# Patient Record
Sex: MALE | Race: BLACK OR AFRICAN AMERICAN | NOT HISPANIC OR LATINO | Employment: OTHER | URBAN - METROPOLITAN AREA
[De-identification: names, ages, dates, MRNs, and addresses within clinical notes are randomized per-mention and may not be internally consistent; named-entity substitution may affect disease eponyms.]

---

## 2019-10-29 ENCOUNTER — OFFICE VISIT (OUTPATIENT)
Dept: WOUND CARE | Facility: HOSPITAL | Age: 64
End: 2019-10-29

## 2019-10-29 PROCEDURE — G0463 HOSPITAL OUTPT CLINIC VISIT: HCPCS

## 2019-11-05 ENCOUNTER — TRANSCRIBE ORDERS (OUTPATIENT)
Dept: ADMINISTRATIVE | Facility: HOSPITAL | Age: 64
End: 2019-11-05

## 2019-11-05 ENCOUNTER — OFFICE VISIT (OUTPATIENT)
Dept: WOUND CARE | Facility: HOSPITAL | Age: 64
End: 2019-11-05

## 2019-11-05 DIAGNOSIS — I70.203 BILATERAL FEMORAL ARTERY STENOSIS (HCC): Primary | ICD-10-CM

## 2019-11-05 PROCEDURE — G0463 HOSPITAL OUTPT CLINIC VISIT: HCPCS

## 2019-11-07 ENCOUNTER — APPOINTMENT (OUTPATIENT)
Dept: CARDIOLOGY | Facility: HOSPITAL | Age: 64
End: 2019-11-07

## 2019-11-11 ENCOUNTER — APPOINTMENT (OUTPATIENT)
Dept: CARDIOLOGY | Facility: HOSPITAL | Age: 64
End: 2019-11-11

## 2019-11-19 ENCOUNTER — OFFICE VISIT (OUTPATIENT)
Dept: WOUND CARE | Facility: HOSPITAL | Age: 64
End: 2019-11-19

## 2019-11-19 ENCOUNTER — LAB REQUISITION (OUTPATIENT)
Dept: LAB | Facility: HOSPITAL | Age: 64
End: 2019-11-19

## 2019-11-19 DIAGNOSIS — E11.621 TYPE 2 DIABETES MELLITUS WITH FOOT ULCER (CODE) (HCC): ICD-10-CM

## 2019-11-19 PROCEDURE — 87070 CULTURE OTHR SPECIMN AEROBIC: CPT | Performed by: SURGERY

## 2019-11-19 PROCEDURE — G0463 HOSPITAL OUTPT CLINIC VISIT: HCPCS

## 2019-11-19 PROCEDURE — 87205 SMEAR GRAM STAIN: CPT | Performed by: SURGERY

## 2019-11-19 PROCEDURE — 87075 CULTR BACTERIA EXCEPT BLOOD: CPT | Performed by: SURGERY

## 2019-11-19 PROCEDURE — 87077 CULTURE AEROBIC IDENTIFY: CPT | Performed by: SURGERY

## 2019-11-19 PROCEDURE — 87186 SC STD MICRODIL/AGAR DIL: CPT | Performed by: SURGERY

## 2019-11-22 LAB
BACTERIA SPEC AEROBE CULT: ABNORMAL
BACTERIA SPEC AEROBE CULT: ABNORMAL
GRAM STN SPEC: ABNORMAL
GRAM STN SPEC: ABNORMAL

## 2019-11-24 LAB — BACTERIA SPEC ANAEROBE CULT: NORMAL

## 2019-11-26 ENCOUNTER — HOSPITAL ENCOUNTER (OUTPATIENT)
Dept: CARDIOLOGY | Facility: HOSPITAL | Age: 64
Discharge: HOME | End: 2019-11-26
Admitting: SURGERY

## 2019-11-26 DIAGNOSIS — I70.203 BILATERAL FEMORAL ARTERY STENOSIS (HCC): ICD-10-CM

## 2019-11-26 DIAGNOSIS — I73.9 PAD (PERIPHERAL ARTERY DISEASE) (HCC): ICD-10-CM

## 2019-11-26 LAB
BH CV LOWER ARTERIAL LEFT ABI RATIO: 1.2
BH CV LOWER ARTERIAL LEFT DORSALIS PEDIS SYS MAX: 170 MMHG
BH CV LOWER ARTERIAL LEFT GREAT TOE SYS MAX: 138 MMHG
BH CV LOWER ARTERIAL LEFT POST TIBIAL SYS MAX: 173 MMHG
BH CV LOWER ARTERIAL LEFT TBI RATIO: 1.2
BH CV LOWER ARTERIAL RIGHT ABI RATIO: 1.1
BH CV LOWER ARTERIAL RIGHT DORSALIS PEDIS SYS MAX: 167 MMHG
BH CV LOWER ARTERIAL RIGHT GREAT TOE SYS MAX: 132 MMHG
BH CV LOWER ARTERIAL RIGHT POST TIBIAL SYS MAX: 166 MMHG
BH CV LOWER ARTERIAL RIGHT TBI RATIO: 1.1
UPPER ARTERIAL LEFT ARM BRACHIAL SYS MAX: 145 MMHG
UPPER ARTERIAL RIGHT ARM BRACHIAL SYS MAX: 140 MMHG

## 2019-11-26 PROCEDURE — 93922 UPR/L XTREMITY ART 2 LEVELS: CPT

## 2019-12-03 ENCOUNTER — OFFICE VISIT (OUTPATIENT)
Dept: WOUND CARE | Facility: HOSPITAL | Age: 64
End: 2019-12-03

## 2019-12-03 PROCEDURE — G0463 HOSPITAL OUTPT CLINIC VISIT: HCPCS

## 2019-12-10 ENCOUNTER — TELEPHONE (OUTPATIENT)
Dept: INFECTIOUS DISEASES | Facility: CLINIC | Age: 64
End: 2019-12-10

## 2019-12-10 NOTE — TELEPHONE ENCOUNTER
Julia swenson/wound care called to inquire if the patient had been scheduled for new patient appt at this time.  Informed her that referral had been sent to Dr. Hurst for review.

## 2019-12-17 ENCOUNTER — LAB (OUTPATIENT)
Dept: LAB | Facility: HOSPITAL | Age: 64
End: 2019-12-17

## 2019-12-17 ENCOUNTER — OFFICE VISIT (OUTPATIENT)
Dept: WOUND CARE | Facility: HOSPITAL | Age: 64
End: 2019-12-17

## 2019-12-17 ENCOUNTER — TRANSCRIBE ORDERS (OUTPATIENT)
Dept: ADMINISTRATIVE | Facility: HOSPITAL | Age: 64
End: 2019-12-17

## 2019-12-17 DIAGNOSIS — L97.509 DIABETIC FOOT ULCER WITH OSTEOMYELITIS (HCC): Primary | ICD-10-CM

## 2019-12-17 DIAGNOSIS — M86.9 DIABETIC FOOT ULCER WITH OSTEOMYELITIS (HCC): Primary | ICD-10-CM

## 2019-12-17 DIAGNOSIS — M86.9 DIABETIC FOOT ULCER WITH OSTEOMYELITIS (HCC): ICD-10-CM

## 2019-12-17 DIAGNOSIS — E11.621 DIABETIC FOOT ULCER WITH OSTEOMYELITIS (HCC): ICD-10-CM

## 2019-12-17 DIAGNOSIS — L97.509 DIABETIC FOOT ULCER WITH OSTEOMYELITIS (HCC): ICD-10-CM

## 2019-12-17 DIAGNOSIS — E11.69 DIABETIC FOOT ULCER WITH OSTEOMYELITIS (HCC): Primary | ICD-10-CM

## 2019-12-17 DIAGNOSIS — E11.69 DIABETIC FOOT ULCER WITH OSTEOMYELITIS (HCC): ICD-10-CM

## 2019-12-17 DIAGNOSIS — E11.621 DIABETIC FOOT ULCER WITH OSTEOMYELITIS (HCC): Primary | ICD-10-CM

## 2019-12-17 DIAGNOSIS — L02.611 ABSCESS OF RIGHT FOOT: ICD-10-CM

## 2019-12-17 DIAGNOSIS — L97.422 ULCER OF LEFT HEEL, WITH FAT LAYER EXPOSED (HCC): ICD-10-CM

## 2019-12-17 LAB
CRP SERPL-MCNC: 0.17 MG/DL (ref 0–0.5)
ERYTHROCYTE [SEDIMENTATION RATE] IN BLOOD: 33 MM/HR (ref 0–20)
HBA1C MFR BLD: 8.6 % (ref 4.8–5.6)

## 2019-12-17 PROCEDURE — 86140 C-REACTIVE PROTEIN: CPT

## 2019-12-17 PROCEDURE — G0463 HOSPITAL OUTPT CLINIC VISIT: HCPCS

## 2019-12-17 PROCEDURE — 83036 HEMOGLOBIN GLYCOSYLATED A1C: CPT

## 2019-12-17 PROCEDURE — 85652 RBC SED RATE AUTOMATED: CPT

## 2019-12-17 PROCEDURE — 36415 COLL VENOUS BLD VENIPUNCTURE: CPT

## 2019-12-23 ENCOUNTER — OFFICE VISIT (OUTPATIENT)
Dept: INFECTIOUS DISEASES | Facility: CLINIC | Age: 64
End: 2019-12-23

## 2019-12-23 ENCOUNTER — APPOINTMENT (OUTPATIENT)
Dept: LAB | Facility: HOSPITAL | Age: 64
End: 2019-12-23

## 2019-12-23 VITALS
TEMPERATURE: 98 F | SYSTOLIC BLOOD PRESSURE: 180 MMHG | HEART RATE: 76 BPM | HEIGHT: 68 IN | WEIGHT: 196.4 LBS | BODY MASS INDEX: 29.77 KG/M2 | DIASTOLIC BLOOD PRESSURE: 100 MMHG

## 2019-12-23 DIAGNOSIS — T07.XXXA WOUNDS, MULTIPLE: Primary | ICD-10-CM

## 2019-12-23 DIAGNOSIS — Z79.2 LONG TERM CURRENT USE OF ANTIBIOTICS: ICD-10-CM

## 2019-12-23 LAB
ALBUMIN SERPL-MCNC: 3.8 G/DL (ref 3.5–5.2)
ALBUMIN/GLOB SERPL: 1 G/DL
ALP SERPL-CCNC: 111 U/L (ref 39–117)
ALT SERPL W P-5'-P-CCNC: 23 U/L (ref 1–41)
ANION GAP SERPL CALCULATED.3IONS-SCNC: 12.7 MMOL/L (ref 5–15)
AST SERPL-CCNC: 19 U/L (ref 1–40)
BASOPHILS # BLD AUTO: 0.02 10*3/MM3 (ref 0–0.2)
BASOPHILS NFR BLD AUTO: 0.3 % (ref 0–1.5)
BILIRUB SERPL-MCNC: 0.4 MG/DL (ref 0.2–1.2)
BUN BLD-MCNC: 15 MG/DL (ref 8–23)
BUN/CREAT SERPL: 11.7 (ref 7–25)
CALCIUM SPEC-SCNC: 8.8 MG/DL (ref 8.6–10.5)
CHLORIDE SERPL-SCNC: 104 MMOL/L (ref 98–107)
CO2 SERPL-SCNC: 24.3 MMOL/L (ref 22–29)
CREAT BLD-MCNC: 1.28 MG/DL (ref 0.76–1.27)
DEPRECATED RDW RBC AUTO: 39.1 FL (ref 37–54)
EOSINOPHIL # BLD AUTO: 0.57 10*3/MM3 (ref 0–0.4)
EOSINOPHIL NFR BLD AUTO: 8 % (ref 0.3–6.2)
ERYTHROCYTE [DISTWIDTH] IN BLOOD BY AUTOMATED COUNT: 13.2 % (ref 12.3–15.4)
GFR SERPL CREATININE-BSD FRML MDRD: 68 ML/MIN/1.73
GLOBULIN UR ELPH-MCNC: 4 GM/DL
GLUCOSE BLD-MCNC: 169 MG/DL (ref 65–99)
HCT VFR BLD AUTO: 34.7 % (ref 37.5–51)
HGB BLD-MCNC: 11.9 G/DL (ref 13–17.7)
IMM GRANULOCYTES # BLD AUTO: 0.04 10*3/MM3 (ref 0–0.05)
IMM GRANULOCYTES NFR BLD AUTO: 0.6 % (ref 0–0.5)
LYMPHOCYTES # BLD AUTO: 1.67 10*3/MM3 (ref 0.7–3.1)
LYMPHOCYTES NFR BLD AUTO: 23.3 % (ref 19.6–45.3)
MCH RBC QN AUTO: 28.3 PG (ref 26.6–33)
MCHC RBC AUTO-ENTMCNC: 34.3 G/DL (ref 31.5–35.7)
MCV RBC AUTO: 82.6 FL (ref 79–97)
MONOCYTES # BLD AUTO: 0.77 10*3/MM3 (ref 0.1–0.9)
MONOCYTES NFR BLD AUTO: 10.8 % (ref 5–12)
NEUTROPHILS # BLD AUTO: 4.09 10*3/MM3 (ref 1.7–7)
NEUTROPHILS NFR BLD AUTO: 57 % (ref 42.7–76)
NRBC BLD AUTO-RTO: 0 /100 WBC (ref 0–0.2)
PLATELET # BLD AUTO: 161 10*3/MM3 (ref 140–450)
PMV BLD AUTO: 10.6 FL (ref 6–12)
POTASSIUM BLD-SCNC: 3.6 MMOL/L (ref 3.5–5.2)
PROT SERPL-MCNC: 7.8 G/DL (ref 6–8.5)
RBC # BLD AUTO: 4.2 10*6/MM3 (ref 4.14–5.8)
SODIUM BLD-SCNC: 141 MMOL/L (ref 136–145)
WBC NRBC COR # BLD: 7.16 10*3/MM3 (ref 3.4–10.8)

## 2019-12-23 PROCEDURE — 99203 OFFICE O/P NEW LOW 30 MIN: CPT | Performed by: INTERNAL MEDICINE

## 2019-12-23 PROCEDURE — 80053 COMPREHEN METABOLIC PANEL: CPT | Performed by: INTERNAL MEDICINE

## 2019-12-23 PROCEDURE — 85025 COMPLETE CBC W/AUTO DIFF WBC: CPT | Performed by: INTERNAL MEDICINE

## 2019-12-23 PROCEDURE — 36415 COLL VENOUS BLD VENIPUNCTURE: CPT | Performed by: INTERNAL MEDICINE

## 2019-12-23 RX ORDER — POTASSIUM CHLORIDE 750 MG/1
10 TABLET, FILM COATED, EXTENDED RELEASE ORAL 2 TIMES DAILY
COMMUNITY
End: 2020-11-02

## 2019-12-23 RX ORDER — TAMSULOSIN HYDROCHLORIDE 0.4 MG/1
0.4 CAPSULE ORAL DAILY
COMMUNITY
End: 2021-02-26 | Stop reason: SDUPTHER

## 2019-12-23 RX ORDER — HYDROCODONE BITARTRATE AND ACETAMINOPHEN 7.5; 325 MG/1; MG/1
.5-1 TABLET ORAL
COMMUNITY
Start: 2016-03-25 | End: 2022-07-25

## 2019-12-23 RX ORDER — GABAPENTIN 100 MG/1
100 CAPSULE ORAL 3 TIMES DAILY
COMMUNITY
End: 2020-11-02

## 2019-12-23 RX ORDER — PIOGLITAZONEHYDROCHLORIDE 15 MG/1
15 TABLET ORAL DAILY
COMMUNITY
End: 2021-03-04 | Stop reason: SDUPTHER

## 2019-12-23 RX ORDER — HYDROCHLOROTHIAZIDE 25 MG/1
12.5 TABLET ORAL DAILY
COMMUNITY
End: 2020-11-02

## 2019-12-23 RX ORDER — DOXYCYCLINE HYCLATE 100 MG/1
100 CAPSULE ORAL 2 TIMES DAILY
COMMUNITY
End: 2020-11-02

## 2019-12-23 RX ORDER — SPIRONOLACTONE 25 MG/1
25 TABLET ORAL DAILY
COMMUNITY
End: 2020-12-03

## 2019-12-23 RX ORDER — ATORVASTATIN CALCIUM 20 MG/1
20 TABLET, FILM COATED ORAL DAILY
COMMUNITY
End: 2021-03-04 | Stop reason: SDUPTHER

## 2019-12-23 RX ORDER — AMLODIPINE BESYLATE 10 MG/1
10 TABLET ORAL DAILY
COMMUNITY
End: 2021-03-04 | Stop reason: SDUPTHER

## 2019-12-23 RX ORDER — LOSARTAN POTASSIUM 100 MG/1
100 TABLET ORAL DAILY
COMMUNITY
End: 2021-03-04 | Stop reason: SDUPTHER

## 2019-12-23 NOTE — PROGRESS NOTES
Referring Provider: Kam Vasquez Sr., MD  [address] [place] [address]  Reason for clinic visits: Follow-up infectious disease clinic visit regarding bilateral foot wounds.    HPI: Arden Carrasquillo is a 64 y.o. male who presents to the infectious disease clinic today for evaluation of bilateral foot wounds.  Patient states that he developed blisters on his left lower extremity over his shin closer to his ankle about 2 to 3 months ago.  These were pruritic.  He scratched them creating wounds.  He states he was treated by his primary care provider with an antibiotic -Keflex?  Those wounds healed but he developed more blisters on the dorsal aspect of his left foot followed by blisters developing on his right foot 2 weeks later.  Again these were pruritic and the patient scratched them creating wounds.  He has been seen at the wound care clinic since November.  Bacterial cultures were obtained on November 19 and grew Citrobacter.  The patient has been on doxycycline 100 mg p.o. twice daily for the last 3 weeks or so.  He states overall he feels like he is doing better although continues to have blisters with wounds on his feet bilaterally.  No other skin involvement.  He denies any fevers chills or night sweats.  He states these lesions are painful.  Otherwise he is doing well and denies any abdominal pain nausea vomiting or diarrhea.  No other rashes or skin lesions    Past Medical History:   Diagnosis Date   • Diabetes mellitus (CMS/[facility])    Hypertension  History of kidney failure  Arthritis  Status post right shoulder surgery    No past surgical history on file.    Social History   reports that he has never smoked. He does not have any smokeless tobacco history on file. Alcohol use questions deferred to the physician. He reports that he has current or past drug history. Drug: Hydrocodone.    Family History  family history is not on file.    No Known Allergies    The medication list has been reviewed and  updated.     Review of Systems  Pertinent items are noted in HPI, all other systems reviewed and negative    Vital Signs   Temp:  [98 °F (36.7 °C)] 98 °F (36.7 °C)  Heart Rate:  [76] 76  BP: (180)/(100) 180/100    Physical Exam:   General: In no acute distress  HEENT: Normocephalic, atraumatic, PERRL,  no scleral icterus. Oropharynx is clear and moist  Neck: Supple, trachea is midline  Cardiovascular: Normal rate, regular rhythm, herberth S1 and S2, no murmurs, rubs, or gallops    Respiratory: Lungs are clear to ascultation bilaterally, no wheezing   GI: Abdomen is soft, non-tender, non-distended, positive bowel sounds bilaterally,   Musculoskeletal:  no edema, tenderness or deformity  Skin: No rashes, blisters on dorsum of both feet bilaterally with hyperpigmentation, old blisters scabbed over without any purulence or erythema minimal swelling  LE: no E/C/C  Neurological: Alert and oriented, being all 4 extremities  Psychiatric: Normal mood and affect     Lab Results   Component Value Date    WBC 7.16 12/23/2019    HGB 11.9 (L) 12/23/2019    HCT 34.7 (L) 12/23/2019    MCV 82.6 12/23/2019     12/23/2019       Lab Results   Component Value Date    GLUCOSE 169 (H) 12/23/2019    BUN 15 12/23/2019    CREATININE 1.28 (H) 12/23/2019    EGFRIFAFRI 68 12/23/2019    BCR 11.7 12/23/2019    CO2 24.3 12/23/2019    CALCIUM 8.8 12/23/2019    ALBUMIN 3.80 12/23/2019    AST 19 12/23/2019    ALT 23 12/23/2019       Lab Results   Component Value Date    SEDRATE 33 (H) 12/17/2019       Lab Results   Component Value Date    CRP 0.17 12/17/2019 11/19 Left foot wound culture: Citrobacter    Assessment:  This is a 64 y.o. male who presents to clinic today for valuation of blistering wounds on his feet bilaterally.  These wounds definitely do not have the typical appearance of a diabetic foot wound.  First of all the location of them is unusual and the fact that they are symmetric makes it even more unlikely to be simply a result  of his diabetes.  His wound started off as blisters and I am wondering if there is some underlying skin condition.  At this time I would recommend referral to dermatology for evaluation.  Currently there are no signs or symptoms of bacterial infection and he seems to be responding well to doxycycline.  I believe the Citrobacter in his wound represents a bacterial superinfection and not the primary cause of these lesions.  I would recommend him to continue his doxycycline and complete the course in the next few days.  We will await dermatology referral.  In the meantime I asked him to call the infectious disease clinic with any worsening symptoms.    Plan:   1.  Complete a course of doxycycline as originally planned  2.  Ambulatory referral to dermatology  3.  Wound care per wound care clinic recommendations    Return to Infectious Disease clinic in 1 month     Time: More than 50% of time spent in counseling and coordination of care:  Total face-to-face/floor time 45 min.  Time spent in counseling 45 min. Counseling included the following topics: Diabetic foot wounds, bacterial superinfection, blistering skin condition and the need to refer to dermatology

## 2019-12-26 ENCOUNTER — TELEPHONE (OUTPATIENT)
Dept: INFECTIOUS DISEASES | Facility: CLINIC | Age: 64
End: 2019-12-26

## 2019-12-26 NOTE — TELEPHONE ENCOUNTER
LVM for pt requesting that he contact the office regarding appt for Derm;    pts scheduled with Dr. Roxi Bates   01/02/20 @ 3:30  [address] [facility], [facility]  [phone]

## 2020-01-21 ENCOUNTER — OFFICE VISIT (OUTPATIENT)
Dept: WOUND CARE | Facility: HOSPITAL | Age: 65
End: 2020-01-21

## 2020-01-21 PROCEDURE — G0463 HOSPITAL OUTPT CLINIC VISIT: HCPCS

## 2020-02-25 ENCOUNTER — APPOINTMENT (OUTPATIENT)
Dept: WOUND CARE | Facility: HOSPITAL | Age: 65
End: 2020-02-25

## 2020-11-02 ENCOUNTER — OFFICE VISIT (OUTPATIENT)
Dept: INTERNAL MEDICINE | Facility: CLINIC | Age: 65
End: 2020-11-02

## 2020-11-02 ENCOUNTER — RESULTS ENCOUNTER (OUTPATIENT)
Dept: INTERNAL MEDICINE | Facility: CLINIC | Age: 65
End: 2020-11-02

## 2020-11-02 VITALS
WEIGHT: 197.8 LBS | SYSTOLIC BLOOD PRESSURE: 144 MMHG | OXYGEN SATURATION: 99 % | DIASTOLIC BLOOD PRESSURE: 78 MMHG | BODY MASS INDEX: 29.98 KG/M2 | HEART RATE: 80 BPM | HEIGHT: 68 IN

## 2020-11-02 DIAGNOSIS — N18.31 STAGE 3A CHRONIC KIDNEY DISEASE (HCC): ICD-10-CM

## 2020-11-02 DIAGNOSIS — N13.8 BPH WITH OBSTRUCTION/LOWER URINARY TRACT SYMPTOMS: ICD-10-CM

## 2020-11-02 DIAGNOSIS — E11.42 TYPE 2 DIABETES MELLITUS WITH DIABETIC POLYNEUROPATHY, WITH LONG-TERM CURRENT USE OF INSULIN (HCC): ICD-10-CM

## 2020-11-02 DIAGNOSIS — Z79.4 TYPE 2 DIABETES MELLITUS WITH DIABETIC POLYNEUROPATHY, WITH LONG-TERM CURRENT USE OF INSULIN (HCC): ICD-10-CM

## 2020-11-02 DIAGNOSIS — Z12.11 ENCOUNTER FOR SCREENING FOR COLORECTAL MALIGNANT NEOPLASM: ICD-10-CM

## 2020-11-02 DIAGNOSIS — M43.10 DEGENERATIVE SPONDYLOLISTHESIS: ICD-10-CM

## 2020-11-02 DIAGNOSIS — Z12.12 ENCOUNTER FOR SCREENING FOR COLORECTAL MALIGNANT NEOPLASM: ICD-10-CM

## 2020-11-02 DIAGNOSIS — N40.1 BPH WITH OBSTRUCTION/LOWER URINARY TRACT SYMPTOMS: ICD-10-CM

## 2020-11-02 DIAGNOSIS — I10 ESSENTIAL HYPERTENSION: ICD-10-CM

## 2020-11-02 DIAGNOSIS — F41.1 GENERALIZED ANXIETY DISORDER: Primary | ICD-10-CM

## 2020-11-02 PROBLEM — E78.5 HYPERLIPIDEMIA: Status: ACTIVE | Noted: 2020-11-02

## 2020-11-02 PROCEDURE — 99204 OFFICE O/P NEW MOD 45 MIN: CPT | Performed by: FAMILY MEDICINE

## 2020-11-02 RX ORDER — MIRTAZAPINE 15 MG/1
TABLET, FILM COATED ORAL
Qty: 60 TABLET | Refills: 1 | Status: SHIPPED | OUTPATIENT
Start: 2020-11-02 | End: 2021-03-01

## 2020-11-02 RX ORDER — PREGABALIN 50 MG/1
50 CAPSULE ORAL 2 TIMES DAILY PRN
Qty: 60 CAPSULE | Refills: 0 | Status: SHIPPED | OUTPATIENT
Start: 2020-11-02 | End: 2020-11-30

## 2020-11-02 RX ORDER — ALPRAZOLAM 0.25 MG/1
0.25 TABLET ORAL DAILY
COMMUNITY
End: 2020-12-03

## 2020-11-02 NOTE — PROGRESS NOTES
Venipuncture performed in LAC by KD with good hemostasis. Patient tolerated well. 11/2/2020 Shae SPAIN LPN.

## 2020-11-02 NOTE — PROGRESS NOTES
Date of Encounter: 2020  Patient: Arden Carrasquillo,  1955    Subjective   History of Presenting Illness  Chief complaint: Neuropathy, anxiety    Type 2 diabetes: Follows with endocrinology.  Last point-of-care A1c 7.7% 2020.  No hypoglycemic episodes.  Foot exam 2020, eye exam pending in 1 month.  No previous history of diabetic retinopathy.    Diabetic neuropathy: On gabapentin 100 mg 3 times daily, feels his neuropathy is very poorly controlled.  Keeps him awake at night.    Anxiety: On alprazolam which she takes 2-3 times per week for anxiety.  Feels his mood is fairly stressed.  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  Working part-time as a .  Has never been on other long-term medications for mood.    BPH with LUTS, does not follow with urology.    Degenerative spondylolisthesis on chronic opioids followed by pain management.    Stage III CKD previously referred to nephrology but has not seen them.    Single.  Never smoker.  Does not drink.  Diabetic diet.  Does not exercise but tries to take the stairs at work.  Works part-time as a .  Does not have a living will but is working on the paperwork.  Has not had colon cancer screening within the past 10 years.  Would like PPSV23 and influenza vaccines would like to wait until next appointment.    Review of Systems:  Negative for fever, congestion, chest pain upon exertion, shortness of breath, vision changes, vomiting, dysuria, lymphadenopathy, muscle weakness,  rashes.    Positive for anxiety and depression, numbness    The following portions of the patient's history were reviewed and updated as appropriate: allergies, current medications, past family history, past medical history, past social history, past surgical history and problem list.    Patient Active Problem List   Diagnosis   • Type 2 diabetes mellitus with diabetic autonomic neuropathy, with long-term current use of insulin (CMS/[facility])   • Degenerative spondylolisthesis   •  BPH with obstruction/lower urinary tract symptoms   • Hypertension   • Generalized anxiety disorder   • Stage 3a chronic kidney disease     Past Medical History:   Diagnosis Date   • Diabetes mellitus (CMS/HCC)      No past surgical history on file.  No family history on file.    Current Outpatient Medications:   •  ALPRAZolam (XANAX) 0.25 MG tablet, Take 0.25 mg by mouth Daily., Disp: , Rfl:   •  amLODIPine (NORVASC) 10 MG tablet, Take 10 mg by mouth Daily., Disp: , Rfl:   •  atorvastatin (LIPITOR) 20 MG tablet, Take 20 mg by mouth Daily., Disp: , Rfl:   •  Cholecalciferol (VITAMIN D3 GUMMIES PO), Take  by mouth., Disp: , Rfl:   •  HYDROcodone-acetaminophen (NORCO) 5-325 MG per tablet, Take 0.5-1 tablets by mouth., Disp: , Rfl:   •  Insulin Glargine, 2 Unit Dial, (TOUJEO MAX SOLOSTAR) 300 UNIT/ML solution pen-injector injection, Inject  under the skin into the appropriate area as directed., Disp: , Rfl:   •  insulin lispro (humaLOG) 100 UNIT/ML injection, Inject 15 Units under the skin into the appropriate area as directed 2 (Two) Times a Day., Disp: , Rfl:   •  losartan (COZAAR) 100 MG tablet, Take 100 mg by mouth Daily., Disp: , Rfl:   •  Menthol-Methyl Salicylate (SALONPAS JET SPRAY EX), Apply  topically., Disp: , Rfl:   •  Multiple Vitamins-Minerals (MENS MULTIVITAMIN PLUS PO), Take  by mouth., Disp: , Rfl:   •  pioglitazone (ACTOS) 15 MG tablet, Take 15 mg by mouth Daily., Disp: , Rfl:   •  spironolactone (ALDACTONE) 25 MG tablet, Take 25 mg by mouth Daily., Disp: , Rfl:   •  tamsulosin (FLOMAX) 0.4 MG capsule 24 hr capsule, Take 0.4 mg by mouth Daily., Disp: , Rfl:   •  Terbinafine HCl (LAMISIL AT SPRAY EX), Apply  topically., Disp: , Rfl:   •  mirtazapine (REMERON) 15 MG tablet, Take 1 tab PO nightly, Disp: 60 tablet, Rfl: 1  •  pregabalin (LYRICA) 50 MG capsule, Take 1 capsule by mouth 2 (Two) Times a Day As Needed (neuropathy)., Disp: 60 capsule, Rfl: 0  No Known Allergies  Social History     Tobacco Use  "  • Smoking status: Never Smoker   Substance Use Topics   • Alcohol use: Defer   • Drug use: Yes     Types: Hydrocodone          Objective   Physical Exam  Vitals:    11/02/20 1119   BP: 144/78   Pulse: 80   SpO2: 99%   Weight: 89.7 kg (197 lb 12.8 oz)   Height: 172.7 cm (68\")     Body mass index is 30.08 kg/m².    Constitutional: NAD.  Eyes: EOMI. PERRLA. Normal conjunctiva.  Ear, nose, mouth, throat: No tonsillar exudates or erythema.   Normal nasal mucosa. Normal external ear canals and TMs bilaterally.  Cardiovascular: RRR. No murmurs. No LE edema b/l. Radial pulses 2+ bilaterally.  Pulmonary: CTA b/l. Good effort.  Integumentary: No rashes or wounds on face or upper extremities.  Lymphatic: No anterior cervical lymphadenopathy.  Endocrine: No thyromegaly or palpable thyroid nodules.  Psychiatric: Normal affect. Normal thought content.  Extremities: Onychomycosis but no evidence of callus or ulceration of the left foot.  Wears compression stockings.  DP 2+.       Assessment/Plan   Assessment and Plan  Very pleasant 65-year-old male with type 2 diabetes with polyneuropathy, hypertension, hyperlipidemia, BPH, chronic lower back pain on opioid therapy, CKD 3, anxiety and depression, who presents with the following:    Diagnoses and all orders for this visit:    1. Generalized anxiety disorder (Primary): Discussed risks and benefits of mirtazapine.  If effective we will encourage continued wean down on alprazolam.  -     Thyroid Panel With TSH  -     mirtazapine (REMERON) 15 MG tablet; Take 1 tab PO nightly  Dispense: 60 tablet; Refill: 1    2. Type 2 diabetes mellitus with diabetic polyneuropathy, with long-term current use of insulin (CMS/[facility]): Continue to follow with endocrinology.  Discussed the risks and benefits of pregabalin for neuropathy as gabapentin has not been effective, particularly at lower dose.  If we decide to make this a long-term medication we will have to do a contract, drug screen.  -     " Hemoglobin A1c  -     Lipid Panel  -     pregabalin (LYRICA) 50 MG capsule; Take 1 capsule by mouth 2 (Two) Times a Day As Needed (neuropathy).  Dispense: 60 capsule; Refill: 0    3. Essential hypertension: Controlled per J[facility] 8    4. BPH with obstruction/lower urinary tract symptoms  -     PSA DIAGNOSTIC    5. Degenerative spondylolisthesis: Continue to follow with pain management    6. Stage 3a chronic kidney disease  -     Comprehensive Metabolic Panel  -     CBC & Differential  -     Urinalysis With Microscopic - Urine, Clean Catch    7. Encounter for screening for colorectal malignant neoplasm  -     Cologuard - Stool, Per Rectum; Future    Follow-up in 1 month for annual Medicare wellness    Pollo Ward MD  Family Medicine  O: [phone]  C: [phone]    Disclaimer: Parts of this note were dictated by speech recognition. Minor errors in transcription may be present. Please call if questions.

## 2020-11-03 DIAGNOSIS — Z79.4 TYPE 2 DIABETES MELLITUS WITH DIABETIC AUTONOMIC NEUROPATHY, WITH LONG-TERM CURRENT USE OF INSULIN (HCC): ICD-10-CM

## 2020-11-03 DIAGNOSIS — N18.31 STAGE 3A CHRONIC KIDNEY DISEASE (HCC): Primary | ICD-10-CM

## 2020-11-03 DIAGNOSIS — I10 ESSENTIAL HYPERTENSION: ICD-10-CM

## 2020-11-03 DIAGNOSIS — E11.43 TYPE 2 DIABETES MELLITUS WITH DIABETIC AUTONOMIC NEUROPATHY, WITH LONG-TERM CURRENT USE OF INSULIN (HCC): ICD-10-CM

## 2020-11-03 PROBLEM — R97.20 ELEVATED PSA, LESS THAN 10 NG/ML: Status: ACTIVE | Noted: 2020-11-03

## 2020-11-03 PROBLEM — D64.9 ANEMIA: Status: ACTIVE | Noted: 2020-11-03

## 2020-11-03 LAB
ALBUMIN SERPL-MCNC: 4.2 G/DL (ref 3.8–4.8)
ALBUMIN/GLOB SERPL: 1.1 {RATIO} (ref 1.2–2.2)
ALP SERPL-CCNC: 119 IU/L (ref 39–117)
ALT SERPL-CCNC: 17 IU/L (ref 0–44)
APPEARANCE UR: CLEAR
AST SERPL-CCNC: 15 IU/L (ref 0–40)
BACTERIA #/AREA URNS HPF: NORMAL /[HPF]
BASOPHILS # BLD AUTO: 0 X10E3/UL (ref 0–0.2)
BASOPHILS NFR BLD AUTO: 0 %
BILIRUB SERPL-MCNC: 0.2 MG/DL (ref 0–1.2)
BILIRUB UR QL STRIP: NEGATIVE
BUN SERPL-MCNC: 33 MG/DL (ref 8–27)
BUN/CREAT SERPL: 16 (ref 10–24)
CALCIUM SERPL-MCNC: 9.1 MG/DL (ref 8.6–10.2)
CHLORIDE SERPL-SCNC: 107 MMOL/L (ref 96–106)
CHOLEST SERPL-MCNC: 122 MG/DL (ref 100–199)
CO2 SERPL-SCNC: 22 MMOL/L (ref 20–29)
COLOR UR: YELLOW
CREAT SERPL-MCNC: 2.02 MG/DL (ref 0.76–1.27)
EOSINOPHIL # BLD AUTO: 0.4 X10E3/UL (ref 0–0.4)
EOSINOPHIL NFR BLD AUTO: 6 %
EPI CELLS #/AREA URNS HPF: NORMAL /HPF (ref 0–10)
ERYTHROCYTE [DISTWIDTH] IN BLOOD BY AUTOMATED COUNT: 13 % (ref 11.6–15.4)
FT4I SERPL CALC-MCNC: 1.5 (ref 1.2–4.9)
GLOBULIN SER CALC-MCNC: 3.7 G/DL (ref 1.5–4.5)
GLUCOSE SERPL-MCNC: 156 MG/DL (ref 65–99)
GLUCOSE UR QL: NEGATIVE
HBA1C MFR BLD: 7.7 % (ref 4.8–5.6)
HCT VFR BLD AUTO: 31.9 % (ref 37.5–51)
HDLC SERPL-MCNC: 33 MG/DL
HGB BLD-MCNC: 10.1 G/DL (ref 13–17.7)
HGB UR QL STRIP: NEGATIVE
IMM GRANULOCYTES # BLD AUTO: 0 X10E3/UL (ref 0–0.1)
IMM GRANULOCYTES NFR BLD AUTO: 0 %
KETONES UR QL STRIP: NEGATIVE
LDLC SERPL CALC-MCNC: 56 MG/DL (ref 0–99)
LEUKOCYTE ESTERASE UR QL STRIP: NEGATIVE
LYMPHOCYTES # BLD AUTO: 1.6 X10E3/UL (ref 0.7–3.1)
LYMPHOCYTES NFR BLD AUTO: 23 %
MCH RBC QN AUTO: 26.7 PG (ref 26.6–33)
MCHC RBC AUTO-ENTMCNC: 31.7 G/DL (ref 31.5–35.7)
MCV RBC AUTO: 84 FL (ref 79–97)
MICRO URNS: NORMAL
MICRO URNS: NORMAL
MONOCYTES # BLD AUTO: 0.7 X10E3/UL (ref 0.1–0.9)
MONOCYTES NFR BLD AUTO: 10 %
MUCOUS THREADS URNS QL MICRO: PRESENT
NEUTROPHILS # BLD AUTO: 4.3 X10E3/UL (ref 1.4–7)
NEUTROPHILS NFR BLD AUTO: 61 %
NITRITE UR QL STRIP: NEGATIVE
PH UR STRIP: 5.5 [PH] (ref 5–7.5)
PLATELET # BLD AUTO: 164 X10E3/UL (ref 150–450)
POTASSIUM SERPL-SCNC: 4.8 MMOL/L (ref 3.5–5.2)
PROT SERPL-MCNC: 7.9 G/DL (ref 6–8.5)
PROT UR QL STRIP: NORMAL
PSA SERPL-MCNC: 6.2 NG/ML (ref 0–4)
RBC # BLD AUTO: 3.78 X10E6/UL (ref 4.14–5.8)
RBC #/AREA URNS HPF: NORMAL /HPF (ref 0–2)
SODIUM SERPL-SCNC: 141 MMOL/L (ref 134–144)
SP GR UR: 1.01 (ref 1–1.03)
T3RU NFR SERPL: 28 % (ref 24–39)
T4 SERPL-MCNC: 5.3 UG/DL (ref 4.5–12)
TRIGL SERPL-MCNC: 199 MG/DL (ref 0–149)
TSH SERPL DL<=0.005 MIU/L-ACNC: 2.95 UIU/ML (ref 0.45–4.5)
UROBILINOGEN UR STRIP-MCNC: 0.2 MG/DL (ref 0.2–1)
VLDLC SERPL CALC-MCNC: 33 MG/DL (ref 5–40)
WBC # BLD AUTO: 7.1 X10E3/UL (ref 3.4–10.8)
WBC #/AREA URNS HPF: NORMAL /HPF (ref 0–5)

## 2020-11-03 NOTE — PROGRESS NOTES
Discussed the results over the phone with the patient as previously agreed.    Worsening CKD  Elevated PSA, no prior values to compare to  Mild anemia    We will have him back in 1 month Medicare wellness for recheck on these labs  We will also send him to nephrology for CKD

## 2020-11-13 DIAGNOSIS — D64.9 ANEMIA, UNSPECIFIED TYPE: ICD-10-CM

## 2020-11-13 DIAGNOSIS — R19.5 POSITIVE COLORECTAL CANCER SCREENING USING COLOGUARD TEST: Primary | ICD-10-CM

## 2020-11-13 NOTE — PROGRESS NOTES
Discussed the results over the phone with the patient as previously agreed.    Positive Cologuard, also anemia unspecified but may be related to CKD, will send for colonoscopy ASAP

## 2020-11-29 DIAGNOSIS — E11.42 TYPE 2 DIABETES MELLITUS WITH DIABETIC POLYNEUROPATHY, WITH LONG-TERM CURRENT USE OF INSULIN (HCC): ICD-10-CM

## 2020-11-29 DIAGNOSIS — Z79.4 TYPE 2 DIABETES MELLITUS WITH DIABETIC POLYNEUROPATHY, WITH LONG-TERM CURRENT USE OF INSULIN (HCC): ICD-10-CM

## 2020-11-30 RX ORDER — PREGABALIN 50 MG/1
CAPSULE ORAL
Qty: 60 CAPSULE | Refills: 2 | Status: SHIPPED | OUTPATIENT
Start: 2020-11-30 | End: 2021-03-01

## 2020-12-03 ENCOUNTER — OFFICE VISIT (OUTPATIENT)
Dept: INTERNAL MEDICINE | Facility: CLINIC | Age: 65
End: 2020-12-03

## 2020-12-03 VITALS
WEIGHT: 197.8 LBS | HEIGHT: 68 IN | TEMPERATURE: 98 F | DIASTOLIC BLOOD PRESSURE: 82 MMHG | SYSTOLIC BLOOD PRESSURE: 126 MMHG | BODY MASS INDEX: 29.98 KG/M2 | HEART RATE: 81 BPM | OXYGEN SATURATION: 99 %

## 2020-12-03 DIAGNOSIS — R10.13 DYSPEPSIA: ICD-10-CM

## 2020-12-03 DIAGNOSIS — I10 ESSENTIAL HYPERTENSION: ICD-10-CM

## 2020-12-03 DIAGNOSIS — F41.1 GENERALIZED ANXIETY DISORDER: ICD-10-CM

## 2020-12-03 DIAGNOSIS — E11.43 TYPE 2 DIABETES MELLITUS WITH DIABETIC AUTONOMIC NEUROPATHY, WITH LONG-TERM CURRENT USE OF INSULIN (HCC): ICD-10-CM

## 2020-12-03 DIAGNOSIS — N62 GYNECOMASTIA: ICD-10-CM

## 2020-12-03 DIAGNOSIS — R97.20 ELEVATED PSA, LESS THAN 10 NG/ML: ICD-10-CM

## 2020-12-03 DIAGNOSIS — Z00.00 ENCOUNTER FOR SUBSEQUENT ANNUAL WELLNESS VISIT (AWV) IN MEDICARE PATIENT: Primary | ICD-10-CM

## 2020-12-03 DIAGNOSIS — Z23 NEED FOR 23-POLYVALENT PNEUMOCOCCAL POLYSACCHARIDE VACCINE: ICD-10-CM

## 2020-12-03 DIAGNOSIS — N18.31 STAGE 3A CHRONIC KIDNEY DISEASE (HCC): ICD-10-CM

## 2020-12-03 DIAGNOSIS — E78.5 HYPERLIPIDEMIA, UNSPECIFIED HYPERLIPIDEMIA TYPE: ICD-10-CM

## 2020-12-03 DIAGNOSIS — Z79.4 TYPE 2 DIABETES MELLITUS WITH DIABETIC AUTONOMIC NEUROPATHY, WITH LONG-TERM CURRENT USE OF INSULIN (HCC): ICD-10-CM

## 2020-12-03 DIAGNOSIS — R19.5 POSITIVE COLORECTAL CANCER SCREENING USING COLOGUARD TEST: ICD-10-CM

## 2020-12-03 DIAGNOSIS — Z23 INFLUENZA VACCINE NEEDED: ICD-10-CM

## 2020-12-03 PROCEDURE — 90732 PPSV23 VACC 2 YRS+ SUBQ/IM: CPT | Performed by: FAMILY MEDICINE

## 2020-12-03 PROCEDURE — 90653 IIV ADJUVANT VACCINE IM: CPT | Performed by: FAMILY MEDICINE

## 2020-12-03 PROCEDURE — G0008 ADMIN INFLUENZA VIRUS VAC: HCPCS | Performed by: FAMILY MEDICINE

## 2020-12-03 PROCEDURE — G0009 ADMIN PNEUMOCOCCAL VACCINE: HCPCS | Performed by: FAMILY MEDICINE

## 2020-12-03 PROCEDURE — G0439 PPPS, SUBSEQ VISIT: HCPCS | Performed by: FAMILY MEDICINE

## 2020-12-03 RX ORDER — PANTOPRAZOLE SODIUM 40 MG/1
40 TABLET, DELAYED RELEASE ORAL DAILY
Qty: 60 TABLET | Refills: 1 | Status: SHIPPED | OUTPATIENT
Start: 2020-12-03 | End: 2021-01-28 | Stop reason: SDUPTHER

## 2020-12-03 RX ORDER — ONDANSETRON 4 MG/1
4 TABLET, FILM COATED ORAL EVERY 8 HOURS PRN
Qty: 30 TABLET | Refills: 0 | Status: SHIPPED | OUTPATIENT
Start: 2020-12-03 | End: 2021-01-20

## 2020-12-03 NOTE — PROGRESS NOTES
Date of Encounter: 2020  Patient: Arden Carrasquillo,  1955    SUBSEQUENT MEDICARE WELLNESS VISIT  Subjective   History of Presenting Illness  Chief complaint: Medicare wellness    Patient complains of tenderness of his left breast for 2 weeks.  No nipple discharge.  No inciting event.    Type 2 diabetes: No hypoglycemic episodes.  Follows with endocrinology.  FBS 120s-180s, occasionally a low 200.  Up-to-date on eye and foot exam.    Generalized anxiety sorter: Improved with mirtazapine daily.    Elevated PSA: On tamsulosin for LUTS.  Has not seen a urologist.    Abnormal Cologuard test: Scheduled to see gastroenterology later this month.    Nausea, improved with eating, also associated with some heartburn.  Taking Tums which helps.    Single.  Never smoker.  Does not drink.  Diabetic diet.  Does not exercise but tries to take the stairs at work.  Works part-time as a .  Does not have a living will but is working on the paperwork.    Abnormal Cologuard.   PPSV23 and influenza vaccines today.    Review of Systems:  Negative for fever, congestion, cough, shortness of breath    Health Risk Assessment:    Recent Hospitalizations:  No hospitalization(s) within the last year.    Current Medical Providers:  Patient Care Team:  Pollo Ward MD as PCP - General (Family Medicine)    Smoking Status:  Social History     Tobacco Use   Smoking Status Never Smoker   Smokeless Tobacco Never Used       Alcohol Consumption:  Social History     Substance and Sexual Activity   Alcohol Use Defer       Depression Screen:   PHQ-2/PHQ-9 Depression Screening 2020   Little interest or pleasure in doing things 0   Feeling down, depressed, or hopeless 0   Total Score 0     I spent more than 16 minutes asking patient questions, counseling and documenting in the chart    Fall Risk Screen:  SYDNIE Fall Risk Assessment was completed, and patient is at LOW risk for falls.Assessment completed on:2020    Health Habits  and Functional and Cognitive Screening:  Functional & Cognitive Status 12/3/2020   Do you have difficulty preparing food and eating? No   Do you have difficulty bathing yourself, getting dressed or grooming yourself? No   Do you have difficulty using the toilet? No   Do you have difficulty moving around from place to place? No   Do you have trouble with steps or getting out of a bed or a chair? No   Do you need help using the phone?  No   Are you deaf or do you have serious difficulty hearing?  No   Do you need help with transportation? No   Do you need help shopping? No   Do you need help preparing meals?  No   Do you need help with housework?  No   Do you need help with laundry? No   Do you need help taking your medications? No   Do you need help managing money? No   Do you ever drive or ride in a car without wearing a seat belt? No   Do you have difficulty concentrating, remembering or making decisions? No       Does the patient have evidence of cognitive impairment? No    Aspirin use counseling:Discussed aspirin, will start after cscope    Recent Lab Results:  Lab Results   Component Value Date     (H) 11/02/2020    CHLPL 122 11/02/2020    TRIG 199 (H) 11/02/2020    HDL 33 (L) 11/02/2020    LDL 56 11/02/2020    VLDL 33 11/02/2020    HGBA1C 7.7 (H) 11/02/2020        Age-appropriate Screening Schedule:  Refer to the list below for future screening recommendations based on patient's age, sex and/or medical conditions. Orders for these recommended tests are listed in the plan section. The patient has been provided with a written plan.    Health Maintenance   Topic Date Due   • TDAP/TD VACCINES (1 - Tdap) 10/03/1972   • ZOSTER VACCINE (1 of 2) 10/03/2003   • INFLUENZA VACCINE  11/02/2021 (Originally 8/1/2020)   • HEMOGLOBIN A1C  05/02/2021   • DIABETIC FOOT EXAM  07/01/2021   • LIPID PANEL  11/02/2021   • DIABETIC EYE EXAM  12/03/2021   • URINE MICROALBUMIN  Discontinued       Compared to one year ago, the  patient feels his physical health is better.  Compared to one year ago, the patient feels his mental health is better.    Reviewed chart for potential of high risk medication in the elderly: yes  Reviewed chart for potential of harmful drug interactions in the elderly:yes    Advanced Care Planning:  Advance Care Planning   ACP discussion was held with the patient during this visit. Patient does not have an advance directive, information provided.    The following portions of the patient's history were reviewed and updated as appropriate: allergies, current medications, past family history, past medical history, past social history, past surgical history and problem list.    Patient Active Problem List   Diagnosis   • Type 2 diabetes mellitus with diabetic autonomic neuropathy, with long-term current use of insulin (CMS/[facility])   • Degenerative spondylolisthesis   • BPH with obstruction/lower urinary tract symptoms   • Hypertension   • Generalized anxiety disorder   • Stage 3a chronic kidney disease   • Hyperlipidemia   • Elevated PSA, less than 10 ng/ml   • Anemia     Past Medical History:   Diagnosis Date   • Diabetes mellitus (CMS/[facility])      No past surgical history on file.  No family history on file.    Current Outpatient Medications:   •  amLODIPine (NORVASC) 10 MG tablet, Take 10 mg by mouth Daily., Disp: , Rfl:   •  atorvastatin (LIPITOR) 20 MG tablet, Take 20 mg by mouth Daily., Disp: , Rfl:   •  Cholecalciferol (VITAMIN D3 GUMMIES PO), Take  by mouth., Disp: , Rfl:   •  HYDROcodone-acetaminophen (NORCO) 5-325 MG per tablet, Take 0.5-1 tablets by mouth., Disp: , Rfl:   •  Insulin Glargine, 2 Unit Dial, (TOUJEO MAX SOLOSTAR) 300 UNIT/ML solution pen-injector injection, Inject  under the skin into the appropriate area as directed., Disp: , Rfl:   •  insulin lispro (humaLOG) 100 UNIT/ML injection, Inject 15 Units under the skin into the appropriate area as directed 2 (Two) Times a Day., Disp: , Rfl:   •  losartan  "(COZAAR) 100 MG tablet, Take 100 mg by mouth Daily., Disp: , Rfl:   •  Menthol-Methyl Salicylate (SALONPAS JET SPRAY EX), Apply  topically., Disp: , Rfl:   •  mirtazapine (REMERON) 15 MG tablet, Take 1 tab PO nightly, Disp: 60 tablet, Rfl: 1  •  Multiple Vitamins-Minerals (MENS MULTIVITAMIN PLUS PO), Take  by mouth., Disp: , Rfl:   •  pioglitazone (ACTOS) 15 MG tablet, Take 15 mg by mouth Daily., Disp: , Rfl:   •  pregabalin (LYRICA) 50 MG capsule, TAKE ONE CAPSULE BY MOUTH TWICE A DAY AS NEEDED FOR NEUROPATHY, Disp: 60 capsule, Rfl: 2  •  tamsulosin (FLOMAX) 0.4 MG capsule 24 hr capsule, Take 0.4 mg by mouth Daily., Disp: , Rfl:   •  Terbinafine HCl (LAMISIL AT SPRAY EX), Apply  topically., Disp: , Rfl:   No Known Allergies  Social History     Tobacco Use   • Smoking status: Never Smoker   • Smokeless tobacco: Never Used   Substance Use Topics   • Alcohol use: Defer   • Drug use: Yes     Types: Hydrocodone          Objective   Physical Exam  Vitals:    12/03/20 1251   BP: 126/82   Pulse: 81   Temp: 98 °F (36.7 °C)   TempSrc: Temporal   SpO2: 99%   Weight: 89.7 kg (197 lb 12.8 oz)   Height: 172.7 cm (68\")     Body mass index is 30.08 kg/m².  Discussed the patient's BMI with him. The BMI is above average; BMI management plan is completed.    Constitutional: NAD.  Eyes: EOMI. PERRLA. Normal conjunctiva.  Cardiovascular: RRR. No murmurs. No LE edema b/l. Radial pulses 2+ bilaterally.  Pulmonary: CTA b/l. Good effort.  Integumentary: No lacerations or wounds on face or upper extremities.  Lymphatic: No anterior cervical lymphadenopathy.  Endocrine: No thyromegaly or palpable thyroid nodules.  Psychiatric: Normal affect. Normal thought content.  Musculoskeletal: Mild tenderness to palpation of the subareolar tissue, mild appreciable gynecomastia.  No nipple discharge or overlying skin changes     Assessment/Plan   Assessment and Plan  Very pleasant 65-year-old male with type 2 diabetes with polyneuropathy, hypertension, " hyperlipidemia, BPH, chronic lower back pain on opioid therapy, CKD 3, anxiety and depression, elevated PSA, abnormal Cologuard test, who presents with the following:    Advance Directive Discussion  Colon Cancer Screening  Depression/Dysphoria  Immunizations Discussed/Encouraged (specific immunizations; Influenza and Pneumococcal 23 )  Prostate Cancer Screening     The above risks/problems have been discussed with the patient.  Pertinent information has been shared with the patient in the After Visit Summary.  Other plans as below:    Diagnoses and all orders for this visit:    1. Encounter for subsequent annual wellness visit (AWV) in Medicare patient (Primary):    2. Type 2 diabetes mellitus with diabetic autonomic neuropathy, with long-term current use of insulin (CMS/[facility]): Stable, continue to follow with endocrinology.  Pregabalin has improved his neuropathy significantly.  Okay to continue this current dose.    3. Generalized anxiety disorder: Improved with mirtazapine    4. Elevated PSA, less than 10 ng/ml: With LUTS.  We need to follow this up further but in context of abnormal Cologuard would want him to proceed with colonoscopy before evaluating for prostate abnormalities.  Plan to repeat early next year.    5. Essential hypertension: Controlled    6. Hyperlipidemia, unspecified hyperlipidemia type: Continue statin    7. Gynecomastia    8. Stage 3a chronic kidney disease    9. Influenza vaccine needed  -     Fluad Tri 65yr+    10. Need for 23-polyvalent pneumococcal polysaccharide vaccine  -     Pneumococcal Polysaccharide Vaccine 23-Valent Greater Than or Equal To 1yo Subcutaneous / IM    11. Dyspepsia: Empiric PPI  -     pantoprazole (PROTONIX) 40 MG EC tablet; Take 1 tablet by mouth Daily.  Dispense: 60 tablet; Refill: 1  -     ondansetron (Zofran) 4 MG tablet; Take 1 tablet by mouth Every 8 (Eight) Hours As Needed for Nausea or Vomiting.  Dispense: 30 tablet; Refill: 0    12. Positive colorectal cancer  screening using Cologuard test: Patient has gastroenterology follow-up later this month    Follow-up in 3 months for chronic medical problems.  Abnormal Cologuard test is our most pressing priority with PSA to be followed afterwards.    An After Visit Summary and PPPS were given to the patient.      Pollo Ward MD  Family Medicine  O: [phone]  C: [phone]    Disclaimer: Parts of this note were dictated by speech recognition. Minor errors in transcription may be present. Please call if questions.

## 2020-12-26 NOTE — PROGRESS NOTES
Consult (positive cologuard)      HPI  Patient for consultation regarding new anemia and a positive Cologuard test.  His hemoglobin 1 year ago was 11.9 at most recent check it was 10.1.    He denies rectal bleeding, change in bowel habits or dark stools. His bowel habits are regular without pain or cramping.     Reflux is controlled with daily pantoprazole. Denies pain with swallowing.     No weight loss.     Denies nausea or vomiting.     Review of Systems   Constitutional: Negative for appetite change, chills, diaphoresis, fatigue, fever and unexpected weight change.   HENT: Negative for dental problem, ear pain, mouth sores, rhinorrhea, sore throat and voice change.    Eyes: Negative for pain, redness and visual disturbance.   Respiratory: Negative for cough, chest tightness and wheezing.    Cardiovascular: Negative for chest pain, palpitations and leg swelling.   Gastrointestinal: Negative.    Endocrine: Negative for cold intolerance, heat intolerance, polydipsia, polyphagia and polyuria.   Genitourinary: Negative for dysuria, frequency, hematuria and urgency.   Musculoskeletal: Negative for arthralgias, back pain, joint swelling, myalgias and neck pain.   Skin: Negative for rash.   Allergic/Immunologic: Negative for environmental allergies, food allergies and immunocompromised state.   Neurological: Negative for dizziness, seizures, weakness, numbness and headaches.   Hematological: Does not bruise/bleed easily.   Psychiatric/Behavioral: Negative for sleep disturbance. The patient is not nervous/anxious.         I have reviewed and confirmed the accuracy of the HPI and ROS as documented by the APRN JAIRO Pickering     Problem List:    Patient Active Problem List   Diagnosis   • Type 2 diabetes mellitus with diabetic autonomic neuropathy, with long-term current use of insulin (CMS/[facility])   • Degenerative spondylolisthesis   • BPH with obstruction/lower urinary tract symptoms   • Hypertension   •  Generalized anxiety disorder   • Stage 3a chronic kidney disease   • Hyperlipidemia   • Elevated PSA, less than 10 ng/ml   • Anemia   • Gynecomastia   • Positive colorectal cancer screening using Cologuard test       Medical History:    Past Medical History:   Diagnosis Date   • Diabetes mellitus (CMS/HCC)         Social History:    Social History     Socioeconomic History   • Marital status: Single     Spouse name: Not on file   • Number of children: Not on file   • Years of education: Not on file   • Highest education level: Not on file   Tobacco Use   • Smoking status: Never Smoker   • Smokeless tobacco: Never Used   Substance and Sexual Activity   • Alcohol use: Defer   • Drug use: Yes     Types: Hydrocodone   • Sexual activity: Defer       Family History:   Family History   Problem Relation Age of Onset   • Colon cancer Neg Hx    • Colon polyps Neg Hx        Surgical History:   Past Surgical History:   Procedure Laterality Date   • UPPER GASTROINTESTINAL ENDOSCOPY           Current Outpatient Medications:   •  amLODIPine (NORVASC) 10 MG tablet, Take 10 mg by mouth Daily., Disp: , Rfl:   •  atorvastatin (LIPITOR) 20 MG tablet, Take 20 mg by mouth Daily., Disp: , Rfl:   •  Cholecalciferol (VITAMIN D3 GUMMIES PO), Take  by mouth., Disp: , Rfl:   •  HYDROcodone-acetaminophen (NORCO) 5-325 MG per tablet, Take 0.5-1 tablets by mouth., Disp: , Rfl:   •  Insulin Glargine, 2 Unit Dial, (TOUJEALFREDO CARRILLO SOLOSTAR) 300 UNIT/ML solution pen-injector injection, Inject  under the skin into the appropriate area as directed., Disp: , Rfl:   •  insulin lispro (humaLOG) 100 UNIT/ML injection, Inject 15 Units under the skin into the appropriate area as directed 2 (Two) Times a Day., Disp: , Rfl:   •  losartan (COZAAR) 100 MG tablet, Take 100 mg by mouth Daily., Disp: , Rfl:   •  Menthol-Methyl Salicylate (SALONPAS JET SPRAY EX), Apply  topically., Disp: , Rfl:   •  mirtazapine (REMERON) 15 MG tablet, Take 1 tab PO nightly, Disp: 60  tablet, Rfl: 1  •  Multiple Vitamins-Minerals (MENS MULTIVITAMIN PLUS PO), Take  by mouth., Disp: , Rfl:   •  ondansetron (Zofran) 4 MG tablet, Take 1 tablet by mouth Every 8 (Eight) Hours As Needed for Nausea or Vomiting., Disp: 30 tablet, Rfl: 0  •  pantoprazole (PROTONIX) 40 MG EC tablet, Take 1 tablet by mouth Daily., Disp: 60 tablet, Rfl: 1  •  pioglitazone (ACTOS) 15 MG tablet, Take 15 mg by mouth Daily., Disp: , Rfl:   •  pregabalin (LYRICA) 50 MG capsule, TAKE ONE CAPSULE BY MOUTH TWICE A DAY AS NEEDED FOR NEUROPATHY, Disp: 60 capsule, Rfl: 2  •  tamsulosin (FLOMAX) 0.4 MG capsule 24 hr capsule, Take 0.4 mg by mouth Daily., Disp: , Rfl:   •  Terbinafine HCl (LAMISIL AT SPRAY EX), Apply  topically., Disp: , Rfl:     Allergies: No Known Allergies     The following portions of the patient's history were reviewed and updated as appropriate: allergies, current medications, past family history, past medical history, past social history, past surgical history and problem list.    Vitals:    12/28/20 1435   BP: 126/80   Pulse: 90   Temp: 95 °F (35 °C)   SpO2: 99%         12/28/20  1435   Weight: 89.2 kg (196 lb 11.2 oz)     Body mass index is 30.81 kg/m².      PHYSICAL EXAM:  Physical Exam  Vitals signs reviewed.   Constitutional:       Appearance: Normal appearance. He is well-developed.   HENT:      Nose: Nose normal. No nasal deformity.   Eyes:      General: No scleral icterus.  Neck:      Trachea: No tracheal deviation.   Pulmonary:      Effort: Pulmonary effort is normal. No respiratory distress.      Breath sounds: Normal breath sounds.   Abdominal:      General: Bowel sounds are normal. There is no distension.      Palpations: Abdomen is soft. Abdomen is not rigid. There is no shifting dullness.      Tenderness: There is no abdominal tenderness. There is no guarding or rebound.      Hernia: No hernia is present.   Lymphadenopathy:      Comments: No periumbilical lymphadenopathy   Skin:     General: Skin is  warm.   Neurological:      Mental Status: He is alert.   Psychiatric:         Behavior: Behavior normal.             Assessment/ Plan  Diagnoses and all orders for this visit:    1. Positive colorectal cancer screening using Cologuard test (Primary)  -     External Facility Surgical / Procedural Request; Future    2. Anemia, unspecified type  -     External Facility Surgical / Procedural Request; Future    Other orders  -     Follow Anesthesia Guidelines / Standing Orders; Future  -     Obtain Informed Consent; Future  -     Implement Anesthesia orders day of procedure.; Standing  -     Obtain informed consent; Standing  -     Verify bowel prep was successful; Standing  -     Give tap water enema if bowel prep was insufficient; Standing         No follow-ups on file.    Patient Instructions   Schedule EGD and colonoscopy, orders placed.    Additional recommendations will be made based on results of EGD and colonoscopy findings.    Follow-up visit after procedures to discuss results and make any additional recommendations.    Discussion:  Patient for consultation regarding worsening anemia and a new positive Cologuard test.  We will proceed with an EGD and colonoscopy for further evaluation.    Documentation was completed by Nabila GILL acting as a scribe in the following sections (HPI, Assessment, Plan) on behalf of the billing provider. Angely

## 2020-12-28 ENCOUNTER — OFFICE VISIT (OUTPATIENT)
Dept: GASTROENTEROLOGY | Facility: CLINIC | Age: 65
End: 2020-12-28

## 2020-12-28 VITALS
WEIGHT: 196.7 LBS | BODY MASS INDEX: 30.87 KG/M2 | TEMPERATURE: 95 F | HEIGHT: 67 IN | HEART RATE: 90 BPM | DIASTOLIC BLOOD PRESSURE: 80 MMHG | OXYGEN SATURATION: 99 % | SYSTOLIC BLOOD PRESSURE: 126 MMHG

## 2020-12-28 DIAGNOSIS — R19.5 POSITIVE COLORECTAL CANCER SCREENING USING COLOGUARD TEST: Primary | ICD-10-CM

## 2020-12-28 DIAGNOSIS — D64.9 ANEMIA, UNSPECIFIED TYPE: ICD-10-CM

## 2020-12-28 PROCEDURE — 99204 OFFICE O/P NEW MOD 45 MIN: CPT | Performed by: INTERNAL MEDICINE

## 2020-12-28 NOTE — PATIENT INSTRUCTIONS
Schedule EGD and colonoscopy, orders placed.    Additional recommendations will be made based on results of EGD and colonoscopy findings.    Follow-up visit after procedures to discuss results and make any additional recommendations.

## 2021-01-14 ENCOUNTER — LAB REQUISITION (OUTPATIENT)
Dept: LAB | Facility: HOSPITAL | Age: 66
End: 2021-01-14

## 2021-01-14 DIAGNOSIS — Z00.00 ENCOUNTER FOR GENERAL ADULT MEDICAL EXAMINATION WITHOUT ABNORMAL FINDINGS: ICD-10-CM

## 2021-01-16 PROCEDURE — U0004 COV-19 TEST NON-CDC HGH THRU: HCPCS | Performed by: INTERNAL MEDICINE

## 2021-01-18 LAB — SARS-COV-2 RNA RESP QL NAA+PROBE: NOT DETECTED

## 2021-01-19 ENCOUNTER — OUTSIDE FACILITY SERVICE (OUTPATIENT)
Dept: GASTROENTEROLOGY | Facility: CLINIC | Age: 66
End: 2021-01-19

## 2021-01-19 ENCOUNTER — LAB REQUISITION (OUTPATIENT)
Dept: LAB | Facility: HOSPITAL | Age: 66
End: 2021-01-19

## 2021-01-19 DIAGNOSIS — R19.5 OTHER FECAL ABNORMALITIES: ICD-10-CM

## 2021-01-19 PROCEDURE — 45385 COLONOSCOPY W/LESION REMOVAL: CPT | Performed by: INTERNAL MEDICINE

## 2021-01-19 PROCEDURE — 43239 EGD BIOPSY SINGLE/MULTIPLE: CPT | Performed by: INTERNAL MEDICINE

## 2021-01-19 PROCEDURE — 88305 TISSUE EXAM BY PATHOLOGIST: CPT | Performed by: INTERNAL MEDICINE

## 2021-01-20 DIAGNOSIS — R10.13 DYSPEPSIA: ICD-10-CM

## 2021-01-20 PROBLEM — D01.0 CARCINOMA IN SITU OF COLON: Status: ACTIVE | Noted: 2021-01-20

## 2021-01-20 LAB
LAB AP CASE REPORT: NORMAL
LAB AP CLINICAL INFORMATION: NORMAL
LAB AP DIAGNOSIS COMMENT: NORMAL
PATH REPORT.FINAL DX SPEC: NORMAL
PATH REPORT.GROSS SPEC: NORMAL

## 2021-01-20 RX ORDER — ONDANSETRON 4 MG/1
TABLET, FILM COATED ORAL
Qty: 30 TABLET | Refills: 0 | Status: SHIPPED | OUTPATIENT
Start: 2021-01-20 | End: 2021-01-28 | Stop reason: SDUPTHER

## 2021-01-25 ENCOUNTER — TRANSCRIBE ORDERS (OUTPATIENT)
Dept: ADMINISTRATIVE | Facility: HOSPITAL | Age: 66
End: 2021-01-25

## 2021-01-25 DIAGNOSIS — N18.30 CKD (CHRONIC KIDNEY DISEASE), SYMPTOM MANAGEMENT ONLY, STAGE 3 (MODERATE) (HCC): Primary | ICD-10-CM

## 2021-01-28 DIAGNOSIS — R10.13 DYSPEPSIA: ICD-10-CM

## 2021-01-28 RX ORDER — PANTOPRAZOLE SODIUM 40 MG/1
40 TABLET, DELAYED RELEASE ORAL DAILY
Qty: 60 TABLET | Refills: 1 | Status: SHIPPED | OUTPATIENT
Start: 2021-01-28 | End: 2021-03-04 | Stop reason: SDUPTHER

## 2021-01-28 RX ORDER — ONDANSETRON 4 MG/1
4 TABLET, FILM COATED ORAL EVERY 8 HOURS PRN
Qty: 30 TABLET | Refills: 0 | Status: SHIPPED | OUTPATIENT
Start: 2021-01-28 | End: 2021-05-04 | Stop reason: SDUPTHER

## 2021-02-01 ENCOUNTER — TELEPHONE (OUTPATIENT)
Dept: GASTROENTEROLOGY | Facility: CLINIC | Age: 66
End: 2021-02-01

## 2021-02-01 ENCOUNTER — HOSPITAL ENCOUNTER (OUTPATIENT)
Dept: ULTRASOUND IMAGING | Facility: HOSPITAL | Age: 66
Discharge: HOME | End: 2021-02-01
Admitting: INTERNAL MEDICINE

## 2021-02-01 DIAGNOSIS — N18.30 CKD (CHRONIC KIDNEY DISEASE), SYMPTOM MANAGEMENT ONLY, STAGE 3 (MODERATE) (HCC): ICD-10-CM

## 2021-02-01 PROCEDURE — 76775 US EXAM ABDO BACK WALL LIM: CPT

## 2021-02-01 NOTE — TELEPHONE ENCOUNTER
Patient called requesting results of biopsy. I informed him of the advanced polyp in the sigmoid colon and repeat colonoscopy in 1 year. Patient understood. Recall noted.

## 2021-02-26 DIAGNOSIS — N40.1 BPH WITH OBSTRUCTION/LOWER URINARY TRACT SYMPTOMS: Primary | ICD-10-CM

## 2021-02-26 DIAGNOSIS — N13.8 BPH WITH OBSTRUCTION/LOWER URINARY TRACT SYMPTOMS: Primary | ICD-10-CM

## 2021-02-26 RX ORDER — TAMSULOSIN HYDROCHLORIDE 0.4 MG/1
0.4 CAPSULE ORAL DAILY
Qty: 90 CAPSULE | Refills: 1 | Status: SHIPPED | OUTPATIENT
Start: 2021-02-26 | End: 2021-03-04 | Stop reason: SDUPTHER

## 2021-02-27 DIAGNOSIS — F41.1 GENERALIZED ANXIETY DISORDER: ICD-10-CM

## 2021-03-01 DIAGNOSIS — Z79.4 TYPE 2 DIABETES MELLITUS WITH DIABETIC POLYNEUROPATHY, WITH LONG-TERM CURRENT USE OF INSULIN (HCC): ICD-10-CM

## 2021-03-01 DIAGNOSIS — E11.42 TYPE 2 DIABETES MELLITUS WITH DIABETIC POLYNEUROPATHY, WITH LONG-TERM CURRENT USE OF INSULIN (HCC): ICD-10-CM

## 2021-03-01 RX ORDER — PREGABALIN 50 MG/1
CAPSULE ORAL
Qty: 60 CAPSULE | Refills: 1 | Status: SHIPPED | OUTPATIENT
Start: 2021-03-01 | End: 2021-04-26

## 2021-03-01 RX ORDER — PREGABALIN 50 MG/1
CAPSULE ORAL
Qty: 60 CAPSULE | Refills: 1 | OUTPATIENT
Start: 2021-03-01

## 2021-03-01 RX ORDER — MIRTAZAPINE 15 MG/1
TABLET, FILM COATED ORAL
Qty: 90 TABLET | Refills: 3 | Status: SHIPPED | OUTPATIENT
Start: 2021-03-01 | End: 2021-03-04 | Stop reason: SDUPTHER

## 2021-03-04 ENCOUNTER — OFFICE VISIT (OUTPATIENT)
Dept: INTERNAL MEDICINE | Facility: CLINIC | Age: 66
End: 2021-03-04

## 2021-03-04 VITALS
HEIGHT: 68 IN | BODY MASS INDEX: 29.95 KG/M2 | OXYGEN SATURATION: 98 % | HEART RATE: 81 BPM | SYSTOLIC BLOOD PRESSURE: 132 MMHG | DIASTOLIC BLOOD PRESSURE: 74 MMHG | WEIGHT: 197.6 LBS | TEMPERATURE: 98.2 F

## 2021-03-04 DIAGNOSIS — D64.9 ANEMIA, UNSPECIFIED TYPE: ICD-10-CM

## 2021-03-04 DIAGNOSIS — D01.0 CARCINOMA IN SITU OF COLON: ICD-10-CM

## 2021-03-04 DIAGNOSIS — N18.31 STAGE 3A CHRONIC KIDNEY DISEASE (HCC): ICD-10-CM

## 2021-03-04 DIAGNOSIS — E11.43 TYPE 2 DIABETES MELLITUS WITH DIABETIC AUTONOMIC NEUROPATHY, WITH LONG-TERM CURRENT USE OF INSULIN (HCC): Primary | ICD-10-CM

## 2021-03-04 DIAGNOSIS — B35.4 TINEA CORPORIS: ICD-10-CM

## 2021-03-04 DIAGNOSIS — N40.1 BPH WITH OBSTRUCTION/LOWER URINARY TRACT SYMPTOMS: ICD-10-CM

## 2021-03-04 DIAGNOSIS — Z79.4 TYPE 2 DIABETES MELLITUS WITH DIABETIC AUTONOMIC NEUROPATHY, WITH LONG-TERM CURRENT USE OF INSULIN (HCC): Primary | ICD-10-CM

## 2021-03-04 DIAGNOSIS — N13.8 BPH WITH OBSTRUCTION/LOWER URINARY TRACT SYMPTOMS: ICD-10-CM

## 2021-03-04 DIAGNOSIS — R10.13 DYSPEPSIA: ICD-10-CM

## 2021-03-04 DIAGNOSIS — R97.20 ELEVATED PSA, LESS THAN 10 NG/ML: ICD-10-CM

## 2021-03-04 DIAGNOSIS — M43.10 DEGENERATIVE SPONDYLOLISTHESIS: ICD-10-CM

## 2021-03-04 DIAGNOSIS — I10 ESSENTIAL HYPERTENSION: ICD-10-CM

## 2021-03-04 DIAGNOSIS — F41.1 GENERALIZED ANXIETY DISORDER: ICD-10-CM

## 2021-03-04 PROBLEM — R19.5 POSITIVE COLORECTAL CANCER SCREENING USING COLOGUARD TEST: Status: RESOLVED | Noted: 2020-12-03 | Resolved: 2021-03-04

## 2021-03-04 PROCEDURE — 99214 OFFICE O/P EST MOD 30 MIN: CPT | Performed by: FAMILY MEDICINE

## 2021-03-04 RX ORDER — AMLODIPINE BESYLATE 10 MG/1
10 TABLET ORAL DAILY
Qty: 90 TABLET | Refills: 3 | Status: SHIPPED | OUTPATIENT
Start: 2021-03-04 | End: 2021-05-04 | Stop reason: SDUPTHER

## 2021-03-04 RX ORDER — INSULIN GLARGINE 300 U/ML
50 INJECTION, SOLUTION SUBCUTANEOUS DAILY
Status: SHIPPED | COMMUNITY
Start: 2021-03-04 | End: 2021-03-04 | Stop reason: SDUPTHER

## 2021-03-04 RX ORDER — BLOOD SUGAR DIAGNOSTIC
STRIP MISCELLANEOUS
Qty: 200 EACH | Refills: 12 | Status: SHIPPED | OUTPATIENT
Start: 2021-03-04 | End: 2022-07-11

## 2021-03-04 RX ORDER — ATORVASTATIN CALCIUM 20 MG/1
20 TABLET, FILM COATED ORAL DAILY
Qty: 90 TABLET | Refills: 3 | Status: SHIPPED | OUTPATIENT
Start: 2021-03-04 | End: 2022-05-06

## 2021-03-04 RX ORDER — AMLODIPINE BESYLATE AND BENAZEPRIL HYDROCHLORIDE 10; 20 MG/1; MG/1
1 CAPSULE ORAL DAILY
COMMUNITY
End: 2021-03-04

## 2021-03-04 RX ORDER — PIOGLITAZONEHYDROCHLORIDE 15 MG/1
15 TABLET ORAL DAILY
Qty: 90 TABLET | Refills: 3 | Status: SHIPPED | OUTPATIENT
Start: 2021-03-04 | End: 2021-07-07

## 2021-03-04 RX ORDER — INSULIN GLARGINE 300 U/ML
50 INJECTION, SOLUTION SUBCUTANEOUS DAILY
Qty: 5 PEN | Refills: 3 | Status: SHIPPED | OUTPATIENT
Start: 2021-03-04 | End: 2021-03-08

## 2021-03-04 RX ORDER — MIRTAZAPINE 15 MG/1
TABLET, FILM COATED ORAL
Qty: 90 TABLET | Refills: 3 | Status: SHIPPED | OUTPATIENT
Start: 2021-03-04

## 2021-03-04 RX ORDER — FLURBIPROFEN SODIUM 0.3 MG/ML
SOLUTION/ DROPS OPHTHALMIC
Qty: 100 EACH | Refills: 3 | Status: SHIPPED | OUTPATIENT
Start: 2021-03-04

## 2021-03-04 RX ORDER — TAMSULOSIN HYDROCHLORIDE 0.4 MG/1
0.4 CAPSULE ORAL DAILY
Qty: 90 CAPSULE | Refills: 3 | Status: SHIPPED | OUTPATIENT
Start: 2021-03-04 | End: 2022-03-29

## 2021-03-04 RX ORDER — PANTOPRAZOLE SODIUM 40 MG/1
40 TABLET, DELAYED RELEASE ORAL DAILY
Qty: 90 TABLET | Refills: 3 | Status: SHIPPED | OUTPATIENT
Start: 2021-03-04 | End: 2021-06-28 | Stop reason: SDUPTHER

## 2021-03-04 RX ORDER — LOSARTAN POTASSIUM 100 MG/1
100 TABLET ORAL DAILY
Qty: 90 TABLET | Refills: 3 | Status: SHIPPED | OUTPATIENT
Start: 2021-03-04 | End: 2022-05-17

## 2021-03-04 RX ORDER — INSULIN LISPRO 100 [IU]/ML
15 INJECTION, SOLUTION INTRAVENOUS; SUBCUTANEOUS 2 TIMES DAILY
Qty: 9 PEN | Refills: 3 | Status: SHIPPED | OUTPATIENT
Start: 2021-03-04 | End: 2021-06-03

## 2021-03-04 NOTE — PROGRESS NOTES
Date of Encounter: 2021  Patient: Arden Carrasquillo,  1955    Subjective   History of Presenting Illness  Chief complaint: Follow-up CKD, type 2 diabetes    Type 2 DM: Wants to see me instead of endocrinologist. On glargine 50 units QD, lispro 15 BID, poglitazone.  FBS 130s-170s, at bedtime 170s-200s.  No hypoglycemic episodes.  Does not check pre or postprandials.  Tries to keep to a diabetic diet.  Last A1c 7.7%.  Up-to-date on eye and foot exam.    Recent colonoscopy showing a polyp with carcinoma in situ, 1 year recall.    Patient saw nephrologist last month they have not faxed us any records but we do have the renal ultrasound which showed benign cysts.    Rash on left lower leg not improving with hydrocortisone    Review of Systems:  Negative for fever, cough, shortness of breath    The following portions of the patient's history were reviewed and updated as appropriate: allergies, current medications, past family history, past medical history, past social history, past surgical history and problem list.    Patient Active Problem List   Diagnosis   • Type 2 diabetes mellitus with diabetic autonomic neuropathy, with long-term current use of insulin (CMS/HCC)   • Degenerative spondylolisthesis   • BPH with obstruction/lower urinary tract symptoms   • Hypertension   • Generalized anxiety disorder   • Stage 3a chronic kidney disease (CMS/HCC)   • Hyperlipidemia   • Elevated PSA, less than 10 ng/ml   • Anemia   • Gynecomastia   • Carcinoma in situ of colon     Past Medical History:   Diagnosis Date   • Diabetes mellitus (CMS/HCC)    • Positive colorectal cancer screening using Cologuard test 12/3/2020     Past Surgical History:   Procedure Laterality Date   • UPPER GASTROINTESTINAL ENDOSCOPY       Family History   Problem Relation Age of Onset   • Colon cancer Neg Hx    • Colon polyps Neg Hx        Current Outpatient Medications:   •  amLODIPine (NORVASC) 10 MG tablet, Take 1 tablet by mouth Daily.,  Disp: 90 tablet, Rfl: 3  •  atorvastatin (LIPITOR) 20 MG tablet, Take 1 tablet by mouth Daily., Disp: 90 tablet, Rfl: 3  •  Cholecalciferol (VITAMIN D3 GUMMIES PO), Take  by mouth., Disp: , Rfl:   •  HYDROcodone-acetaminophen (NORCO) 5-325 MG per tablet, Take 0.5-1 tablets by mouth., Disp: , Rfl:   •  Insulin Glargine, 2 Unit Dial, (Toujeo Max SoloStar) 300 UNIT/ML solution pen-injector injection, Inject 50 Units under the skin into the appropriate area as directed Daily., Disp: 5 pen, Rfl: 3  •  losartan (COZAAR) 100 MG tablet, Take 1 tablet by mouth Daily., Disp: 90 tablet, Rfl: 3  •  mirtazapine (REMERON) 15 MG tablet, TAKE ONE TABLET BY MOUTH ONCE NIGHTLY, Disp: 90 tablet, Rfl: 3  •  Multiple Vitamins-Minerals (MENS MULTIVITAMIN PLUS PO), Take  by mouth., Disp: , Rfl:   •  ondansetron (ZOFRAN) 4 MG tablet, Take 1 tablet by mouth Every 8 (Eight) Hours As Needed for Nausea or Vomiting., Disp: 30 tablet, Rfl: 0  •  pantoprazole (PROTONIX) 40 MG EC tablet, Take 1 tablet by mouth Daily., Disp: 90 tablet, Rfl: 3  •  pioglitazone (ACTOS) 15 MG tablet, Take 1 tablet by mouth Daily., Disp: 90 tablet, Rfl: 3  •  pregabalin (LYRICA) 50 MG capsule, TAKE ONE CAPSULE BY MOUTH TWICE A DAY AS NEEDED FOR NEUROPATHY, Disp: 60 capsule, Rfl: 1  •  tamsulosin (FLOMAX) 0.4 MG capsule 24 hr capsule, Take 1 capsule by mouth Daily., Disp: 90 capsule, Rfl: 3  •  glucose blood (Accu-Chek Mary Plus) test strip, Use to check glucose four times daily, Disp: 200 each, Rfl: 12  •  Insulin Lispro, 1 Unit Dial, (HUMALOG) 100 UNIT/ML solution pen-injector, Inject 15 Units under the skin into the appropriate area as directed 2 (two) times a day., Disp: 9 pen, Rfl: 3  •  Insulin Pen Needle (B-D UF III MINI PEN NEEDLES) 31G X 5 MM misc, Use with insulin pens, Disp: 100 each, Rfl: 3  •  Menthol-Methyl Salicylate (SALONPAS JET SPRAY EX), Apply  topically., Disp: , Rfl:   •  Terbinafine HCl (LAMISIL AT SPRAY EX), Apply  topically., Disp: , Rfl:   No  "Known Allergies  Social History     Tobacco Use   • Smoking status: Never Smoker   • Smokeless tobacco: Never Used   Substance Use Topics   • Alcohol use: Defer   • Drug use: Yes     Types: Hydrocodone          Objective   Physical Exam  Vitals:    03/04/21 1226   BP: 132/74   Pulse: 81   Temp: 98.2 °F (36.8 °C)   TempSrc: Temporal   SpO2: 98%   Weight: 89.6 kg (197 lb 9.6 oz)   Height: 172.7 cm (68\")     Body mass index is 30.04 kg/m².    Constitutional: NAD.  Eyes: EOMI. PERRLA. Normal conjunctiva.  Ear, nose, mouth, throat: No tonsillar exudates or erythema.   Normal nasal mucosa. Normal external ear canals and TMs bilaterally.  Cardiovascular: RRR. No murmurs. No LE edema b/l. Radial pulses 2+ bilaterally.  Pulmonary: CTA b/l. Good effort.  Integumentary: Thickening, scaling patch without erythema of the left ankle with raised borders.  Lymphatic: No anterior cervical lymphadenopathy.  Endocrine: No thyromegaly or palpable thyroid nodules.  Psychiatric: Normal affect. Normal thought content.     Assessment/Plan   Assessment and Plan  Very pleasant 65-year-old male with type 2 diabetes with polyneuropathy, hypertension, hyperlipidemia, BPH, chronic lower back pain on opioid therapy, CKD 3, anxiety and depression, elevated PSA, abnormal Cologuard test, who presents with the following:    Diagnoses and all orders for this visit:    1. Type 2 diabetes mellitus with diabetic autonomic neuropathy, with long-term current use of insulin (CMS/[facility]) (Primary): We will see what his A1c is but I suspect needs to increase his basal insulin based on a recent values  -     atorvastatin (LIPITOR) 20 MG tablet; Take 1 tablet by mouth Daily.  Dispense: 90 tablet; Refill: 3  -     Insulin Glargine, 2 Unit Dial, (Toujeo Max SoloStar) 300 UNIT/ML solution pen-injector injection; Inject 50 Units under the skin into the appropriate area as directed Daily.  Dispense: 5 pen; Refill: 3  -     Insulin Lispro, 1 Unit Dial, (HUMALOG) 100 " UNIT/ML solution pen-injector; Inject 15 Units under the skin into the appropriate area as directed 2 (two) times a day.  Dispense: 9 pen; Refill: 3  -     pioglitazone (ACTOS) 15 MG tablet; Take 1 tablet by mouth Daily.  Dispense: 90 tablet; Refill: 3  -     glucose blood (Accu-Chek Mary Plus) test strip; Use to check glucose four times daily  Dispense: 200 each; Refill: 12  -     Insulin Pen Needle (B-D UF III MINI PEN NEEDLES) 31G X 5 MM misc; Use with insulin pens  Dispense: 100 each; Refill: 3  -     Hemoglobin A1c    2. Essential hypertension: Controlled  -     amLODIPine (NORVASC) 10 MG tablet; Take 1 tablet by mouth Daily.  Dispense: 90 tablet; Refill: 3  -     losartan (COZAAR) 100 MG tablet; Take 1 tablet by mouth Daily.  Dispense: 90 tablet; Refill: 3    3. Stage 3a chronic kidney disease (CMS/HCC): Continue to follow with nephrology    4. Carcinoma in situ of colon: Follow-up C-scope 1 year    5. Generalized anxiety disorder: Controlled  -     mirtazapine (REMERON) 15 MG tablet; TAKE ONE TABLET BY MOUTH ONCE NIGHTLY  Dispense: 90 tablet; Refill: 3    6. Degenerative spondylolisthesis: Follows with pain management    7. Anemia, unspecified type  -     CBC & Differential  -     Iron Profile  -     Vitamin B12  -     Ferritin    8. Elevated PSA, less than 10 ng/ml  -     PSA DIAGNOSTIC  -     Comprehensive Metabolic Panel    9. BPH with obstruction/lower urinary tract symptoms: Symptoms controlled  -     tamsulosin (FLOMAX) 0.4 MG capsule 24 hr capsule; Take 1 capsule by mouth Daily.  Dispense: 90 capsule; Refill: 3    10. Dyspepsia: Symptoms controlled  -     pantoprazole (PROTONIX) 40 MG EC tablet; Take 1 tablet by mouth Daily.  Dispense: 90 tablet; Refill: 3    11. Tinea corporis: DDx includes tinea versus psoriasis.  Not improving with hydrocortisone so probably tinea.  He has topical terbinafine, will try that first    F/u 3 mo for t2dm    Pollo Ward MD  Family Medicine  O: [phone]  C:  [phone]    Disclaimer: Parts of this note were dictated by speech recognition. Minor errors in transcription may be present. Please call if questions.

## 2021-03-05 LAB
ALBUMIN SERPL-MCNC: 4 G/DL (ref 3.8–4.8)
ALBUMIN/GLOB SERPL: 1.1 {RATIO} (ref 1.2–2.2)
ALP SERPL-CCNC: 140 IU/L (ref 39–117)
ALT SERPL-CCNC: 14 IU/L (ref 0–44)
AST SERPL-CCNC: 14 IU/L (ref 0–40)
BASOPHILS # BLD AUTO: 0 X10E3/UL (ref 0–0.2)
BASOPHILS NFR BLD AUTO: 0 %
BILIRUB SERPL-MCNC: 0.2 MG/DL (ref 0–1.2)
BUN SERPL-MCNC: 22 MG/DL (ref 8–27)
BUN/CREAT SERPL: 12 (ref 10–24)
CALCIUM SERPL-MCNC: 8.6 MG/DL (ref 8.6–10.2)
CHLORIDE SERPL-SCNC: 104 MMOL/L (ref 96–106)
CO2 SERPL-SCNC: 21 MMOL/L (ref 20–29)
CREAT SERPL-MCNC: 1.84 MG/DL (ref 0.76–1.27)
EOSINOPHIL # BLD AUTO: 0.3 X10E3/UL (ref 0–0.4)
EOSINOPHIL NFR BLD AUTO: 4 %
ERYTHROCYTE [DISTWIDTH] IN BLOOD BY AUTOMATED COUNT: 12.8 % (ref 11.6–15.4)
FERRITIN SERPL-MCNC: 162 NG/ML (ref 30–400)
GLOBULIN SER CALC-MCNC: 3.7 G/DL (ref 1.5–4.5)
GLUCOSE SERPL-MCNC: 185 MG/DL (ref 65–99)
HBA1C MFR BLD: 7.8 % (ref 4.8–5.6)
HCT VFR BLD AUTO: 32.4 % (ref 37.5–51)
HGB BLD-MCNC: 10.4 G/DL (ref 13–17.7)
IMM GRANULOCYTES # BLD AUTO: 0 X10E3/UL (ref 0–0.1)
IMM GRANULOCYTES NFR BLD AUTO: 0 %
IRON SATN MFR SERPL: 20 % (ref 15–55)
IRON SERPL-MCNC: 50 UG/DL (ref 38–169)
LYMPHOCYTES # BLD AUTO: 1.4 X10E3/UL (ref 0.7–3.1)
LYMPHOCYTES NFR BLD AUTO: 18 %
MCH RBC QN AUTO: 26.9 PG (ref 26.6–33)
MCHC RBC AUTO-ENTMCNC: 32.1 G/DL (ref 31.5–35.7)
MCV RBC AUTO: 84 FL (ref 79–97)
MONOCYTES # BLD AUTO: 0.6 X10E3/UL (ref 0.1–0.9)
MONOCYTES NFR BLD AUTO: 8 %
NEUTROPHILS # BLD AUTO: 5.4 X10E3/UL (ref 1.4–7)
NEUTROPHILS NFR BLD AUTO: 70 %
PLATELET # BLD AUTO: 187 X10E3/UL (ref 150–450)
POTASSIUM SERPL-SCNC: 4.8 MMOL/L (ref 3.5–5.2)
PROT SERPL-MCNC: 7.7 G/DL (ref 6–8.5)
PSA SERPL-MCNC: 6.2 NG/ML (ref 0–4)
RBC # BLD AUTO: 3.86 X10E6/UL (ref 4.14–5.8)
SODIUM SERPL-SCNC: 138 MMOL/L (ref 134–144)
TIBC SERPL-MCNC: 244 UG/DL (ref 250–450)
UIBC SERPL-MCNC: 194 UG/DL (ref 111–343)
VIT B12 SERPL-MCNC: 261 PG/ML (ref 232–1245)
WBC # BLD AUTO: 7.7 X10E3/UL (ref 3.4–10.8)

## 2021-03-08 DIAGNOSIS — N40.1 BPH WITH OBSTRUCTION/LOWER URINARY TRACT SYMPTOMS: ICD-10-CM

## 2021-03-08 DIAGNOSIS — R97.20 ELEVATED PSA, LESS THAN 10 NG/ML: Primary | ICD-10-CM

## 2021-03-08 DIAGNOSIS — E11.43 TYPE 2 DIABETES MELLITUS WITH DIABETIC AUTONOMIC NEUROPATHY, WITH LONG-TERM CURRENT USE OF INSULIN (HCC): ICD-10-CM

## 2021-03-08 DIAGNOSIS — Z79.4 TYPE 2 DIABETES MELLITUS WITH DIABETIC AUTONOMIC NEUROPATHY, WITH LONG-TERM CURRENT USE OF INSULIN (HCC): ICD-10-CM

## 2021-03-08 DIAGNOSIS — N13.8 BPH WITH OBSTRUCTION/LOWER URINARY TRACT SYMPTOMS: ICD-10-CM

## 2021-03-08 PROBLEM — N18.32 ANEMIA IN STAGE 3B CHRONIC KIDNEY DISEASE: Status: ACTIVE | Noted: 2020-11-03

## 2021-03-08 PROBLEM — D63.1 ANEMIA IN STAGE 3B CHRONIC KIDNEY DISEASE: Status: ACTIVE | Noted: 2020-11-03

## 2021-03-08 RX ORDER — INSULIN GLARGINE 300 U/ML
60 INJECTION, SOLUTION SUBCUTANEOUS DAILY
Qty: 5 PEN | Refills: 3 | Status: SHIPPED | OUTPATIENT
Start: 2021-03-08 | End: 2021-06-03

## 2021-03-08 NOTE — PROGRESS NOTES
After several attempts to call patient without success, I left a voice mail discussing the results as previously agreed.    Recommend increase glargine to 60 units based on recent glucose measurements and A1c    We will refer to urology due to persistent increase in PSA with history of TURP

## 2021-03-16 ENCOUNTER — BULK ORDERING (OUTPATIENT)
Dept: CASE MANAGEMENT | Facility: OTHER | Age: 66
End: 2021-03-16

## 2021-03-16 DIAGNOSIS — Z23 IMMUNIZATION DUE: ICD-10-CM

## 2021-04-26 DIAGNOSIS — E11.42 TYPE 2 DIABETES MELLITUS WITH DIABETIC POLYNEUROPATHY, WITH LONG-TERM CURRENT USE OF INSULIN (HCC): ICD-10-CM

## 2021-04-26 DIAGNOSIS — Z79.4 TYPE 2 DIABETES MELLITUS WITH DIABETIC POLYNEUROPATHY, WITH LONG-TERM CURRENT USE OF INSULIN (HCC): ICD-10-CM

## 2021-04-26 RX ORDER — PREGABALIN 50 MG/1
CAPSULE ORAL
Qty: 60 CAPSULE | Refills: 2 | Status: SHIPPED | OUTPATIENT
Start: 2021-04-26 | End: 2021-07-06 | Stop reason: SDUPTHER

## 2021-05-04 DIAGNOSIS — I10 ESSENTIAL HYPERTENSION: ICD-10-CM

## 2021-05-04 DIAGNOSIS — R10.13 DYSPEPSIA: ICD-10-CM

## 2021-05-04 RX ORDER — AMLODIPINE BESYLATE 10 MG/1
10 TABLET ORAL DAILY
Qty: 90 TABLET | Refills: 3 | Status: SHIPPED | OUTPATIENT
Start: 2021-05-04 | End: 2021-06-28 | Stop reason: SDUPTHER

## 2021-05-04 RX ORDER — ONDANSETRON 4 MG/1
4 TABLET, FILM COATED ORAL EVERY 8 HOURS PRN
Qty: 30 TABLET | Refills: 0 | Status: SHIPPED | OUTPATIENT
Start: 2021-05-04 | End: 2021-06-28 | Stop reason: SDUPTHER

## 2021-06-03 ENCOUNTER — OFFICE VISIT (OUTPATIENT)
Dept: INTERNAL MEDICINE | Facility: CLINIC | Age: 66
End: 2021-06-03

## 2021-06-03 VITALS
DIASTOLIC BLOOD PRESSURE: 90 MMHG | HEART RATE: 87 BPM | WEIGHT: 202.8 LBS | BODY MASS INDEX: 30.84 KG/M2 | OXYGEN SATURATION: 98 % | SYSTOLIC BLOOD PRESSURE: 140 MMHG

## 2021-06-03 DIAGNOSIS — Z79.4 TYPE 2 DIABETES MELLITUS WITH DIABETIC AUTONOMIC NEUROPATHY, WITH LONG-TERM CURRENT USE OF INSULIN (HCC): Primary | ICD-10-CM

## 2021-06-03 DIAGNOSIS — C61 PROSTATE CANCER (HCC): ICD-10-CM

## 2021-06-03 DIAGNOSIS — I10 ESSENTIAL HYPERTENSION: ICD-10-CM

## 2021-06-03 DIAGNOSIS — E11.43 TYPE 2 DIABETES MELLITUS WITH DIABETIC AUTONOMIC NEUROPATHY, WITH LONG-TERM CURRENT USE OF INSULIN (HCC): Primary | ICD-10-CM

## 2021-06-03 PROCEDURE — 99213 OFFICE O/P EST LOW 20 MIN: CPT | Performed by: FAMILY MEDICINE

## 2021-06-03 RX ORDER — INSULIN GLARGINE 300 U/ML
64 INJECTION, SOLUTION SUBCUTANEOUS DAILY
Qty: 5 PEN | Refills: 3 | Status: SHIPPED | OUTPATIENT
Start: 2021-06-03 | End: 2021-07-07

## 2021-06-03 RX ORDER — INSULIN LISPRO 100 [IU]/ML
8 INJECTION, SOLUTION INTRAVENOUS; SUBCUTANEOUS 2 TIMES DAILY
Qty: 3 PEN | Refills: 3 | Status: SHIPPED | OUTPATIENT
Start: 2021-06-03 | End: 2021-06-28 | Stop reason: SDUPTHER

## 2021-06-03 NOTE — PATIENT INSTRUCTIONS
Start checking your blood sugar before meals and 2 hours after meals (on the meals where you give yourself the insulin lispro).  You do not have to do this every day but a few of these measurements would be helpful in letting us know whether your short acting insulin is at the right dose.

## 2021-06-03 NOTE — PROGRESS NOTES
Date of Encounter: 2021  Patient: Arden Carrasquillo,  1955    Subjective   History of Presenting Illness  Chief complaint: Follow-up type 2 diabetes    Type 2 diabetes: FBS 120s-170s, occasionally 200, at bedtime 140s-170s, 1 hypoglycemic event to 70s in the evening with no obvious trigger.  Takes lispro 8 units with breakfast and lunch, not checking pre or postprandial.    Patient underwent prostate biopsy, [place] 3+3, current strategy is watchful waiting.    Continues to follow with Dr. Rod for pain management, recent UDS through there was appropriate.    Review of Systems:  Negative for fever, cough, shortness of breath    Positive for intermittent fatigue    The following portions of the patient's history were reviewed and updated as appropriate: allergies, current medications, past family history, past medical history, past social history, past surgical history and problem list.    Patient Active Problem List   Diagnosis   • Type 2 diabetes mellitus with diabetic autonomic neuropathy, with long-term current use of insulin (CMS/HCC)   • Degenerative spondylolisthesis   • Hypertension   • Generalized anxiety disorder   • Stage 3a chronic kidney disease (CMS/HCC)   • Hyperlipidemia   • Prostate cancer (CMS/HCC) [place] 3+3   • Anemia in stage 3b chronic kidney disease (CMS/HCC)   • Gynecomastia   • Carcinoma in situ of colon     Past Medical History:   Diagnosis Date   • Diabetes mellitus (CMS/HCC)    • Positive colorectal cancer screening using Cologuard test 12/3/2020     Past Surgical History:   Procedure Laterality Date   • CYSTOSCOPY TRANSURETHRAL RESECTION OF PROSTATE     • UPPER GASTROINTESTINAL ENDOSCOPY       Family History   Problem Relation Age of Onset   • Colon cancer Neg Hx    • Colon polyps Neg Hx        Current Outpatient Medications:   •  amLODIPine (NORVASC) 10 MG tablet, Take 1 tablet by mouth Daily., Disp: 90 tablet, Rfl: 3  •  atorvastatin (LIPITOR) 20 MG tablet, Take 1  tablet by mouth Daily., Disp: 90 tablet, Rfl: 3  •  Cholecalciferol (VITAMIN D3 GUMMIES PO), Take  by mouth., Disp: , Rfl:   •  glucose blood (Accu-Chek Mary Plus) test strip, Use to check glucose four times daily, Disp: 200 each, Rfl: 12  •  HYDROcodone-acetaminophen (NORCO) 5-325 MG per tablet, Take 0.5-1 tablets by mouth., Disp: , Rfl:   •  Insulin Glargine, 2 Unit Dial, (Toujeo Max SoloStar) 300 UNIT/ML solution pen-injector injection, Inject 64 Units under the skin into the appropriate area as directed Daily., Disp: 5 pen, Rfl: 3  •  Insulin Lispro, 1 Unit Dial, (HUMALOG) 100 UNIT/ML solution pen-injector, Inject 8 Units under the skin into the appropriate area as directed 2 (two) times a day., Disp: 3 pen, Rfl: 3  •  Insulin Pen Needle (B-D UF III MINI PEN NEEDLES) 31G X 5 MM misc, Use with insulin pens, Disp: 100 each, Rfl: 3  •  losartan (COZAAR) 100 MG tablet, Take 1 tablet by mouth Daily. (Patient taking differently: Take 50 mg by mouth 2 (Two) Times a Day.), Disp: 90 tablet, Rfl: 3  •  mirtazapine (REMERON) 15 MG tablet, TAKE ONE TABLET BY MOUTH ONCE NIGHTLY, Disp: 90 tablet, Rfl: 3  •  Multiple Vitamins-Minerals (MENS MULTIVITAMIN PLUS PO), Take  by mouth., Disp: , Rfl:   •  ondansetron (ZOFRAN) 4 MG tablet, Take 1 tablet by mouth Every 8 (Eight) Hours As Needed for Nausea or Vomiting., Disp: 30 tablet, Rfl: 0  •  pantoprazole (PROTONIX) 40 MG EC tablet, Take 1 tablet by mouth Daily., Disp: 90 tablet, Rfl: 3  •  pregabalin (LYRICA) 50 MG capsule, TAKE ONE CAPSULE BY MOUTH TWICE A DAY AS NEEDED FOR NEUROPATHY, Disp: 60 capsule, Rfl: 2  •  tamsulosin (FLOMAX) 0.4 MG capsule 24 hr capsule, Take 1 capsule by mouth Daily., Disp: 90 capsule, Rfl: 3  •  Menthol-Methyl Salicylate (SALONPAS JET SPRAY EX), Apply  topically., Disp: , Rfl:   •  pioglitazone (ACTOS) 15 MG tablet, Take 1 tablet by mouth Daily., Disp: 90 tablet, Rfl: 3  •  Terbinafine HCl (LAMISIL AT SPRAY EX), Apply  topically., Disp: , Rfl:   No  Known Allergies  Social History     Tobacco Use   • Smoking status: Never Smoker   • Smokeless tobacco: Never Used   Substance Use Topics   • Alcohol use: Defer   • Drug use: Yes     Types: Hydrocodone          Objective   Physical Exam  Vitals:    06/03/21 1308   BP: 140/90   Pulse: 87   SpO2: 98%   Weight: 92 kg (202 lb 12.8 oz)     Body mass index is 30.84 kg/m².    Constitutional: NAD.  Eyes: EOMI. PERRLA. Normal conjunctiva.  Cardiovascular: RRR. No murmurs. No LE edema b/l. Radial pulses 2+ bilaterally.  Pulmonary: CTA b/l. Good effort.  Integumentary: No rashes or wounds on face or upper extremities.  Lymphatic: No anterior cervical lymphadenopathy.  Endocrine: No thyromegaly or palpable thyroid nodules.  Psychiatric: Normal affect. Normal thought content.     Assessment/Plan   Assessment and Plan  Very pleasant 65-year-old male with type 2 diabetes with polyneuropathy, hypertension, hyperlipidemia, prostate cancer, carcinoma in situ of colon, chronic lower back pain on opioid therapy, CKD 3, anxiety and depression, who presents with the following:    Diagnoses and all orders for this visit:    1. Type 2 diabetes mellitus with diabetic autonomic neuropathy, with long-term current use of insulin (CMS/[facility]) (Primary): Increase glargine to 64 units daily, patient to start checking pre and postprandial so we can better titrate rapid acting insulin.  Point-of-care A1c 7.5%.  Goal is less than 8% in context of age and comorbidities  -     Insulin Lispro, 1 Unit Dial, (HUMALOG) 100 UNIT/ML solution pen-injector; Inject 8 Units under the skin into the appropriate area as directed 2 (two) times a day.  Dispense: 3 pen; Refill: 3  -     Insulin Glargine, 2 Unit Dial, (Toujeo Max SoloStar) 300 UNIT/ML solution pen-injector injection; Inject 64 Units under the skin into the appropriate area as directed Daily.  Dispense: 5 pen; Refill: 3    2. Essential hypertension: Controlled although higher than previously    3.  Prostate cancer (CMS/HCC) [place] 3+3: Continue to follow with urology, agree with watchful waiting    Follow-up in 6 months for annual physical    Pollo Ward MD  Family Medicine  O: [phone]  C: [phone]    Disclaimer: Parts of this note were dictated by speech recognition. Minor errors in transcription may be present. Please call if questions.

## 2021-06-28 DIAGNOSIS — E11.43 TYPE 2 DIABETES MELLITUS WITH DIABETIC AUTONOMIC NEUROPATHY, WITH LONG-TERM CURRENT USE OF INSULIN (HCC): ICD-10-CM

## 2021-06-28 DIAGNOSIS — Z79.4 TYPE 2 DIABETES MELLITUS WITH DIABETIC AUTONOMIC NEUROPATHY, WITH LONG-TERM CURRENT USE OF INSULIN (HCC): ICD-10-CM

## 2021-06-28 DIAGNOSIS — R10.13 DYSPEPSIA: ICD-10-CM

## 2021-06-28 DIAGNOSIS — I10 ESSENTIAL HYPERTENSION: ICD-10-CM

## 2021-06-28 RX ORDER — AMLODIPINE BESYLATE 10 MG/1
10 TABLET ORAL DAILY
Qty: 90 TABLET | Refills: 3 | Status: SHIPPED | OUTPATIENT
Start: 2021-06-28 | End: 2022-06-20 | Stop reason: SDUPTHER

## 2021-06-28 RX ORDER — PANTOPRAZOLE SODIUM 40 MG/1
40 TABLET, DELAYED RELEASE ORAL DAILY
Qty: 90 TABLET | Refills: 3 | Status: SHIPPED | OUTPATIENT
Start: 2021-06-28 | End: 2022-07-12

## 2021-06-28 RX ORDER — INSULIN LISPRO 100 [IU]/ML
8 INJECTION, SOLUTION INTRAVENOUS; SUBCUTANEOUS 2 TIMES DAILY
Qty: 3 PEN | Refills: 3 | Status: SHIPPED | OUTPATIENT
Start: 2021-06-28 | End: 2021-07-07

## 2021-06-28 RX ORDER — ONDANSETRON 4 MG/1
4 TABLET, FILM COATED ORAL EVERY 8 HOURS PRN
Qty: 30 TABLET | Refills: 0 | Status: SHIPPED | OUTPATIENT
Start: 2021-06-28 | End: 2022-07-12

## 2021-07-06 ENCOUNTER — OFFICE VISIT (OUTPATIENT)
Dept: INTERNAL MEDICINE | Facility: CLINIC | Age: 66
End: 2021-07-06

## 2021-07-06 VITALS
SYSTOLIC BLOOD PRESSURE: 142 MMHG | HEIGHT: 68 IN | DIASTOLIC BLOOD PRESSURE: 86 MMHG | BODY MASS INDEX: 30.98 KG/M2 | HEART RATE: 88 BPM | WEIGHT: 204.4 LBS | OXYGEN SATURATION: 97 %

## 2021-07-06 DIAGNOSIS — Z79.4 TYPE 2 DIABETES MELLITUS WITH DIABETIC POLYNEUROPATHY, WITH LONG-TERM CURRENT USE OF INSULIN (HCC): ICD-10-CM

## 2021-07-06 DIAGNOSIS — N18.31 STAGE 3A CHRONIC KIDNEY DISEASE (HCC): ICD-10-CM

## 2021-07-06 DIAGNOSIS — G62.9 PERIPHERAL POLYNEUROPATHY: Primary | ICD-10-CM

## 2021-07-06 DIAGNOSIS — E11.42 TYPE 2 DIABETES MELLITUS WITH DIABETIC POLYNEUROPATHY, WITH LONG-TERM CURRENT USE OF INSULIN (HCC): ICD-10-CM

## 2021-07-06 DIAGNOSIS — R60.0 BILATERAL LOWER EXTREMITY EDEMA: ICD-10-CM

## 2021-07-06 DIAGNOSIS — R06.01 ORTHOPNEA: ICD-10-CM

## 2021-07-06 PROCEDURE — 99214 OFFICE O/P EST MOD 30 MIN: CPT | Performed by: FAMILY MEDICINE

## 2021-07-06 RX ORDER — LANOLIN ALCOHOL/MO/W.PET/CERES
1000 CREAM (GRAM) TOPICAL DAILY
Qty: 90 TABLET | Refills: 3 | Status: SHIPPED | OUTPATIENT
Start: 2021-07-06 | End: 2022-06-13 | Stop reason: HOSPADM

## 2021-07-06 RX ORDER — PREGABALIN 75 MG/1
75 CAPSULE ORAL 2 TIMES DAILY
Qty: 60 CAPSULE | Refills: 2 | Status: SHIPPED | OUTPATIENT
Start: 2021-07-06 | End: 2021-10-20

## 2021-07-06 RX ORDER — LANOLIN ALCOHOL/MO/W.PET/CERES
1000 CREAM (GRAM) TOPICAL DAILY
COMMUNITY
End: 2021-07-06 | Stop reason: SDUPTHER

## 2021-07-06 NOTE — PROGRESS NOTES
"Date of Encounter: 2021  Patient: Arden Carrasquillo,  1955    Subjective   History of Presenting Illness  Chief complaint: Leg pain    Sensation of \"legs moving\" worse in the lower legs but also present somewhat in the upper legs.  Present for several months.  Unsure if related to previously diagnosed peripheral polyneuropathy, current dose of pregabalin does not control symptoms.  He does have some lower extremity edema, not doing anything for this. -150s, at bedtime low 200s, no hypoglycemic episodes. Has not seen nephrologist since our last appointment.  Making good urine.    Review of Systems:  Negative for fever, cough, shortness of breath, back pain    The following portions of the patient's history were reviewed and updated as appropriate: allergies, current medications, past family history, past medical history, past social history, past surgical history and problem list.    Patient Active Problem List   Diagnosis   • Type 2 diabetes mellitus with diabetic autonomic neuropathy, with long-term current use of insulin (CMS/HCC)   • Degenerative spondylolisthesis   • Hypertension   • Generalized anxiety disorder   • Stage 3a chronic kidney disease (CMS/HCC)   • Hyperlipidemia   • Prostate cancer (CMS/HCC) [place] 3+3   • Anemia in stage 3b chronic kidney disease (CMS/HCC)   • Gynecomastia   • Carcinoma in situ of colon   • Bilateral lower extremity edema     Past Medical History:   Diagnosis Date   • Diabetes mellitus (CMS/HCC)    • Positive colorectal cancer screening using Cologuard test 12/3/2020     Past Surgical History:   Procedure Laterality Date   • CYSTOSCOPY TRANSURETHRAL RESECTION OF PROSTATE     • UPPER GASTROINTESTINAL ENDOSCOPY       Family History   Problem Relation Age of Onset   • Colon cancer Neg Hx    • Colon polyps Neg Hx        Current Outpatient Medications:   •  amLODIPine (NORVASC) 10 MG tablet, Take 1 tablet by mouth Daily., Disp: 90 tablet, Rfl: 3  •  atorvastatin " (LIPITOR) 20 MG tablet, Take 1 tablet by mouth Daily., Disp: 90 tablet, Rfl: 3  •  Cholecalciferol (VITAMIN D3 GUMMIES PO), Take  by mouth., Disp: , Rfl:   •  glucose blood (Accu-Chek Mary Plus) test strip, Use to check glucose four times daily, Disp: 200 each, Rfl: 12  •  HYDROcodone-acetaminophen (NORCO) 7.5-325 MG per tablet, Take 0.5-1 tablets by mouth., Disp: , Rfl:   •  Insulin Glargine, 2 Unit Dial, (Toujeo Max SoloStar) 300 UNIT/ML solution pen-injector injection, Inject 64 Units under the skin into the appropriate area as directed Daily., Disp: 5 pen, Rfl: 3  •  Insulin Lispro, 1 Unit Dial, (HUMALOG) 100 UNIT/ML solution pen-injector, Inject 8 Units under the skin into the appropriate area as directed 2 (two) times a day., Disp: 3 pen, Rfl: 3  •  Insulin Pen Needle (B-D UF III MINI PEN NEEDLES) 31G X 5 MM misc, Use with insulin pens, Disp: 100 each, Rfl: 3  •  losartan (COZAAR) 100 MG tablet, Take 1 tablet by mouth Daily. (Patient taking differently: Take 50 mg by mouth 2 (Two) Times a Day.), Disp: 90 tablet, Rfl: 3  •  Menthol-Methyl Salicylate (SALONPAS JET SPRAY EX), Apply  topically., Disp: , Rfl:   •  mirtazapine (REMERON) 15 MG tablet, TAKE ONE TABLET BY MOUTH ONCE NIGHTLY, Disp: 90 tablet, Rfl: 3  •  Multiple Vitamins-Minerals (MENS MULTIVITAMIN PLUS PO), Take  by mouth., Disp: , Rfl:   •  ondansetron (ZOFRAN) 4 MG tablet, Take 1 tablet by mouth Every 8 (Eight) Hours As Needed for Nausea or Vomiting., Disp: 30 tablet, Rfl: 0  •  pantoprazole (PROTONIX) 40 MG EC tablet, Take 1 tablet by mouth Daily., Disp: 90 tablet, Rfl: 3  •  pioglitazone (ACTOS) 15 MG tablet, Take 1 tablet by mouth Daily., Disp: 90 tablet, Rfl: 3  •  pregabalin (LYRICA) 75 MG capsule, Take 1 capsule by mouth 2 (Two) Times a Day., Disp: 60 capsule, Rfl: 2  •  tamsulosin (FLOMAX) 0.4 MG capsule 24 hr capsule, Take 1 capsule by mouth Daily., Disp: 90 capsule, Rfl: 3  •  Terbinafine HCl (LAMISIL AT SPRAY EX), Apply  topically., Disp:  ", Rfl:   •  vitamin B-12 (CYANOCOBALAMIN) 1000 MCG tablet, Take 1 tablet by mouth Daily., Disp: 90 tablet, Rfl: 3  No Known Allergies  Social History     Tobacco Use   • Smoking status: Never Smoker   • Smokeless tobacco: Never Used   Substance Use Topics   • Alcohol use: Defer   • Drug use: Yes     Types: Hydrocodone          Objective   Physical Exam  Vitals:    07/06/21 1439   BP: 142/86   Pulse: 88   SpO2: 97%   Weight: 92.7 kg (204 lb 6.4 oz)   Height: 172.7 cm (68\")     Body mass index is 31.08 kg/m².    Constitutional: NAD.  Cardiovascular: RRR. No murmurs.  Moderate pitting edema of the lower legs, greatest at the ankles. Radial pulses 2+ bilaterally.  Pulmonary: CTA b/l. Good effort.  No bibasilar crackles.  Integumentary: No rashes or wounds on face or upper extremities.  Lymphatic: No anterior cervical lymphadenopathy.  Endocrine: No thyromegaly or palpable thyroid nodules.  Psychiatric: Normal affect. Normal thought content.  Neurologic: Monofilament sensation intact in the lower extremities excluding the feet.     Assessment/Plan   Assessment and Plan  Very pleasant 65-year-old male with type 2 diabetes with polyneuropathy, hypertension, hyperlipidemia, prostate cancer, chronic lower back pain on opioid therapy, CKD 3, anxiety and depression, who presents with the following:    Diagnoses and all orders for this visit:    1. Peripheral polyneuropathy (Primary): Symptoms may be related to type 2 diabetes but another contributing causes most certainly his lower extremity edema which was not present on previous exam.  He also has vitamin B12 deficiency.  For symptomatic relief, increase pregabalin to 75 mg twice daily, and recommend adherence to previously recommended vitamin B-12 therapy.  -     pregabalin (LYRICA) 75 MG capsule; Take 1 capsule by mouth 2 (Two) Times a Day.  Dispense: 60 capsule; Refill: 2  -     vitamin B-12 (CYANOCOBALAMIN) 1000 MCG tablet; Take 1 tablet by mouth Daily.  Dispense: 90 " tablet; Refill: 3  -     Vitamin B12    2. Type 2 diabetes mellitus with diabetic polyneuropathy, with long-term current use of insulin (CMS/[facility])  -     Hemoglobin A1c    3. Bilateral lower extremity edema: Recommend compression stockings, regular leg elevation.  Differential includes dependent edema, pioglitazone induced edema, worsening CKD with fluid retention, systolic CHF. Not a great candidate for furosemide due to kidney function but will update labs. He has not had an echocardiogram before, has no heart complaints or other findings consistent with systolic failure, will screen BNP for further evaluation  4. Orthopnea   -     BNP ([facility] Only)    5. Stage 3a chronic kidney disease (CMS/[facility]): Has follow-up with nephrology  -     Comprehensive Metabolic Panel  -     CBC & Differential  -     Thyroid Panel With TSH    Follow-up in 6 months or earlier as needed    Pollo Ward MD  Family Medicine  O: [phone]  C: [phone]    Disclaimer: Parts of this note were dictated by speech recognition. Minor errors in transcription may be present. Please call if questions.

## 2021-07-06 NOTE — PROGRESS NOTES
Venipuncture performed in [facility] by KD with good hemostasis. Patient tolerated well. 7/6/2021 Shae SPAIN LPN.

## 2021-07-07 DIAGNOSIS — E11.43 TYPE 2 DIABETES MELLITUS WITH DIABETIC AUTONOMIC NEUROPATHY, WITH LONG-TERM CURRENT USE OF INSULIN (HCC): ICD-10-CM

## 2021-07-07 DIAGNOSIS — Z79.4 TYPE 2 DIABETES MELLITUS WITH DIABETIC AUTONOMIC NEUROPATHY, WITH LONG-TERM CURRENT USE OF INSULIN (HCC): ICD-10-CM

## 2021-07-07 LAB
ALBUMIN SERPL-MCNC: 4.3 G/DL (ref 3.8–4.8)
ALBUMIN/GLOB SERPL: 1.2 {RATIO} (ref 1.2–2.2)
ALP SERPL-CCNC: 150 IU/L (ref 48–121)
ALT SERPL-CCNC: 14 IU/L (ref 0–44)
AST SERPL-CCNC: 18 IU/L (ref 0–40)
BASOPHILS # BLD AUTO: 0 X10E3/UL (ref 0–0.2)
BASOPHILS NFR BLD AUTO: 1 %
BILIRUB SERPL-MCNC: 0.3 MG/DL (ref 0–1.2)
BNP SERPL-MCNC: 12.6 PG/ML (ref 0–100)
BUN SERPL-MCNC: 17 MG/DL (ref 8–27)
BUN/CREAT SERPL: 11 (ref 10–24)
CALCIUM SERPL-MCNC: 9.4 MG/DL (ref 8.6–10.2)
CHLORIDE SERPL-SCNC: 105 MMOL/L (ref 96–106)
CO2 SERPL-SCNC: 23 MMOL/L (ref 20–29)
CREAT SERPL-MCNC: 1.54 MG/DL (ref 0.76–1.27)
EOSINOPHIL # BLD AUTO: 0.3 X10E3/UL (ref 0–0.4)
EOSINOPHIL NFR BLD AUTO: 5 %
ERYTHROCYTE [DISTWIDTH] IN BLOOD BY AUTOMATED COUNT: 13.9 % (ref 11.6–15.4)
FT4I SERPL CALC-MCNC: 1.4 (ref 1.2–4.9)
GLOBULIN SER CALC-MCNC: 3.6 G/DL (ref 1.5–4.5)
GLUCOSE SERPL-MCNC: 186 MG/DL (ref 65–99)
HBA1C MFR BLD: 7.8 % (ref 4.8–5.6)
HCT VFR BLD AUTO: 33.5 % (ref 37.5–51)
HGB BLD-MCNC: 10.9 G/DL (ref 13–17.7)
IMM GRANULOCYTES # BLD AUTO: 0 X10E3/UL (ref 0–0.1)
IMM GRANULOCYTES NFR BLD AUTO: 1 %
LYMPHOCYTES # BLD AUTO: 1.7 X10E3/UL (ref 0.7–3.1)
LYMPHOCYTES NFR BLD AUTO: 28 %
MCH RBC QN AUTO: 26.7 PG (ref 26.6–33)
MCHC RBC AUTO-ENTMCNC: 32.5 G/DL (ref 31.5–35.7)
MCV RBC AUTO: 82 FL (ref 79–97)
MONOCYTES # BLD AUTO: 0.6 X10E3/UL (ref 0.1–0.9)
MONOCYTES NFR BLD AUTO: 10 %
NEUTROPHILS # BLD AUTO: 3.6 X10E3/UL (ref 1.4–7)
NEUTROPHILS NFR BLD AUTO: 55 %
PLATELET # BLD AUTO: 185 X10E3/UL (ref 150–450)
POTASSIUM SERPL-SCNC: 4.7 MMOL/L (ref 3.5–5.2)
PROT SERPL-MCNC: 7.9 G/DL (ref 6–8.5)
RBC # BLD AUTO: 4.08 X10E6/UL (ref 4.14–5.8)
SODIUM SERPL-SCNC: 140 MMOL/L (ref 134–144)
T3RU NFR SERPL: 30 % (ref 24–39)
T4 SERPL-MCNC: 4.8 UG/DL (ref 4.5–12)
TSH SERPL DL<=0.005 MIU/L-ACNC: 2.63 UIU/ML (ref 0.45–4.5)
VIT B12 SERPL-MCNC: 265 PG/ML (ref 232–1245)
WBC # BLD AUTO: 6.3 X10E3/UL (ref 3.4–10.8)

## 2021-07-07 RX ORDER — INSULIN GLARGINE 300 U/ML
70 INJECTION, SOLUTION SUBCUTANEOUS DAILY
Qty: 5 PEN | Refills: 3 | Status: SHIPPED | OUTPATIENT
Start: 2021-07-07

## 2021-07-07 RX ORDER — INSULIN LISPRO 100 [IU]/ML
12 INJECTION, SOLUTION INTRAVENOUS; SUBCUTANEOUS 2 TIMES DAILY
Qty: 3 PEN | Refills: 3 | Status: SHIPPED | OUTPATIENT
Start: 2021-07-07

## 2021-08-06 ENCOUNTER — OFFICE VISIT (OUTPATIENT)
Dept: INTERNAL MEDICINE | Facility: CLINIC | Age: 66
End: 2021-08-06

## 2021-08-06 VITALS
TEMPERATURE: 99.5 F | OXYGEN SATURATION: 96 % | WEIGHT: 200.6 LBS | HEART RATE: 86 BPM | DIASTOLIC BLOOD PRESSURE: 78 MMHG | HEIGHT: 68 IN | SYSTOLIC BLOOD PRESSURE: 120 MMHG | BODY MASS INDEX: 30.4 KG/M2

## 2021-08-06 DIAGNOSIS — H66.93 BILATERAL OTITIS MEDIA, UNSPECIFIED OTITIS MEDIA TYPE: ICD-10-CM

## 2021-08-06 DIAGNOSIS — M43.10 DEGENERATIVE SPONDYLOLISTHESIS: Primary | ICD-10-CM

## 2021-08-06 PROCEDURE — 99213 OFFICE O/P EST LOW 20 MIN: CPT | Performed by: FAMILY MEDICINE

## 2021-08-06 RX ORDER — AMOXICILLIN 500 MG/1
1000 CAPSULE ORAL 2 TIMES DAILY
Qty: 20 CAPSULE | Refills: 0 | Status: SHIPPED | OUTPATIENT
Start: 2021-08-06 | End: 2022-06-13

## 2021-08-06 NOTE — PROGRESS NOTES
Subjective   Arden Carrasquillo is a 65 y.o. male.   CC: left leg pain    History of Present Illness   Arden is a 65 year old male who comes in today with the complaint of pain that extends from his left buttocks down the side of his left leg.  Sometimes it stops at the knee and other times it goes to the ankle.  Has a hard time getting comfortable.  Difficult to sit due to the pain.  Denies any known injury. Thinks it has been going on for about 3 months. Has a history of degenerative spondylolisthesis and does see pain management.  Has an appointment to see Dr. Camacho his pain management specialist on August 23.  He denies any swelling of the leg.  It hurts to walk.  He is on Hydrocodone and Lyrica.  Also has peripheral neuropathy.    Brings a record of his blood sugar readings---they are up and down.  Has some trouble following the diabetic diet.    Also complains of intermittent headache associated with dizziness which he describes as vertigo.  Just lasts for a short time when it comes on.  Does have some congestion.        The following portions of the patient's history were reviewed and updated as appropriate: allergies, current medications, past medical history, past social history, past surgical history and problem list.    Review of Systems   Constitutional: Positive for activity change (because of the pain). Negative for chills, diaphoresis, fatigue and fever.   HENT: Positive for ear pain. Negative for congestion.    Respiratory: Negative for cough and shortness of breath.    Cardiovascular: Negative for chest pain, palpitations and leg swelling.   Gastrointestinal: Negative for nausea.   Genitourinary: Negative for difficulty urinating.   Musculoskeletal: Positive for back pain.        Pain radiating from left buttocks down the LLE   Skin: Negative for rash.   Neurological: Positive for dizziness (with the headache) and headaches (intermittent, not daily). Negative for tremors, seizures, speech  "difficulty, weakness, light-headedness and numbness.       Objective   Physical Exam  Constitutional:       Appearance: Normal appearance.   HENT:      Right Ear: Ear canal and external ear normal. Tympanic membrane is erythematous.      Left Ear: Ear canal and external ear normal. Tympanic membrane is erythematous.      Nose: Nose normal.      Mouth/Throat:      Mouth: Mucous membranes are moist.      Pharynx: Oropharynx is clear.      Comments: Postnasal drainage  Eyes:      Pupils: Pupils are equal, round, and reactive to light.   Cardiovascular:      Rate and Rhythm: Normal rate and regular rhythm.      Heart sounds: Normal heart sounds.   Pulmonary:      Effort: Pulmonary effort is normal.      Breath sounds: Normal breath sounds.   Musculoskeletal:      Cervical back: Normal range of motion and neck supple.      Lumbar back: Tenderness present. No swelling, edema, deformity or signs of trauma. Decreased range of motion. Negative right straight leg raise test and negative left straight leg raise test.      Comments: Tender over the muscles of the left buttocks.   Skin:     General: Skin is warm and dry.      Findings: No rash.   Neurological:      General: No focal deficit present.      Mental Status: He is alert and oriented to person, place, and time.   Psychiatric:         Mood and Affect: Mood normal.         Behavior: Behavior normal.       Vitals:    08/06/21 1247   BP: 120/78   BP Location: Left arm   Patient Position: Sitting   Cuff Size: Large Adult   Pulse: 86   Temp: 99.5 °F (37.5 °C)   TempSrc: Oral   SpO2: 96%   Weight: 91 kg (200 lb 9.6 oz)   Height: 172.7 cm (68\")     Assessment/Plan   Diagnoses and all orders for this visit:    1. Degenerative spondylolisthesis (Primary)    2. Bilateral otitis media, unspecified otitis media type  -     amoxicillin (AMOXIL) 500 MG capsule; Take 2 capsules by mouth 2 (Two) Times a Day.  Dispense: 20 capsule; Refill: 0      He is to keep his appointment with pain " management.  There is nothing I can add to his regimen at this time.  He cannot take NSAIDs due to kidney disease.

## 2021-10-20 DIAGNOSIS — G62.9 PERIPHERAL POLYNEUROPATHY: ICD-10-CM

## 2021-10-20 RX ORDER — PREGABALIN 75 MG/1
CAPSULE ORAL
Qty: 60 CAPSULE | Refills: 0 | Status: SHIPPED | OUTPATIENT
Start: 2021-10-20 | End: 2021-11-17

## 2021-11-17 DIAGNOSIS — G62.9 PERIPHERAL POLYNEUROPATHY: ICD-10-CM

## 2021-11-17 RX ORDER — PREGABALIN 75 MG/1
CAPSULE ORAL
Qty: 180 CAPSULE | Refills: 2 | Status: SHIPPED | OUTPATIENT
Start: 2021-11-17 | End: 2022-05-24

## 2021-11-17 NOTE — TELEPHONE ENCOUNTER
Rx Refill Note  Requested Prescriptions     Pending Prescriptions Disp Refills   • pregabalin (LYRICA) 75 MG capsule [Pharmacy Med Name: PREGABALIN 75 MG CAPSULE] 60 capsule      Sig: TAKE ONE CAPSULE BY MOUTH TWICE A DAY      Last office visit with prescribing clinician: 7/6/2021      Next office visit with prescribing clinician: 12/3/2021            Mariana Ascencio LPN  11/17/21, 13:44 EST

## 2022-03-28 DIAGNOSIS — E11.43 TYPE 2 DIABETES MELLITUS WITH DIABETIC AUTONOMIC NEUROPATHY, WITH LONG-TERM CURRENT USE OF INSULIN: ICD-10-CM

## 2022-03-28 DIAGNOSIS — N40.1 BPH WITH OBSTRUCTION/LOWER URINARY TRACT SYMPTOMS: ICD-10-CM

## 2022-03-28 DIAGNOSIS — Z79.4 TYPE 2 DIABETES MELLITUS WITH DIABETIC AUTONOMIC NEUROPATHY, WITH LONG-TERM CURRENT USE OF INSULIN: ICD-10-CM

## 2022-03-28 DIAGNOSIS — N13.8 BPH WITH OBSTRUCTION/LOWER URINARY TRACT SYMPTOMS: ICD-10-CM

## 2022-03-29 RX ORDER — TAMSULOSIN HYDROCHLORIDE 0.4 MG/1
CAPSULE ORAL
Qty: 90 CAPSULE | Refills: 3 | Status: SHIPPED | OUTPATIENT
Start: 2022-03-29

## 2022-03-29 RX ORDER — PIOGLITAZONEHYDROCHLORIDE 15 MG/1
TABLET ORAL
Qty: 90 TABLET | Refills: 3 | OUTPATIENT
Start: 2022-03-29

## 2022-05-06 DIAGNOSIS — Z79.4 TYPE 2 DIABETES MELLITUS WITH DIABETIC AUTONOMIC NEUROPATHY, WITH LONG-TERM CURRENT USE OF INSULIN: ICD-10-CM

## 2022-05-06 DIAGNOSIS — E11.43 TYPE 2 DIABETES MELLITUS WITH DIABETIC AUTONOMIC NEUROPATHY, WITH LONG-TERM CURRENT USE OF INSULIN: ICD-10-CM

## 2022-05-06 RX ORDER — ATORVASTATIN CALCIUM 20 MG/1
TABLET, FILM COATED ORAL
Qty: 90 TABLET | Refills: 3 | Status: SHIPPED | OUTPATIENT
Start: 2022-05-06

## 2022-05-17 DIAGNOSIS — Z79.4 TYPE 2 DIABETES MELLITUS WITH DIABETIC AUTONOMIC NEUROPATHY, WITH LONG-TERM CURRENT USE OF INSULIN: ICD-10-CM

## 2022-05-17 DIAGNOSIS — E11.43 TYPE 2 DIABETES MELLITUS WITH DIABETIC AUTONOMIC NEUROPATHY, WITH LONG-TERM CURRENT USE OF INSULIN: ICD-10-CM

## 2022-05-17 DIAGNOSIS — I10 ESSENTIAL HYPERTENSION: ICD-10-CM

## 2022-05-17 RX ORDER — PIOGLITAZONEHYDROCHLORIDE 15 MG/1
TABLET ORAL
Qty: 90 TABLET | Refills: 3 | OUTPATIENT
Start: 2022-05-17

## 2022-05-17 RX ORDER — LOSARTAN POTASSIUM 100 MG/1
TABLET ORAL
Qty: 90 TABLET | Refills: 3 | Status: SHIPPED | OUTPATIENT
Start: 2022-05-17

## 2022-05-23 DIAGNOSIS — G62.9 PERIPHERAL POLYNEUROPATHY: ICD-10-CM

## 2022-05-24 RX ORDER — PREGABALIN 75 MG/1
CAPSULE ORAL
Qty: 180 CAPSULE | Refills: 1 | Status: SHIPPED | OUTPATIENT
Start: 2022-05-24 | End: 2022-11-06

## 2022-05-24 NOTE — TELEPHONE ENCOUNTER
Rx Refill Note  Requested Prescriptions     Pending Prescriptions Disp Refills   • pregabalin (LYRICA) 75 MG capsule [Pharmacy Med Name: PREGABALIN 75 MG CAPSULE] 180 capsule      Sig: TAKE ONE CAPSULE BY MOUTH TWICE A DAY      Last office visit with prescribing clinician: 7/6/2021      Next office visit with prescribing clinician: Visit date not found            Mariana Ascencio LPN  05/24/22, 09:10 EDT

## 2022-06-13 ENCOUNTER — OFFICE VISIT (OUTPATIENT)
Dept: INTERNAL MEDICINE | Facility: CLINIC | Age: 67
End: 2022-06-13

## 2022-06-13 VITALS
HEART RATE: 79 BPM | SYSTOLIC BLOOD PRESSURE: 140 MMHG | TEMPERATURE: 97.5 F | HEIGHT: 66 IN | BODY MASS INDEX: 28.64 KG/M2 | WEIGHT: 178.2 LBS | OXYGEN SATURATION: 97 % | DIASTOLIC BLOOD PRESSURE: 100 MMHG

## 2022-06-13 DIAGNOSIS — E11.43 TYPE 2 DIABETES MELLITUS WITH DIABETIC AUTONOMIC NEUROPATHY, WITH LONG-TERM CURRENT USE OF INSULIN: ICD-10-CM

## 2022-06-13 DIAGNOSIS — Z79.4 TYPE 2 DIABETES MELLITUS WITH DIABETIC AUTONOMIC NEUROPATHY, WITH LONG-TERM CURRENT USE OF INSULIN: ICD-10-CM
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
## 2022-06-13 DIAGNOSIS — C61 PROSTATE CANCER: ICD-10-CM

## 2022-06-13 DIAGNOSIS — E78.5 HYPERLIPIDEMIA, UNSPECIFIED HYPERLIPIDEMIA TYPE: ICD-10-CM

## 2022-06-13 DIAGNOSIS — I10 PRIMARY HYPERTENSION: ICD-10-CM

## 2022-06-13 PROCEDURE — 99214 OFFICE O/P EST MOD 30 MIN: CPT | Performed by: NURSE PRACTITIONER

## 2022-06-13 NOTE — PROGRESS NOTES
Date of Encounter: 2022  Patient: Arden Carrasquillo,  1955    Subjective     Chief complaint: Fall    HPI   Patient here today to discuss a few items.  Patient states its been over a year since he is seen with his PCP.  Patient recently had 2 falls.  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  Patient states that he never hit his head with either of the falls.  Last fall occurred about a week ago in his apartment.  Patient fell on the right side of his body.  Patient complains of right elbow pain as well as right hip pain.  Patient is using a walker to assist him in the office today.    Patient continues to follow with pain management for his back pain.  Patient states that he recently received an epidural has helped with his pain some.    Patient lives in an apartment with his sister currently.  Patient no longer working his part-time job as he states that it was too much to keep up with.  Patient states the falls occur due to him feeling weak in the legs.  Patient was not using his walker at the time of either of his falls.    Patient states he has not eaten anything yet today.  Would like to have his blood work updated.  Patient has been keeping track of his blood sugars at home and states that they are in pretty good range.    Patient continues to follow with specialist for his prostate cancer.  Patient brought copies of his last lab work from specialist office.  Still plans for watchful waiting at this time.    Review of Systems:  Negative for fever, congestion, chest pain upon exertion, shortness of breath, vision changes, vomiting, dysuria, lymphadenopathy, muscle weakness, numbness, mood changes, rashes.  Positive for pain on right elbow, weakness in bilateral legs.    The following portions of the patient's history were reviewed and updated as appropriate: allergies, current medications, past family history, past medical history, past social history, past  surgical history and problem list.    Patient Active Problem List   Diagnosis   • Type 2 diabetes mellitus with diabetic autonomic neuropathy, with long-term current use of insulin (HCC)   • Degenerative spondylolisthesis   • Hypertension   • Generalized anxiety disorder   • Stage 3a chronic kidney disease (HCC)   • Hyperlipidemia   • Prostate cancer (CMS/HCC) [place] 3+3   • Anemia in stage 3b chronic kidney disease (HCC)   • Gynecomastia   • Carcinoma in situ of colon   • Bilateral lower extremity edema     Past Medical History:   Diagnosis Date   • Diabetes mellitus (HCC)    • Positive colorectal cancer screening using Cologuard test 12/3/2020     Past Surgical History:   Procedure Laterality Date   • CYSTOSCOPY TRANSURETHRAL RESECTION OF PROSTATE     • UPPER GASTROINTESTINAL ENDOSCOPY       Family History   Problem Relation Age of Onset   • Colon cancer Neg Hx    • Colon polyps Neg Hx        Current Outpatient Medications:   •  amLODIPine (NORVASC) 10 MG tablet, Take 1 tablet by mouth Daily., Disp: 90 tablet, Rfl: 3  •  atorvastatin (LIPITOR) 20 MG tablet, TAKE ONE TABLET BY MOUTH DAILY, Disp: 90 tablet, Rfl: 3  •  glucose blood (Accu-Chek Mary Plus) test strip, Use to check glucose four times daily, Disp: 200 each, Rfl: 12  •  HYDROcodone-acetaminophen (NORCO) 7.5-325 MG per tablet, Take 0.5-1 tablets by mouth., Disp: , Rfl:   •  Insulin Glargine, 2 Unit Dial, (Toujeo Max SoloStar) 300 UNIT/ML solution pen-injector injection, Inject 70 Units under the skin into the appropriate area as directed Daily., Disp: 5 pen, Rfl: 3  •  Insulin Lispro, 1 Unit Dial, (HUMALOG) 100 UNIT/ML solution pen-injector, Inject 12 Units under the skin into the appropriate area as directed 2 (two) times a day., Disp: 3 pen, Rfl: 3  •  Insulin Pen Needle (B-D UF III MINI PEN NEEDLES) 31G X 5 MM misc, Use with insulin pens, Disp: 100 each, Rfl: 3  •  losartan (COZAAR) 100 MG tablet, TAKE ONE TABLET BY MOUTH DAILY, Disp: 90 tablet, Rfl:  "3  •  mirtazapine (REMERON) 15 MG tablet, TAKE ONE TABLET BY MOUTH ONCE NIGHTLY, Disp: 90 tablet, Rfl: 3  •  ondansetron (ZOFRAN) 4 MG tablet, Take 1 tablet by mouth Every 8 (Eight) Hours As Needed for Nausea or Vomiting., Disp: 30 tablet, Rfl: 0  •  pantoprazole (PROTONIX) 40 MG EC tablet, Take 1 tablet by mouth Daily., Disp: 90 tablet, Rfl: 3  •  pregabalin (LYRICA) 75 MG capsule, TAKE ONE CAPSULE BY MOUTH TWICE A DAY, Disp: 180 capsule, Rfl: 1  •  Cholecalciferol (VITAMIN D3 GUMMIES PO), Take  by mouth., Disp: , Rfl:   •  Multiple Vitamins-Minerals (MENS MULTIVITAMIN PLUS PO), Take  by mouth., Disp: , Rfl:   •  tamsulosin (FLOMAX) 0.4 MG capsule 24 hr capsule, TAKE ONE CAPSULE BY MOUTH DAILY, Disp: 90 capsule, Rfl: 3  •  Terbinafine HCl (LAMISIL AT SPRAY EX), Apply  topically., Disp: , Rfl:   No Known Allergies  Social History     Tobacco Use   • Smoking status: Never Smoker   • Smokeless tobacco: Never Used   Substance Use Topics   • Alcohol use: Defer   • Drug use: Yes     Types: Hydrocodone          Objective   Physical Exam   Vitals:    06/13/22 1400   BP: 140/100   BP Location: Left arm   Patient Position: Sitting   Cuff Size: Adult   Pulse: 79   Temp: 97.5 °F (36.4 °C)   TempSrc: Temporal   SpO2: 97%   Weight: 80.8 kg (178 lb 3.2 oz)   Height: 167.6 cm (66\")     Body mass index is 28.76 kg/m².    Constitutional: NAD.  Cardiovascular: RRR.  Integumentary: Patient has bruising and scabbing to right elbow.  No signs or symptoms of infection at today's visit.  Psychiatric: Normal affect. Normal thought content.           Assessment & Plan   Assessment and Plan  Pleasant 66-year-old male with generalized anxiety disorder, degenerative spondylolisthesis, bilateral lower extremity edema, hypertension, type 2 diabetes and others here today to discuss the following:    Diagnoses and all orders for this visit:    1. Fall, initial encounter (Primary)  -     Ambulatory Referral to Physical Therapy Evaluate and " treat    2. Primary hypertension  Assessment & Plan:  Blood pressure elevated in office today.  Patient is in some pain due to recent fall.  Will recheck blood pressure at follow-up visit to determine if any changes are needed to current medications.    Orders:  -     CBC & Differential    3. Type 2 diabetes mellitus with diabetic autonomic neuropathy, with long-term current use of insulin ([facility])  Assessment & Plan:  Had a long discussion with patient regarding his diabetes management.  Discussed about the importance of eating regular meals throughout the day.    Orders:  -     Hemoglobin A1c  -     Comprehensive Metabolic Panel    4. Hyperlipidemia, unspecified hyperlipidemia type  -     Comprehensive Metabolic Panel  -     Lipid Panel    5. Prostate cancer (CMS/[facility]) [place] 3+3  Overview:  Watchful waiting    Assessment & Plan:  Reviewed patient notes at bedside.  Patient to continue following with specialist on a regular basis.         Discussed with patient that importance of using his walker when around the house as well as outside of the home.  Discussed about eating regular meals so that his blood sugar does not drop too low.  Answered some questions from the patient.    Patient verbalized understanding of and agreed to plan of care.    Return in about 1 week (around 6/20/2022) for Medicare Wellness.       A total of 30 minutes of time was spent on this encounter today.  This includes reviewing the patient's records, face-to-face time, documentation.    Jen Rodas DNP, FNP-C  Family Medicine  O: [phone]      Please note that portions of this note were completed with a voice recognition program. Please call if questions.

## 2022-06-13 NOTE — ASSESSMENT & PLAN NOTE
Had a long discussion with patient regarding his diabetes management.  Discussed about the importance of eating regular meals throughout the day.

## 2022-06-13 NOTE — ASSESSMENT & PLAN NOTE
Blood pressure elevated in office today.  Patient is in some pain due to recent fall.  Will recheck blood pressure at follow-up visit to determine if any changes are needed to current medications.

## 2022-06-13 NOTE — ASSESSMENT & PLAN NOTE
Reviewed patient notes at bedside.  Patient to continue following with specialist on a regular basis.

## 2022-06-14 LAB
ALBUMIN SERPL-MCNC: 4 G/DL (ref 3.8–4.8)
ALBUMIN/GLOB SERPL: 1.2 {RATIO} (ref 1.2–2.2)
ALP SERPL-CCNC: 116 IU/L (ref 44–121)
ALT SERPL-CCNC: 28 IU/L (ref 0–44)
AST SERPL-CCNC: 16 IU/L (ref 0–40)
BASOPHILS # BLD AUTO: 0 X10E3/UL (ref 0–0.2)
BASOPHILS NFR BLD AUTO: 0 %
BILIRUB SERPL-MCNC: 0.3 MG/DL (ref 0–1.2)
BUN SERPL-MCNC: 20 MG/DL (ref 8–27)
BUN/CREAT SERPL: 15 (ref 10–24)
CALCIUM SERPL-MCNC: 8.6 MG/DL (ref 8.6–10.2)
CHLORIDE SERPL-SCNC: 102 MMOL/L (ref 96–106)
CHOLEST SERPL-MCNC: 141 MG/DL (ref 100–199)
CO2 SERPL-SCNC: 22 MMOL/L (ref 20–29)
CREAT SERPL-MCNC: 1.36 MG/DL (ref 0.76–1.27)
EGFRCR SERPLBLD CKD-EPI 2021: 57 ML/MIN/1.73
EOSINOPHIL # BLD AUTO: 0.2 X10E3/UL (ref 0–0.4)
EOSINOPHIL NFR BLD AUTO: 3 %
ERYTHROCYTE [DISTWIDTH] IN BLOOD BY AUTOMATED COUNT: 13 % (ref 11.6–15.4)
GLOBULIN SER CALC-MCNC: 3.4 G/DL (ref 1.5–4.5)
GLUCOSE SERPL-MCNC: 124 MG/DL (ref 65–99)
HBA1C MFR BLD: 7.5 % (ref 4.8–5.6)
HCT VFR BLD AUTO: 35.7 % (ref 37.5–51)
HDLC SERPL-MCNC: 33 MG/DL
HGB BLD-MCNC: 11.3 G/DL (ref 13–17.7)
IMM GRANULOCYTES # BLD AUTO: 0.1 X10E3/UL (ref 0–0.1)
IMM GRANULOCYTES NFR BLD AUTO: 1 %
LDLC SERPL CALC-MCNC: 78 MG/DL (ref 0–99)
LYMPHOCYTES # BLD AUTO: 1.9 X10E3/UL (ref 0.7–3.1)
LYMPHOCYTES NFR BLD AUTO: 27 %
MCH RBC QN AUTO: 26.4 PG (ref 26.6–33)
MCHC RBC AUTO-ENTMCNC: 31.7 G/DL (ref 31.5–35.7)
MCV RBC AUTO: 83 FL (ref 79–97)
MONOCYTES # BLD AUTO: 0.7 X10E3/UL (ref 0.1–0.9)
MONOCYTES NFR BLD AUTO: 10 %
NEUTROPHILS # BLD AUTO: 4.3 X10E3/UL (ref 1.4–7)
NEUTROPHILS NFR BLD AUTO: 59 %
PLATELET # BLD AUTO: 245 X10E3/UL (ref 150–450)
POTASSIUM SERPL-SCNC: 4.1 MMOL/L (ref 3.5–5.2)
PROT SERPL-MCNC: 7.4 G/DL (ref 6–8.5)
RBC # BLD AUTO: 4.28 X10E6/UL (ref 4.14–5.8)
SODIUM SERPL-SCNC: 138 MMOL/L (ref 134–144)
TRIGL SERPL-MCNC: 177 MG/DL (ref 0–149)
VLDLC SERPL CALC-MCNC: 30 MG/DL (ref 5–40)
WBC # BLD AUTO: 7.2 X10E3/UL (ref 3.4–10.8)

## 2022-06-20 ENCOUNTER — OFFICE VISIT (OUTPATIENT)
Dept: INTERNAL MEDICINE | Facility: CLINIC | Age: 67
End: 2022-06-20

## 2022-06-20 ENCOUNTER — HOME HEALTH ADMISSION (OUTPATIENT)
Dept: HOME HEALTH SERVICES | Facility: HOME HEALTHCARE | Age: 67
End: 2022-06-20

## 2022-06-20 VITALS
SYSTOLIC BLOOD PRESSURE: 148 MMHG | OXYGEN SATURATION: 98 % | HEIGHT: 66 IN | TEMPERATURE: 99.5 F | HEART RATE: 92 BPM | DIASTOLIC BLOOD PRESSURE: 88 MMHG | WEIGHT: 188.8 LBS | BODY MASS INDEX: 30.34 KG/M2

## 2022-06-20 DIAGNOSIS — N18.31 STAGE 3A CHRONIC KIDNEY DISEASE: ICD-10-CM

## 2022-06-20 DIAGNOSIS — C61 PROSTATE CANCER: ICD-10-CM

## 2022-06-20 DIAGNOSIS — I10 PRIMARY HYPERTENSION: ICD-10-CM

## 2022-06-20 DIAGNOSIS — N18.32 ANEMIA IN STAGE 3B CHRONIC KIDNEY DISEASE: ICD-10-CM

## 2022-06-20 DIAGNOSIS — R29.898 WEAKNESS OF BOTH LOWER EXTREMITIES: ICD-10-CM

## 2022-06-20 DIAGNOSIS — Z79.4 TYPE 2 DIABETES MELLITUS WITH DIABETIC AUTONOMIC NEUROPATHY, WITH LONG-TERM CURRENT USE OF INSULIN: ICD-10-CM

## 2022-06-20 DIAGNOSIS — E11.43 TYPE 2 DIABETES MELLITUS WITH DIABETIC AUTONOMIC NEUROPATHY, WITH LONG-TERM CURRENT USE OF INSULIN: ICD-10-CM

## 2022-06-20 DIAGNOSIS — Z00.00 ENCOUNTER FOR SUBSEQUENT ANNUAL WELLNESS VISIT (AWV) IN MEDICARE PATIENT: Primary | ICD-10-CM

## 2022-06-20 DIAGNOSIS — D63.1 ANEMIA IN STAGE 3B CHRONIC KIDNEY DISEASE: ICD-10-CM

## 2022-06-20 DIAGNOSIS — I10 ESSENTIAL HYPERTENSION: ICD-10-CM

## 2022-06-20 DIAGNOSIS — F41.1 GENERALIZED ANXIETY DISORDER: ICD-10-CM

## 2022-06-20 DIAGNOSIS — M43.10 DEGENERATIVE SPONDYLOLISTHESIS: ICD-10-CM

## 2022-06-20 DIAGNOSIS — E78.5 HYPERLIPIDEMIA, UNSPECIFIED HYPERLIPIDEMIA TYPE: ICD-10-CM

## 2022-06-20 DIAGNOSIS — D01.0 CARCINOMA IN SITU OF COLON: ICD-10-CM

## 2022-06-20 DIAGNOSIS — R60.0 BILATERAL LOWER EXTREMITY EDEMA: ICD-10-CM

## 2022-06-20 PROCEDURE — 1170F FXNL STATUS ASSESSED: CPT | Performed by: FAMILY MEDICINE

## 2022-06-20 PROCEDURE — 1159F MED LIST DOCD IN RCRD: CPT | Performed by: FAMILY MEDICINE

## 2022-06-20 PROCEDURE — 1125F AMNT PAIN NOTED PAIN PRSNT: CPT | Performed by: FAMILY MEDICINE

## 2022-06-20 PROCEDURE — G0439 PPPS, SUBSEQ VISIT: HCPCS | Performed by: FAMILY MEDICINE

## 2022-06-20 RX ORDER — DIAZEPAM 2 MG/1
TABLET ORAL
Qty: 3 TABLET | Refills: 0 | Status: SHIPPED | OUTPATIENT
Start: 2022-06-20 | End: 2022-12-16

## 2022-06-20 RX ORDER — AMLODIPINE BESYLATE 10 MG/1
10 TABLET ORAL DAILY
Qty: 90 TABLET | Refills: 3 | Status: SHIPPED | OUTPATIENT
Start: 2022-06-20

## 2022-06-20 NOTE — PROGRESS NOTES
Date of Encounter: 2022  Patient: Arden Carrasquillo,  1955    SUBSEQUENT MEDICARE WELLNESS VISIT  Subjective   History of Presenting Illness  Chief complaint: Medical wellness exam    Progressive lower extremity weakness so bad that he had to quit his job because of frequent falls.  Recent fall seen by Mrs. Rodas 2022.  Melissa has been controlling his pain along with LUCITA and hydrocodone prescribed by his pain management.  He did have follow-up imaging ordered by them but missed this appointment.    Hypertension, controlled per JNC 8 guidelines today.    Type 2 diabetes recent A1c 7.8%.  This is monitored by endocrinology.  FBS 130s-170s, 2-hour postprandial at 3 PM is over 250, at bedtime less than 200.  No hypoglycemic episodes.  He takes lispro 12 units twice daily, glargine 70 units daily.    Stage IIIa CKD not currently following with nephrology, recent GFR is improved.  He avoids NSAIDs.    Prostate cancer [place] 3+3 on Flomax for urinary symptoms, currently follows with urology annually with a less than 10% risk of metastases at 10 years.      General anxiety disorder stable on mirtazapine.  His chronically ill son is currently in the ICU and they are weighing taking him off of life support.     Carcinoma in situ of the colon due for colonoscopy.     Single, sexually active with monogamous partner. Lives with sister. Never smoker. Does not drink.  Diabetic diet. Colonoscopy  overdue for recall due to advanced polyp.  Had to quit his job due to frequent falls and lower extremity weakness.  Does not have a living will but is working on the paperwork, decision maker would be sister or daughter.   Up-to-date on pneumococcal, COVID-19 #1-3, declines further vaccination today.     Pain  In the past 7 days, how much pain have you felt? 3/10 lower back    Review of Systems:  Negative for fever, congestion, chest pain upon exertion, shortness of breath, vision changes, vomiting, dysuria,  lymphadenopathy, muscle weakness, numbness, mood changes, rashes.    Health Risk Assessment:    Recent Hospitalizations:  No hospitalization(s) within the last year.    Current Medical Providers:  Patient Care Team:  Pollo Ward MD as PCP - General (Family Medicine)  Senthil Gao MD as Consulting Physician (Gastroenterology)    Smoking Status:  Social History     Tobacco Use   Smoking Status Never Smoker   Smokeless Tobacco Never Used       Alcohol Consumption:  Social History     Substance and Sexual Activity   Alcohol Use Defer       Depression Screen:   PHQ-2/PHQ-9 Depression Screening 6/13/2022   Retired PHQ-9 Total Score -   Retired Total Score -   Little Interest or Pleasure in Doing Things 3-->nearly every day   Feeling Down, Depressed or Hopeless 1-->several days   Trouble Falling or Staying Asleep, or Sleeping Too Much 1-->several days   Feeling Tired or Having Little Energy 3-->nearly every day   Poor Appetite or Overeating 0-->not at all   Feeling Bad about Yourself - or that You are a Failure or Have Let Yourself or Your Family Down 0-->not at all   Trouble Concentrating on Things, Such as Reading the Newspaper or Watching Television 0-->not at all   Moving or Speaking So Slowly that Other People Could Have Noticed? Or the Opposite - Being So Fidgety 0-->not at all   Thoughts that You Would be Better Off Dead or of Hurting Yourself in Some Way 0-->not at all   PHQ-9: Brief Depression Severity Measure Score 8   If You Checked Off Any Problems, How Difficult Have These Problems Made It For You to Do Your Work, Take Care of Things at Home, or Get Along with Other People? somewhat difficult     I spent more than 16 minutes asking patient questions, counseling and documenting in the chart    Fall Risk Screen:  STEADI Fall Risk Assessment was completed, and patient is at HIGH risk for falls. Assessment completed on:6/13/2022    Health Habits and Functional and Cognitive Screening:  Functional &  Cognitive Status 6/20/2022   Do you have difficulty preparing food and eating? No   Do you have difficulty bathing yourself, getting dressed or grooming yourself? No   Do you have difficulty using the toilet? No   Do you have difficulty moving around from place to place? Yes   Do you have trouble with steps or getting out of a bed or a chair? Yes   Do you need help using the phone?  No   Are you deaf or do you have serious difficulty hearing?  No   Do you need help with transportation? No   Do you need help shopping? Yes   Do you need help preparing meals?  No   Do you need help with housework?  Yes   Do you need help with laundry? Yes   Do you need help taking your medications? No   Do you need help managing money? No   Do you ever drive or ride in a car without wearing a seat belt? No   Do you have difficulty concentrating, remembering or making decisions? No       Does the patient have evidence of cognitive impairment? No    Aspirin use counseling:Does not need ASA (and currently is not on it)    Recent Lab Results:  Lab Results   Component Value Date    CHLPL 141 06/13/2022    TRIG 177 (H) 06/13/2022    HDL 33 (L) 06/13/2022    LDL 78 06/13/2022    VLDL 30 06/13/2022    HGBA1C 7.5 (H) 06/13/2022        Age-appropriate Screening Schedule:  Refer to the list below for future screening recommendations based on patient's age, sex and/or medical conditions. Orders for these recommended tests are listed in the plan section. The patient has been provided with a written plan.    Health Maintenance   Topic Date Due   • TDAP/TD VACCINES (1 - Tdap) Never done   • ZOSTER VACCINE (1 of 2) Never done   • DIABETIC EYE EXAM  12/03/2021   • INFLUENZA VACCINE  10/01/2022   • HEMOGLOBIN A1C  12/13/2022   • DIABETIC FOOT EXAM  03/01/2023   • LIPID PANEL  06/13/2023   • URINE MICROALBUMIN  Discontinued       Compared to one year ago, the patient feels his physical health is worse.  Compared to one year ago, the patient feels his  mental health is the same.    Reviewed chart for potential of high risk medication in the elderly: yes  Reviewed chart for potential of harmful drug interactions in the elderly:yes    Advanced Care Planning:  Advance Care Planning   ACP discussion was held with the patient during this visit. Patient does not have an advance directive, information provided.    The following portions of the patient's history were reviewed and updated as appropriate: allergies, current medications, past family history, past medical history, past social history, past surgical history and problem list.    Patient Active Problem List   Diagnosis   • Type 2 diabetes mellitus with diabetic autonomic neuropathy, with long-term current use of insulin (HCC)   • Degenerative spondylolisthesis   • Hypertension   • Generalized anxiety disorder   • Stage 3a chronic kidney disease (HCC)   • Hyperlipidemia   • Prostate cancer (CMS/HCC) Greg 3+3   • Anemia in stage 3b chronic kidney disease (HCC)   • Gynecomastia   • Carcinoma in situ of colon   • Bilateral lower extremity edema   • Weakness of both lower extremities     Past Medical History:   Diagnosis Date   • Diabetes mellitus (HCC)    • Positive colorectal cancer screening using Cologuard test 12/3/2020     Past Surgical History:   Procedure Laterality Date   • CYSTOSCOPY TRANSURETHRAL RESECTION OF PROSTATE     • UPPER GASTROINTESTINAL ENDOSCOPY       Family History   Problem Relation Age of Onset   • Colon cancer Neg Hx    • Colon polyps Neg Hx        Current Outpatient Medications:   •  amLODIPine (NORVASC) 10 MG tablet, Take 1 tablet by mouth Daily., Disp: 90 tablet, Rfl: 3  •  atorvastatin (LIPITOR) 20 MG tablet, TAKE ONE TABLET BY MOUTH DAILY, Disp: 90 tablet, Rfl: 3  •  Cholecalciferol (VITAMIN D3 GUMMIES PO), Take  by mouth., Disp: , Rfl:   •  glucose blood (Accu-Chek Mary Plus) test strip, Use to check glucose four times daily, Disp: 200 each, Rfl: 12  •  HYDROcodone-acetaminophen  (NORCO) 7.5-325 MG per tablet, Take 0.5-1 tablets by mouth., Disp: , Rfl:   •  Insulin Glargine, 2 Unit Dial, (Toujeo Max SoloStar) 300 UNIT/ML solution pen-injector injection, Inject 70 Units under the skin into the appropriate area as directed Daily., Disp: 5 pen, Rfl: 3  •  Insulin Lispro, 1 Unit Dial, (HUMALOG) 100 UNIT/ML solution pen-injector, Inject 12 Units under the skin into the appropriate area as directed 2 (two) times a day., Disp: 3 pen, Rfl: 3  •  Insulin Pen Needle (B-D UF III MINI PEN NEEDLES) 31G X 5 MM misc, Use with insulin pens, Disp: 100 each, Rfl: 3  •  losartan (COZAAR) 100 MG tablet, TAKE ONE TABLET BY MOUTH DAILY, Disp: 90 tablet, Rfl: 3  •  mirtazapine (REMERON) 15 MG tablet, TAKE ONE TABLET BY MOUTH ONCE NIGHTLY, Disp: 90 tablet, Rfl: 3  •  Multiple Vitamins-Minerals (MENS MULTIVITAMIN PLUS PO), Take  by mouth., Disp: , Rfl:   •  ondansetron (ZOFRAN) 4 MG tablet, Take 1 tablet by mouth Every 8 (Eight) Hours As Needed for Nausea or Vomiting., Disp: 30 tablet, Rfl: 0  •  pantoprazole (PROTONIX) 40 MG EC tablet, Take 1 tablet by mouth Daily., Disp: 90 tablet, Rfl: 3  •  pregabalin (LYRICA) 75 MG capsule, TAKE ONE CAPSULE BY MOUTH TWICE A DAY, Disp: 180 capsule, Rfl: 1  •  tamsulosin (FLOMAX) 0.4 MG capsule 24 hr capsule, TAKE ONE CAPSULE BY MOUTH DAILY, Disp: 90 capsule, Rfl: 3  •  Terbinafine HCl (LAMISIL AT SPRAY EX), Apply  topically., Disp: , Rfl:   •  diazePAM (Valium) 2 MG tablet, Take 1/2 to 1 tablet 1 hour before MRI. May repeat a second tab if not effective., Disp: 3 tablet, Rfl: 0  No Known Allergies  Social History     Tobacco Use   • Smoking status: Never Smoker   • Smokeless tobacco: Never Used   Substance Use Topics   • Alcohol use: Defer   • Drug use: Yes     Types: Hydrocodone          Objective   Physical Exam  Vitals:    06/20/22 0956   BP: 148/88   BP Location: Right arm   Patient Position: Sitting   Cuff Size: Adult   Pulse: 92   Temp: 99.5 °F (37.5 °C)   TempSrc:  "Infrared   SpO2: 98%   Weight: 85.6 kg (188 lb 12.8 oz)   Height: 167.6 cm (66\")     Body mass index is 30.47 kg/m².  Discussed the patient's BMI with him. The BMI Is above average, continue dietary intervention as he cannot tolerate exercise due to weakness.    Constitutional: NAD.  Psychiatric: Blunted affect.  Congruent thought content.  Normal thought content.  Neurologic: 3-4/5 strength in the right lower extremity, 4/5 strength in the left lower extremity.  There is muscle atrophy in the lower extremities.  Brisk patellar reflexes bilaterally, 3 beats of clonus on ankle jerk bilaterally.     Assessment & Plan   Assessment and Plan  Very pleasant 66-year-old male with type 2 diabetes with polyneuropathy, hypertension, hyperlipidemia, prostate cancer, chronic lower back pain on opioid therapy, CKD 3, anxiety and depression, who presents with the following:    Advance Directive Discussion  Diabetic Lab Screening   Fall Risk    The above risks/problems have been discussed with the patient.  Pertinent information has been shared with the patient in the After Visit Summary.  Other plans as below:    Diagnoses and all orders for this visit:    1. Encounter for subsequent annual wellness visit (AWV) in Medicare patient (Primary): We discussed preventative care including age and patient-appropriate immunizations and cancer screening. We also discussed the importance of exercise and a healthy diet. This is their annual preventative exam.    2. Primary hypertension: Controlled per JNC 8    3. Hyperlipidemia, unspecified hyperlipidemia type: LDL approximately 70, continue current dose    4. Type 2 diabetes mellitus with diabetic autonomic neuropathy, with long-term current use of insulin (HCC): A1c less than 8%, continue to follow with endocrinology.  I recommend he consider adding a midday prandial dose of lispro 7-8 units.    5. Stage 3a chronic kidney disease (HCC): Improving, likely medical renal disease, not " currently following with nephrology but we will keep them in mind if worsening again    6. Anemia in stage 3b chronic kidney disease (HCC): Resolved    7. Prostate cancer (CMS/HCC) Greg 3+3: Continue to follow with urology annually    8. Carcinoma in situ of colon: Overdue for relook  -     Ambulatory Referral For Screening Colonoscopy    9. Generalized anxiety disorder: As needed for MRI  -     diazePAM (Valium) 2 MG tablet; Take 1/2 to 1 tablet 1 hour before MRI. May repeat a second tab if not effective.  Dispense: 3 tablet; Refill: 0    10. Degenerative spondylolisthesis: Concern for spinal stenosis based upon his symptoms and examination.  I will arrange an MRI for further evaluation, he will also call his pain management specialist.  -     MRI Lumbar Spine Without Contrast  -     Ambulatory Referral to Home Health  11. Weakness of both lower extremities  -     MRI Lumbar Spine Without Contrast  -     Ambulatory Referral to Home Health    12. Bilateral lower extremity edema: Stable to improving    Follow Up:  6 months    An After Visit Summary and PPPS were given to the patient.      Pollo Ward MD  Family Medicine  O: [phone]  C: [phone]    Disclaimer: Parts of this note were dictated by speech recognition. Minor errors in transcription may be present. Please call if questions.

## 2022-06-30 ENCOUNTER — TELEPHONE (OUTPATIENT)
Dept: INTERNAL MEDICINE | Facility: CLINIC | Age: 67
End: 2022-06-30

## 2022-07-11 DIAGNOSIS — Z79.4 TYPE 2 DIABETES MELLITUS WITH DIABETIC AUTONOMIC NEUROPATHY, WITH LONG-TERM CURRENT USE OF INSULIN: ICD-10-CM

## 2022-07-11 DIAGNOSIS — E11.43 TYPE 2 DIABETES MELLITUS WITH DIABETIC AUTONOMIC NEUROPATHY, WITH LONG-TERM CURRENT USE OF INSULIN: ICD-10-CM

## 2022-07-11 RX ORDER — PIOGLITAZONEHYDROCHLORIDE 15 MG/1
TABLET ORAL
Qty: 90 TABLET | Refills: 3 | Status: SHIPPED | OUTPATIENT
Start: 2022-07-11

## 2022-07-12 ENCOUNTER — TELEPHONE (OUTPATIENT)
Dept: INTERNAL MEDICINE | Facility: CLINIC | Age: 67
End: 2022-07-12

## 2022-07-12 DIAGNOSIS — R10.13 DYSPEPSIA: ICD-10-CM

## 2022-07-12 RX ORDER — PANTOPRAZOLE SODIUM 40 MG/1
TABLET, DELAYED RELEASE ORAL
Qty: 90 TABLET | Refills: 3 | Status: SHIPPED | OUTPATIENT
Start: 2022-07-12

## 2022-07-12 RX ORDER — ONDANSETRON 4 MG/1
TABLET, FILM COATED ORAL
Qty: 30 TABLET | Refills: 0 | Status: SHIPPED | OUTPATIENT
Start: 2022-07-12 | End: 2022-11-14

## 2022-07-14 ENCOUNTER — TELEPHONE (OUTPATIENT)
Dept: INTERNAL MEDICINE | Facility: CLINIC | Age: 67
End: 2022-07-14

## 2022-07-14 NOTE — TELEPHONE ENCOUNTER
Caller: TAO    Relationship: [facility]     Best call back number: [phone]    What orders are you requesting (i.e. lab or imaging): VERBAL ORDERS FOR OCCUPATIONAL THERAPY 2 TIMES A WEEK FOR 4 WEEKS    In what timeframe would the patient need to come in: TO START NEXT WEEK 7/18    Where will you receive your lab/imaging services: IN HOME    Additional notes: [facility]O COMPLETED PATIENT'S INITIAL EVALUATION FOR OCCUPATIONAL THERAPY TODAY 7/14/22.

## 2022-07-18 ENCOUNTER — TELEPHONE (OUTPATIENT)
Dept: INTERNAL MEDICINE | Facility: CLINIC | Age: 67
End: 2022-07-18

## 2022-07-18 NOTE — TELEPHONE ENCOUNTER
Caller: Arden Carrasquillo     Relationship: [unfilled]     Best call back number: [phone]    What is your medical concern? PATIENT IS WONDERING IF DR APARICIO WOULD RECOMMEND HIM RECEIVING A SHINGLES SHOT. HE HAS NOT RECEIVED ONE YET

## 2022-07-20 NOTE — TELEPHONE ENCOUNTER
"S/w pt re: shingles vaccine. Pt stated he would discuss with Dr. Ward at [facility], he \"wasn't sure that is what he needed.\"  "

## 2022-07-25 ENCOUNTER — OFFICE VISIT (OUTPATIENT)
Dept: INTERNAL MEDICINE | Facility: CLINIC | Age: 67
End: 2022-07-25

## 2022-07-25 VITALS
TEMPERATURE: 96.9 F | SYSTOLIC BLOOD PRESSURE: 124 MMHG | WEIGHT: 188.2 LBS | BODY MASS INDEX: 30.25 KG/M2 | DIASTOLIC BLOOD PRESSURE: 66 MMHG | HEIGHT: 66 IN | OXYGEN SATURATION: 98 % | HEART RATE: 87 BPM

## 2022-07-25 DIAGNOSIS — L30.4 INTERTRIGO: Primary | ICD-10-CM

## 2022-07-25 DIAGNOSIS — F51.04 PSYCHOPHYSIOLOGICAL INSOMNIA: ICD-10-CM

## 2022-07-25 DIAGNOSIS — Z23 NEED FOR COVID-19 VACCINE: ICD-10-CM

## 2022-07-25 PROCEDURE — 0054A COVID-19 (PFIZER) 12+ YRS: CPT | Performed by: FAMILY MEDICINE

## 2022-07-25 PROCEDURE — 99214 OFFICE O/P EST MOD 30 MIN: CPT | Performed by: FAMILY MEDICINE

## 2022-07-25 PROCEDURE — 91305 COVID-19 (PFIZER) 12+ YRS: CPT | Performed by: FAMILY MEDICINE

## 2022-07-25 RX ORDER — ZOLPIDEM TARTRATE 5 MG/1
TABLET ORAL
Qty: 30 TABLET | Refills: 0 | Status: SHIPPED | OUTPATIENT
Start: 2022-07-25 | End: 2022-09-11

## 2022-07-25 RX ORDER — OXYCODONE HYDROCHLORIDE AND ACETAMINOPHEN 5; 325 MG/1; MG/1
1 TABLET ORAL EVERY 6 HOURS PRN
COMMUNITY
End: 2022-12-16

## 2022-07-25 NOTE — PROGRESS NOTES
Date of Encounter: 2022  Patient: Arden Carrasquillo,  1955    Subjective   History of Presenting Illness  Chief complaint: Sleep problems    About 3 days each week he has difficulty falling asleep despite usual sleep habits.  He tries to go to sleep at the same time every night.  He uses blackout curtains and a noise machine.  He does occasionally watch TV in the bedroom.  Once he is asleep he usually stays asleep, problems falling asleep.  He has not noticed any help with the mirtazapine or melatonin. He does not use any alcohol at bedtime. He is not taking oxycodone for his lower back regularly.  Recent stressors include the death of his son following a long journey of chronic illness.    Rash under his left breast and left axilla, not painful just itchy.  Has been improving with topical Lamisil.    Review of Systems:  Negative for fever, cough, shortness of breath    The following portions of the patient's history were reviewed and updated as appropriate: allergies, current medications, past family history, past medical history, past social history, past surgical history and problem list.    Patient Active Problem List   Diagnosis   • Type 2 diabetes mellitus with diabetic autonomic neuropathy, with long-term current use of insulin (HCC)   • Degenerative spondylolisthesis   • Hypertension   • Generalized anxiety disorder   • Stage 3a chronic kidney disease (HCC)   • Hyperlipidemia   • Prostate cancer (CMS/HCC) [place] 3+3   • Anemia in stage 3b chronic kidney disease (HCC)   • Gynecomastia   • Carcinoma in situ of colon   • Bilateral lower extremity edema   • Weakness of both lower extremities   • Psychophysiological insomnia     Past Medical History:   Diagnosis Date   • Diabetes mellitus (HCC)    • Positive colorectal cancer screening using Cologuard test 12/3/2020     Past Surgical History:   Procedure Laterality Date   • CYSTOSCOPY TRANSURETHRAL RESECTION OF PROSTATE     • UPPER GASTROINTESTINAL  ENDOSCOPY       Family History   Problem Relation Age of Onset   • Colon cancer Neg Hx    • Colon polyps Neg Hx        Current Outpatient Medications:   •  amLODIPine (NORVASC) 10 MG tablet, Take 1 tablet by mouth Daily., Disp: 90 tablet, Rfl: 3  •  atorvastatin (LIPITOR) 20 MG tablet, TAKE ONE TABLET BY MOUTH DAILY, Disp: 90 tablet, Rfl: 3  •  Cholecalciferol (VITAMIN D3 GUMMIES PO), Take  by mouth., Disp: , Rfl:   •  diazePAM (Valium) 2 MG tablet, Take 1/2 to 1 tablet 1 hour before MRI. May repeat a second tab if not effective., Disp: 3 tablet, Rfl: 0  •  glucose blood (Accu-Chek Mary Plus) test strip, USE TO TEST SUGAR FOUR TIMES A DAY, Disp: 200 each, Rfl: prn  •  Insulin Glargine, 2 Unit Dial, (Toujeo Max SoloStar) 300 UNIT/ML solution pen-injector injection, Inject 70 Units under the skin into the appropriate area as directed Daily., Disp: 5 pen, Rfl: 3  •  Insulin Lispro, 1 Unit Dial, (HUMALOG) 100 UNIT/ML solution pen-injector, Inject 12 Units under the skin into the appropriate area as directed 2 (two) times a day., Disp: 3 pen, Rfl: 3  •  Insulin Pen Needle (B-D UF III MINI PEN NEEDLES) 31G X 5 MM misc, Use with insulin pens, Disp: 100 each, Rfl: 3  •  losartan (COZAAR) 100 MG tablet, TAKE ONE TABLET BY MOUTH DAILY, Disp: 90 tablet, Rfl: 3  •  mirtazapine (REMERON) 15 MG tablet, TAKE ONE TABLET BY MOUTH ONCE NIGHTLY, Disp: 90 tablet, Rfl: 3  •  Multiple Vitamins-Minerals (MENS MULTIVITAMIN PLUS PO), Take  by mouth., Disp: , Rfl:   •  ondansetron (ZOFRAN) 4 MG tablet, TAKE ONE TABLET BY MOUTH EVERY 8 HOURS AS NEEDED FOR  NAUSEA AND VOMITING, Disp: 30 tablet, Rfl: 0  •  oxyCODONE-acetaminophen (PERCOCET) 5-325 MG per tablet, Take 1 tablet by mouth Every 6 (Six) Hours As Needed., Disp: , Rfl:   •  pantoprazole (PROTONIX) 40 MG EC tablet, TAKE ONE TABLET BY MOUTH DAILY, Disp: 90 tablet, Rfl: 3  •  pioglitazone (ACTOS) 15 MG tablet, TAKE ONE TABLET BY MOUTH DAILY, Disp: 90 tablet, Rfl: 3  •  pregabalin  "(LYRICA) 75 MG capsule, TAKE ONE CAPSULE BY MOUTH TWICE A DAY, Disp: 180 capsule, Rfl: 1  •  tamsulosin (FLOMAX) 0.4 MG capsule 24 hr capsule, TAKE ONE CAPSULE BY MOUTH DAILY, Disp: 90 capsule, Rfl: 3  •  Terbinafine HCl (LAMISIL AT SPRAY EX), Apply  topically., Disp: , Rfl:   •  zolpidem (Ambien) 5 MG tablet, Take 1/2 to 1 tablet as needed for sleep, Disp: 30 tablet, Rfl: 0  Allergies   Allergen Reactions   • Hydrocodone Itching     Social History     Tobacco Use   • Smoking status: Never Smoker   • Smokeless tobacco: Never Used   Substance Use Topics   • Alcohol use: Defer   • Drug use: Yes     Types: Hydrocodone          Objective   Physical Exam  Vitals:    07/25/22 1053   BP: 124/66   Pulse: 87   Temp: 96.9 °F (36.1 °C)   TempSrc: Infrared   SpO2: 98%   Weight: 85.4 kg (188 lb 3.2 oz)   Height: 167.6 cm (66\")     Body mass index is 30.38 kg/m².    Constitutional: NAD.  Cardiovascular: RRR. No murmurs. No LE edema b/l. Radial pulses 2+ bilaterally.  Pulmonary: CTA b/l. Good effort.  Integumentary: Erythema in a intertrigo configuration under the left breast and left axilla.  There are no vesicles or papules.  These 2 rashes when taken together do not follow a dermatomal configuration.  Psychiatric: Normal affect. Normal thought content.       Assessment & Plan   Assessment and Plan  Very pleasant 66-year-old male with type 2 diabetes with polyneuropathy, hypertension, hyperlipidemia, prostate cancer, chronic lower back pain on opioid therapy, CKD 3, anxiety and depression, who presents with the following:    Diagnoses and all orders for this visit:    1. Intertrigo (Primary): Since improving with terbinafine recommend terbinafine cream instead of the spray due to alcohol base used for the latter.  On examination this is not consistent with shingles.    2. Psychophysiological insomnia: Suspect this is adjustment insomnia due to recent death of his son.  I do think intermittent, low-dose zolpidem is a good option " for him for a short period of time, I did discuss the risks and benefits of this medication especially in context of his oxycodone, however he does not use this regularly.  If not improving need to consider safer long-term medication.  -     zolpidem (Ambien) 5 MG tablet; Take 1/2 to 1 tablet as needed for sleep  Dispense: 30 tablet; Refill: 0    3. Need for COVID-19 vaccine  -     COVID-19 Vaccine (Pfizer) Kevin Ward MD  Family Medicine  O: [phone]  C: [phone]    Disclaimer: Parts of this note were dictated by speech recognition. Minor errors in transcription may be present. Please call if questions.

## 2022-07-26 ENCOUNTER — TELEPHONE (OUTPATIENT)
Dept: INTERNAL MEDICINE | Facility: CLINIC | Age: 67
End: 2022-07-26

## 2022-07-26 NOTE — TELEPHONE ENCOUNTER
Caller: [facility] - [place], [place] - [address] - [phone]  - [phone] FX    Relationship: Pharmacy     Who are you requesting to speak with (clinical staff, provider,  specific staff member): DR. APARICIO    What was the call regarding: [facility] STATES THEY ARE NEEDING TO KNOW THE FREQUENCY OR MAXIMUM DOSE ON THE PATIENTS zolpidem (Ambien) 5 MG tablet PRESCRIPTION.     PLEASE CALL PHARMACY BACK.

## 2022-08-16 ENCOUNTER — PREP FOR SURGERY (OUTPATIENT)
Dept: SURGERY | Facility: SURGERY CENTER | Age: 67
End: 2022-08-16

## 2022-08-16 DIAGNOSIS — Z86.010 HISTORY OF COLON POLYPS: Primary | ICD-10-CM

## 2022-08-16 DIAGNOSIS — Z12.11 ENCOUNTER FOR SCREENING FOR MALIGNANT NEOPLASM OF COLON: ICD-10-CM

## 2022-08-17 RX ORDER — SODIUM CHLORIDE 0.9 % (FLUSH) 0.9 %
10 SYRINGE (ML) INJECTION AS NEEDED
OUTPATIENT
Start: 2022-08-17

## 2022-08-17 RX ORDER — SODIUM CHLORIDE, SODIUM LACTATE, POTASSIUM CHLORIDE, CALCIUM CHLORIDE 600; 310; 30; 20 MG/100ML; MG/100ML; MG/100ML; MG/100ML
30 INJECTION, SOLUTION INTRAVENOUS CONTINUOUS PRN
OUTPATIENT
Start: 2022-08-17

## 2022-08-17 RX ORDER — SODIUM CHLORIDE 0.9 % (FLUSH) 0.9 %
3 SYRINGE (ML) INJECTION EVERY 12 HOURS SCHEDULED
OUTPATIENT
Start: 2022-08-17

## 2022-08-29 ENCOUNTER — OFFICE VISIT (OUTPATIENT)
Dept: INTERNAL MEDICINE | Facility: CLINIC | Age: 67
End: 2022-08-29

## 2022-08-29 VITALS
WEIGHT: 202 LBS | HEART RATE: 95 BPM | HEIGHT: 66 IN | TEMPERATURE: 98.9 F | OXYGEN SATURATION: 97 % | SYSTOLIC BLOOD PRESSURE: 115 MMHG | BODY MASS INDEX: 32.47 KG/M2 | DIASTOLIC BLOOD PRESSURE: 70 MMHG

## 2022-08-29 DIAGNOSIS — R31.9 URINARY TRACT INFECTION WITH HEMATURIA, SITE UNSPECIFIED: ICD-10-CM

## 2022-08-29 DIAGNOSIS — R80.9 PROTEINURIA, UNSPECIFIED TYPE: ICD-10-CM

## 2022-08-29 DIAGNOSIS — N39.0 URINARY TRACT INFECTION WITH HEMATURIA, SITE UNSPECIFIED: ICD-10-CM

## 2022-08-29 DIAGNOSIS — R35.0 FREQUENT URINATION: Primary | ICD-10-CM

## 2022-08-29 LAB
BILIRUB BLD-MCNC: NEGATIVE MG/DL
CLARITY, POC: ABNORMAL
COLOR UR: ABNORMAL
EXPIRATION DATE: ABNORMAL
GLUCOSE UR STRIP-MCNC: NEGATIVE MG/DL
KETONES UR QL: NEGATIVE
LEUKOCYTE EST, POC: ABNORMAL
Lab: ABNORMAL
NITRITE UR-MCNC: NEGATIVE MG/ML
PH UR: 6 [PH] (ref 5–8)
PROT UR STRIP-MCNC: ABNORMAL MG/DL
RBC # UR STRIP: ABNORMAL /UL
SP GR UR: 1.01 (ref 1–1.03)
UROBILINOGEN UR QL: NORMAL

## 2022-08-29 PROCEDURE — 81003 URINALYSIS AUTO W/O SCOPE: CPT | Performed by: NURSE PRACTITIONER

## 2022-08-29 PROCEDURE — 99213 OFFICE O/P EST LOW 20 MIN: CPT | Performed by: NURSE PRACTITIONER

## 2022-08-29 RX ORDER — NITROFURANTOIN 25; 75 MG/1; MG/1
100 CAPSULE ORAL 2 TIMES DAILY
Qty: 10 CAPSULE | Refills: 0 | Status: SHIPPED | OUTPATIENT
Start: 2022-08-29 | End: 2022-09-03

## 2022-08-29 RX ORDER — HYDROMORPHONE HYDROCHLORIDE 2 MG/1
TABLET ORAL
COMMUNITY
Start: 2022-08-02 | End: 2022-12-16

## 2022-08-29 RX ORDER — HYDROCODONE BITARTRATE AND ACETAMINOPHEN 7.5; 325 MG/1; MG/1
.5-1 TABLET ORAL
COMMUNITY
Start: 2022-08-30 | End: 2022-09-29

## 2022-08-29 NOTE — PROGRESS NOTES
Date of Encounter: 2022  Patient: Arden Carrasquillo,  1955    Subjective     Chief complaint: Urinary frequency and nausea    HPI   Patient here today for same-day visit.  Patient states that on Friday he started feeling bad.  Patient has had some abdominal discomfort and nausea.  Patient denies any vomiting or fever.  Patient states that he has had to use the restroom much more frequently.    Patient states that his sugars have been well controlled at home.  Denies any new medications besides pain medication which is managed by his pain management doctor.    Patient states he had some constipation and may have been mildly dehydrated last week.  Denies any recent history of urinary tract infection.    Review of Systems:  Negative for fever, congestion, chest pain upon exertion, shortness of breath, vision changes, vomiting, dysuria, lymphadenopathy, muscle weakness, numbness, mood changes, rashes.    The following portions of the patient's history were reviewed and updated as appropriate: allergies, current medications, past family history, past medical history, past social history, past surgical history and problem list.    Patient Active Problem List   Diagnosis   • Type 2 diabetes mellitus with diabetic autonomic neuropathy, with long-term current use of insulin (HCC)   • Degenerative spondylolisthesis   • Hypertension   • Generalized anxiety disorder   • Stage 3a chronic kidney disease (HCC)   • Hyperlipidemia   • Prostate cancer (CMS/HCC) Greg 3+3   • Anemia in stage 3b chronic kidney disease (HCC)   • Gynecomastia   • Carcinoma in situ of colon   • Bilateral lower extremity edema   • Weakness of both lower extremities   • Psychophysiological insomnia     Past Medical History:   Diagnosis Date   • Diabetes mellitus (HCC)    • Positive colorectal cancer screening using Cologuard test 12/3/2020     Past Surgical History:   Procedure Laterality Date   • CYSTOSCOPY TRANSURETHRAL RESECTION OF  PROSTATE     • UPPER GASTROINTESTINAL ENDOSCOPY       Family History   Problem Relation Age of Onset   • Colon cancer Neg Hx    • Colon polyps Neg Hx        Current Outpatient Medications:   •  amLODIPine (NORVASC) 10 MG tablet, Take 1 tablet by mouth Daily., Disp: 90 tablet, Rfl: 3  •  atorvastatin (LIPITOR) 20 MG tablet, TAKE ONE TABLET BY MOUTH DAILY, Disp: 90 tablet, Rfl: 3  •  Cholecalciferol (VITAMIN D3 GUMMIES PO), Take  by mouth., Disp: , Rfl:   •  diazePAM (Valium) 2 MG tablet, Take 1/2 to 1 tablet 1 hour before MRI. May repeat a second tab if not effective., Disp: 3 tablet, Rfl: 0  •  glucose blood (Accu-Chek Mary Plus) test strip, USE TO TEST SUGAR FOUR TIMES A DAY, Disp: 200 each, Rfl: prn  •  HYDROmorphone (DILAUDID) 2 MG tablet, , Disp: , Rfl:   •  Insulin Glargine, 2 Unit Dial, (Toujeo Max SoloStar) 300 UNIT/ML solution pen-injector injection, Inject 70 Units under the skin into the appropriate area as directed Daily., Disp: 5 pen, Rfl: 3  •  Insulin Lispro, 1 Unit Dial, (HUMALOG) 100 UNIT/ML solution pen-injector, Inject 12 Units under the skin into the appropriate area as directed 2 (two) times a day., Disp: 3 pen, Rfl: 3  •  Insulin Pen Needle (B-D UF III MINI PEN NEEDLES) 31G X 5 MM misc, Use with insulin pens, Disp: 100 each, Rfl: 3  •  losartan (COZAAR) 100 MG tablet, TAKE ONE TABLET BY MOUTH DAILY, Disp: 90 tablet, Rfl: 3  •  mirtazapine (REMERON) 15 MG tablet, TAKE ONE TABLET BY MOUTH ONCE NIGHTLY, Disp: 90 tablet, Rfl: 3  •  Multiple Vitamins-Minerals (MENS MULTIVITAMIN PLUS PO), Take  by mouth., Disp: , Rfl:   •  ondansetron (ZOFRAN) 4 MG tablet, TAKE ONE TABLET BY MOUTH EVERY 8 HOURS AS NEEDED FOR  NAUSEA AND VOMITING, Disp: 30 tablet, Rfl: 0  •  oxyCODONE-acetaminophen (PERCOCET) 5-325 MG per tablet, Take 1 tablet by mouth Every 6 (Six) Hours As Needed., Disp: , Rfl:   •  pantoprazole (PROTONIX) 40 MG EC tablet, TAKE ONE TABLET BY MOUTH DAILY, Disp: 90 tablet, Rfl: 3  •  pioglitazone  "(ACTOS) 15 MG tablet, TAKE ONE TABLET BY MOUTH DAILY, Disp: 90 tablet, Rfl: 3  •  pregabalin (LYRICA) 75 MG capsule, TAKE ONE CAPSULE BY MOUTH TWICE A DAY, Disp: 180 capsule, Rfl: 1  •  tamsulosin (FLOMAX) 0.4 MG capsule 24 hr capsule, TAKE ONE CAPSULE BY MOUTH DAILY, Disp: 90 capsule, Rfl: 3  •  Terbinafine HCl (LAMISIL AT SPRAY EX), Apply  topically., Disp: , Rfl:   •  zolpidem (Ambien) 5 MG tablet, Take 1/2 to 1 tablet as needed for sleep, Disp: 30 tablet, Rfl: 0  •  [START ON 8/30/2022] HYDROcodone-acetaminophen (NORCO) 7.5-325 MG per tablet, Take 0.5-1 tablets by mouth., Disp: , Rfl:   •  nitrofurantoin, macrocrystal-monohydrate, (Macrobid) 100 MG capsule, Take 1 capsule by mouth 2 (Two) Times a Day for 5 days., Disp: 10 capsule, Rfl: 0  Allergies   Allergen Reactions   • Hydrocodone Itching     Social History     Tobacco Use   • Smoking status: Never Smoker   • Smokeless tobacco: Never Used   Substance Use Topics   • Alcohol use: Defer   • Drug use: Yes     Types: Hydrocodone          Objective   Physical Exam   Vitals:    08/29/22 1359   BP: 115/70   Pulse: 95   Temp: 98.9 °F (37.2 °C)   TempSrc: Temporal   SpO2: 97%   Weight: 91.6 kg (202 lb)   Height: 167.6 cm (66\")     Body mass index is 32.6 kg/m².    Constitutional: NAD.  Cardiovascular: RRR.   Integumentary: No rashes or wounds on face or upper extremities.  : Negative CVA tenderness.         Assessment & Plan   Assessment and Plan  Pleasant 66-year-old male with hypertension, hyperlipidemia, type 2 diabetes and others here today for same-day visit.  The following was discussed:    Diagnoses and all orders for this visit:    1. Frequent urination (Primary)  -     POCT urinalysis dipstick, automated  -     Urine Culture - Urine, Urine, Clean Catch    2. Urinary tract infection with hematuria, site unspecified  -     nitrofurantoin, macrocrystal-monohydrate, (Macrobid) 100 MG capsule; Take 1 capsule by mouth 2 (Two) Times a Day for 5 days.  Dispense: 10 " capsule; Refill: 0  -     Urine Culture - Urine, Urine, Clean Catch    3. Proteinuria, unspecified type      Will monitor with follow-up UA in 1 month.    Discussed with patient about medications.  Discussed about the importance of staying well-hydrated.  Answered some questions from the patient.  Patient verbalized understanding of and agreed to plan of care.    Return in about 1 month (around 9/29/2022) for Recheck urine .     Jen Rodas DNP, FNP-C  [facility]  O: [phone]      Please note that portions of this note were completed with a voice recognition program. Please call if questions.

## 2022-09-01 LAB
BACTERIA UR CULT: ABNORMAL
BACTERIA UR CULT: ABNORMAL
OTHER ANTIBIOTIC SUSC ISLT: ABNORMAL

## 2022-09-09 DIAGNOSIS — F51.04 PSYCHOPHYSIOLOGICAL INSOMNIA: ICD-10-CM

## 2022-09-09 NOTE — TELEPHONE ENCOUNTER
Rx Refill Note  Requested Prescriptions     Pending Prescriptions Disp Refills   • zolpidem (AMBIEN) 5 MG tablet [Pharmacy Med Name: ZOLPIDEM TARTRATE 5 MG TABLET] 30 tablet      Sig: TAKE 1/2 TO 1 TABLET BY MOUTH EVERY NIGHT AT BEDTIME AS NEEDED FOR SLEEP      Last office visit with prescribing clinician: 7/25/2022      Next office visit with prescribing clinician: 12/20/2022            CAROLE WASHINGTON MA  09/09/22, 14:24 EDT

## 2022-09-09 NOTE — TELEPHONE ENCOUNTER
Rx Refill Note  Requested Prescriptions     Pending Prescriptions Disp Refills   • zolpidem (AMBIEN) 5 MG tablet [Pharmacy Med Name: ZOLPIDEM TARTRATE 5 MG TABLET] 30 tablet      Sig: TAKE 1/2 TO 1 TABLET BY MOUTH EVERY NIGHT AT BEDTIME AS NEEDED FOR SLEEP      Last office visit with prescribing clinician: 7/25/2022      Next office visit with prescribing clinician: 12/20/2022   {TIP  Encounters:23}    {TIP  Please add Last Relevant Lab Date if appropriate:23  } Quant Urine Drug Conf - (08/02/2022 15:25)    {TIP  Is Refill Pharmacy correct?:23}  CAROLE WASHINGTON MA  09/09/22, 14:22 EDT

## 2022-09-11 RX ORDER — ZOLPIDEM TARTRATE 5 MG/1
TABLET ORAL
Qty: 30 TABLET | Refills: 0 | Status: SHIPPED | OUTPATIENT
Start: 2022-09-11 | End: 2022-11-02

## 2022-09-15 DIAGNOSIS — N39.0 URINARY TRACT INFECTION WITH HEMATURIA, SITE UNSPECIFIED: ICD-10-CM

## 2022-09-15 DIAGNOSIS — R31.9 URINARY TRACT INFECTION WITH HEMATURIA, SITE UNSPECIFIED: ICD-10-CM

## 2022-09-16 RX ORDER — NITROFURANTOIN 25; 75 MG/1; MG/1
CAPSULE ORAL
Qty: 10 CAPSULE | Refills: 0 | OUTPATIENT
Start: 2022-09-16

## 2022-09-23 ENCOUNTER — TELEPHONE (OUTPATIENT)
Dept: INTERNAL MEDICINE | Facility: CLINIC | Age: 67
End: 2022-09-23

## 2022-09-23 NOTE — TELEPHONE ENCOUNTER
Caller: Arden Carrasquillo    Relationship: Self    Best call back number: [phone]    Who are you requesting to speak with (clinical staff, provider,  specific staff member):      What was the call regarding: PATIENT STATES HES HAVING SEVERE FATIGUE AND WANTS TO SPEAK TO DR. APARICIO ABOUT IT     Do you require a callback:YES

## 2022-10-06 ENCOUNTER — OFFICE VISIT (OUTPATIENT)
Dept: INTERNAL MEDICINE | Facility: CLINIC | Age: 67
End: 2022-10-06

## 2022-10-06 DIAGNOSIS — Z79.4 TYPE 2 DIABETES MELLITUS WITH DIABETIC AUTONOMIC NEUROPATHY, WITH LONG-TERM CURRENT USE OF INSULIN: ICD-10-CM

## 2022-10-06 DIAGNOSIS — E11.43 TYPE 2 DIABETES MELLITUS WITH DIABETIC AUTONOMIC NEUROPATHY, WITH LONG-TERM CURRENT USE OF INSULIN: ICD-10-CM

## 2022-10-06 DIAGNOSIS — R30.0 DYSURIA: Primary | ICD-10-CM

## 2022-10-06 DIAGNOSIS — Z23 NEED FOR INFLUENZA VACCINATION: ICD-10-CM

## 2022-10-06 DIAGNOSIS — Z23 NEED FOR COVID-19 VACCINE: ICD-10-CM

## 2022-10-06 LAB
EXPIRATION DATE: ABNORMAL
HBA1C MFR BLD: 7.7 % (ref 4.8–0.56)
Lab: ABNORMAL

## 2022-10-06 PROCEDURE — 99213 OFFICE O/P EST LOW 20 MIN: CPT | Performed by: FAMILY MEDICINE

## 2022-10-06 PROCEDURE — 91312 COVID-19 (PFIZER) BIVALENT BOOSTER 12+YRS: CPT | Performed by: FAMILY MEDICINE

## 2022-10-06 PROCEDURE — 0124A COVID-19 (PFIZER) BIVALENT BOOSTER 12+YRS: CPT | Performed by: FAMILY MEDICINE

## 2022-10-06 PROCEDURE — G0008 ADMIN INFLUENZA VIRUS VAC: HCPCS | Performed by: FAMILY MEDICINE

## 2022-10-06 PROCEDURE — 3051F HG A1C>EQUAL 7.0%<8.0%: CPT | Performed by: FAMILY MEDICINE

## 2022-10-06 PROCEDURE — 83036 HEMOGLOBIN GLYCOSYLATED A1C: CPT | Performed by: FAMILY MEDICINE

## 2022-10-06 PROCEDURE — 90662 IIV NO PRSV INCREASED AG IM: CPT | Performed by: FAMILY MEDICINE

## 2022-10-06 NOTE — PROGRESS NOTES
Your A1c is 7.7%.  This is stable compared to your previous values but is still a little higher than we would like.  If you are able to send me some sugar logs I can make adjustments to your medications.

## 2022-10-06 NOTE — PROGRESS NOTES
Date of Encounter: 10/06/2022  Patient: Arden Carrasquillo,  1955    Subjective   History of Presenting Illness  Chief complaint: Urine problems    Patient concerned that he may be developing a UTI.  He is having occasional burning with urination and fatigue which has been symptoms in the past, but he is not sure and he does not feel as badly as he did about a month ago when he was diagnosed with an Enterococcus faecalis UTI.  He denies fever and chills.  He does not feel that he wants to start an antibiotic yet as he is unsure of his intermittent symptoms.  He has not followed up with urology in over 1 year for prostate cancer.    Type 2 diabetes, amenable to A1c as well.  Has not received influenza or COVID-19 booster.    Review of Systems:  Negative for fever, cough, shortness of breath, chills, abdominal pain, nausea, foul-smelling urine, pelvic pain    The following portions of the patient's history were reviewed and updated as appropriate: allergies, current medications, past family history, past medical history, past social history, past surgical history and problem list.    Patient Active Problem List   Diagnosis   • Type 2 diabetes mellitus with diabetic autonomic neuropathy, with long-term current use of insulin ([facility])   • Degenerative spondylolisthesis   • Hypertension   • Generalized anxiety disorder   • Stage 3a chronic kidney disease (HCC)   • Hyperlipidemia   • Prostate cancer (CMS/HCC) Greg 3+3   • Anemia in stage 3b chronic kidney disease (HCC)   • Gynecomastia   • Carcinoma in situ of colon   • Bilateral lower extremity edema   • Weakness of both lower extremities   • Psychophysiological insomnia     Past Medical History:   Diagnosis Date   • Diabetes mellitus (HCC)    • Positive colorectal cancer screening using Cologuard test 12/3/2020     Past Surgical History:   Procedure Laterality Date   • CYSTOSCOPY TRANSURETHRAL RESECTION OF PROSTATE     • UPPER GASTROINTESTINAL ENDOSCOPY        Family History   Problem Relation Age of Onset   • Colon cancer Neg Hx    • Colon polyps Neg Hx        Current Outpatient Medications:   •  amLODIPine (NORVASC) 10 MG tablet, Take 1 tablet by mouth Daily., Disp: 90 tablet, Rfl: 3  •  atorvastatin (LIPITOR) 20 MG tablet, TAKE ONE TABLET BY MOUTH DAILY, Disp: 90 tablet, Rfl: 3  •  Cholecalciferol (VITAMIN D3 GUMMIES PO), Take  by mouth., Disp: , Rfl:   •  diazePAM (Valium) 2 MG tablet, Take 1/2 to 1 tablet 1 hour before MRI. May repeat a second tab if not effective., Disp: 3 tablet, Rfl: 0  •  glucose blood (Accu-Chek Mary Plus) test strip, USE TO TEST SUGAR FOUR TIMES A DAY, Disp: 200 each, Rfl: prn  •  HYDROmorphone (DILAUDID) 2 MG tablet, , Disp: , Rfl:   •  Insulin Glargine, 2 Unit Dial, (Toujeo Max SoloStar) 300 UNIT/ML solution pen-injector injection, Inject 70 Units under the skin into the appropriate area as directed Daily., Disp: 5 pen, Rfl: 3  •  Insulin Lispro, 1 Unit Dial, (HUMALOG) 100 UNIT/ML solution pen-injector, Inject 12 Units under the skin into the appropriate area as directed 2 (two) times a day., Disp: 3 pen, Rfl: 3  •  Insulin Pen Needle (B-D UF III MINI PEN NEEDLES) 31G X 5 MM misc, Use with insulin pens, Disp: 100 each, Rfl: 3  •  losartan (COZAAR) 100 MG tablet, TAKE ONE TABLET BY MOUTH DAILY, Disp: 90 tablet, Rfl: 3  •  mirtazapine (REMERON) 15 MG tablet, TAKE ONE TABLET BY MOUTH ONCE NIGHTLY, Disp: 90 tablet, Rfl: 3  •  Multiple Vitamins-Minerals (MENS MULTIVITAMIN PLUS PO), Take  by mouth., Disp: , Rfl:   •  ondansetron (ZOFRAN) 4 MG tablet, TAKE ONE TABLET BY MOUTH EVERY 8 HOURS AS NEEDED FOR  NAUSEA AND VOMITING, Disp: 30 tablet, Rfl: 0  •  oxyCODONE-acetaminophen (PERCOCET) 5-325 MG per tablet, Take 1 tablet by mouth Every 6 (Six) Hours As Needed., Disp: , Rfl:   •  pantoprazole (PROTONIX) 40 MG EC tablet, TAKE ONE TABLET BY MOUTH DAILY, Disp: 90 tablet, Rfl: 3  •  pioglitazone (ACTOS) 15 MG tablet, TAKE ONE TABLET BY MOUTH DAILY,  Disp: 90 tablet, Rfl: 3  •  pregabalin (LYRICA) 75 MG capsule, TAKE ONE CAPSULE BY MOUTH TWICE A DAY, Disp: 180 capsule, Rfl: 1  •  tamsulosin (FLOMAX) 0.4 MG capsule 24 hr capsule, TAKE ONE CAPSULE BY MOUTH DAILY, Disp: 90 capsule, Rfl: 3  •  Terbinafine HCl (LAMISIL AT SPRAY EX), Apply  topically., Disp: , Rfl:   •  zolpidem (AMBIEN) 5 MG tablet, TAKE 1/2 TO 1 TABLET BY MOUTH EVERY NIGHT AT BEDTIME AS NEEDED FOR SLEEP, Disp: 30 tablet, Rfl: 0  Allergies   Allergen Reactions   • Hydrocodone Itching     Social History     Tobacco Use   • Smoking status: Never Smoker   • Smokeless tobacco: Never Used   Substance Use Topics   • Alcohol use: Defer   • Drug use: Yes     Types: Hydrocodone          Objective   Physical Exam  There were no vitals filed for this visit.  There is no height or weight on file to calculate BMI.       Assessment & Plan   Assessment and Plan  Very pleasant 67-year-old male with type 2 diabetes with polyneuropathy, hypertension, hyperlipidemia, prostate cancer, chronic lower back pain on opioid therapy, CKD 3, anxiety and depression, who presents with the following:    Diagnoses and all orders for this visit:    1. Dysuria (Primary): Fairly mild and intermittent symptoms, because of his history we will have him come in for a urine specimen.  We will hold on empiric antibiotics unless his symptoms worsen in which case he is to call me for empiric treatment based upon his recent culture.  He needs to call urology for routine follow-up for prostate cancer.  -     Urinalysis With Microscopic - Urine, Clean Catch; Future  -     Urine Culture - Urine, Urine, Clean Catch; Future    2. Type 2 diabetes mellitus with diabetic autonomic neuropathy, with long-term current use of insulin ([facility])  -     POC Glycosylated Hemoglobin (Hb A1C); Future    3. Need for COVID-19 vaccine  -     COVID-19 Bivalent Booster (Pfizer) 12+yrs    4. Need for influenza vaccination  -     FluLaval/Fluzone >6 mos ([id number])    We  spent approximately 15 minutes on the phone discussing the above issues.  Plan was a video visit but he could not get this to work because of technical problems    Pollo Ward MD  Family Medicine  O: [phone]  C: [phone]    Disclaimer: Parts of this note were dictated by speech recognition. Minor errors in transcription may be present. Please call if questions.

## 2022-10-08 LAB
APPEARANCE UR: CLEAR
BACTERIA #/AREA URNS HPF: NORMAL /[HPF]
BACTERIA UR CULT: NO GROWTH
BACTERIA UR CULT: NORMAL
BILIRUB UR QL STRIP: NEGATIVE
CASTS URNS QL MICRO: NORMAL /LPF
COLOR UR: YELLOW
EPI CELLS #/AREA URNS HPF: NORMAL /HPF (ref 0–10)
GLUCOSE UR QL STRIP: NEGATIVE
HGB UR QL STRIP: NEGATIVE
KETONES UR QL STRIP: NEGATIVE
LEUKOCYTE ESTERASE UR QL STRIP: NEGATIVE
MICRO URNS: ABNORMAL
NITRITE UR QL STRIP: NEGATIVE
PH UR STRIP: 6.5 [PH] (ref 5–7.5)
PROT UR QL STRIP: ABNORMAL
RBC #/AREA URNS HPF: NORMAL /HPF (ref 0–2)
SP GR UR STRIP: 1.01 (ref 1–1.03)
UROBILINOGEN UR STRIP-MCNC: 0.2 MG/DL (ref 0.2–1)
WBC #/AREA URNS HPF: NORMAL /HPF (ref 0–5)

## 2022-10-12 ENCOUNTER — TELEPHONE (OUTPATIENT)
Dept: INTERNAL MEDICINE | Facility: CLINIC | Age: 67
End: 2022-10-12

## 2022-10-12 NOTE — TELEPHONE ENCOUNTER
Pt calling today to ask about recent lab results, as well as checking on home health referral.    I advised pt of providers message with results, and pt understands and has no questions.     For home health referral, we will have to check on this and call him back.

## 2022-10-13 NOTE — TELEPHONE ENCOUNTER
Spoke to pt to let him know Office manage has resent referral to home health and hopefully they should be contacting him soon. Pt was advised to call our office back with any questions.

## 2022-10-25 NOTE — PROGRESS NOTES
Spoke to Arden Carrasquillo about results. Patient voiced understanding and will call if they have any questions.

## 2022-10-31 ENCOUNTER — TELEPHONE (OUTPATIENT)
Dept: INTERNAL MEDICINE | Facility: CLINIC | Age: 67
End: 2022-10-31

## 2022-10-31 DIAGNOSIS — F51.04 PSYCHOPHYSIOLOGICAL INSOMNIA: ICD-10-CM

## 2022-10-31 NOTE — TELEPHONE ENCOUNTER
REFAXED REF TO [facility] FAXED ON 10/13. PT STATES THAT THE REFERRED TO OFFICE DID NOT RECEIVE.

## 2022-11-02 RX ORDER — ZOLPIDEM TARTRATE 5 MG/1
TABLET ORAL
Qty: 30 TABLET | Refills: 0 | Status: SHIPPED | OUTPATIENT
Start: 2022-11-02 | End: 2022-11-28

## 2022-11-03 DIAGNOSIS — G62.9 PERIPHERAL POLYNEUROPATHY: ICD-10-CM

## 2022-11-06 RX ORDER — PREGABALIN 75 MG/1
CAPSULE ORAL
Qty: 180 CAPSULE | Refills: 2 | Status: SHIPPED | OUTPATIENT
Start: 2022-11-22

## 2022-11-13 DIAGNOSIS — R10.13 DYSPEPSIA: ICD-10-CM

## 2022-11-14 RX ORDER — ONDANSETRON 4 MG/1
TABLET, FILM COATED ORAL
Qty: 30 TABLET | Refills: 0 | Status: SHIPPED | OUTPATIENT
Start: 2022-11-14

## 2022-11-15 ENCOUNTER — TELEPHONE (OUTPATIENT)
Dept: INTERNAL MEDICINE | Facility: CLINIC | Age: 67
End: 2022-11-15

## 2022-11-15 DIAGNOSIS — R11.0 NAUSEA: Primary | ICD-10-CM

## 2022-11-15 RX ORDER — PROMETHAZINE HYDROCHLORIDE 12.5 MG/1
TABLET ORAL
Qty: 15 TABLET | Refills: 1 | Status: SHIPPED | OUTPATIENT
Start: 2022-11-15 | End: 2022-12-19

## 2022-11-15 NOTE — TELEPHONE ENCOUNTER
Caller: Arden Carrasquillo     Relationship: SELF    Best call back number:    [phone]          What is your medical concern?     PATIENT IS STILL HAVING NAUSEA EPISODES AND WAS WANTING TO KNOW IF YOU CAN SEND HIM IN SOMETHING STRONGER THE MEDICATION THAT WAS GIVE IS NOT HELPING.    PLEASE ADVISE.

## 2022-11-16 NOTE — TELEPHONE ENCOUNTER
Sent in promethazine, he should start with a half tab first as this can be a very sedating medication  If he is having nausea that is not improving he needs to be seen

## 2022-11-28 DIAGNOSIS — F51.04 PSYCHOPHYSIOLOGICAL INSOMNIA: ICD-10-CM

## 2022-11-28 RX ORDER — ZOLPIDEM TARTRATE 5 MG/1
TABLET ORAL
Qty: 30 TABLET | Refills: 2 | Status: SHIPPED | OUTPATIENT
Start: 2022-11-28 | End: 2023-02-28

## 2022-11-28 NOTE — TELEPHONE ENCOUNTER
Rx Refill Note  Requested Prescriptions     Pending Prescriptions Disp Refills   • zolpidem (AMBIEN) 5 MG tablet [Pharmacy Med Name: ZOLPIDEM TARTRATE 5 MG TABLET] 30 tablet      Sig: TAKE 1/2 TO 1 TABLET BY MOUTH AT BEDTIME AS NEEDED FOR SLEEP      Last office visit with prescribing clinician: 10/6/2022      Next office visit with prescribing clinician: 12/20/2022          Quant Urine Drug Conf - (08/02/2022 15:25)      Sarah Florez MA  11/28/22, 11:40 EST

## 2022-12-16 ENCOUNTER — OFFICE VISIT (OUTPATIENT)
Dept: INTERNAL MEDICINE | Facility: CLINIC | Age: 67
End: 2022-12-16

## 2022-12-16 VITALS — SYSTOLIC BLOOD PRESSURE: 134 MMHG | DIASTOLIC BLOOD PRESSURE: 80 MMHG | WEIGHT: 186.4 LBS | BODY MASS INDEX: 30.09 KG/M2

## 2022-12-16 DIAGNOSIS — N18.32 ANEMIA IN STAGE 3B CHRONIC KIDNEY DISEASE: ICD-10-CM

## 2022-12-16 DIAGNOSIS — N62 GYNECOMASTIA: ICD-10-CM

## 2022-12-16 DIAGNOSIS — Z79.4 TYPE 2 DIABETES MELLITUS WITH DIABETIC AUTONOMIC NEUROPATHY, WITH LONG-TERM CURRENT USE OF INSULIN: Primary | ICD-10-CM

## 2022-12-16 DIAGNOSIS — D63.1 ANEMIA IN STAGE 3B CHRONIC KIDNEY DISEASE: ICD-10-CM

## 2022-12-16 DIAGNOSIS — N18.31 STAGE 3A CHRONIC KIDNEY DISEASE: ICD-10-CM

## 2022-12-16 DIAGNOSIS — E78.5 HYPERLIPIDEMIA, UNSPECIFIED HYPERLIPIDEMIA TYPE: ICD-10-CM

## 2022-12-16 DIAGNOSIS — E11.43 TYPE 2 DIABETES MELLITUS WITH DIABETIC AUTONOMIC NEUROPATHY, WITH LONG-TERM CURRENT USE OF INSULIN: Primary | ICD-10-CM

## 2022-12-16 PROCEDURE — 99214 OFFICE O/P EST MOD 30 MIN: CPT | Performed by: FAMILY MEDICINE

## 2022-12-16 RX ORDER — HYDROCODONE BITARTRATE AND ACETAMINOPHEN 7.5; 325 MG/1; MG/1
1 TABLET ORAL
COMMUNITY
Start: 2022-12-16 | End: 2023-01-15

## 2022-12-16 RX ORDER — SEMAGLUTIDE 1.34 MG/ML
0.25 INJECTION, SOLUTION SUBCUTANEOUS WEEKLY
Qty: 3 ML | Refills: 3 | Status: SHIPPED | OUTPATIENT
Start: 2022-12-16 | End: 2023-03-16

## 2022-12-17 DIAGNOSIS — R11.0 NAUSEA: ICD-10-CM

## 2022-12-17 LAB
25(OH)D3+25(OH)D2 SERPL-MCNC: 13.7 NG/ML (ref 30–100)
ALBUMIN SERPL-MCNC: 4.4 G/DL (ref 3.8–4.8)
ALBUMIN/GLOB SERPL: 1.5 {RATIO} (ref 1.2–2.2)
ALP SERPL-CCNC: 92 IU/L (ref 44–121)
ALT SERPL-CCNC: 19 IU/L (ref 0–44)
APPEARANCE UR: CLEAR
AST SERPL-CCNC: 17 IU/L (ref 0–40)
BACTERIA #/AREA URNS HPF: NORMAL /[HPF]
BASOPHILS # BLD AUTO: 0 X10E3/UL (ref 0–0.2)
BASOPHILS NFR BLD AUTO: 0 %
BILIRUB SERPL-MCNC: 0.6 MG/DL (ref 0–1.2)
BILIRUB UR QL STRIP: NEGATIVE
BUN SERPL-MCNC: 15 MG/DL (ref 8–27)
BUN/CREAT SERPL: 11 (ref 10–24)
CALCIUM SERPL-MCNC: 9.7 MG/DL (ref 8.6–10.2)
CASTS URNS QL MICRO: NORMAL /LPF
CHLORIDE SERPL-SCNC: 104 MMOL/L (ref 96–106)
CHOLEST SERPL-MCNC: 177 MG/DL (ref 100–199)
CO2 SERPL-SCNC: 26 MMOL/L (ref 20–29)
COLOR UR: YELLOW
CREAT SERPL-MCNC: 1.34 MG/DL (ref 0.76–1.27)
EGFRCR SERPLBLD CKD-EPI 2021: 57 ML/MIN/1.73
EOSINOPHIL # BLD AUTO: 0.1 X10E3/UL (ref 0–0.4)
EOSINOPHIL NFR BLD AUTO: 1 %
EPI CELLS #/AREA URNS HPF: NORMAL /HPF (ref 0–10)
ERYTHROCYTE [DISTWIDTH] IN BLOOD BY AUTOMATED COUNT: 14.6 % (ref 11.6–15.4)
FERRITIN SERPL-MCNC: 149 NG/ML (ref 30–400)
FT4I SERPL CALC-MCNC: 2 (ref 1.2–4.9)
GLOBULIN SER CALC-MCNC: 2.9 G/DL (ref 1.5–4.5)
GLUCOSE SERPL-MCNC: 160 MG/DL (ref 70–99)
GLUCOSE UR QL STRIP: NEGATIVE
HBA1C MFR BLD: 7.5 % (ref 4.8–5.6)
HCT VFR BLD AUTO: 35.4 % (ref 37.5–51)
HDLC SERPL-MCNC: 52 MG/DL
HGB BLD-MCNC: 11.7 G/DL (ref 13–17.7)
HGB UR QL STRIP: NEGATIVE
IMM GRANULOCYTES # BLD AUTO: 0 X10E3/UL (ref 0–0.1)
IMM GRANULOCYTES NFR BLD AUTO: 0 %
IRON SATN MFR SERPL: 34 % (ref 15–55)
IRON SERPL-MCNC: 83 UG/DL (ref 38–169)
KETONES UR QL STRIP: NEGATIVE
LDLC SERPL CALC-MCNC: 104 MG/DL (ref 0–99)
LEUKOCYTE ESTERASE UR QL STRIP: NEGATIVE
LYMPHOCYTES # BLD AUTO: 1.6 X10E3/UL (ref 0.7–3.1)
LYMPHOCYTES NFR BLD AUTO: 24 %
MCH RBC QN AUTO: 27.5 PG (ref 26.6–33)
MCHC RBC AUTO-ENTMCNC: 33.1 G/DL (ref 31.5–35.7)
MCV RBC AUTO: 83 FL (ref 79–97)
MICRO URNS: ABNORMAL
MONOCYTES # BLD AUTO: 0.5 X10E3/UL (ref 0.1–0.9)
MONOCYTES NFR BLD AUTO: 8 %
NEUTROPHILS # BLD AUTO: 4.5 X10E3/UL (ref 1.4–7)
NEUTROPHILS NFR BLD AUTO: 67 %
NITRITE UR QL STRIP: NEGATIVE
PH UR STRIP: 6.5 [PH] (ref 5–7.5)
PLATELET # BLD AUTO: 176 X10E3/UL (ref 150–450)
POTASSIUM SERPL-SCNC: 3.8 MMOL/L (ref 3.5–5.2)
PROT SERPL-MCNC: 7.3 G/DL (ref 6–8.5)
PROT UR QL STRIP: ABNORMAL
RBC # BLD AUTO: 4.25 X10E6/UL (ref 4.14–5.8)
RBC #/AREA URNS HPF: NORMAL /HPF (ref 0–2)
SODIUM SERPL-SCNC: 143 MMOL/L (ref 134–144)
SP GR UR STRIP: 1.01 (ref 1–1.03)
T3RU NFR SERPL: 29 % (ref 24–39)
T4 SERPL-MCNC: 6.9 UG/DL (ref 4.5–12)
TESTOST SERPL-MCNC: 540 NG/DL (ref 264–916)
TIBC SERPL-MCNC: 245 UG/DL (ref 250–450)
TRIGL SERPL-MCNC: 115 MG/DL (ref 0–149)
TSH SERPL DL<=0.005 MIU/L-ACNC: 1.08 UIU/ML (ref 0.45–4.5)
UIBC SERPL-MCNC: 162 UG/DL (ref 111–343)
UROBILINOGEN UR STRIP-MCNC: 0.2 MG/DL (ref 0.2–1)
VIT B12 SERPL-MCNC: 329 PG/ML (ref 232–1245)
VLDLC SERPL CALC-MCNC: 21 MG/DL (ref 5–40)
WBC # BLD AUTO: 6.7 X10E3/UL (ref 3.4–10.8)
WBC #/AREA URNS HPF: NORMAL /HPF (ref 0–5)

## 2022-12-17 NOTE — PROGRESS NOTES
Date of Encounter: 2022  Patient: Arden Carrasquillo,  1955    Subjective   History of Presenting Illness  Chief complaint: Follow-up type 2 diabetes    No acute concerns.    Type 2 diabetes, FBS 160s-180s, sometimes will get mid 200s in early afternoon, not checking evening blood sugars frequently.  No hypoglycemia.  Currently on pioglitazone 25 mg, glargine 70 units daily, lispro 12 units twice daily.  Did not tolerate metformin.  Up-to-date on eye exam.    Blood pressure is well controlled.    Anxiety doing well on mirtazapine with no side effects.    Greg 3+3 prostate cancer with LUTS on tamsulosin continuing to follow with urology    He has had his influenza and COVID-19 bivalent vaccine    Review of Systems:  Negative for fever, cough, shortness of breath, chest pain, palpitations, abdominal pain    The following portions of the patient's history were reviewed and updated as appropriate: allergies, current medications, past family history, past medical history, past social history, past surgical history and problem list.    Patient Active Problem List   Diagnosis   • Type 2 diabetes mellitus with diabetic autonomic neuropathy, with long-term current use of insulin (HCC)   • Degenerative spondylolisthesis   • Hypertension   • Generalized anxiety disorder   • Stage 3a chronic kidney disease (HCC)   • Hyperlipidemia   • Prostate cancer (CMS/HCC) Greg 3+3   • Anemia in stage 3b chronic kidney disease (HCC)   • Gynecomastia   • Carcinoma in situ of colon   • Bilateral lower extremity edema   • Weakness of both lower extremities   • Psychophysiological insomnia     Past Medical History:   Diagnosis Date   • Diabetes mellitus (HCC)    • Positive colorectal cancer screening using Cologuard test 12/3/2020     Past Surgical History:   Procedure Laterality Date   • CYSTOSCOPY TRANSURETHRAL RESECTION OF PROSTATE     • UPPER GASTROINTESTINAL ENDOSCOPY       Family History   Problem Relation Age of Onset    • Colon cancer Neg Hx    • Colon polyps Neg Hx        Current Outpatient Medications:   •  amLODIPine (NORVASC) 10 MG tablet, Take 1 tablet by mouth Daily., Disp: 90 tablet, Rfl: 3  •  atorvastatin (LIPITOR) 20 MG tablet, TAKE ONE TABLET BY MOUTH DAILY, Disp: 90 tablet, Rfl: 3  •  glucose blood (Accu-Chek Mary Plus) test strip, USE TO TEST SUGAR FOUR TIMES A DAY, Disp: 200 each, Rfl: prn  •  HYDROcodone-acetaminophen (NORCO) 7.5-325 MG per tablet, Take 1 tablet by mouth., Disp: , Rfl:   •  Insulin Glargine, 2 Unit Dial, (Toujeo Max SoloStar) 300 UNIT/ML solution pen-injector injection, Inject 70 Units under the skin into the appropriate area as directed Daily., Disp: 5 pen, Rfl: 3  •  Insulin Lispro, 1 Unit Dial, (HUMALOG) 100 UNIT/ML solution pen-injector, Inject 12 Units under the skin into the appropriate area as directed 2 (two) times a day., Disp: 3 pen, Rfl: 3  •  Insulin Pen Needle (B-D UF III MINI PEN NEEDLES) 31G X 5 MM misc, Use with insulin pens, Disp: 100 each, Rfl: 3  •  losartan (COZAAR) 100 MG tablet, TAKE ONE TABLET BY MOUTH DAILY, Disp: 90 tablet, Rfl: 3  •  mirtazapine (REMERON) 15 MG tablet, TAKE ONE TABLET BY MOUTH ONCE NIGHTLY, Disp: 90 tablet, Rfl: 3  •  Multiple Vitamins-Minerals (MENS MULTIVITAMIN PLUS PO), Take  by mouth., Disp: , Rfl:   •  ondansetron (ZOFRAN) 4 MG tablet, TAKE ONE TABLET BY MOUTH EVERY 8 HOURS AS NEEDED FOR FOR NAUSEA AND VOMITING, Disp: 30 tablet, Rfl: 0  •  pantoprazole (PROTONIX) 40 MG EC tablet, TAKE ONE TABLET BY MOUTH DAILY, Disp: 90 tablet, Rfl: 3  •  pioglitazone (ACTOS) 15 MG tablet, TAKE ONE TABLET BY MOUTH DAILY, Disp: 90 tablet, Rfl: 3  •  pregabalin (LYRICA) 75 MG capsule, TAKE ONE CAPSULE BY MOUTH TWICE A DAY, Disp: 180 capsule, Rfl: 2  •  promethazine (PHENERGAN) 12.5 MG tablet, Take 1/2 to 1 tablet every 6 hours as needed for nausea, Disp: 15 tablet, Rfl: 1  •  tamsulosin (FLOMAX) 0.4 MG capsule 24 hr capsule, TAKE ONE CAPSULE BY MOUTH DAILY, Disp: 90  capsule, Rfl: 3  •  zolpidem (AMBIEN) 5 MG tablet, TAKE 1/2 TO 1 TABLET BY MOUTH AT BEDTIME AS NEEDED FOR SLEEP, Disp: 30 tablet, Rfl: 2  •  Semaglutide,0.25 or 0.5MG/DOS, (Ozempic, 0.25 or 0.5 MG/DOSE,) 2 MG/1.5ML solution pen-injector, Inject 0.25 mg under the skin into the appropriate area as directed 1 (One) Time Per Week., Disp: 3 mL, Rfl: 3  Allergies   Allergen Reactions   • Hydrocodone Itching     Social History     Tobacco Use   • Smoking status: Never   • Smokeless tobacco: Never   Substance Use Topics   • Alcohol use: Defer   • Drug use: Yes     Types: Hydrocodone          Objective   Physical Exam  Vitals:    12/16/22 1413   BP: 134/80   Weight: 84.6 kg (186 lb 6.4 oz)     Body mass index is 30.09 kg/m².    Constitutional: NAD.  Eyes: EOMI. PERRLA. Normal conjunctiva.  Ear, nose, mouth, throat: No tonsillar exudates or erythema.   Normal nasal mucosa. Normal external ear canals and TMs bilaterally.  Cardiovascular: RRR.  1-2/6 late systolic murmur right upper sternal border. No LE edema b/l. Radial pulses 2+ bilaterally.  Pulmonary: CTA b/l. Good effort.  Integumentary: No rashes or wounds on face or upper extremities.  Lymphatic: No anterior cervical lymphadenopathy.  Endocrine: No thyromegaly or palpable thyroid nodules.  Psychiatric: Normal affect. Normal thought content.     Assessment & Plan   Assessment and Plan  Very pleasant 67-year-old male with type 2 diabetes with polyneuropathy, hypertension, hyperlipidemia, prostate cancer, chronic lower back pain on opioid therapy, CKD 3, anxiety and depression, who presents with the following:     Diagnoses and all orders for this visit:    1. Type 2 diabetes mellitus with diabetic autonomic neuropathy, with long-term current use of insulin (HCC) (Primary): Discussed risks and benefits of semaglutide, he has never been on GLP-1's.  No history of pancreatitis or familial MEN.  Discussed titration as tolerated  -     Semaglutide,0.25 or 0.5MG/DOS, (Ozempic,  0.25 or 0.5 MG/DOSE,) 2 MG/1.5ML solution pen-injector; Inject 0.25 mg under the skin into the appropriate area as directed 1 (One) Time Per Week.  Dispense: 3 mL; Refill: 3  -     Hemoglobin A1c    2. Stage 3a chronic kidney disease (HCC)  -     Vitamin D,25-Hydroxy  -     Urinalysis With Microscopic - Urine, Clean Catch    3. Hyperlipidemia, unspecified hyperlipidemia type  -     Thyroid Panel With TSH  -     Comprehensive Metabolic Panel  -     CBC & Differential  -     Lipid Panel    4. Anemia in stage 3b chronic kidney disease (HCC)  -     Iron Profile  -     Ferritin  -     Vitamin B12    5. Gynecomastia  -     Testosterone    Follow-up in 3 months for type 2 diabetes    Pollo Ward MD  Family Medicine  O: [phone]    Disclaimer: Parts of this note were dictated by speech recognition. Minor errors in transcription may be present. Please call if questions.

## 2022-12-19 DIAGNOSIS — E55.9 VITAMIN D DEFICIENCY: ICD-10-CM

## 2022-12-19 DIAGNOSIS — N18.31 STAGE 3A CHRONIC KIDNEY DISEASE: Primary | ICD-10-CM

## 2022-12-19 RX ORDER — PROMETHAZINE HYDROCHLORIDE 12.5 MG/1
TABLET ORAL
Qty: 15 TABLET | Refills: 1 | Status: SHIPPED | OUTPATIENT
Start: 2022-12-19 | End: 2023-01-27

## 2022-12-19 RX ORDER — CHOLECALCIFEROL (VITAMIN D3) 50 MCG
TABLET ORAL
Qty: 90 EACH | Refills: 3 | Status: SHIPPED | OUTPATIENT
Start: 2022-12-19

## 2022-12-19 RX ORDER — CHOLECALCIFEROL (VITAMIN D3) 1250 MCG
CAPSULE ORAL
Qty: 8 CAPSULE | Refills: 0 | Status: SHIPPED | OUTPATIENT
Start: 2022-12-19

## 2022-12-19 NOTE — PROGRESS NOTES
Diabetes still uncontrolled, hopefully that medication works well for you  Your vitamin D is low, I will send in a dedicated supplement to take daily  Kidney function is stable continue to follow with your nephrologist

## 2022-12-19 NOTE — TELEPHONE ENCOUNTER
Rx Refill Note  Requested Prescriptions     Pending Prescriptions Disp Refills   • promethazine (PHENERGAN) 12.5 MG tablet [Pharmacy Med Name: PROMETHAZINE 12.5 MG TABLET] 15 tablet 1     Sig: TAKE 1/2 TO 1 TABLET BY MOUTH EVERY 6 HOURS AS NEEDED FOR NAUSEA      Last office visit with prescribing clinician: 12/16/2022   Last telemedicine visit with prescribing clinician: 3/16/2023   Next office visit with prescribing clinician: 3/16/2023                         Would you like a call back once the refill request has been completed: [] Yes [] No    If the office needs to give you a call back, can they leave a voicemail: [] Yes [] No    Sarah Florez MA  12/19/22, 08:10 EST

## 2022-12-28 ENCOUNTER — TELEPHONE (OUTPATIENT)
Dept: INTERNAL MEDICINE | Facility: CLINIC | Age: 67
End: 2022-12-28

## 2022-12-28 NOTE — TELEPHONE ENCOUNTER
Pt called to report possible allergic reaction to Vitamin D3 50,000 units.     Pt states he developed a small red rash on the sides of both legs, with some itching, some burning on LLE after taking his first dose last week of Vitamin D3.     Pt denies swelling, spreading of rash to other extremities, sweating, etc.     I advised pt to try taking some benadryl to resolve the rash that is present, and to avoid taking Vitamin D3 50, 000 units until further notice/advice from Dr. Ward.    Pt verbalized understanding, agreement. Discussed reasons to go to ER.

## 2022-12-28 NOTE — TELEPHONE ENCOUNTER
Have him take an over the counter dose of 1000 units to 2000 units once daily instead. Probably an allergy to a  the capsule

## 2022-12-29 NOTE — TELEPHONE ENCOUNTER
Spoke to Arden Carrasquillo about providers message, pt understanding and states he will try this and let us know how it goes. Pt has no questions.

## 2023-01-26 DIAGNOSIS — R11.0 NAUSEA: ICD-10-CM

## 2023-01-27 RX ORDER — PROMETHAZINE HYDROCHLORIDE 12.5 MG/1
TABLET ORAL
Qty: 15 TABLET | Refills: 1 | Status: SHIPPED | OUTPATIENT
Start: 2023-01-27 | End: 2023-03-24

## 2023-01-27 NOTE — TELEPHONE ENCOUNTER
Rx Refill Note  Requested Prescriptions     Pending Prescriptions Disp Refills   • promethazine (PHENERGAN) 12.5 MG tablet [Pharmacy Med Name: PROMETHAZINE 12.5 MG TABLET] 15 tablet 1     Sig: TAKE 1/2 TO 1 TABLET BY MOUTH EVERY 6 HOURS AS NEEDED FOR NAUSEA      Last office visit with prescribing clinician: 12/16/2022   Last telemedicine visit with prescribing clinician: 3/16/2023   Next office visit with prescribing clinician: 3/16/2023     Lizzette Connell  01/27/23, 08:22 EST

## 2023-02-08 PROBLEM — Z86.0100 HISTORY OF COLON POLYPS: Status: ACTIVE | Noted: 2023-02-08

## 2023-02-08 PROBLEM — Z86.010 HISTORY OF COLON POLYPS: Status: ACTIVE | Noted: 2023-02-08

## 2023-02-08 PROBLEM — Z12.11 ENCOUNTER FOR SCREENING FOR MALIGNANT NEOPLASM OF COLON: Status: ACTIVE | Noted: 2023-02-08

## 2023-02-27 DIAGNOSIS — F51.04 PSYCHOPHYSIOLOGICAL INSOMNIA: ICD-10-CM

## 2023-02-28 RX ORDER — ZOLPIDEM TARTRATE 5 MG/1
TABLET ORAL
Qty: 30 TABLET | Refills: 0 | Status: SHIPPED | OUTPATIENT
Start: 2023-02-28 | End: 2023-03-30

## 2023-02-28 NOTE — TELEPHONE ENCOUNTER
Rx Refill Note  Requested Prescriptions     Pending Prescriptions Disp Refills   • zolpidem (AMBIEN) 5 MG tablet [Pharmacy Med Name: ZOLPIDEM TARTRATE 5 MG TABLET] 30 tablet      Sig: TAKE 1/2 TO 1 TABLET BY MOUTH EVERY NIGHT AT BEDTIME      Last office visit with prescribing clinician: 12/16/2022   Last telemedicine visit with prescribing clinician: 3/16/2023   Next office visit with prescribing clinician: 3/16/2023     UDS NOT ON FILE  CONTRACT NOT ON FILE                    Would you like a call back once the refill request has been completed: [] Yes [] No    If the office needs to give you a call back, can they leave a voicemail: [] Yes [] No    Mariana Ascencio, REMINGTON  02/28/23, 07:58 EST

## 2023-03-16 ENCOUNTER — OFFICE VISIT (OUTPATIENT)
Dept: INTERNAL MEDICINE | Facility: CLINIC | Age: 68
End: 2023-03-16
Payer: MEDICARE

## 2023-03-16 VITALS
SYSTOLIC BLOOD PRESSURE: 132 MMHG | DIASTOLIC BLOOD PRESSURE: 78 MMHG | BODY MASS INDEX: 31.57 KG/M2 | WEIGHT: 195.6 LBS | OXYGEN SATURATION: 98 % | TEMPERATURE: 97.5 F | HEART RATE: 95 BPM

## 2023-03-16 DIAGNOSIS — M43.10 DEGENERATIVE SPONDYLOLISTHESIS: ICD-10-CM

## 2023-03-16 DIAGNOSIS — R29.898 WEAKNESS OF BOTH LOWER EXTREMITIES: ICD-10-CM

## 2023-03-16 DIAGNOSIS — R60.0 BILATERAL LOWER EXTREMITY EDEMA: ICD-10-CM

## 2023-03-16 DIAGNOSIS — E11.43 TYPE 2 DIABETES MELLITUS WITH DIABETIC AUTONOMIC NEUROPATHY, WITH LONG-TERM CURRENT USE OF INSULIN: Primary | ICD-10-CM

## 2023-03-16 DIAGNOSIS — Z91.81: ICD-10-CM

## 2023-03-16 DIAGNOSIS — Z79.4 TYPE 2 DIABETES MELLITUS WITH DIABETIC AUTONOMIC NEUROPATHY, WITH LONG-TERM CURRENT USE OF INSULIN: Primary | ICD-10-CM

## 2023-03-16 PROCEDURE — 1160F RVW MEDS BY RX/DR IN RCRD: CPT | Performed by: FAMILY MEDICINE

## 2023-03-16 PROCEDURE — 3075F SYST BP GE 130 - 139MM HG: CPT | Performed by: FAMILY MEDICINE

## 2023-03-16 PROCEDURE — 99213 OFFICE O/P EST LOW 20 MIN: CPT | Performed by: FAMILY MEDICINE

## 2023-03-16 PROCEDURE — 3078F DIAST BP <80 MM HG: CPT | Performed by: FAMILY MEDICINE

## 2023-03-16 PROCEDURE — 1159F MED LIST DOCD IN RCRD: CPT | Performed by: FAMILY MEDICINE

## 2023-03-16 RX ORDER — SEMAGLUTIDE 1.34 MG/ML
0.5 INJECTION, SOLUTION SUBCUTANEOUS WEEKLY
Qty: 6 ML | Refills: 3 | Status: SHIPPED | OUTPATIENT
Start: 2023-03-16 | End: 2023-03-16 | Stop reason: SDUPTHER

## 2023-03-16 RX ORDER — SEMAGLUTIDE 1.34 MG/ML
0.5 INJECTION, SOLUTION SUBCUTANEOUS WEEKLY
Qty: 6 ML | Refills: 3 | Status: SHIPPED | OUTPATIENT
Start: 2023-03-16

## 2023-03-16 NOTE — PROGRESS NOTES
Date of Encounter: 2023  Patient: Arden Carrasquillo,  1955    Subjective   History of Presenting Illness  Chief complaint: Follow-up type 2 diabetes    Type 2 diabetes with recent addition of semaglutide 0.25 mg once weekly.  Initially had nausea, this has resolved.  He has noticed improvement in his glucose.  Did not bring his logs with him.  .  Neuropathy in the feet doing fairly well and has actually improved recently.    Worsening lower back pain with lower extremity weakness, following closely with spine and pain management.  He is using a rolling walker.  He does feel that he is at risk of falling and is concerned at home.  He lives alone in an apartment and is starting to have difficulty doing things for himself. He has difficulty with transportation and difficulty driving due to his weakness, unable to attend outpatient physical therapy but amenable to home health.    Persistent lower extremity weakness, worsening due to reduced mobility.  No cough, chest pain, or shortness of breath.    The following portions of the patient's history were reviewed and updated as appropriate: allergies, current medications, past family history, past medical history, past social history, past surgical history and problem list.    Patient Active Problem List   Diagnosis   • Type 2 diabetes mellitus with diabetic autonomic neuropathy, with long-term current use of insulin (HCC)   • Degenerative spondylolisthesis   • Hypertension   • Generalized anxiety disorder   • Stage 3a chronic kidney disease (HCC)   • Hyperlipidemia   • Prostate cancer (CMS/HCC) [place] 3+3   • Anemia in stage 3b chronic kidney disease (HCC)   • Gynecomastia   • Carcinoma in situ of colon   • Bilateral lower extremity edema   • Weakness of both lower extremities   • Psychophysiological insomnia   • History of colon polyps   • Encounter for screening for malignant neoplasm of colon     Past Medical History:   Diagnosis Date   • Diabetes  mellitus (HCC)    • Positive colorectal cancer screening using Cologuard test 12/3/2020     Past Surgical History:   Procedure Laterality Date   • CYSTOSCOPY TRANSURETHRAL RESECTION OF PROSTATE     • UPPER GASTROINTESTINAL ENDOSCOPY       Family History   Problem Relation Age of Onset   • Colon cancer Neg Hx    • Colon polyps Neg Hx        Current Outpatient Medications:   •  promethazine (PHENERGAN) 12.5 MG tablet, TAKE 1/2 TO 1 TABLET BY MOUTH EVERY 6 HOURS AS NEEDED FOR NAUSEA, Disp: 15 tablet, Rfl: 1  •  Semaglutide,0.25 or 0.5MG/DOS, (Ozempic, 0.25 or 0.5 MG/DOSE,) 2 MG/1.5ML solution pen-injector, Inject 0.5 mg under the skin into the appropriate area as directed 1 (One) Time Per Week., Disp: 6 mL, Rfl: 3  •  amLODIPine (NORVASC) 10 MG tablet, Take 1 tablet by mouth Daily., Disp: 90 tablet, Rfl: 3  •  atorvastatin (LIPITOR) 20 MG tablet, TAKE ONE TABLET BY MOUTH DAILY, Disp: 90 tablet, Rfl: 3  •  Cholecalciferol (D3) 50 MCG (2000 UT) tablet, Take 1 tab by mouth daily for vitamin D deficiency, Disp: 90 each, Rfl: 3  •  Cholecalciferol (Vitamin D3) 1.25 MG (87762 UT) capsule, Take 1 capsule once weekly for 8 weeks, then switch to the once daily dose, Disp: 8 capsule, Rfl: 0  •  glucose blood (Accu-Chek Mary Plus) test strip, USE TO TEST SUGAR FOUR TIMES A DAY, Disp: 200 each, Rfl: prn  •  Insulin Glargine, 2 Unit Dial, (Toujeo Max SoloStar) 300 UNIT/ML solution pen-injector injection, Inject 70 Units under the skin into the appropriate area as directed Daily., Disp: 5 pen, Rfl: 3  •  Insulin Lispro, 1 Unit Dial, (HUMALOG) 100 UNIT/ML solution pen-injector, Inject 12 Units under the skin into the appropriate area as directed 2 (two) times a day., Disp: 3 pen, Rfl: 3  •  Insulin Pen Needle (B-D UF III MINI PEN NEEDLES) 31G X 5 MM misc, Use with insulin pens, Disp: 100 each, Rfl: 3  •  losartan (COZAAR) 100 MG tablet, TAKE ONE TABLET BY MOUTH DAILY, Disp: 90 tablet, Rfl: 3  •  mirtazapine (REMERON) 15 MG tablet,  TAKE ONE TABLET BY MOUTH ONCE NIGHTLY, Disp: 90 tablet, Rfl: 3  •  Multiple Vitamins-Minerals (MENS MULTIVITAMIN PLUS PO), Take  by mouth., Disp: , Rfl:   •  ondansetron (ZOFRAN) 4 MG tablet, TAKE ONE TABLET BY MOUTH EVERY 8 HOURS AS NEEDED FOR FOR NAUSEA AND VOMITING, Disp: 30 tablet, Rfl: 0  •  pantoprazole (PROTONIX) 40 MG EC tablet, TAKE ONE TABLET BY MOUTH DAILY, Disp: 90 tablet, Rfl: 3  •  pioglitazone (ACTOS) 15 MG tablet, TAKE ONE TABLET BY MOUTH DAILY, Disp: 90 tablet, Rfl: 3  •  pregabalin (LYRICA) 75 MG capsule, TAKE ONE CAPSULE BY MOUTH TWICE A DAY, Disp: 180 capsule, Rfl: 2  •  tamsulosin (FLOMAX) 0.4 MG capsule 24 hr capsule, TAKE ONE CAPSULE BY MOUTH DAILY, Disp: 90 capsule, Rfl: 3  •  zolpidem (AMBIEN) 5 MG tablet, TAKE 1/2 TO 1 TABLET BY MOUTH EVERY NIGHT AT BEDTIME, Disp: 30 tablet, Rfl: 0  Allergies   Allergen Reactions   • Hydrocodone Itching     Social History     Tobacco Use   • Smoking status: Never   • Smokeless tobacco: Never   Substance Use Topics   • Alcohol use: Defer   • Drug use: Yes     Types: Hydrocodone          Objective   Physical Exam  Vitals:    03/16/23 1521   BP: 132/78   Pulse: 95   Temp: 97.5 °F (36.4 °C)   TempSrc: Infrared   SpO2: 98%   Weight: 88.7 kg (195 lb 9.6 oz)     Body mass index is 31.57 kg/m².    Constitutional: NAD.  Cardiovascular: RRR. No murmurs.  2+ pitting lower extremity edema of the bilateral lower legs. radial pulses 2+ bilaterally.  Pulmonary: CTA b/l. Good effort.  No fluid at the bases.  Integumentary: No rashes or wounds on face or upper extremities.  Psychiatric: Normal affect. Normal thought content.  Diabetic foot exam: DP pulses, proprioperception, ankle reflexes, and monofilament touch sensation normal throughout both feet.  There is significant pedal edema and onychomycosis     Assessment & Plan   Assessment and Plan  Very pleasant 67-year-old male with type 2 diabetes with polyneuropathy, hypertension, hyperlipidemia, prostate cancer, chronic  lower back pain on opioid therapy, CKD 3, anxiety and depression, who presents with the following:    Diagnoses and all orders for this visit:    1. Type 2 diabetes mellitus with diabetic autonomic neuropathy, with long-term current use of insulin ([facility]) (Primary): Overall doing fairly well with addition of semaglutide, point-of-care A1c has improved from 7.5% to 7.4%. not having any side effects. We will have him increase this slightly to 0.5 mg.  -     Semaglutide,0.25 or 0.5MG/DOS, (Ozempic, 0.25 or 0.5 MG/DOSE,) 2 MG/1.5ML solution pen-injector; Inject 0.5 mg under the skin into the appropriate area as directed 1 (One) Time Per Week.  Dispense: 6 mL; Refill: 3    2. Weakness of both lower extremities: Would do best with aquatic therapy but does not have access to reliable transportation and cannot drive himself.  I would like to also involve home health  as there may be some home safety and long-range planning things to consider as his back appears to be worsening.  -     Ambulatory Referral to Home Health  3. Degenerative spondylolisthesis  -     Ambulatory Referral to Home Health  4. At high risk for falls per [employer] fall risk assessment scale  -     Ambulatory Referral to Home Health    5. Bilateral lower extremity edema: He sees nephrology tomorrow.  History of normal BNP.  Cardiopulmonary examination normal.  Likely due to sedentary status along with CKD and amlodipine.  I would like him to discuss this with his nephrologist tomorrow    Pollo Ward MD  Family Medicine  O: [phone]    Disclaimer: Parts of this note were dictated by speech recognition. Minor errors in transcription may be present. Please call if questions.

## 2023-03-17 ENCOUNTER — HOME HEALTH ADMISSION (OUTPATIENT)
Dept: HOME HEALTH SERVICES | Facility: HOME HEALTHCARE | Age: 68
End: 2023-03-17
Payer: MEDICARE

## 2023-03-21 ENCOUNTER — TELEPHONE (OUTPATIENT)
Dept: INTERNAL MEDICINE | Facility: CLINIC | Age: 68
End: 2023-03-21
Payer: MEDICARE

## 2023-03-21 NOTE — TELEPHONE ENCOUNTER
Caller: SIDNEY    Relationship: Home Health    Best call back number: [phone]    What was the call regarding: [facility] HAVE [facility] DECLINE THE REFERRAL FOR HOME HEALTH BECAUSE THEY ARE UNABLE TO ACCEPT THE PATIENT'S INSURANCE.

## 2023-03-23 DIAGNOSIS — R11.0 NAUSEA: ICD-10-CM

## 2023-03-24 RX ORDER — PROMETHAZINE HYDROCHLORIDE 12.5 MG/1
TABLET ORAL
Qty: 15 TABLET | Refills: 1 | Status: SHIPPED | OUTPATIENT
Start: 2023-03-24

## 2023-03-24 NOTE — TELEPHONE ENCOUNTER
Rx Refill Note  Requested Prescriptions     Pending Prescriptions Disp Refills   • promethazine (PHENERGAN) 12.5 MG tablet [Pharmacy Med Name: PROMETHAZINE 12.5 MG TABLET] 15 tablet 1     Sig: TAKE 1/2 TO 1 TABLET BY MOUTH EVERY 6 HOURS AS NEEDED FOR NAUSEA      Last office visit with prescribing clinician: 3/16/2023   Last telemedicine visit with prescribing clinician: 9/18/2023   Next office visit with prescribing clinician: 9/18/2023                         Would you like a call back once the refill request has been completed: [] Yes [] No    If the office needs to give you a call back, can they leave a voicemail: [] Yes [] No    Mariana Ascencio LPN  03/24/23, 08:15 EDT

## 2023-03-27 ENCOUNTER — TELEPHONE (OUTPATIENT)
Dept: INTERNAL MEDICINE | Facility: CLINIC | Age: 68
End: 2023-03-27
Payer: MEDICARE

## 2023-03-27 RX ORDER — FUROSEMIDE 20 MG/1
20 TABLET ORAL DAILY
COMMUNITY

## 2023-03-27 NOTE — TELEPHONE ENCOUNTER
Pt called to let Dr. Ward know Nephrologist started him on Lasix 20mg QD and is scheduling him for an EKG at their office.

## 2023-03-29 DIAGNOSIS — F51.04 PSYCHOPHYSIOLOGICAL INSOMNIA: ICD-10-CM

## 2023-03-29 RX ORDER — TORSEMIDE 20 MG/1
TABLET ORAL
COMMUNITY
Start: 2023-03-21

## 2023-03-29 NOTE — TELEPHONE ENCOUNTER
Rx Refill Note  Requested Prescriptions     Pending Prescriptions Disp Refills   • zolpidem (AMBIEN) 5 MG tablet [Pharmacy Med Name: ZOLPIDEM TARTRATE 5 MG TABLET] 30 tablet      Sig: TAKE 1/2 TO 1 TABLET BY MOUTH ONCE NIGHTLY      Last office visit with prescribing clinician: 3/16/2023   Last telemedicine visit with prescribing clinician: 9/18/2023   Next office visit with prescribing clinician: 9/18/2023          NO UDS on file                 Would you like a call back once the refill request has been completed: [] Yes [] No    If the office needs to give you a call back, can they leave a voicemail: [] Yes [] No    Sarah Florez MA  03/29/23, 15:06 EDT

## 2023-03-30 RX ORDER — ZOLPIDEM TARTRATE 5 MG/1
TABLET ORAL
Qty: 30 TABLET | Refills: 2 | Status: SHIPPED | OUTPATIENT
Start: 2023-03-30

## 2023-04-18 ENCOUNTER — TELEPHONE (OUTPATIENT)
Dept: GASTROENTEROLOGY | Facility: CLINIC | Age: 68
End: 2023-04-18
Payer: MEDICARE

## 2023-04-19 DIAGNOSIS — Z91.81: ICD-10-CM

## 2023-04-19 DIAGNOSIS — R29.898 WEAKNESS OF BOTH LOWER EXTREMITIES: Primary | ICD-10-CM

## 2023-04-19 DIAGNOSIS — M43.10 DEGENERATIVE SPONDYLOLISTHESIS: ICD-10-CM

## 2023-04-21 DIAGNOSIS — Z79.4 TYPE 2 DIABETES MELLITUS WITH DIABETIC AUTONOMIC NEUROPATHY, WITH LONG-TERM CURRENT USE OF INSULIN: ICD-10-CM

## 2023-04-21 DIAGNOSIS — E11.43 TYPE 2 DIABETES MELLITUS WITH DIABETIC AUTONOMIC NEUROPATHY, WITH LONG-TERM CURRENT USE OF INSULIN: ICD-10-CM

## 2023-04-21 NOTE — TELEPHONE ENCOUNTER
Rx Refill Note  Requested Prescriptions     Pending Prescriptions Disp Refills   • Insulin Pen Needle (B-D UF III MINI PEN NEEDLES) 31G X 5 MM misc [Pharmacy Med Name: BD UF MINI PEN NEEDLE [id number]] 100 each 3     Sig: USE TWICE PER DAY WITH INSULIN INJECTION      Last office visit with prescribing clinician: 3/16/2023   Last telemedicine visit with prescribing clinician: 9/18/2023   Next office visit with prescribing clinician: 9/18/2023                         Would you like a call back once the refill request has been completed: [] Yes [] No    If the office needs to give you a call back, can they leave a voicemail: [] Yes [] No    Sarah Florez MA  04/21/23, 16:09 EDT

## 2023-04-23 DIAGNOSIS — N13.8 BPH WITH OBSTRUCTION/LOWER URINARY TRACT SYMPTOMS: ICD-10-CM

## 2023-04-23 DIAGNOSIS — N40.1 BPH WITH OBSTRUCTION/LOWER URINARY TRACT SYMPTOMS: ICD-10-CM

## 2023-04-24 RX ORDER — TAMSULOSIN HYDROCHLORIDE 0.4 MG/1
CAPSULE ORAL
Qty: 90 CAPSULE | Refills: 3 | Status: SHIPPED | OUTPATIENT
Start: 2023-04-24

## 2023-04-24 RX ORDER — FLURBIPROFEN SODIUM 0.3 MG/ML
SOLUTION/ DROPS OPHTHALMIC
Qty: 100 EACH | Refills: 3 | Status: SHIPPED | OUTPATIENT
Start: 2023-04-24

## 2023-04-25 ENCOUNTER — TELEPHONE (OUTPATIENT)
Dept: INTERNAL MEDICINE | Facility: CLINIC | Age: 68
End: 2023-04-25
Payer: MEDICARE

## 2023-04-25 DIAGNOSIS — G62.9 PERIPHERAL POLYNEUROPATHY: ICD-10-CM

## 2023-04-25 DIAGNOSIS — M43.10 DEGENERATIVE SPONDYLOLISTHESIS: Primary | ICD-10-CM

## 2023-04-25 RX ORDER — LIDOCAINE 50 MG/G
1 PATCH TOPICAL DAILY PRN
Qty: 30 PATCH | Refills: 3 | Status: SHIPPED | OUTPATIENT
Start: 2023-04-25

## 2023-04-25 NOTE — TELEPHONE ENCOUNTER
Verbal orders given for patient:    Arden Carrasquillo  Physical Therapy  2x per week, for 4 weeks  1x per week, for 5 weeks

## 2023-04-25 NOTE — TELEPHONE ENCOUNTER
#90 days of lyrica dispensed 3/13/2023  Should not be due for refill  Is he taking it more than twice daily

## 2023-04-25 NOTE — TELEPHONE ENCOUNTER
Caller: Carina Tan    Relationship: Emergency Contact    Best call back number:[phone]    Who are you requesting to speak with (clinical staff, provider,  specific staff member): CLINICAL STAFF     What was the call regarding: WANTING ORDERS FOR COMPRESSION SOCKS THAT ZIP UP SENT TO [facility]     ALSO ASKING FOR SOME PAIN PATCHES FOR PATIENT IN LOWER BACK AND GOING DOWN HIS LEFT LEG     [id number] - [place], [place] - [address] - [phone] [facility] [phone] FX     Do you require a callback: YES

## 2023-04-25 NOTE — TELEPHONE ENCOUNTER
Caller: Arden Carrasquillo    Relationship: Self    Best call back number: [phone]    What medication are you requesting: ARTHRITIS MEDICATION     What are your current symptoms: PAIN IN HANDS     How long have you been experiencing symptoms: FOR A WHILE NOW  Have you had these symptoms before:    [x] Yes  [] No    Have you been treated for these symptoms before:   [] Yes  [x] No    If a prescription is needed, what is your preferred pharmacy and phone number: [facility] [id number] - [place], [place] - [address]  [phone] [facility] [phone] FX     Additional notes:

## 2023-04-26 ENCOUNTER — PRIOR AUTHORIZATION (OUTPATIENT)
Dept: INTERNAL MEDICINE | Facility: CLINIC | Age: 68
End: 2023-04-26
Payer: MEDICARE

## 2023-05-19 ENCOUNTER — OFFICE VISIT (OUTPATIENT)
Dept: INTERNAL MEDICINE | Facility: CLINIC | Age: 68
End: 2023-05-19
Payer: MEDICARE

## 2023-05-19 VITALS
DIASTOLIC BLOOD PRESSURE: 62 MMHG | HEART RATE: 90 BPM | OXYGEN SATURATION: 98 % | SYSTOLIC BLOOD PRESSURE: 114 MMHG | TEMPERATURE: 96.9 F

## 2023-05-19 DIAGNOSIS — E11.43 TYPE 2 DIABETES MELLITUS WITH DIABETIC AUTONOMIC NEUROPATHY, WITH LONG-TERM CURRENT USE OF INSULIN: ICD-10-CM

## 2023-05-19 DIAGNOSIS — M48.062 LUMBAR STENOSIS WITH NEUROGENIC CLAUDICATION: Primary | ICD-10-CM

## 2023-05-19 DIAGNOSIS — R29.898 WEAKNESS OF BOTH UPPER EXTREMITIES: ICD-10-CM

## 2023-05-19 DIAGNOSIS — M43.10 DEGENERATIVE SPONDYLOLISTHESIS: ICD-10-CM

## 2023-05-19 DIAGNOSIS — R29.898 WEAKNESS OF BOTH LOWER EXTREMITIES: ICD-10-CM

## 2023-05-19 DIAGNOSIS — Z79.4 TYPE 2 DIABETES MELLITUS WITH DIABETIC AUTONOMIC NEUROPATHY, WITH LONG-TERM CURRENT USE OF INSULIN: ICD-10-CM

## 2023-05-19 DIAGNOSIS — R29.6 FREQUENT FALLS: ICD-10-CM

## 2023-05-19 RX ORDER — CHOLECALCIFEROL (VITAMIN D3) 125 MCG
1 TABLET ORAL DAILY
Qty: 90 TABLET | Refills: 3 | Status: SHIPPED | OUTPATIENT
Start: 2023-05-19

## 2023-05-19 RX ORDER — HYDROMORPHONE HYDROCHLORIDE 2 MG/1
2 TABLET ORAL EVERY 6 HOURS PRN
COMMUNITY

## 2023-05-19 RX ORDER — HYDROCODONE BITARTRATE AND ACETAMINOPHEN 7.5; 325 MG/1; MG/1
1 TABLET ORAL EVERY 6 HOURS PRN
COMMUNITY

## 2023-05-19 RX ORDER — PHENOL 1.4 %
AEROSOL, SPRAY (ML) MUCOUS MEMBRANE
COMMUNITY

## 2023-05-19 NOTE — PROGRESS NOTES
Date of Encounter: 2023  Patient: Arden Carrasquillo,  1955    Subjective   History of Presenting Illness  Chief complaint: Power wheelchair evaluation    Patient presents for power wheelchair evaluation.  He presents alongside a formal seating and mobility evaluation which recommends use of a power wheelchair to address his limitations.  He has known lumbar stenosis with neurogenic claudication complicated by type 2 diabetes with neuropathy.  His weakness in his lower extremities is so severe that he has difficulty using the rolling walker and has fallen multiple times over the past few months.  He has a over the toilet commode with handrails, still has difficulty getting into his bed and is wondering whether there are any handrails that can help with that, he does have physical therapy work with him regularly.  Currently undergoing nerve ablation to see if that helps his symptoms but the plan is for surgical intervention if worsening.  His pain is severe and not currently controlled despite his recent switch to hydromorphone by his pain specialist.    Patient does have chronic, longstanding weakness in the upper extremities that he feels is worsening.  Very distant history of EMG not in our system.  Having worsening  strength left greater than right, tremor, dropping things frequently    The following portions of the patient's history were reviewed and updated as appropriate: allergies, current medications, past family history, past medical history, past social history, past surgical history and problem list.    Patient Active Problem List   Diagnosis   • Type 2 diabetes mellitus with diabetic autonomic neuropathy, with long-term current use of insulin   • Degenerative spondylolisthesis   • Hypertension   • Generalized anxiety disorder   • Stage 3a chronic kidney disease   • Hyperlipidemia   • Prostate cancer (CMS/[facility]) [place] 3+3   • Anemia in stage 3b chronic kidney disease   • Gynecomastia   •  Carcinoma in situ of colon   • Bilateral lower extremity edema   • Weakness of both lower extremities   • Psychophysiological insomnia   • History of colon polyps   • Encounter for screening for malignant neoplasm of colon   • Lumbar stenosis with neurogenic claudication     Past Medical History:   Diagnosis Date   • Diabetes mellitus    • Positive colorectal cancer screening using Cologuard test 12/3/2020     Past Surgical History:   Procedure Laterality Date   • CYSTOSCOPY TRANSURETHRAL RESECTION OF PROSTATE     • UPPER GASTROINTESTINAL ENDOSCOPY       Family History   Problem Relation Age of Onset   • Colon cancer Neg Hx    • Colon polyps Neg Hx        Current Outpatient Medications:   •  amLODIPine (NORVASC) 10 MG tablet, Take 1 tablet by mouth Daily., Disp: 90 tablet, Rfl: 3  •  atorvastatin (LIPITOR) 20 MG tablet, TAKE ONE TABLET BY MOUTH DAILY, Disp: 90 tablet, Rfl: 3  •  HYDROcodone-acetaminophen (NORCO) 7.5-325 MG per tablet, Take 1 tablet by mouth Every 6 (Six) Hours As Needed for Moderate Pain., Disp: , Rfl:   •  HYDROmorphone (DILAUDID) 2 MG tablet, Take 1 tablet by mouth Every 6 (Six) Hours As Needed for Moderate Pain., Disp: , Rfl:   •  Insulin Glargine, 2 Unit Dial, (Toujeo Max SoloStar) 300 UNIT/ML solution pen-injector injection, Inject 70 Units under the skin into the appropriate area as directed Daily., Disp: 5 pen, Rfl: 3  •  Insulin Lispro, 1 Unit Dial, (HUMALOG) 100 UNIT/ML solution pen-injector, Inject 12 Units under the skin into the appropriate area as directed 2 (two) times a day., Disp: 3 pen, Rfl: 3  •  Insulin Pen Needle (B-D UF III MINI PEN NEEDLES) 31G X 5 MM misc, USE TWICE PER DAY WITH INSULIN INJECTION, Disp: 100 each, Rfl: 3  •  lidocaine (Lidoderm) 5 %, Place 1 patch on the skin as directed by provider Daily As Needed for Moderate Pain. Remove & Discard patch within 12 hours or as directed by MD, Disp: 30 patch, Rfl: 3  •  losartan (COZAAR) 100 MG tablet, TAKE ONE TABLET BY MOUTH  DAILY, Disp: 90 tablet, Rfl: 3  •  Melatonin 10 MG tablet, Take  by mouth., Disp: , Rfl:   •  mirtazapine (REMERON) 15 MG tablet, TAKE ONE TABLET BY MOUTH ONCE NIGHTLY, Disp: 90 tablet, Rfl: 3  •  Multiple Vitamins-Minerals (MENS MULTIVITAMIN PLUS PO), Take  by mouth., Disp: , Rfl:   •  pantoprazole (PROTONIX) 40 MG EC tablet, TAKE ONE TABLET BY MOUTH DAILY, Disp: 90 tablet, Rfl: 3  •  pioglitazone (ACTOS) 15 MG tablet, TAKE ONE TABLET BY MOUTH DAILY, Disp: 90 tablet, Rfl: 3  •  pregabalin (LYRICA) 75 MG capsule, TAKE ONE CAPSULE BY MOUTH TWICE A DAY, Disp: 180 capsule, Rfl: 2  •  promethazine (PHENERGAN) 12.5 MG tablet, TAKE 1/2 TO 1 TABLET BY MOUTH EVERY 6 HOURS AS NEEDED FOR NAUSEA, Disp: 15 tablet, Rfl: 1  •  Semaglutide,0.25 or 0.5MG/DOS, (Ozempic, 0.25 or 0.5 MG/DOSE,) 2 MG/1.5ML solution pen-injector, Inject 0.5 mg under the skin into the appropriate area as directed 1 (One) Time Per Week., Disp: 6 mL, Rfl: 3  •  tamsulosin (FLOMAX) 0.4 MG capsule 24 hr capsule, TAKE ONE CAPSULE BY MOUTH DAILY, Disp: 90 capsule, Rfl: 3  •  torsemide (DEMADEX) 20 MG tablet, , Disp: , Rfl:   •  zolpidem (AMBIEN) 5 MG tablet, TAKE 1/2 TO 1 TABLET BY MOUTH ONCE NIGHTLY, Disp: 30 tablet, Rfl: 2  •  Cholecalciferol (D3) 50 MCG (2000 UT) tablet, Take 1 tab by mouth daily for vitamin D deficiency (Patient not taking: Reported on 5/19/2023), Disp: 90 each, Rfl: 3  •  Ergocalciferol (Vitamin D2) 50 MCG (2000 UT) tablet, Take 1 tablet by mouth Daily., Disp: 90 tablet, Rfl: 3  •  furosemide (LASIX) 20 MG tablet, Take 1 tablet by mouth Daily. (Patient not taking: Reported on 5/19/2023), Disp: , Rfl:   •  glucose blood (Accu-Chek Mary Plus) test strip, USE TO TEST SUGAR FOUR TIMES A DAY (Patient not taking: Reported on 5/19/2023), Disp: 200 each, Rfl: prn  Allergies   Allergen Reactions   • Vitamin D Analogs Rash     Social History     Tobacco Use   • Smoking status: Never   • Smokeless tobacco: Never   Substance Use Topics   • Alcohol  use: Defer   • Drug use: Yes     Types: Hydrocodone          Objective   Physical Exam  Vitals:    05/19/23 0820   BP: 114/62   Pulse: 90   Temp: 96.9 °F (36.1 °C)   TempSrc: Infrared   SpO2: 98%     There is no height or weight on file to calculate BMI.    Constitutional: NAD.  Psychiatric: Normal affect. Normal thought content.  Neurologic: Negative tinnel test in the bilateral wrists.  Status post carpal tunnel release.   strength is weak in the left hand, weak in the right hand.  Sensation reduced in the fingertips.  Bicep and tricep strength intact.  Weakness in the anterior thighs 4/5 in flexion bilaterally.  Knee extension 4/5 bilaterally.  Gait is unstable, slow and shuffling with walker.     Assessment & Plan   Assessment and Plan  Very pleasant 67-year-old male with type 2 diabetes with polyneuropathy, hypertension, hyperlipidemia, prostate cancer, chronic lower back pain on opioid therapy, CKD 3, anxiety and depression, who presents with the following:    Diagnoses and all orders for this visit:    1. Lumbar stenosis with neurogenic claudication (Primary)  2. Degenerative spondylolisthesis  3. Frequent falls  4. Weakness of both lower extremities  5. Type 2 diabetes mellitus with diabetic autonomic neuropathy, with long-term current use of insulin    Is without hesitation that I recommend a powered wheelchair for management of his impaired mobility and seating.  He has not done well with a walker or a cane previously, and his symptoms are progressing. He is at risk of future falls, injury and subsequent complications if he continues to use a rolling walker for mobility.  I have completed the detailed standard written order and we will fax it to rehab medical to get this arranged.    6. Weakness of both upper extremities: EMG for initial evaluation due to history of carpal tunnel release and suspect neck osteoarthritis  -     EMG 2 Limbs; Future    Other orders: Rash with D3 we will try D2  -      Ergocalciferol (Vitamin D2) 50 MCG (2000 UT) tablet; Take 1 tablet by mouth Daily.  Dispense: 90 tablet; Refill: 3    I spent 40 minutes face-to-face with the patient, completing the order and reviewing his chart    Pollo Ward MD  Family Medicine  O: [phone]    Disclaimer: Parts of this note were dictated by speech recognition. Minor errors in transcription may be present. Please call if questions.

## 2023-05-24 DIAGNOSIS — N40.1 BPH WITH OBSTRUCTION/LOWER URINARY TRACT SYMPTOMS: ICD-10-CM

## 2023-05-24 DIAGNOSIS — N13.8 BPH WITH OBSTRUCTION/LOWER URINARY TRACT SYMPTOMS: ICD-10-CM

## 2023-05-24 DIAGNOSIS — Z79.4 TYPE 2 DIABETES MELLITUS WITH DIABETIC AUTONOMIC NEUROPATHY, WITH LONG-TERM CURRENT USE OF INSULIN: ICD-10-CM

## 2023-05-24 DIAGNOSIS — I10 ESSENTIAL HYPERTENSION: ICD-10-CM

## 2023-05-24 DIAGNOSIS — R11.0 NAUSEA: ICD-10-CM

## 2023-05-24 DIAGNOSIS — G62.9 PERIPHERAL POLYNEUROPATHY: ICD-10-CM

## 2023-05-24 DIAGNOSIS — F41.1 GENERALIZED ANXIETY DISORDER: ICD-10-CM

## 2023-05-24 DIAGNOSIS — R10.13 DYSPEPSIA: ICD-10-CM

## 2023-05-24 DIAGNOSIS — E11.43 TYPE 2 DIABETES MELLITUS WITH DIABETIC AUTONOMIC NEUROPATHY, WITH LONG-TERM CURRENT USE OF INSULIN: ICD-10-CM

## 2023-05-24 RX ORDER — AMLODIPINE BESYLATE 10 MG/1
10 TABLET ORAL DAILY
Qty: 90 TABLET | Refills: 3 | Status: SHIPPED | OUTPATIENT
Start: 2023-05-24

## 2023-05-24 RX ORDER — LOSARTAN POTASSIUM 100 MG/1
100 TABLET ORAL DAILY
Qty: 90 TABLET | Refills: 3 | Status: SHIPPED | OUTPATIENT
Start: 2023-05-24

## 2023-05-24 RX ORDER — PROMETHAZINE HYDROCHLORIDE 12.5 MG/1
TABLET ORAL
Qty: 15 TABLET | Refills: 1 | Status: SHIPPED | OUTPATIENT
Start: 2023-05-24

## 2023-05-24 RX ORDER — TAMSULOSIN HYDROCHLORIDE 0.4 MG/1
1 CAPSULE ORAL DAILY
Qty: 90 CAPSULE | Refills: 3 | Status: SHIPPED | OUTPATIENT
Start: 2023-05-24

## 2023-05-24 RX ORDER — PANTOPRAZOLE SODIUM 40 MG/1
40 TABLET, DELAYED RELEASE ORAL DAILY
Qty: 90 TABLET | Refills: 3 | Status: SHIPPED | OUTPATIENT
Start: 2023-05-24

## 2023-05-24 RX ORDER — MIRTAZAPINE 15 MG/1
TABLET, FILM COATED ORAL
Qty: 90 TABLET | Refills: 3 | Status: SHIPPED | OUTPATIENT
Start: 2023-05-24

## 2023-05-24 RX ORDER — PIOGLITAZONEHYDROCHLORIDE 15 MG/1
15 TABLET ORAL DAILY
Qty: 90 TABLET | Refills: 3 | Status: SHIPPED | OUTPATIENT
Start: 2023-05-24

## 2023-05-24 RX ORDER — PREGABALIN 75 MG/1
75 CAPSULE ORAL 2 TIMES DAILY
Qty: 180 CAPSULE | Refills: 2 | Status: SHIPPED | OUTPATIENT
Start: 2023-05-24

## 2023-05-24 RX ORDER — ATORVASTATIN CALCIUM 20 MG/1
20 TABLET, FILM COATED ORAL DAILY
Qty: 90 TABLET | Refills: 3 | Status: SHIPPED | OUTPATIENT
Start: 2023-05-24

## 2023-05-24 RX ORDER — SEMAGLUTIDE 1.34 MG/ML
0.5 INJECTION, SOLUTION SUBCUTANEOUS WEEKLY
Qty: 6 ML | Refills: 3 | Status: SHIPPED | OUTPATIENT
Start: 2023-05-24

## 2023-05-24 RX ORDER — FLURBIPROFEN SODIUM 0.3 MG/ML
SOLUTION/ DROPS OPHTHALMIC
Qty: 100 EACH | Refills: 3 | Status: SHIPPED | OUTPATIENT
Start: 2023-05-24

## 2023-05-24 NOTE — TELEPHONE ENCOUNTER
Rx Refill Note  Requested Prescriptions     Pending Prescriptions Disp Refills   • Semaglutide,0.25 or 0.5MG/DOS, (Ozempic, 0.25 or 0.5 MG/DOSE,) 2 MG/1.5ML solution pen-injector 6 mL 3     Sig: Inject 0.5 mg under the skin into the appropriate area as directed 1 (One) Time Per Week.   • atorvastatin (LIPITOR) 20 MG tablet 90 tablet 3     Sig: Take 1 tablet by mouth Daily.   • amLODIPine (NORVASC) 10 MG tablet 90 tablet 3     Sig: Take 1 tablet by mouth Daily.   • losartan (COZAAR) 100 MG tablet 90 tablet 3     Sig: Take 1 tablet by mouth Daily.   • pioglitazone (ACTOS) 15 MG tablet 90 tablet 3     Sig: Take 1 tablet by mouth Daily.   • promethazine (PHENERGAN) 12.5 MG tablet 15 tablet 1     Sig: TAKE 1/2 TO 1 TABLET BY MOUTH EVERY 6 HOURS AS NEEDED FOR NAUSEA   • mirtazapine (REMERON) 15 MG tablet 90 tablet 3     Sig: TAKE ONE TABLET BY MOUTH ONCE NIGHTLY   • tamsulosin (FLOMAX) 0.4 MG capsule 24 hr capsule 90 capsule 3     Sig: Take 1 capsule by mouth Daily.   • pantoprazole (PROTONIX) 40 MG EC tablet 90 tablet 3     Sig: Take 1 tablet by mouth Daily.   • pregabalin (LYRICA) 75 MG capsule 180 capsule 2     Sig: Take 1 capsule by mouth 2 (Two) Times a Day.   • glucose blood (Accu-Chek Guide) test strip 100 each 12     Sig: Use as instructed   • Insulin Pen Needle (B-D UF III MINI PEN NEEDLES) 31G X 5 MM misc 100 each 3     Sig: USE TWICE PER DAY WITH INSULIN INJECTION      Last office visit with prescribing clinician: 5/19/2023   Last telemedicine visit with prescribing clinician: Visit date not found   Next office visit with prescribing clinician: 9/18/2023                         Would you like a call back once the refill request has been completed: [] Yes [] No    If the office needs to give you a call back, can they leave a voicemail: [] Yes [] No    Mariana Ascencio LPN  05/24/23, 12:58 EDT

## 2023-05-30 ENCOUNTER — TELEPHONE (OUTPATIENT)
Dept: INTERNAL MEDICINE | Facility: CLINIC | Age: 68
End: 2023-05-30

## 2023-05-30 DIAGNOSIS — R11.0 NAUSEA: ICD-10-CM

## 2023-05-30 DIAGNOSIS — Z79.4 TYPE 2 DIABETES MELLITUS WITH DIABETIC AUTONOMIC NEUROPATHY, WITH LONG-TERM CURRENT USE OF INSULIN: ICD-10-CM

## 2023-05-30 DIAGNOSIS — G62.9 PERIPHERAL POLYNEUROPATHY: ICD-10-CM

## 2023-05-30 DIAGNOSIS — N13.8 BPH WITH OBSTRUCTION/LOWER URINARY TRACT SYMPTOMS: ICD-10-CM

## 2023-05-30 DIAGNOSIS — E11.43 TYPE 2 DIABETES MELLITUS WITH DIABETIC AUTONOMIC NEUROPATHY, WITH LONG-TERM CURRENT USE OF INSULIN: ICD-10-CM

## 2023-05-30 DIAGNOSIS — I10 ESSENTIAL HYPERTENSION: ICD-10-CM

## 2023-05-30 DIAGNOSIS — N40.1 BPH WITH OBSTRUCTION/LOWER URINARY TRACT SYMPTOMS: ICD-10-CM

## 2023-05-30 DIAGNOSIS — R10.13 DYSPEPSIA: ICD-10-CM

## 2023-05-30 DIAGNOSIS — F41.1 GENERALIZED ANXIETY DISORDER: ICD-10-CM

## 2023-05-30 RX ORDER — PROMETHAZINE HYDROCHLORIDE 12.5 MG/1
TABLET ORAL
Qty: 15 TABLET | Refills: 1 | Status: CANCELLED | OUTPATIENT
Start: 2023-05-30

## 2023-05-30 RX ORDER — FLURBIPROFEN SODIUM 0.3 MG/ML
SOLUTION/ DROPS OPHTHALMIC
Qty: 100 EACH | Refills: 3 | Status: CANCELLED | OUTPATIENT
Start: 2023-05-30

## 2023-05-30 RX ORDER — SEMAGLUTIDE 1.34 MG/ML
0.5 INJECTION, SOLUTION SUBCUTANEOUS WEEKLY
Qty: 6 ML | Refills: 3 | Status: CANCELLED | OUTPATIENT
Start: 2023-05-30

## 2023-05-30 RX ORDER — PREGABALIN 75 MG/1
75 CAPSULE ORAL 2 TIMES DAILY
Qty: 180 CAPSULE | Refills: 2 | Status: CANCELLED | OUTPATIENT
Start: 2023-05-30

## 2023-05-30 RX ORDER — ATORVASTATIN CALCIUM 20 MG/1
20 TABLET, FILM COATED ORAL DAILY
Qty: 90 TABLET | Refills: 3 | Status: CANCELLED | OUTPATIENT
Start: 2023-05-30

## 2023-05-30 RX ORDER — LOSARTAN POTASSIUM 100 MG/1
100 TABLET ORAL DAILY
Qty: 90 TABLET | Refills: 3 | Status: CANCELLED | OUTPATIENT
Start: 2023-05-30

## 2023-05-30 RX ORDER — TAMSULOSIN HYDROCHLORIDE 0.4 MG/1
1 CAPSULE ORAL DAILY
Qty: 90 CAPSULE | Refills: 3 | Status: CANCELLED | OUTPATIENT
Start: 2023-05-30

## 2023-05-30 RX ORDER — PIOGLITAZONEHYDROCHLORIDE 15 MG/1
15 TABLET ORAL DAILY
Qty: 90 TABLET | Refills: 3 | Status: CANCELLED | OUTPATIENT
Start: 2023-05-30

## 2023-05-30 RX ORDER — PANTOPRAZOLE SODIUM 40 MG/1
40 TABLET, DELAYED RELEASE ORAL DAILY
Qty: 90 TABLET | Refills: 3 | Status: CANCELLED | OUTPATIENT
Start: 2023-05-30

## 2023-05-30 RX ORDER — MIRTAZAPINE 15 MG/1
TABLET, FILM COATED ORAL
Qty: 90 TABLET | Refills: 3 | Status: CANCELLED | OUTPATIENT
Start: 2023-05-30

## 2023-05-30 RX ORDER — AMLODIPINE BESYLATE 10 MG/1
10 TABLET ORAL DAILY
Qty: 90 TABLET | Refills: 3 | Status: CANCELLED | OUTPATIENT
Start: 2023-05-30

## 2023-05-30 NOTE — TELEPHONE ENCOUNTER
Caller: Arden Carrasquillo    Relationship: Self    Best call back number: [phone]    Requested Prescriptions:   Requested Prescriptions     Pending Prescriptions Disp Refills   • Semaglutide,0.25 or 0.5MG/DOS, (Ozempic, 0.25 or 0.5 MG/DOSE,) 2 MG/1.5ML solution pen-injector 6 mL 3     Sig: Inject 0.5 mg under the skin into the appropriate area as directed 1 (One) Time Per Week.   • atorvastatin (LIPITOR) 20 MG tablet 90 tablet 3     Sig: Take 1 tablet by mouth Daily.   • amLODIPine (NORVASC) 10 MG tablet 90 tablet 3     Sig: Take 1 tablet by mouth Daily.   • losartan (COZAAR) 100 MG tablet 90 tablet 3     Sig: Take 1 tablet by mouth Daily.   • pioglitazone (ACTOS) 15 MG tablet 90 tablet 3     Sig: Take 1 tablet by mouth Daily.   • promethazine (PHENERGAN) 12.5 MG tablet 15 tablet 1     Sig: TAKE 1/2 TO 1 TABLET BY MOUTH EVERY 6 HOURS AS NEEDED FOR NAUSEA   • mirtazapine (REMERON) 15 MG tablet 90 tablet 3     Sig: TAKE ONE TABLET BY MOUTH ONCE NIGHTLY   • tamsulosin (FLOMAX) 0.4 MG capsule 24 hr capsule 90 capsule 3     Sig: Take 1 capsule by mouth Daily.   • pregabalin (LYRICA) 75 MG capsule 180 capsule 2     Sig: Take 1 capsule by mouth 2 (Two) Times a Day.   • glucose blood (Accu-Chek Guide) test strip 100 each 12     Sig: Use as instructed   • pantoprazole (PROTONIX) 40 MG EC tablet 90 tablet 3     Sig: Take 1 tablet by mouth Daily.   • Insulin Pen Needle (B-D UF III MINI PEN NEEDLES) 31G X 5 MM misc 100 each 3     Sig: USE TWICE PER DAY WITH INSULIN INJECTION        Pharmacy where request should be sent: [facility] - [place], [place] - [address] [facility] - [phone] [facility] [phone] FX     Last office visit with prescribing clinician: 5/19/2023   Last telemedicine visit with prescribing clinician: Visit date not found   Next office visit with prescribing clinician: 9/18/2023     Additional details provided by patient: THE PATIENT STATES THAT THE MAIL ORDER PHARMACY WILL NEED AUTHORIZATION ON  THESE REFILL REQUESTS. THE PATIENT WOULD LIKE A CALL OR E-MAIL TO CONFIRM THAT THIS WAS DONE.     Does the patient have less than a 3 day supply:  [x] Yes  [] No    Would you like a call back once the refill request has been completed: [x] Yes [] No    If the office needs to give you a call back, can they leave a voicemail: [x] Yes [] No    Chad Holden Rep   05/30/23 13:13 EDT

## 2023-06-12 DIAGNOSIS — F51.04 PSYCHOPHYSIOLOGICAL INSOMNIA: ICD-10-CM

## 2023-06-12 RX ORDER — ZOLPIDEM TARTRATE 5 MG/1
TABLET ORAL
Qty: 30 TABLET | Refills: 2 | Status: SHIPPED | OUTPATIENT
Start: 2023-07-02

## 2023-06-16 ENCOUNTER — TELEPHONE (OUTPATIENT)
Dept: NEUROLOGY | Facility: CLINIC | Age: 68
End: 2023-06-16
Payer: MEDICARE

## 2023-06-19 ENCOUNTER — TELEPHONE (OUTPATIENT)
Dept: INTERNAL MEDICINE | Facility: CLINIC | Age: 68
End: 2023-06-19
Payer: MEDICARE

## 2023-06-19 ENCOUNTER — TELEPHONE (OUTPATIENT)
Dept: INTERNAL MEDICINE | Facility: CLINIC | Age: 68
End: 2023-06-19

## 2023-06-19 NOTE — TELEPHONE ENCOUNTER
Caller: ROSALINE SANTOS    [facility] call back number: [phone]     What orders are you requesting (i.e. lab or imaging): CONTINUE PHYSICAL THERAPY 1 TIME A WEEK FOR 8 WEEKS     In what timeframe would the patient need to come in: ASAP

## 2023-06-19 NOTE — TELEPHONE ENCOUNTER
I would recommend over-the-counter Imodium as needed and keeping good fluid intake    If not improving would recommend an appointment

## 2023-06-19 NOTE — TELEPHONE ENCOUNTER
Caller: Arden Carrasquillo    Relationship: Self    Best call back number: [phone]     What medication are you requesting: SOMETHING FOR DIARRHEA     What are your current symptoms: DIARRHEA     How long have you been experiencing symptoms: SINCE 6/17/23    Have you had these symptoms before:    [x] Yes  [] No    Have you been treated for these symptoms before:   [x] Yes  [] No    If a prescription is needed, what is your preferred pharmacy and phone number: [facility] [id number] - [place], [place] - [facility]  [phone] [facility] [phone] FX     Additional notes:

## 2023-06-19 NOTE — TELEPHONE ENCOUNTER
Spoke to pt about providers message, pt states that this is what he has been doing, but that he will continue as requested and will let us know if he needs a follow up.

## 2023-06-28 ENCOUNTER — TELEPHONE (OUTPATIENT)
Dept: INTERNAL MEDICINE | Facility: CLINIC | Age: 68
End: 2023-06-28

## 2023-06-28 NOTE — TELEPHONE ENCOUNTER
Caller: Arden Carrasquillo    Relationship: Self    Best call back number:     [phone]       What is the medical concern/diagnosis: EMG      Any additional details:     PATIENT WAS TOLD THAT THE REFERRAL ON FILE HAS  AND THAT HE WOULD NEED A NEW ONE.  THEY ALSO TOLD HIM IT WOULD BE OCTOBER BEFORE THEY COULD SCHEDULE HIM IN FOR AN APPOINTMENT SO PATIENT IS WONDERING IF HE CAN BE REFERRED TO SOMEONE ELSE.      PLEASE ADVISE

## 2023-06-28 NOTE — TELEPHONE ENCOUNTER
Spoke to patient to confirm which referral. Looks as if the referral was canceled. I am unable to find see why. Will ask Dallin in the morning

## 2023-07-25 DIAGNOSIS — I10 ESSENTIAL HYPERTENSION: ICD-10-CM

## 2023-07-25 RX ORDER — AMLODIPINE BESYLATE 10 MG/1
TABLET ORAL
Qty: 90 TABLET | Refills: 3 | Status: SHIPPED | OUTPATIENT
Start: 2023-07-25

## 2023-08-04 ENCOUNTER — OFFICE VISIT (OUTPATIENT)
Dept: INTERNAL MEDICINE | Facility: CLINIC | Age: 68
End: 2023-08-04
Payer: MEDICARE

## 2023-08-04 VITALS
SYSTOLIC BLOOD PRESSURE: 128 MMHG | RESPIRATION RATE: 12 BRPM | HEART RATE: 82 BPM | BODY MASS INDEX: 30.37 KG/M2 | WEIGHT: 189 LBS | HEIGHT: 66 IN | OXYGEN SATURATION: 97 % | DIASTOLIC BLOOD PRESSURE: 70 MMHG | TEMPERATURE: 96 F

## 2023-08-04 DIAGNOSIS — M48.062 LUMBAR STENOSIS WITH NEUROGENIC CLAUDICATION: ICD-10-CM

## 2023-08-04 DIAGNOSIS — R29.898 WEAKNESS OF BOTH LOWER EXTREMITIES: ICD-10-CM

## 2023-08-04 DIAGNOSIS — Z12.5 SCREENING FOR MALIGNANT NEOPLASM OF PROSTATE: ICD-10-CM

## 2023-08-04 DIAGNOSIS — N18.31 STAGE 3A CHRONIC KIDNEY DISEASE: ICD-10-CM

## 2023-08-04 DIAGNOSIS — I89.0 LYMPHEDEMA: ICD-10-CM

## 2023-08-04 DIAGNOSIS — E78.5 HYPERLIPIDEMIA, UNSPECIFIED HYPERLIPIDEMIA TYPE: ICD-10-CM

## 2023-08-04 DIAGNOSIS — Z23 NEED FOR VACCINATION FOR PNEUMOCOCCUS: ICD-10-CM

## 2023-08-04 DIAGNOSIS — E11.43 TYPE 2 DIABETES MELLITUS WITH DIABETIC AUTONOMIC NEUROPATHY, WITH LONG-TERM CURRENT USE OF INSULIN: Primary | ICD-10-CM

## 2023-08-04 DIAGNOSIS — R29.898 WEAKNESS OF LEFT ARM: ICD-10-CM

## 2023-08-04 DIAGNOSIS — R60.0 BILATERAL LOWER EXTREMITY EDEMA: ICD-10-CM

## 2023-08-04 DIAGNOSIS — M25.512 CHRONIC LEFT SHOULDER PAIN: ICD-10-CM

## 2023-08-04 DIAGNOSIS — Z79.4 TYPE 2 DIABETES MELLITUS WITH DIABETIC AUTONOMIC NEUROPATHY, WITH LONG-TERM CURRENT USE OF INSULIN: Primary | ICD-10-CM

## 2023-08-04 DIAGNOSIS — G89.29 CHRONIC LEFT SHOULDER PAIN: ICD-10-CM

## 2023-08-05 LAB
25(OH)D3+25(OH)D2 SERPL-MCNC: 24 NG/ML (ref 30–100)
ALBUMIN SERPL-MCNC: 4 G/DL (ref 3.9–4.9)
ALBUMIN/GLOB SERPL: 1.1 {RATIO} (ref 1.2–2.2)
ALP SERPL-CCNC: 94 IU/L (ref 44–121)
ALT SERPL-CCNC: 12 IU/L (ref 0–44)
AST SERPL-CCNC: 14 IU/L (ref 0–40)
BASOPHILS # BLD AUTO: 0 X10E3/UL (ref 0–0.2)
BASOPHILS NFR BLD AUTO: 0 %
BILIRUB SERPL-MCNC: 0.5 MG/DL (ref 0–1.2)
BUN SERPL-MCNC: 28 MG/DL (ref 8–27)
BUN/CREAT SERPL: 16 (ref 10–24)
CALCIUM SERPL-MCNC: 9.3 MG/DL (ref 8.6–10.2)
CHLORIDE SERPL-SCNC: 102 MMOL/L (ref 96–106)
CHOLEST SERPL-MCNC: 116 MG/DL (ref 100–199)
CO2 SERPL-SCNC: 26 MMOL/L (ref 20–29)
CREAT SERPL-MCNC: 1.76 MG/DL (ref 0.76–1.27)
EGFRCR SERPLBLD CKD-EPI 2021: 41 ML/MIN/1.73
EOSINOPHIL # BLD AUTO: 0.1 X10E3/UL (ref 0–0.4)
EOSINOPHIL NFR BLD AUTO: 2 %
ERYTHROCYTE [DISTWIDTH] IN BLOOD BY AUTOMATED COUNT: 14.1 % (ref 11.6–15.4)
FT4I SERPL CALC-MCNC: 1.9 (ref 1.2–4.9)
GLOBULIN SER CALC-MCNC: 3.5 G/DL (ref 1.5–4.5)
GLUCOSE SERPL-MCNC: 121 MG/DL (ref 70–99)
HBA1C MFR BLD: 6.5 % (ref 4.8–5.6)
HCT VFR BLD AUTO: 32.1 % (ref 37.5–51)
HDLC SERPL-MCNC: 43 MG/DL
HGB BLD-MCNC: 10.3 G/DL (ref 13–17.7)
IMM GRANULOCYTES # BLD AUTO: 0 X10E3/UL (ref 0–0.1)
IMM GRANULOCYTES NFR BLD AUTO: 0 %
LDLC SERPL CALC-MCNC: 52 MG/DL (ref 0–99)
LYMPHOCYTES # BLD AUTO: 1.1 X10E3/UL (ref 0.7–3.1)
LYMPHOCYTES NFR BLD AUTO: 18 %
MCH RBC QN AUTO: 26.6 PG (ref 26.6–33)
MCHC RBC AUTO-ENTMCNC: 32.1 G/DL (ref 31.5–35.7)
MCV RBC AUTO: 83 FL (ref 79–97)
MONOCYTES # BLD AUTO: 0.5 X10E3/UL (ref 0.1–0.9)
MONOCYTES NFR BLD AUTO: 9 %
NEUTROPHILS # BLD AUTO: 4 X10E3/UL (ref 1.4–7)
NEUTROPHILS NFR BLD AUTO: 71 %
PLATELET # BLD AUTO: 193 X10E3/UL (ref 150–450)
POTASSIUM SERPL-SCNC: 3.9 MMOL/L (ref 3.5–5.2)
PROT SERPL-MCNC: 7.5 G/DL (ref 6–8.5)
PSA SERPL-MCNC: 6.1 NG/ML (ref 0–4)
RBC # BLD AUTO: 3.87 X10E6/UL (ref 4.14–5.8)
SODIUM SERPL-SCNC: 141 MMOL/L (ref 134–144)
T3RU NFR SERPL: 30 % (ref 24–39)
T4 SERPL-MCNC: 6.3 UG/DL (ref 4.5–12)
TRIGL SERPL-MCNC: 115 MG/DL (ref 0–149)
TSH SERPL DL<=0.005 MIU/L-ACNC: 1.92 UIU/ML (ref 0.45–4.5)
UNABLE TO VOID: NORMAL
VIT B12 SERPL-MCNC: 378 PG/ML (ref 232–1245)
VLDLC SERPL CALC-MCNC: 21 MG/DL (ref 5–40)
WBC # BLD AUTO: 5.7 X10E3/UL (ref 3.4–10.8)

## 2023-08-09 ENCOUNTER — TELEPHONE (OUTPATIENT)
Dept: INTERNAL MEDICINE | Facility: CLINIC | Age: 68
End: 2023-08-09
Payer: MEDICARE

## 2023-08-09 NOTE — TELEPHONE ENCOUNTER
Caller: Arden Carrasquillo    Relationship to patient: Self    Best call back number: [phone]     Patient is needing: PATIENT HAS NOT HEARD FROM ANYONE FOR THE HOME HEALTH FOR WOUND CARE, WOULD LIKE TO HAVE A CALL BACK TO FIND OUT WHO TO REACH OUT TO.

## 2023-08-14 DIAGNOSIS — Z79.4 TYPE 2 DIABETES MELLITUS WITH DIABETIC AUTONOMIC NEUROPATHY, WITH LONG-TERM CURRENT USE OF INSULIN: ICD-10-CM

## 2023-08-14 DIAGNOSIS — E11.43 TYPE 2 DIABETES MELLITUS WITH DIABETIC AUTONOMIC NEUROPATHY, WITH LONG-TERM CURRENT USE OF INSULIN: ICD-10-CM

## 2023-08-14 RX ORDER — PIOGLITAZONEHYDROCHLORIDE 15 MG/1
TABLET ORAL
Qty: 90 TABLET | Refills: 3 | Status: SHIPPED | OUTPATIENT
Start: 2023-08-14

## 2023-08-14 NOTE — TELEPHONE ENCOUNTER
Rx Refill Note  Requested Prescriptions     Pending Prescriptions Disp Refills    pioglitazone (ACTOS) 15 MG tablet [Pharmacy Med Name: PIOGLITAZONE HCL 15 MG TABLET] 90 tablet 3     Sig: TAKE ONE TABLET BY MOUTH DAILY      Last office visit with prescribing clinician: 8/4/2023   Last telemedicine visit with prescribing clinician: Visit date not found   Next office visit with prescribing clinician: 2/5/2024

## 2023-08-15 NOTE — TELEPHONE ENCOUNTER
Spoke to pt to advise that it has been faxed over to the number given. Pt understanding and has no questions.

## 2023-08-15 NOTE — TELEPHONE ENCOUNTER
Caller: Arden Carrasquillo    Relationship: Self    Best call back number: [phone]     What was the call regarding: PATIENT STATES THAT THE HOME HEALTH HAS NOT RECEIVED ANY ORDERS AND HE WOULD LIKE TO KNOW IF SOMEONE IS ABLE TO FAX THE ORDERS OVER TO THEM.    THE FAX NUMBER [phone]    PLEASE ADVISE

## 2023-08-18 ENCOUNTER — TELEPHONE (OUTPATIENT)
Dept: INTERNAL MEDICINE | Facility: CLINIC | Age: 68
End: 2023-08-18
Payer: MEDICARE

## 2023-08-18 NOTE — TELEPHONE ENCOUNTER
Discussed with Ras   Without any help at home he is a poor candidate for compression therapy as he will not be able to take it off the following day  Velcro wraps may be a good option, home health a little unsure on how to order these but will get back to me after discussing with clinical manager

## 2023-08-18 NOTE — TELEPHONE ENCOUNTER
Ras Siu called for verbal orders for patient for Skilled Nursing, PT/OT and Social work.  Provided verbal ok.    She is also asking for directions on what kind of LE wrap patient needs.  Please advise and as to how often.    CB is [phone] and ok to leave verbal orders on voice mail.

## 2023-08-22 ENCOUNTER — TELEPHONE (OUTPATIENT)
Dept: INTERNAL MEDICINE | Facility: CLINIC | Age: 68
End: 2023-08-22

## 2023-08-22 NOTE — TELEPHONE ENCOUNTER
Caller: KRISTIAN    Relationship to patient: [employer]    Best call back number: [phone]    Patient is needing: KRISTIAN FROM [employer] CALLING TO UPDATE THAT HE DID AN EVALUATION ON THE PATIENT TODAY 8/22/23 AND THEY WILL NOT BE PICKING PATIENT UP FOR HOME HEALTH.HE STATED THAT OT WOULD BE HELPING PATIENT FOR NOW.

## 2023-08-23 ENCOUNTER — TELEPHONE (OUTPATIENT)
Dept: INTERNAL MEDICINE | Facility: CLINIC | Age: 68
End: 2023-08-23
Payer: MEDICARE

## 2023-08-23 ENCOUNTER — TELEPHONE (OUTPATIENT)
Dept: INTERNAL MEDICINE | Facility: CLINIC | Age: 68
End: 2023-08-23

## 2023-08-23 NOTE — TELEPHONE ENCOUNTER
Spoke to Joaquim with [facility] to give verbal orders. He is understanding and has no questions at this time.

## 2023-08-23 NOTE — TELEPHONE ENCOUNTER
Caller: MATHEW DORSEY    Relationship: [employer]    Best call back number: [phone]     What orders are you requesting (i.e. lab or imaging): SOCIAL WORK    Where will you receive your lab/imaging services: IN HOME    Additional notes: CALLER REQUESTED VERBAL APPROVAL FOR ONE ADDITIONAL VISIT

## 2023-08-23 NOTE — TELEPHONE ENCOUNTER
Caller: TAO MCKEON    Relationship: [facility]    Best call back number: [phone]       What orders are you requesting (i.e. lab or imaging): VERBAL ORDERS      Additional notes:       TO CONTINUE TO DO OPT.2X A WEEK FOR THREE WEEKS   THEN 1 TIME A WEEK FOR 5 WEEKS    PLEASE CALL AND ADVISE ON VERBAL ORDER REQUEST

## 2023-09-07 ENCOUNTER — TELEPHONE (OUTPATIENT)
Dept: INTERNAL MEDICINE | Facility: CLINIC | Age: 68
End: 2023-09-07
Payer: MEDICARE

## 2023-09-07 NOTE — TELEPHONE ENCOUNTER
Caller: BRIAN    Relationship:     Best call back number:[phone]     What was the call regarding: [employer] IS REQUESTING ADDITIONAL SOCIAL  WORK VISIT FOR COMMUNITY [employer]OURCES    PLEASE CALL AND ADVISE

## 2023-09-07 NOTE — TELEPHONE ENCOUNTER
Sima with [employer] calling to report pt fall. Pt was getting up from recliner last night and only having socks on (not shoes) and fell on both knees.    Pt has bruising a small tear and swelling on right knee and a small tear on pt right toe

## 2023-09-18 ENCOUNTER — TELEPHONE (OUTPATIENT)
Dept: INTERNAL MEDICINE | Facility: CLINIC | Age: 68
End: 2023-09-18

## 2023-09-18 NOTE — TELEPHONE ENCOUNTER
Caller: CIELO    Relationship:     Best call back number: [phone]     What orders are you requesting (i.e. lab or imaging):      In what timeframe would the patient need to come in:      Where will you receive your lab/imaging services:      Additional notes: [facility]BAL [facility]DERS FOR WOUND CARE FOR RIGHT GREAT TOE.  GOING TO BE XERFOAM ONCE DAILY.  PATIENT IS ASKING ABOUT DEXCOM OR ILEANA GLUCOSE MONITOR ORDER CAN BE SENT TO THE PHARMACY.

## 2023-10-05 ENCOUNTER — TELEPHONE (OUTPATIENT)
Dept: INTERNAL MEDICINE | Facility: CLINIC | Age: 68
End: 2023-10-05
Payer: MEDICARE

## 2023-10-05 DIAGNOSIS — E11.43 TYPE 2 DIABETES MELLITUS WITH DIABETIC AUTONOMIC NEUROPATHY, WITH LONG-TERM CURRENT USE OF INSULIN: Primary | ICD-10-CM

## 2023-10-05 DIAGNOSIS — Z79.4 TYPE 2 DIABETES MELLITUS WITH DIABETIC AUTONOMIC NEUROPATHY, WITH LONG-TERM CURRENT USE OF INSULIN: Primary | ICD-10-CM

## 2023-10-05 NOTE — TELEPHONE ENCOUNTER
Caller: [facility]    Relationship: HOME HEALTH     Best call back number: [phone]    What medication are you requesting: DEXCOM LEOLA OR FREESTYLE     How long have you been experiencing symptoms: ONGOING    Have you had these symptoms before:    [x] Yes  [] No    Have you been treated for these symptoms before:   [x] Yes  [] No    If a prescription is needed, what is your preferred pharmacy and phone number:    [facility] [id number] - [place], [place] - [address]  [phone] [facility] [phone] FX

## 2023-10-09 ENCOUNTER — TELEPHONE (OUTPATIENT)
Dept: INTERNAL MEDICINE | Facility: CLINIC | Age: 68
End: 2023-10-09

## 2023-10-09 NOTE — TELEPHONE ENCOUNTER
Caller: [facility]    Best call back number: [phone]     What form or medical record are you requesting: MEDICATION LIST    Who is requesting this form or medical record from you: ANNITA    How would you like to receive the form or medical records (pick-up, mail, fax): FAX  If fax, what is the fax number: [phone]    Timeframe paperwork needed: ASAP

## 2023-10-16 ENCOUNTER — LAB (OUTPATIENT)
Dept: LAB | Facility: HOSPITAL | Age: 68
End: 2023-10-16
Payer: MEDICARE

## 2023-10-16 NOTE — TELEPHONE ENCOUNTER
LORIE CALLED BACK STATED PATIENT IS TAKING THE FOLLOWING OVER THE COUNTER MEDICATIONS.  PANTOPRAZOLE 40 MGS DAILY  DOCUSATE SODIUM PRN  TYLENOL 325 PRN  VITAMIN D 2000 IU DAILY    LORIE STATED THAT IF THE PATIENT DOES NOT NEED TO TAKE THE ABOVE TO REACH OUT TO PATIENT TO LET HIM KNOW TO STOP.

## 2023-10-16 NOTE — TELEPHONE ENCOUNTER
Manager aware, will reach out to patient to see if he is aware of this company requesting records. We have no records release on file for this company or in regards to this pt and this company. Will update chart as soon as able.

## 2023-10-17 DIAGNOSIS — F51.04 PSYCHOPHYSIOLOGICAL INSOMNIA: ICD-10-CM

## 2023-10-17 RX ORDER — ZOLPIDEM TARTRATE 5 MG/1
TABLET ORAL
Qty: 30 TABLET | Refills: 2 | Status: SHIPPED | OUTPATIENT
Start: 2023-10-17

## 2023-10-17 NOTE — TELEPHONE ENCOUNTER
Rx Refill Note  Requested Prescriptions     Pending Prescriptions Disp Refills    zolpidem (AMBIEN) 5 MG tablet 30 tablet 2     Sig: TAKE 1/2 TO 1 TABLET BY MOUTH ONCE NIGHTLY      Last office visit with prescribing clinician: 8/4/2023   Last telemedicine visit with prescribing clinician: Visit date not found   Next office visit with prescribing clinician: 2/5/2024

## 2023-10-18 ENCOUNTER — HOSPITAL ENCOUNTER (OUTPATIENT)
Dept: INFUSION THERAPY | Facility: HOSPITAL | Age: 68
Discharge: HOME | End: 2023-10-18
Admitting: PSYCHIATRY & NEUROLOGY
Payer: MEDICARE

## 2023-10-18 DIAGNOSIS — R29.898 WEAKNESS OF BOTH UPPER EXTREMITIES: ICD-10-CM

## 2023-10-18 PROCEDURE — 95911 NRV CNDJ TEST 9-10 STUDIES: CPT

## 2023-10-18 PROCEDURE — 95886 MUSC TEST DONE W/N TEST COMP: CPT

## 2023-10-19 ENCOUNTER — TELEPHONE (OUTPATIENT)
Dept: INTERNAL MEDICINE | Facility: CLINIC | Age: 68
End: 2023-10-19

## 2023-10-19 PROBLEM — G62.9 PERIPHERAL POLYNEUROPATHY: Status: ACTIVE | Noted: 2023-10-19

## 2023-10-19 NOTE — TELEPHONE ENCOUNTER
Caller: Arden Carrasquillo    Relationship: Self    Best call back number: [phone]     What test was performed: EMG    When was the test performed: 10/18/23    Where was the test performed: [facility]    Additional notes: PLEASE CALL PATIENT TO REVIEW TEST RESULTS

## 2023-10-20 NOTE — PROGRESS NOTES
Very severe peripheral neuropathy, with complex study per Dr. Aguirre with potential for involvement at the nerves in your neck as well.    Are you currently seeing a neurologist? If not, we need to arrange a consultation as soon as possible

## 2023-10-20 NOTE — TELEPHONE ENCOUNTER
THE PATIENT IS CHECKING BACK IN ON THE RESULTS OF THIS EMG.    WHEN ADDRESSING THE RESULTS, PLEASE FAX THEM TO THE PATIENT'S PAIN MANAGEMENT DOCTOR IN [place].  DR. ROLDAN VAZQUEZ  [phone]    HE HAS AN APPOINTMENT WITH DR. VAZQUEZ ON 10/24/2023

## 2023-10-23 NOTE — TELEPHONE ENCOUNTER
Spoke to Arden Carrasquillo. Aware and Acknowledged. Confirmed still taking meds.   Debridement Text: The wound edges were debrided prior to proceeding with the closure to facilitate wound healing.

## 2023-10-26 ENCOUNTER — TELEPHONE (OUTPATIENT)
Dept: INTERNAL MEDICINE | Facility: CLINIC | Age: 68
End: 2023-10-26
Payer: MEDICARE

## 2023-10-26 DIAGNOSIS — R29.898 WEAKNESS OF LEFT ARM: ICD-10-CM

## 2023-10-26 DIAGNOSIS — M50.30 DEGENERATIVE DISC DISEASE, CERVICAL: ICD-10-CM

## 2023-10-26 DIAGNOSIS — R29.898 WEAKNESS OF BOTH LOWER EXTREMITIES: ICD-10-CM

## 2023-10-26 DIAGNOSIS — M48.062 LUMBAR STENOSIS WITH NEUROGENIC CLAUDICATION: ICD-10-CM

## 2023-10-26 DIAGNOSIS — G62.9 PERIPHERAL POLYNEUROPATHY: ICD-10-CM

## 2023-10-26 DIAGNOSIS — E11.43 TYPE 2 DIABETES MELLITUS WITH DIABETIC AUTONOMIC NEUROPATHY, WITH LONG-TERM CURRENT USE OF INSULIN: Primary | ICD-10-CM

## 2023-10-26 DIAGNOSIS — Z79.4 TYPE 2 DIABETES MELLITUS WITH DIABETIC AUTONOMIC NEUROPATHY, WITH LONG-TERM CURRENT USE OF INSULIN: Primary | ICD-10-CM

## 2023-10-26 NOTE — TELEPHONE ENCOUNTER
Caller: Arden Carrasquillo    Relationship: Self    Best call back number: [phone]     What is the medical concern/diagnosis: SEVERE NEUROPATHY    What specialty or service is being requested: NERVE SPECIALIST    What is the provider, practice or medical service name: DR. APARICIO RECOMMENDATION    Any additional details: PLEASE CALL PATIENT WITH ANY UPDATES INCLUDING NAME OF REFERRED PRACTICE/PROVIDER

## 2023-11-01 ENCOUNTER — TELEPHONE (OUTPATIENT)
Dept: INTERNAL MEDICINE | Facility: CLINIC | Age: 68
End: 2023-11-01

## 2023-11-01 NOTE — TELEPHONE ENCOUNTER
Caller: Arden Carrasquillo    Relationship: Self    Best call back number: [phone]    What was the call regarding: PATIENT STATED HIS MAIL BOX IS TOO HIGH FOR HIM TO REACH, AND HAD ASKED HIS FACILITY TO HAVE THIS MOVED.    THE FACILITY ADVISED HIM HE WOULD NEED TO CONTACT THE POST OFFICE, AND THEY ADVISED HIM THAT HE CAN FILL OUT A FORM TO HAVE HIS MAIL DROPPED OFF TO HIS DOOR.    HE WOULD LIKE TO KNOW IF DR APARICIO WOULD FILL THIS OUT.    PLEASE CALL.

## 2023-11-02 NOTE — TELEPHONE ENCOUNTER
"Pt does not have the form at this time. I was able to find an application for \"Exception To Current/Proposed   Delivery Mode Due To Physical Hardship,\" but it will also require a physician's statement attached to it.    Please submit letter/physician statement; form has been filled out as far as I am able, and is in your inbox.  "

## 2023-11-14 DIAGNOSIS — F51.04 PSYCHOPHYSIOLOGICAL INSOMNIA: ICD-10-CM

## 2023-11-14 RX ORDER — ZOLPIDEM TARTRATE 5 MG/1
TABLET ORAL
Qty: 30 TABLET | Refills: 2 | Status: SHIPPED | OUTPATIENT
Start: 2023-11-14

## 2023-11-14 NOTE — TELEPHONE ENCOUNTER
Rx Refill Note  Requested Prescriptions     Pending Prescriptions Disp Refills    zolpidem (AMBIEN) 5 MG tablet [Pharmacy Med Name: ZOLPIDEM TAB 5MG] 30 tablet 2     Sig: TAKE 1/2 TO 1 TABLET ONCE  NIGHTLY      Last office visit with prescribing clinician: 8/4/2023   Last telemedicine visit with prescribing clinician: Visit date not found   Next office visit with prescribing clinician: 2/5/2024                         Would you like a call back once the refill request has been completed: [] Yes [] No    If the office needs to give you a call back, can they leave a voicemail: [] Yes [] No    Sarah Florez MA  11/14/23, 14:25 EST

## 2023-11-21 DIAGNOSIS — Z79.4 TYPE 2 DIABETES MELLITUS WITH DIABETIC AUTONOMIC NEUROPATHY, WITH LONG-TERM CURRENT USE OF INSULIN: ICD-10-CM

## 2023-11-21 DIAGNOSIS — E11.43 TYPE 2 DIABETES MELLITUS WITH DIABETIC AUTONOMIC NEUROPATHY, WITH LONG-TERM CURRENT USE OF INSULIN: ICD-10-CM

## 2023-11-21 RX ORDER — PEN NEEDLE, DIABETIC 31 GX3/16"
NEEDLE, DISPOSABLE MISCELLANEOUS
Qty: 100 EACH | Refills: 3 | Status: SHIPPED | OUTPATIENT
Start: 2023-11-21

## 2023-11-21 NOTE — TELEPHONE ENCOUNTER
Rx Refill Note  Requested Prescriptions     Pending Prescriptions Disp Refills    [employer] Pen [place] 31G X 5 MM misc [Pharmacy Med Name: KRO PEN NEEDLE 5MM X 31G] 100 each 3     Sig: USE ONE NEW NEEDLE TWO TIMES A DAY WITH INSULIN INJECTION      Last office visit with prescribing clinician: 8/4/2023   Last telemedicine visit with prescribing clinician: Visit date not found   Next office visit with prescribing clinician: 2/5/2024                         Would you like a call back once the refill request has been completed: [] Yes [] No    If the office needs to give you a call back, can they leave a voicemail: [] Yes [] No    Sarah Florez MA  11/21/23, 08:11 EST

## 2023-11-28 ENCOUNTER — TELEPHONE (OUTPATIENT)
Dept: INTERNAL MEDICINE | Facility: CLINIC | Age: 68
End: 2023-11-28

## 2023-11-28 NOTE — TELEPHONE ENCOUNTER
Caller: Arden Carrasquillo    Relationship: Self    Best call back number: [phone]     What is the medical concern/diagnosis: MOBILITY ISSUES, PATIENT HAS HAD A FEW FALLS RECENTLY     What specialty or service is being requested: HOME HEALTH PT & OT    What is the provider, practice or medical service name: [facility] THE INSURANCE [facility]     Any additional details: PATIENT IS HAVING MOBILITY CONCERNS. PATIENT STATES HE FELL IN THE BATHROOM ON SUNDAY 11.26.23 AND IS NEEDING HOME HEALTH. PATIENT IS REQUESTING A REFERRAL FOR PHYSICAL THERAPY & OCCUPATIONAL THERAPY AT HOME.

## 2023-12-07 DIAGNOSIS — R10.13 DYSPEPSIA: ICD-10-CM

## 2023-12-07 RX ORDER — PANTOPRAZOLE SODIUM 40 MG/1
40 TABLET, DELAYED RELEASE ORAL DAILY
Qty: 90 TABLET | Refills: 0 | Status: SHIPPED | OUTPATIENT
Start: 2023-12-07 | End: 2023-12-07 | Stop reason: SDUPTHER

## 2023-12-07 RX ORDER — PANTOPRAZOLE SODIUM 40 MG/1
40 TABLET, DELAYED RELEASE ORAL DAILY
Qty: 90 TABLET | Refills: 3 | Status: SHIPPED | OUTPATIENT
Start: 2023-12-07

## 2023-12-07 NOTE — TELEPHONE ENCOUNTER
PATIENT IS COMPLETELY OUT OF MEDICATION     Caller: [facility] - [place], [place] - [address] - [phone] [facility] [phone] FX    Relationship: Pharmacy    Requested Prescriptions:   Requested Prescriptions     Pending Prescriptions Disp Refills    pantoprazole (PROTONIX) 40 MG EC tablet 90 tablet 3     Sig: Take 1 tablet by mouth Daily.        Pharmacy where request should be sent: [facility] [id number] - [place], [place] - [address] [facility] [phone] [facility] [phone] FX     Last office visit with prescribing clinician: 8/4/2023   Last telemedicine visit with prescribing clinician: Visit date not found   Next office visit with prescribing clinician: 2/5/2024     Additional details provided by patient: CALLING IN FOR PATIENT, HE IS COMPLETELY OUT OF MEDICATION. WOULD LIKE A SUPPLY SENT TO [facility] AND ANY ADDITIONAL REFILLS TO BE SENT TO [facility].     Does the patient have less than a 3 day supply:  [x] Yes  [] No    Would you like a call back once the refill request has been completed: [] Yes [] No    If the office needs to give you a call back, can they leave a voicemail: [] Yes [] No    Chad Olivares Rep   12/07/23 15:45 EST

## 2023-12-11 ENCOUNTER — TELEPHONE (OUTPATIENT)
Dept: INTERNAL MEDICINE | Facility: CLINIC | Age: 68
End: 2023-12-11
Payer: MEDICARE

## 2023-12-11 ENCOUNTER — PATIENT MESSAGE (OUTPATIENT)
Dept: INTERNAL MEDICINE | Facility: CLINIC | Age: 68
End: 2023-12-11
Payer: MEDICARE

## 2023-12-11 DIAGNOSIS — G62.9 PERIPHERAL POLYNEUROPATHY: ICD-10-CM

## 2023-12-11 DIAGNOSIS — G89.29 CHRONIC LEFT SHOULDER PAIN: ICD-10-CM

## 2023-12-11 DIAGNOSIS — M48.062 LUMBAR STENOSIS WITH NEUROGENIC CLAUDICATION: Primary | ICD-10-CM

## 2023-12-11 DIAGNOSIS — M25.512 CHRONIC LEFT SHOULDER PAIN: ICD-10-CM

## 2023-12-11 DIAGNOSIS — B02.9 HERPES ZOSTER WITHOUT COMPLICATION: Primary | ICD-10-CM

## 2023-12-11 DIAGNOSIS — R29.898 WEAKNESS OF BOTH LOWER EXTREMITIES: ICD-10-CM

## 2023-12-11 DIAGNOSIS — R29.6 FREQUENT FALLS: ICD-10-CM

## 2023-12-11 DIAGNOSIS — R11.0 NAUSEA: ICD-10-CM

## 2023-12-11 DIAGNOSIS — R29.898 WEAKNESS OF LEFT ARM: ICD-10-CM

## 2023-12-11 RX ORDER — PROMETHAZINE HYDROCHLORIDE 12.5 MG/1
TABLET ORAL
Qty: 15 TABLET | Refills: 1 | Status: SHIPPED | OUTPATIENT
Start: 2023-12-11

## 2023-12-11 NOTE — TELEPHONE ENCOUNTER
Rx Refill Note  Requested Prescriptions     Pending Prescriptions Disp Refills    promethazine (PHENERGAN) 12.5 MG tablet 15 tablet 1     Sig: TAKE 1/2 TO 1 TABLET BY MOUTH EVERY 6 HOURS AS NEEDED FOR NAUSEA      Last office visit with prescribing clinician: 8/4/2023   Last telemedicine visit with prescribing clinician: Visit date not found   Next office visit with prescribing clinician: 2/5/2024

## 2023-12-12 RX ORDER — VALACYCLOVIR HYDROCHLORIDE 1 G/1
1000 TABLET, FILM COATED ORAL 3 TIMES DAILY
Qty: 21 TABLET | Refills: 0 | Status: SHIPPED | OUTPATIENT
Start: 2023-12-12 | End: 2023-12-19

## 2023-12-12 NOTE — TELEPHONE ENCOUNTER
From: Arden Carrasquillo  To: Pollo Ward  Sent: 12/11/2023 9:29 PM EST  Subject: Rash    ,  I have a rash on my back. I think it may be shingles or psoriasis. I don't know but it hurts when something rubs against it. Please advise. My daughter took a picture of it and I attached a copy.

## 2023-12-15 ENCOUNTER — TELEPHONE (OUTPATIENT)
Dept: INTERNAL MEDICINE | Facility: CLINIC | Age: 68
End: 2023-12-15

## 2023-12-15 NOTE — TELEPHONE ENCOUNTER
.    Caller: Carina Tan     Relationship: SISTER IS ON  VERBAL  Best call back number:     [phone]        What is your medical concern?     CALLED TO LET YOU KNOW THAT MR. SALAZAR FELL THIS MORNING AND IS AT THE [facility].  HE CAN NOT STAND ON HIS LEFT LEG AND THEY ARE MOST LIKELY GOING TO PUT IN ORDERS FOR A CT SCAN.

## 2024-03-13 ENCOUNTER — READMISSION MANAGEMENT (OUTPATIENT)
Dept: CALL CENTER | Facility: HOSPITAL | Age: 69
End: 2024-03-13
Payer: MEDICARE

## 2024-03-13 NOTE — OUTREACH NOTE
Prep Survey      Flowsheet Row Responses   [facility] facility patient discharged from? Non-BH   Is LACE score < 7 ? Non-BH Discharge   Eligibility [facility]   Date of Discharge 03/13/24   Discharge Disposition Home or Self Care   Discharge diagnosis Encounter for surgical aftercare following surgery on the nervous system   Does the patient have one of the following disease processes/diagnoses(primary or secondary)? General Surgery   Prep survey completed? Yes            Sivan GORDON - Registered Nurse

## 2024-03-14 ENCOUNTER — TRANSITIONAL CARE MANAGEMENT TELEPHONE ENCOUNTER (OUTPATIENT)
Dept: CALL CENTER | Facility: HOSPITAL | Age: 69
End: 2024-03-14
Payer: MEDICARE

## 2024-03-14 NOTE — OUTREACH NOTE
Call Center TCM Note      Flowsheet Row Responses   [facility] patient discharged from? Non-   Does the patient have one of the following disease processes/diagnoses(primary or secondary)? Other   TCM attempt successful? Yes   Call start time 1158   Call end time 1208   List who call center can speak with pt   Meds reviewed with patient/caregiver? Yes   Does the patient have all medications ordered at discharge? Yes   Is the patient taking all medications as directed (includes completed medication regime)? Yes   Comments Hospital f/u scheduled for 3- @ 3:15 pm.   Does the patient have an appointment with their PCP within 7-14 days of discharge? Yes   Has home health visited the patient within 72 hours of discharge? N/A   Psychosocial issues? No   Did the patient receive a copy of their discharge instructions? Yes   Nursing interventions Reviewed instructions with patient   What is the patient's perception of their health status since discharge? Improving   Is the patient/caregiver able to teach back signs and symptoms related to disease process for when to call PCP? Yes   Is the patient/caregiver able to teach back signs and symptoms related to disease process for when to call 911? Yes   Is the patient/caregiver able to teach back the hierarchy of who to call/visit for symptoms/problems? PCP, Specialist, Home health nurse, Urgent Care, ED, 911 Yes   TCM call completed? Yes   Wrap up additional comments Pt reports home from rehab. Pt does reports sore to the buttocks, sister is dressing that wound. Pt concerned about this wound. Pt states HH, PT and OT is all ordered. Pt waiting on call for visit. Pt to see pcp on 3-.   Call end time 1208   Would this patient benefit from a Referral to Amb Social Work? No   Is the patient interested in additional calls from an ambulatory ? No            Greer Rodriguez, RN    3/14/2024, 12:09 EDT

## 2024-03-15 ENCOUNTER — TELEPHONE (OUTPATIENT)
Dept: INTERNAL MEDICINE | Facility: CLINIC | Age: 69
End: 2024-03-15

## 2024-03-15 ENCOUNTER — TELEPHONE (OUTPATIENT)
Dept: INTERNAL MEDICINE | Facility: CLINIC | Age: 69
End: 2024-03-15
Payer: MEDICARE

## 2024-03-15 DIAGNOSIS — G95.9 CERVICAL MYELOPATHY: ICD-10-CM

## 2024-03-15 DIAGNOSIS — L97.909 VENOUS STASIS ULCER, UNSPECIFIED SITE, UNSPECIFIED ULCER STAGE, UNSPECIFIED WHETHER VARICOSE VEINS PRESENT: ICD-10-CM

## 2024-03-15 DIAGNOSIS — I89.0 LYMPHEDEMA: ICD-10-CM

## 2024-03-15 DIAGNOSIS — M48.062 LUMBAR STENOSIS WITH NEUROGENIC CLAUDICATION: Primary | ICD-10-CM

## 2024-03-15 DIAGNOSIS — I83.009 VENOUS STASIS ULCER, UNSPECIFIED SITE, UNSPECIFIED ULCER STAGE, UNSPECIFIED WHETHER VARICOSE VEINS PRESENT: ICD-10-CM

## 2024-03-15 DIAGNOSIS — R60.0 BILATERAL LOWER EXTREMITY EDEMA: ICD-10-CM

## 2024-03-15 NOTE — TELEPHONE ENCOUNTER
Caller: Arden Carrasquillo    Relationship: Self    Best call back number: [phone]     What was the call regarding:   REFERRALS  IN HOME WOUND CARE  IN HOME NURSING ASSISTANT   IN HOME NURSING

## 2024-03-15 NOTE — TELEPHONE ENCOUNTER
I s/w Ruddy with [facility] and gave him verbal orders for PT and skilled nursing evaluation, per agreement with Dr. Ward that I can authorize verbal orders with home health, and have Dr. Ward sign paper authorizations.    Ruddy agreed, and took verbal orders for PT and skilled nursing evaluation.

## 2024-03-15 NOTE — TELEPHONE ENCOUNTER
Caller: DANIELLE-KRISTIAN    Relationship:     Best call back number: [phone]     What is the best time to reach you: ANYTIME    Who are you requesting to speak with (clinical staff, provider,  specific staff member):     Do you know the name of the person who called: KRISTIAN    What was the call regarding: DANIELLE IS WANTING VERBAL ORDERS FOR PHYSICAL THERAPY 2 TIMES A WEEK FOR 4 WEEKS AND 1 TIME A WEEK FOR 4 WEEKS.  ALSO A SKILLED NURSING EVALUATION. PATIENT HAS BLISTER THAT HAS POPPED ON GROIN. ALSO SACRAL WOUND.

## 2024-03-18 ENCOUNTER — OFFICE VISIT (OUTPATIENT)
Dept: INTERNAL MEDICINE | Facility: CLINIC | Age: 69
End: 2024-03-18
Payer: MEDICARE

## 2024-03-18 VITALS
TEMPERATURE: 97.7 F | WEIGHT: 183 LBS | SYSTOLIC BLOOD PRESSURE: 142 MMHG | BODY MASS INDEX: 29.41 KG/M2 | HEART RATE: 84 BPM | HEIGHT: 66 IN | DIASTOLIC BLOOD PRESSURE: 68 MMHG

## 2024-03-18 DIAGNOSIS — R15.9 URINARY AND FECAL INCONTINENCE: ICD-10-CM

## 2024-03-18 DIAGNOSIS — Z98.1 S/P CERVICAL SPINAL FUSION: ICD-10-CM

## 2024-03-18 DIAGNOSIS — L98.421 SKIN ULCER OF SACRUM, LIMITED TO BREAKDOWN OF SKIN: ICD-10-CM

## 2024-03-18 DIAGNOSIS — J18.9 COMMUNITY ACQUIRED PNEUMONIA OF RIGHT LOWER LOBE OF LUNG: Primary | ICD-10-CM

## 2024-03-18 DIAGNOSIS — E11.43 TYPE 2 DIABETES MELLITUS WITH DIABETIC AUTONOMIC NEUROPATHY, WITH LONG-TERM CURRENT USE OF INSULIN: ICD-10-CM

## 2024-03-18 DIAGNOSIS — G95.20 CERVICAL CORD COMPRESSION WITH MYELOPATHY: ICD-10-CM

## 2024-03-18 DIAGNOSIS — Z79.4 TYPE 2 DIABETES MELLITUS WITH DIABETIC AUTONOMIC NEUROPATHY, WITH LONG-TERM CURRENT USE OF INSULIN: ICD-10-CM

## 2024-03-18 DIAGNOSIS — R32 URINARY AND FECAL INCONTINENCE: ICD-10-CM

## 2024-03-18 DIAGNOSIS — I71.23 ANEURYSM OF DESCENDING THORACIC AORTA WITHOUT RUPTURE: ICD-10-CM

## 2024-03-18 DIAGNOSIS — R60.0 BILATERAL LOWER EXTREMITY EDEMA: ICD-10-CM

## 2024-03-18 PROBLEM — G62.9 PERIPHERAL POLYNEUROPATHY: Status: RESOLVED | Noted: 2023-10-19 | Resolved: 2024-03-18

## 2024-03-18 PROBLEM — Z12.11 ENCOUNTER FOR SCREENING FOR MALIGNANT NEOPLASM OF COLON: Status: RESOLVED | Noted: 2023-02-08 | Resolved: 2024-03-18

## 2024-03-18 PROCEDURE — 3078F DIAST BP <80 MM HG: CPT | Performed by: FAMILY MEDICINE

## 2024-03-18 PROCEDURE — 3077F SYST BP >= 140 MM HG: CPT | Performed by: FAMILY MEDICINE

## 2024-03-18 PROCEDURE — 1159F MED LIST DOCD IN RCRD: CPT | Performed by: FAMILY MEDICINE

## 2024-03-18 PROCEDURE — 1160F RVW MEDS BY RX/DR IN RCRD: CPT | Performed by: FAMILY MEDICINE

## 2024-03-18 PROCEDURE — 99214 OFFICE O/P EST MOD 30 MIN: CPT | Performed by: FAMILY MEDICINE

## 2024-03-18 RX ORDER — LEVOFLOXACIN 750 MG/1
750 TABLET, FILM COATED ORAL DAILY
Qty: 7 TABLET | Refills: 0 | Status: SHIPPED | OUTPATIENT
Start: 2024-03-18 | End: 2024-03-25

## 2024-03-18 RX ORDER — TORSEMIDE 20 MG/1
20 TABLET ORAL DAILY PRN
Status: SHIPPED | COMMUNITY
Start: 2024-03-18

## 2024-03-18 NOTE — PROGRESS NOTES
Date of Encounter: 2024  Patient: Arden Carrasquillo,  1955    Subjective   History of Presenting Illness  Chief complaint: Hospital discharge follow-up    Patient was admitted to [facility] 12/15/23-23 after a fall due to worsening extremity weakness, found to have cervical myelopathy with spinal cord compression among other problems.  Briefly, he also had an NEEL, UTI, and incidental descending thoracic aortic aneurysm 3.7 cm, among other adjustments to his chronic medical regimen.  He ultimately underwent posterior decompression and fusion he was discharged to subacute rehab at [facility].    He was recently discharged from [facility], we do not have any discharge summary or medication list available, they accidentally left it at home before coming into the office today.    He reports improvement in his weakness but still present, can walk a few 100 feet with assistance from a walker, can also transfer from his bed to the bathroom with a walker without difficulty.  He is able to feed himself.  Otherwise still fairly wheelchair-bound. No recent falls.  His painful neuropathy has also improved significantly although he still uses Lyrica as needed.  Home health recently arranged for ongoing physical, occupational therapy needs, as well as for a sacral decubitus ulcer for which family is currently doing daily dressing changes with calcium alginate dressing.  Since his surgery he has also had fecal urgency and urinary urgency with incontinence, uses adult briefs routinely.    He has been taken completely off of his insulin and recent fasting glucose 150-180s.      Lower extremity edema has resolved completely with improved mobility and he maintains on torsemide although this makes it difficult to make it to the bathroom regularly.    The following portions of the patient's history were reviewed and updated as appropriate: allergies, current medications, past family history, past medical  history, past social history, past surgical history and problem list.    Patient Active Problem List   Diagnosis    Type 2 diabetes mellitus with diabetic autonomic neuropathy, with long-term current use of insulin    Degenerative spondylolisthesis    Hypertension    Generalized anxiety disorder    Stage 3a chronic kidney disease    Hyperlipidemia    Prostate cancer    Anemia in stage 3b chronic kidney disease    Gynecomastia    Carcinoma in situ of colon    Bilateral lower extremity edema    Weakness of both lower extremities    Psychophysiological insomnia    History of colon polyps    Lumbar stenosis with neurogenic claudication    S/P cervical spinal fusion    Cervical cord compression with myelopathy    Aneurysm of descending thoracic aorta without rupture     Past Medical History:   Diagnosis Date    Diabetes mellitus     Positive colorectal cancer screening using Cologuard test 12/03/2020    Severe peripheral polyneuropathy 10/19/2023     Past Surgical History:   Procedure Laterality Date    CYSTOSCOPY TRANSURETHRAL RESECTION OF PROSTATE      UPPER GASTROINTESTINAL ENDOSCOPY       Family History   Problem Relation Age of Onset    Colon cancer Neg Hx     Colon polyps Neg Hx        Current Outpatient Medications:     torsemide (DEMADEX) 20 MG tablet, Take 1 tablet by mouth Daily As Needed (edema)., Disp: , Rfl:     amLODIPine (NORVASC) 10 MG tablet, TAKE ONE TABLET BY MOUTH DAILY, Disp: 90 tablet, Rfl: 3    atorvastatin (LIPITOR) 20 MG tablet, TAKE ONE TABLET BY MOUTH DAILY, Disp: 90 tablet, Rfl: 3    Continuous Blood Gluc Sensor (FreeStyle Johnathon 2 Sensor) misc, Use 1 Device Every 14 (Fourteen) Days., Disp: 6 each, Rfl: 3    Ergocalciferol (Vitamin D2) 50 MCG (2000 UT) tablet, Take 1 tablet by mouth Daily., Disp: 90 tablet, Rfl: 3    glucose blood (Accu-Chek Guide) test strip, Use as instructed, Disp: 100 each, Rfl: 12    HYDROcodone-acetaminophen (NORCO) 7.5-325 MG per tablet, Take 1 tablet by mouth Every 6  (Six) Hours As Needed for Moderate Pain., Disp: , Rfl:     [employer] Pen Needles 31G X 5 MM misc, USE ONE NEW NEEDLE TWO TIMES A DAY WITH INSULIN INJECTION, Disp: 100 each, Rfl: 3    levoFLOXacin (Levaquin) 750 MG tablet, Take 1 tablet by mouth Daily for 7 days., Disp: 7 tablet, Rfl: 0    lidocaine (Lidoderm) 5 %, Place 1 patch on the skin as directed by provider Daily As Needed for Moderate Pain. Remove & Discard patch within 12 hours or as directed by MD, Disp: 30 patch, Rfl: 3    losartan (COZAAR) 100 MG tablet, TAKE ONE TABLET BY MOUTH DAILY, Disp: 90 tablet, Rfl: 3    Melatonin 10 MG tablet, Take  by mouth., Disp: , Rfl:     mirtazapine (REMERON) 15 MG tablet, TAKE ONE TABLET BY MOUTH ONCE NIGHTLY, Disp: 90 tablet, Rfl: 3    Multiple Vitamins-Minerals (MENS MULTIVITAMIN PLUS PO), Take  by mouth., Disp: , Rfl:     pantoprazole (PROTONIX) 40 MG EC tablet, Take 1 tablet by mouth Daily., Disp: 90 tablet, Rfl: 3    pioglitazone (ACTOS) 15 MG tablet, TAKE ONE TABLET BY MOUTH DAILY, Disp: 90 tablet, Rfl: 3    promethazine (PHENERGAN) 12.5 MG tablet, TAKE 1/2 TO 1 TABLET BY MOUTH EVERY 6 HOURS AS NEEDED FOR NAUSEA, Disp: 15 tablet, Rfl: 1    Semaglutide,0.25 or 0.5MG/DOS, (Ozempic, 0.25 or 0.5 MG/DOSE,) 2 MG/1.5ML solution pen-injector, Inject 0.5 mg under the skin into the appropriate area as directed 1 (One) Time Per Week., Disp: 6 mL, Rfl: 3    tamsulosin (FLOMAX) 0.4 MG capsule 24 hr capsule, Take 1 capsule by mouth Daily., Disp: 90 capsule, Rfl: 3    zolpidem (AMBIEN) 5 MG tablet, TAKE 1/2 TO 1 TABLET ONCE  NIGHTLY, Disp: 30 tablet, Rfl: 2  Allergies   Allergen Reactions    Vitamin D Analogs Rash     Social History     Tobacco Use    Smoking status: Never    Smokeless tobacco: Never   Substance Use Topics    Alcohol use: Defer    Drug use: Yes     Types: Hydrocodone          Objective   Physical Exam  Vitals:    03/18/24 1528   BP: 142/68   BP Location: Left arm   Patient Position: Sitting   Cuff Size: Adult   Pulse:  "84   Temp: 97.7 °F (36.5 °C)   TempSrc: Infrared   Weight: 83 kg (183 lb)   Height: 167.6 cm (65.98\")     Body mass index is 29.55 kg/m².    Constitutional: NAD.  Cardiovascular: RRR. No appreciable murmurs. No LE edema b/l. Radial pulses 1+ bilaterally.  Pulmonary: There are crackles in the right lower lung field not present on the left lower lung field.  Effort is poor.  Crackles do not clear with coughing.  Integumentary: No rashes or wounds on face or upper extremities.  Lymphatic: No anterior cervical lymphadenopathy.  Endocrine: No thyromegaly or palpable thyroid nodules.  Psychiatric: Normal affect. Normal thought content.  Neurologic: His baseline strength has improved in upper extremities and lower extremities but still generally 4/5 throughout.      Assessment & Plan   Assessment and Plan  Very pleasant 68-year-old male with type 2 diabetes with polyneuropathy, hypertension, thoracic aortic aneurysm, hyperlipidemia, prostate cancer, cervical cord compression with myelopathy status post fusion, chronic lower back pain on opioid therapy, CKD 3, chronic lower extremity edema, anxiety and depression, who presents with the following:     Diagnoses and all orders for this visit:    1. Community acquired pneumonia of right lower lobe of lung (Primary): Marked abnormality in his lung examination, he did have influenza A 1/11/2024, family members have noticed that he is breathing a little bit more difficulty recently.  He denies fever and chills.  This has not been present on his lung exam or interval chest x-ray including 12/21/2023, so I would like to treat for occult pneumonia with levofloxacin.  Discussed reasons to return for further evaluation.  -     levoFLOXacin (Levaquin) 750 MG tablet; Take 1 tablet by mouth Daily for 7 days.  Dispense: 7 tablet; Refill: 0    2. S/P cervical spinal fusion: Improvement in strength, will need to continue home physical and occupational therapy, he is still very much " wheelchair-bound.  3. Cervical cord compression with myelopathy    4. Skin ulcer of sacrum, limited to breakdown of skin: Will get nursing for wound care, also discussed appropriate dressing changes, will get mattress Topper for home bed and send an order for alginate dressing.  -     Miscellaneous DME  -     Miscellaneous DME    5. Urinary and fecal incontinence: Recommend Metamucil for increased fiber to improve fecal continence, will send in DME order for adult briefs  -     Miscellaneous DME    6. Aneurysm of descending thoracic aorta without rupture: Needs to be imaged in 1 year to assess for any growth and then can space imaging afterwards.    7. Bilateral lower extremity edema: Ok to reduce torsemide to 10 mg daily scheduled or even 20 mg daily as needed based on lower extremity edema which is not present today.    We will request his discharge summary from [facility], review labs through the facility, reconcile his medications and determine follow-up including any needed labs once we have completed this.    Pollo Ward MD  Family Medicine  O: [phone]    Disclaimer: Parts of this note were dictated by speech recognition. Minor errors in transcription may be present. Please call if questions.

## 2024-03-19 ENCOUNTER — TELEPHONE (OUTPATIENT)
Dept: INTERNAL MEDICINE | Facility: CLINIC | Age: 69
End: 2024-03-19
Payer: MEDICARE

## 2024-03-19 NOTE — TELEPHONE ENCOUNTER
Caller: [facility]    Relationship: Other    Best call back number: [phone]     Who are you requesting to speak with (clinical staff, provider,  specific staff member): CLINICAL STAFF     What was the call regarding: PATIENT PRIMARY INSURANCE IS LISTED AS [employer] AND THEY DO NOT ACCEPT THAT SO THEY ARE UNABLE TO TREAT PATIENT AND HAS TO REFUSE THE REFERRAL

## 2024-03-25 ENCOUNTER — TELEPHONE (OUTPATIENT)
Dept: INTERNAL MEDICINE | Facility: CLINIC | Age: 69
End: 2024-03-25
Payer: MEDICARE

## 2024-03-25 RX ORDER — PREGABALIN 100 MG/1
100 CAPSULE ORAL 3 TIMES DAILY PRN
Start: 2024-03-25

## 2024-03-25 RX ORDER — PIOGLITAZONEHYDROCHLORIDE 30 MG/1
30 TABLET ORAL DAILY
Qty: 90 TABLET | Refills: 3 | Status: SHIPPED | OUTPATIENT
Start: 2024-03-25

## 2024-03-25 RX ORDER — FERROUS SULFATE 325(65) MG
325 TABLET ORAL
COMMUNITY

## 2024-03-25 NOTE — TELEPHONE ENCOUNTER
Call to have pt schedule follow up appointment in 3 months    Can we also do a phone med reconciliation? The main thing is he was on losartan, amlodipine, and atorvastatin, but seems like these were held during rehab. Is he still taking these or not?

## 2024-03-26 RX ORDER — AMLODIPINE BESYLATE 10 MG/1
10 TABLET ORAL DAILY
COMMUNITY

## 2024-03-26 RX ORDER — LOSARTAN POTASSIUM 100 MG/1
100 TABLET ORAL DAILY
COMMUNITY

## 2024-03-26 NOTE — TELEPHONE ENCOUNTER
Spoke to pt about medications, pt confirms these meds were held with rehab, but that when he returned home he resumed them. Pt reports he is currently taking all three.    Pt has been scheduled at the end of May for f/u.    In regards to the orders that were submitted to [facility], pt states that they contacted him and advised that they do not have kind of foam pad that would cover his bed with it being queen sized. He states they only have this option for a hospital bed. I asked him if this is something that he was interested in, he states that he feels that is not a good decision at this time.     He states that everything was denied by insurance, I advised him that there is not much we may be able to do but that I would notify provider.

## 2024-04-03 ENCOUNTER — TELEPHONE (OUTPATIENT)
Dept: INTERNAL MEDICINE | Facility: CLINIC | Age: 69
End: 2024-04-03
Payer: MEDICARE

## 2024-04-03 NOTE — TELEPHONE ENCOUNTER
Caller: Arden Carrasquillo    Relationship: Self    Best call back number: [phone]     What medication are you requesting: CYCLOBENZAPRINE 5MG  3 TIME A DAY    What are your current symptoms: MUSCLE SPASMS     If a prescription is needed, what is your preferred pharmacy and phone number: [facility] [id number] - [place], [place] - [address] - [phone] [facility] [phone] FX     Additional notes: PATIENT STATES THAT DR. APARICIO PRESCRIBED THIS MEDICATION TO HIM MEDICATION IS NOT ON MED LIST

## 2024-04-04 NOTE — TELEPHONE ENCOUNTER
Will send to provider, he is on vacation and responding to messages sporadically, as soon as we receive an update, the pt will be notified.     HUB TO RELAY

## 2024-04-08 NOTE — TELEPHONE ENCOUNTER
Name: Arden Carrasquillo    Relationship: Self    Best Callback Number:     [phone]        HUB PROVIDED THE RELAY MESSAGE FROM THE OFFICE   PATIENT HAS FURTHER QUESTIONS AND WOULD LIKE A CALL BACK AT THE FOLLOWING PHONE NUMBER [phone]     ADDITIONAL INFORMATION:  PATIENT ALSO WANTED TO LET  KNOW THAT THE WATER PILL HE WAS PUT ON IS WORKING, BUT PATIENT THINKS IT IS WORKING TO MUCH.     PATIENT SAID JUST AS SOON AS HE CAN TO PLEASE CALL IN CYCLOBENZAPRINE 5MG

## 2024-04-15 ENCOUNTER — TELEPHONE (OUTPATIENT)
Dept: INTERNAL MEDICINE | Facility: CLINIC | Age: 69
End: 2024-04-15
Payer: MEDICARE

## 2024-04-15 NOTE — TELEPHONE ENCOUNTER
Patient calling today stating that his blood sugar upon waking this morning was 500.     Patient states that his blood sugar has progressively gone up the past few days and pt provided readings from 4/10 at 164 and 4/11 at 217. Pt states that he currently has no insulin in his possession and is calling for medication.    Pt was advised d/t glucose being so high that he will need to go to the ER as they will need to give him medications via IV and not by mouth.     Pt understanding and has no questions at this time. States will go to ER.

## 2024-04-15 NOTE — TELEPHONE ENCOUNTER
Pt daughter, Breonna calling to state that she took pts blood sugar after pt took his medication for the morning. She states that his glucose is now 149 after medication, and that she is concerned because he did not tell her that we directed him to the ER.     Pt did not go to ER, but is feeling better now. When responding, please call Breonna.    Please advise,

## 2024-04-23 DIAGNOSIS — R10.13 DYSPEPSIA: ICD-10-CM

## 2024-04-23 DIAGNOSIS — N40.1 BPH WITH OBSTRUCTION/LOWER URINARY TRACT SYMPTOMS: ICD-10-CM

## 2024-04-23 DIAGNOSIS — E11.43 TYPE 2 DIABETES MELLITUS WITH DIABETIC AUTONOMIC NEUROPATHY, WITH LONG-TERM CURRENT USE OF INSULIN: ICD-10-CM

## 2024-04-23 DIAGNOSIS — F41.1 GENERALIZED ANXIETY DISORDER: ICD-10-CM

## 2024-04-23 DIAGNOSIS — N13.8 BPH WITH OBSTRUCTION/LOWER URINARY TRACT SYMPTOMS: ICD-10-CM

## 2024-04-23 DIAGNOSIS — G95.20 CERVICAL CORD COMPRESSION WITH MYELOPATHY: ICD-10-CM

## 2024-04-23 DIAGNOSIS — Z79.4 TYPE 2 DIABETES MELLITUS WITH DIABETIC AUTONOMIC NEUROPATHY, WITH LONG-TERM CURRENT USE OF INSULIN: ICD-10-CM

## 2024-04-23 RX ORDER — ATORVASTATIN CALCIUM 20 MG/1
20 TABLET, FILM COATED ORAL DAILY
Qty: 90 TABLET | Refills: 3 | Status: SHIPPED | OUTPATIENT
Start: 2024-04-23

## 2024-04-23 RX ORDER — CHOLECALCIFEROL (VITAMIN D3) 125 MCG
1 TABLET ORAL DAILY
Qty: 90 TABLET | Refills: 3 | Status: SHIPPED | OUTPATIENT
Start: 2024-04-23 | End: 2024-04-28 | Stop reason: SDUPTHER

## 2024-04-23 RX ORDER — TORSEMIDE 20 MG/1
20 TABLET ORAL DAILY PRN
Qty: 90 TABLET | Refills: 3 | Status: SHIPPED | OUTPATIENT
Start: 2024-04-23 | End: 2024-04-24 | Stop reason: SDUPTHER

## 2024-04-23 RX ORDER — SEMAGLUTIDE 1.34 MG/ML
0.5 INJECTION, SOLUTION SUBCUTANEOUS WEEKLY
Qty: 6 ML | Refills: 3 | Status: SHIPPED | OUTPATIENT
Start: 2024-04-23

## 2024-04-23 RX ORDER — PIOGLITAZONEHYDROCHLORIDE 30 MG/1
30 TABLET ORAL DAILY
Qty: 90 TABLET | Refills: 3 | Status: SHIPPED | OUTPATIENT
Start: 2024-04-23

## 2024-04-23 RX ORDER — LOSARTAN POTASSIUM 100 MG/1
100 TABLET ORAL DAILY
Qty: 90 TABLET | Refills: 3 | Status: SHIPPED | OUTPATIENT
Start: 2024-04-23

## 2024-04-23 RX ORDER — PREGABALIN 100 MG/1
100 CAPSULE ORAL 3 TIMES DAILY PRN
Start: 2024-04-23

## 2024-04-23 RX ORDER — MIRTAZAPINE 15 MG/1
TABLET, FILM COATED ORAL
Qty: 90 TABLET | Refills: 3 | Status: SHIPPED | OUTPATIENT
Start: 2024-04-23

## 2024-04-23 RX ORDER — TAMSULOSIN HYDROCHLORIDE 0.4 MG/1
1 CAPSULE ORAL DAILY
Qty: 90 CAPSULE | Refills: 3 | Status: SHIPPED | OUTPATIENT
Start: 2024-04-23

## 2024-04-23 RX ORDER — PANTOPRAZOLE SODIUM 40 MG/1
40 TABLET, DELAYED RELEASE ORAL DAILY
Qty: 90 TABLET | Refills: 3 | Status: SHIPPED | OUTPATIENT
Start: 2024-04-23 | End: 2024-04-28 | Stop reason: SDUPTHER

## 2024-04-23 RX ORDER — AMLODIPINE BESYLATE 10 MG/1
10 TABLET ORAL DAILY
Qty: 90 TABLET | Refills: 3 | Status: SHIPPED | OUTPATIENT
Start: 2024-04-23

## 2024-04-23 NOTE — TELEPHONE ENCOUNTER
Caller: Foreign Arden M    Relationship: Self    Best call back number: [phone]    Requested Prescriptions:   Requested Prescriptions     Pending Prescriptions Disp Refills    Ergocalciferol (Vitamin D2) 50 MCG (2000 UT) tablet 90 tablet 3     Sig: Take 1 tablet by mouth Daily.    atorvastatin (LIPITOR) 20 MG tablet 90 tablet 3     Sig: Take 1 tablet by mouth Daily.    amLODIPine (NORVASC) 10 MG tablet       Sig: Take 1 tablet by mouth Daily.    pioglitazone (ACTOS) 30 MG tablet 90 tablet 3     Sig: Take 1 tablet by mouth Daily.    pantoprazole (PROTONIX) 40 MG EC tablet 90 tablet 3     Sig: Take 1 tablet by mouth Daily.    losartan (COZAAR) 100 MG tablet       Sig: Take 1 tablet by mouth Daily.    mirtazapine (REMERON) 15 MG tablet 90 tablet 3     Sig: TAKE ONE TABLET BY MOUTH ONCE NIGHTLY    Semaglutide,0.25 or 0.5MG/DOS, (Ozempic, 0.25 or 0.5 MG/DOSE,) 2 MG/1.5ML solution pen-injector 6 mL 3     Sig: Inject 0.5 mg under the skin into the appropriate area as directed 1 (One) Time Per Week.    tamsulosin (FLOMAX) 0.4 MG capsule 24 hr capsule 90 capsule 3     Sig: Take 1 capsule by mouth Daily.    pregabalin (LYRICA) 100 MG capsule       Sig: Take 1 capsule by mouth 3 (Three) Times a Day As Needed (neuropathy).    torsemide (DEMADEX) 20 MG tablet       Sig: Take 1 tablet by mouth Daily As Needed (edema).        Pharmacy where request should be sent: [facility] [id number] - [place] [id number]  [phone] [facility] [phone] FX     Last office visit with prescribing clinician: 3/18/2024   Last telemedicine visit with prescribing clinician: Visit date not found   Next office visit with prescribing clinician: 5/30/2024     Does the patient have less than a 3 day supply:  [x] Yes  [] No    Would you like a call back once the refill request has been completed: [x] Yes [] No    If the office needs to give you a call back, can they leave a voicemail: [x] Yes []  No    Chacha Lott, RegSched Rep   04/23/24 12:16 EDT

## 2024-04-24 ENCOUNTER — TELEPHONE (OUTPATIENT)
Dept: INTERNAL MEDICINE | Facility: CLINIC | Age: 69
End: 2024-04-24
Payer: MEDICARE

## 2024-04-24 RX ORDER — TORSEMIDE 20 MG/1
20 TABLET ORAL 2 TIMES DAILY
Qty: 90 TABLET | Refills: 3 | Status: SHIPPED | OUTPATIENT
Start: 2024-04-24 | End: 2024-04-25

## 2024-04-24 NOTE — TELEPHONE ENCOUNTER
Increase to 1 tablet twice daily  Give that a week  If not helping or developing cramps let us know

## 2024-04-24 NOTE — TELEPHONE ENCOUNTER
Caller: Arden Carrasquillo    Relationship: Self    Best call back number: [phone]     What was the call regarding: PATIENT STATES THE WATER PILL HE IS CURRENTLY TAKING ISN'T HELPING WITH THE WATER IN HIS LEGS. HE WOULD LIKE TO KNOW IF THERE IS ANOTHER MEDICATION HE COULD TAKE OR WHAT DOES DR APARICIO RECOMMEND HIM TO DO

## 2024-04-25 RX ORDER — TORSEMIDE 20 MG/1
40 TABLET ORAL 2 TIMES DAILY
Qty: 90 TABLET | Refills: 3 | Status: SHIPPED | OUTPATIENT
Start: 2024-04-25

## 2024-04-26 DIAGNOSIS — R10.13 DYSPEPSIA: ICD-10-CM

## 2024-04-26 NOTE — TELEPHONE ENCOUNTER
Rx Refill Note  Requested Prescriptions     Pending Prescriptions Disp Refills    ferrous sulfate 325 (65 FE) MG tablet       Sig: Take 1 tablet by mouth Daily With Breakfast.      Last office visit with prescribing clinician: 3/18/2024   Last telemedicine visit with prescribing clinician: Visit date not found   Next office visit with prescribing clinician: 5/30/2024       Mariana Ascencio LPN  04/26/24, 17:27 EDT

## 2024-04-26 NOTE — TELEPHONE ENCOUNTER
caller: Arden Carrasquillo    Relationship: Self    Best call back number: [phone]     What medication are you requesting:   Cholecalciferol (VITAMIN D3 GUMMIES PO)   VITAMIN C 500MG  OMEPRAZOLE 20MG  ferrous sulfate 325 (65 FE) MG tablet   CIZETLATACONE 15MG (?)    What are your current symptoms: REQUESTED REFILl    If a prescription is needed, what is your preferred pharmacy and phone number: [facility] [id number] - [place], [place] - [facility] AT [facility] [address] - [phone] [facility] [phone] FX

## 2024-04-26 NOTE — TELEPHONE ENCOUNTER
Then let's do two tablets twice daily  I'll update chart  He needs to be seen in about 1-2 weeks to assess benefit and check kidney function

## 2024-04-28 RX ORDER — ASCORBIC ACID 500 MG
500 TABLET ORAL DAILY
Qty: 90 TABLET | Refills: 3 | Status: SHIPPED | OUTPATIENT
Start: 2024-04-28

## 2024-04-28 RX ORDER — FERROUS SULFATE 325(65) MG
325 TABLET ORAL
Qty: 90 TABLET | Refills: 3 | Status: SHIPPED | OUTPATIENT
Start: 2024-04-28

## 2024-04-28 RX ORDER — CHOLECALCIFEROL (VITAMIN D3) 125 MCG
1 TABLET ORAL DAILY
Qty: 90 TABLET | Refills: 3 | Status: SHIPPED | OUTPATIENT
Start: 2024-04-28

## 2024-04-28 RX ORDER — PANTOPRAZOLE SODIUM 40 MG/1
40 TABLET, DELAYED RELEASE ORAL DAILY
Qty: 90 TABLET | Refills: 3 | Status: SHIPPED | OUTPATIENT
Start: 2024-04-28

## 2024-04-29 ENCOUNTER — TELEPHONE (OUTPATIENT)
Dept: INTERNAL MEDICINE | Facility: CLINIC | Age: 69
End: 2024-04-29
Payer: MEDICARE

## 2024-04-29 NOTE — TELEPHONE ENCOUNTER
Caller: Arden Carrasquillo    Relationship: Self    Best call back number: [phone]     What is the best time to reach you: ANYTIME    Who are you requesting to speak with (clinical staff, provider,  specific staff member): CLINICAL    Do you know the name of the person who called: JENSEN TOMLIN    What was the call regarding: PATIENT RETURNING CALL ABOUT MEDICATION     Is it okay if the provider responds through MyChart: NO

## 2024-05-10 ENCOUNTER — OFFICE VISIT (OUTPATIENT)
Dept: INTERNAL MEDICINE | Facility: CLINIC | Age: 69
End: 2024-05-10
Payer: MEDICARE

## 2024-05-10 ENCOUNTER — TELEPHONE (OUTPATIENT)
Dept: INTERNAL MEDICINE | Facility: CLINIC | Age: 69
End: 2024-05-10

## 2024-05-10 VITALS
TEMPERATURE: 97.5 F | HEART RATE: 60 BPM | SYSTOLIC BLOOD PRESSURE: 128 MMHG | DIASTOLIC BLOOD PRESSURE: 82 MMHG | OXYGEN SATURATION: 95 %

## 2024-05-10 DIAGNOSIS — M25.512 CHRONIC LEFT SHOULDER PAIN: ICD-10-CM

## 2024-05-10 DIAGNOSIS — R60.0 BILATERAL LOWER EXTREMITY EDEMA: ICD-10-CM

## 2024-05-10 DIAGNOSIS — Z79.4 TYPE 2 DIABETES MELLITUS WITH DIABETIC AUTONOMIC NEUROPATHY, WITH LONG-TERM CURRENT USE OF INSULIN: ICD-10-CM

## 2024-05-10 DIAGNOSIS — E11.43 TYPE 2 DIABETES MELLITUS WITH DIABETIC AUTONOMIC NEUROPATHY, WITH LONG-TERM CURRENT USE OF INSULIN: ICD-10-CM

## 2024-05-10 DIAGNOSIS — G89.29 CHRONIC LEFT SHOULDER PAIN: ICD-10-CM

## 2024-05-10 DIAGNOSIS — N18.31 STAGE 3A CHRONIC KIDNEY DISEASE: Primary | ICD-10-CM

## 2024-05-10 DIAGNOSIS — L60.2 ONYCHOGRYPHOSIS: ICD-10-CM

## 2024-05-10 LAB
EXPIRATION DATE: ABNORMAL
HBA1C MFR BLD: 7.3 % (ref 4.5–5.7)
Lab: ABNORMAL

## 2024-05-10 PROCEDURE — 3051F HG A1C>EQUAL 7.0%<8.0%: CPT | Performed by: FAMILY MEDICINE

## 2024-05-10 PROCEDURE — 1125F AMNT PAIN NOTED PAIN PRSNT: CPT | Performed by: FAMILY MEDICINE

## 2024-05-10 PROCEDURE — 3079F DIAST BP 80-89 MM HG: CPT | Performed by: FAMILY MEDICINE

## 2024-05-10 PROCEDURE — 83036 HEMOGLOBIN GLYCOSYLATED A1C: CPT | Performed by: FAMILY MEDICINE

## 2024-05-10 PROCEDURE — 3074F SYST BP LT 130 MM HG: CPT | Performed by: FAMILY MEDICINE

## 2024-05-10 PROCEDURE — 99214 OFFICE O/P EST MOD 30 MIN: CPT | Performed by: FAMILY MEDICINE

## 2024-05-10 RX ORDER — ZOLPIDEM TARTRATE 5 MG/1
5 TABLET ORAL NIGHTLY PRN
COMMUNITY
End: 2024-05-13

## 2024-05-10 NOTE — TELEPHONE ENCOUNTER
Caller: OTHER    Relationship: Other    Best call back number: [phone]    What orders are you requesting (i.e. lab or imaging): OCCUPATIONAL THERAPY     In what timeframe would the patient need to come in: N/A     Where will you receive your lab/imaging services: HOME HEALTH     Additional notes: TAO WITH [facility] CALLED AND NEEDS A VERBAL ORDER FOR OCCUPATIONAL THERAPY FOR 1X WEEK FOR 4 ADDITIONAL WEEKS

## 2024-05-11 DIAGNOSIS — G47.00 INSOMNIA, UNSPECIFIED TYPE: Primary | ICD-10-CM

## 2024-05-11 LAB
ALBUMIN/CREAT UR: 124 MG/G CREAT (ref 0–29)
APPEARANCE UR: ABNORMAL
BACTERIA #/AREA URNS HPF: ABNORMAL /[HPF]
BILIRUB UR QL STRIP: NEGATIVE
CASTS URNS QL MICRO: ABNORMAL /LPF
COLOR UR: YELLOW
CREAT UR-MCNC: 38.9 MG/DL
EPI CELLS #/AREA URNS HPF: ABNORMAL /HPF (ref 0–10)
GLUCOSE UR QL STRIP: NEGATIVE
HGB UR QL STRIP: NEGATIVE
KETONES UR QL STRIP: NEGATIVE
LEUKOCYTE ESTERASE UR QL STRIP: ABNORMAL
MICRO URNS: ABNORMAL
MICROALBUMIN UR-MCNC: 48.3 UG/ML
NITRITE UR QL STRIP: NEGATIVE
PH UR STRIP: 5.5 [PH] (ref 5–7.5)
PROT UR QL STRIP: ABNORMAL
PROT UR-MCNC: 24.3 MG/DL
PROT/CREAT UR: 625 MG/G CREAT (ref 0–200)
RBC #/AREA URNS HPF: ABNORMAL /HPF (ref 0–2)
SP GR UR STRIP: 1.01 (ref 1–1.03)
UROBILINOGEN UR STRIP-MCNC: 0.2 MG/DL (ref 0.2–1)
WBC #/AREA URNS HPF: >30 /HPF (ref 0–5)

## 2024-05-13 ENCOUNTER — READMISSION MANAGEMENT (OUTPATIENT)
Dept: CALL CENTER | Facility: HOSPITAL | Age: 69
End: 2024-05-13
Payer: MEDICARE

## 2024-05-13 RX ORDER — ZOLPIDEM TARTRATE 5 MG/1
TABLET ORAL
Qty: 30 TABLET | Refills: 2 | Status: SHIPPED | OUTPATIENT
Start: 2024-05-13

## 2024-05-13 NOTE — OUTREACH NOTE
Prep Survey      Flowsheet Row Responses   [facility] facility patient discharged from? Non-BH   Is LACE score < 7 ? Non-BH Discharge   Eligibility Not Eligible   What are the reasons patient is not eligible? Other  [No recent admit]   Does the patient have one of the following disease processes/diagnoses(primary or secondary)? Other   Prep survey completed? Yes            Sivan GORDON - Registered Nurse

## 2024-05-13 NOTE — TELEPHONE ENCOUNTER
LS  5/10/24  NOV  6/27/24  LF    UDS     On med list as historical, not sure that you have filled before

## 2024-05-15 ENCOUNTER — TELEPHONE (OUTPATIENT)
Dept: INTERNAL MEDICINE | Facility: CLINIC | Age: 69
End: 2024-05-15
Payer: MEDICARE

## 2024-05-15 NOTE — TELEPHONE ENCOUNTER
Caller: Arden Carrasquillo    Relationship: Self    Best call back number: [phone]     Who are you requesting to speak with (clinical staff, provider,  specific staff member): CLINICAL STAFF     What was the call regarding: CALLING OFFICE BACK WITH A LIST OF MEDICATIONS HE'S TAKING:     HYDROCODONE-ACETAMINOPHEN 7.5 MG    VITAMIN C 500 MG    VITAMIN D2 50 MCG     VITAMIN D3 1250 MCG    CYCLOBENZAPRINE 5 MG     MELATONIN 5 MG    PREGABALIN 100 MG     POTASSIUM CHLORIDE 100 MG    FERROUS SULFATE 325 MG     OMEPRAZOLE 20 MG     TAMSULOSIN 0.4 MG     ZOLPIDEM 5 MG     PIOGLITAZONE 15 MG     LEVOFLOXACIN 750 MG     PANTOPRAZOLE 40 MG     MULTI VITAMINS

## 2024-05-16 DIAGNOSIS — Z79.4 TYPE 2 DIABETES MELLITUS WITH DIABETIC AUTONOMIC NEUROPATHY, WITH LONG-TERM CURRENT USE OF INSULIN: Primary | ICD-10-CM

## 2024-05-16 DIAGNOSIS — E78.5 HYPERLIPIDEMIA, UNSPECIFIED HYPERLIPIDEMIA TYPE: ICD-10-CM

## 2024-05-16 DIAGNOSIS — E11.43 TYPE 2 DIABETES MELLITUS WITH DIABETIC AUTONOMIC NEUROPATHY, WITH LONG-TERM CURRENT USE OF INSULIN: Primary | ICD-10-CM

## 2024-05-16 DIAGNOSIS — R10.13 DYSPEPSIA: ICD-10-CM

## 2024-05-16 RX ORDER — CHOLECALCIFEROL (VITAMIN D3) 125 MCG
1 TABLET ORAL DAILY
COMMUNITY

## 2024-05-16 RX ORDER — PANTOPRAZOLE SODIUM 40 MG/1
40 TABLET, DELAYED RELEASE ORAL DAILY
Start: 2024-05-16

## 2024-05-16 RX ORDER — ATORVASTATIN CALCIUM 10 MG/1
TABLET, FILM COATED ORAL
Qty: 90 TABLET | Refills: 3 | Status: SHIPPED | OUTPATIENT
Start: 2024-05-16

## 2024-05-16 RX ORDER — OMEPRAZOLE 20 MG/1
20 CAPSULE, DELAYED RELEASE ORAL DAILY
COMMUNITY
End: 2024-05-16

## 2024-05-17 RX ORDER — POTASSIUM CHLORIDE 750 MG/1
10 TABLET, FILM COATED, EXTENDED RELEASE ORAL DAILY
COMMUNITY

## 2024-05-17 RX ORDER — LOSARTAN POTASSIUM 100 MG/1
100 TABLET ORAL DAILY
COMMUNITY

## 2024-05-17 NOTE — TELEPHONE ENCOUNTER
STOP omeprazole, and continue taking pantoprazole. These are duplicate therapies  STOP vitamin D3 and continue vitamin D2  Restart a low dose of atorvastatin. I have sent this into the pharmacy. This reduces risk of heart attack and stroke.  Verify dose of potassium he is taking. It says 100mEq which would be a lot. Hopefully just 10mEq?  Is he still taking torsemide? If so, he needs to discuss with nephrologist at next appointment.

## 2024-05-17 NOTE — TELEPHONE ENCOUNTER
SPOKE with patient and his wife, he takes Losartan (potassium) 100mg and Potassium 10meq.  They are aware of other changes, will  the Atorvastatin

## 2024-05-21 ENCOUNTER — TELEPHONE (OUTPATIENT)
Dept: INTERNAL MEDICINE | Facility: CLINIC | Age: 69
End: 2024-05-21

## 2024-05-21 ENCOUNTER — TELEPHONE (OUTPATIENT)
Dept: INTERNAL MEDICINE | Facility: CLINIC | Age: 69
End: 2024-05-21
Payer: MEDICARE

## 2024-05-21 NOTE — TELEPHONE ENCOUNTER
Caller: Arden Carrasquillo    Relationship: Self    Best call back number: [phone]     What test was performed: URINE TEST    Additional notes: PATIENT REQUESTS A CALL BACK TO DISCUSS RESULTS WHEN AVAILABLE

## 2024-05-21 NOTE — TELEPHONE ENCOUNTER
Caller: Arden Carrasquillo    Relationship: Self    Best call back number: [phone]     Who are you requesting to speak with (clinical staff, provider,  specific staff member): DR APARICIO OR MA    What was the call regarding: PATIENT REQUESTS A CALL BACK TO FOLLOW UP ON STATUS OF REQUEST FOR A LIFT CHAIR

## 2024-05-22 NOTE — TELEPHONE ENCOUNTER
Has protein in his urine  His kidney function remains bad since his hospitalization  He needs to follow instructions of the nephrologist  Low sodium diet, avoid NSAIDs  Nephrologist did not recommend any med changes at this time

## 2024-05-24 ENCOUNTER — TELEPHONE (OUTPATIENT)
Dept: INTERNAL MEDICINE | Facility: CLINIC | Age: 69
End: 2024-05-24
Payer: MEDICARE

## 2024-05-24 NOTE — TELEPHONE ENCOUNTER
Caller: YEN CABALLERO    Relationship:     Best call back number: [phone]     What is the best time to reach you: ANY    Who are you requesting to speak with (clinical staff, provider,  specific staff member): PCP OR CLINICAL      What was the call regarding: VERBAL ORDERS NEEDED TO ALLOW WOUND CARE ORDERS FROM CHON HEUSER, WOUND CARE NURSE PRACTITIONER.

## 2024-05-28 RX ORDER — LOSARTAN POTASSIUM 100 MG/1
100 TABLET ORAL DAILY
Qty: 90 TABLET | Refills: 3 | Status: SHIPPED | OUTPATIENT
Start: 2024-05-28

## 2024-05-28 RX ORDER — AMLODIPINE BESYLATE 10 MG/1
10 TABLET ORAL DAILY
Qty: 90 TABLET | Refills: 1 | OUTPATIENT
Start: 2024-05-28

## 2024-06-08 ENCOUNTER — READMISSION MANAGEMENT (OUTPATIENT)
Dept: CALL CENTER | Facility: HOSPITAL | Age: 69
End: 2024-06-08
Payer: MEDICARE

## 2024-06-09 NOTE — OUTREACH NOTE
Prep Survey      Flowsheet Row Responses   [facility] facility patient discharged from? Non-BH   Is LACE score < 7 ? Non-BH Discharge   Eligibility Not Eligible   What are the reasons patient is not eligible? [facility]   Does the patient have one of the following disease processes/diagnoses(primary or secondary)? Other   Prep survey completed? Yes            TONNY LUCIA - Registered Nurse

## 2024-08-15 ENCOUNTER — TELEPHONE (OUTPATIENT)
Dept: INTERNAL MEDICINE | Facility: CLINIC | Age: 69
End: 2024-08-15

## 2024-08-15 DIAGNOSIS — E11.43 TYPE 2 DIABETES MELLITUS WITH DIABETIC AUTONOMIC NEUROPATHY, WITH LONG-TERM CURRENT USE OF INSULIN: ICD-10-CM

## 2024-08-15 DIAGNOSIS — N18.31 STAGE 3A CHRONIC KIDNEY DISEASE: ICD-10-CM

## 2024-08-15 DIAGNOSIS — M48.062 LUMBAR STENOSIS WITH NEUROGENIC CLAUDICATION: ICD-10-CM

## 2024-08-15 DIAGNOSIS — Z79.4 TYPE 2 DIABETES MELLITUS WITH DIABETIC AUTONOMIC NEUROPATHY, WITH LONG-TERM CURRENT USE OF INSULIN: ICD-10-CM

## 2024-08-15 DIAGNOSIS — L89.153 PRESSURE INJURY OF SACRAL REGION, STAGE 3: Primary | ICD-10-CM

## 2024-08-15 DIAGNOSIS — Z98.1 S/P CERVICAL SPINAL FUSION: ICD-10-CM

## 2024-08-15 RX ORDER — SODIUM HYPOCHLORITE 2.5 MG/ML
SOLUTION TOPICAL
Qty: 473 ML | Refills: 3 | Status: SHIPPED | OUTPATIENT
Start: 2024-08-15

## 2024-08-15 NOTE — TELEPHONE ENCOUNTER
Caller: Carina Tan    Relationship: Emergency Contact    Best call back number: [phone]    What medication are you requesting: DAKINS SOLUTION    What are your current symptoms: WOUND CARE      If a prescription is needed, what is your preferred pharmacy and phone number: [facility] [id number] - [place], [place] - [address] - [phone]  - [phone] FX     Additional notes: HIS WOUND WAS BEING PACKED WITH THIS BUT DIDN'T GET ANY SENT HOME WITH HIM.

## 2024-08-15 NOTE — TELEPHONE ENCOUNTER
Sent home health orders  Sent DME orders - they probably will not cover since he is not bariatric size but we will try  Send rx for dakins

## 2024-08-15 NOTE — TELEPHONE ENCOUNTER
Caller: Carina Tan    Relationship: Emergency Contact    Best call back number: [phone]    What is the best time to reach you: ANYTIME    Who are you requesting to speak with (clinical staff, provider,  specific staff member): CLINICAL    What was the call regarding: PATIENT'S SISTER STATED THAT PATIENT GOT DISCHARGED FROM REHAB YESTERDAY 8/14/24 AND IS NOW HOME. SHE STATED THAT HE WAS SENT HOME WITH A ROTEX AIR MATTRESS BUT IT IS TOO SMALL AND SLICK SO THEY WILL HAVE TO SEND IT BACK AND HOPEFULLY GET A MORE FITTING ONE. SHE STATED THAT [facility] HAS A BARIATRIC BED AND IS WANTING TO SEE IF PATIENT'S INSURANCE MAY COVER THAT TYPE OF BED.PATIENT'S SISTER STATED THAT HOME HEALTH WAS SUPPOSED TO BE SCHEDULED BUT THEY HAVE NOT REACHED OUT TO GET PATIENT ON THEIR SCHEDULE AND PATIENT HAS A STAGE 4 PRESSURE SORE NEEDING CLEANED. PATIENT'S SISTER STATED THAT SHE MAY BE ABLE TO CLEAN THE PRESSURE SORE WITH SOME SALINE BUT WOULD LIKE TO KNOW IF THAT OKAY. PLEASE ADVISE.

## 2024-08-16 ENCOUNTER — TELEPHONE (OUTPATIENT)
Dept: INTERNAL MEDICINE | Facility: CLINIC | Age: 69
End: 2024-08-16
Payer: MEDICARE

## 2024-08-16 NOTE — TELEPHONE ENCOUNTER
Caller: NATALIA RIVAS    Relationship: Other    Best call back number: [phone]     What orders are you requesting (i.e. lab or imaging): OT 1 TIME A WEEK FOR 8 WEEKS

## 2024-08-16 NOTE — TELEPHONE ENCOUNTER
Caller: ALMA DELIA WITH [facility]    Relationship: Other    Best call back number: [phone]     What orders are you requesting (i.e. lab or imaging): PHYSICAL THERAPY    Where will you receive your lab/imaging services: HOME     Additional notes: ALMA DELIA WENT TO EVALUATE PATIENT AND SHE IS REQUESTING ORDERS FOR PHYSICAL THERAPY 2 TIMES A WEEK FOR 4 WEEKS AND 1 TIME A WEEK FOR 4 WEEKS. PLEASE CALL TO FOLLOW UP IF NEEDED

## 2024-08-19 ENCOUNTER — TELEPHONE (OUTPATIENT)
Dept: INTERNAL MEDICINE | Facility: CLINIC | Age: 69
End: 2024-08-19
Payer: MEDICARE

## 2024-08-19 NOTE — TELEPHONE ENCOUNTER
PT LAST SEEN FACE 2 FACE ON 5/10/24. PT MUST HAVE F2F WITH PCP WITHIN 90 DAYS OF THE ORDER FOR HOMEHEALTH.    PLEASE SCHEDULE A PATIENT EITHER AN IN OFFICE APPT OR A VIRTUAL APPT       THANK YOU

## 2024-08-20 NOTE — TELEPHONE ENCOUNTER
LVM for pt advising that he needs to call back and schedule visit in office or via virtual appt to have referral accepted by home health.     HUB TO RELAY

## 2024-08-21 ENCOUNTER — TELEPHONE (OUTPATIENT)
Dept: INTERNAL MEDICINE | Facility: CLINIC | Age: 69
End: 2024-08-21
Payer: MEDICARE

## 2024-08-21 NOTE — TELEPHONE ENCOUNTER
[facility] CALLED AND STATES HE DOES NOT QUALITY FOR A BARIATRIC HOSPITAL BED. THEY ARE REQUESTING AN ORDER FOR A SEMI ELECTRIC HOSPITAL BED.     CALL BACK NUMBER [phone]  [phone]

## 2024-08-22 DIAGNOSIS — Z98.1 S/P CERVICAL SPINAL FUSION: ICD-10-CM

## 2024-08-22 DIAGNOSIS — L89.153 PRESSURE INJURY OF SACRAL REGION, STAGE 3: ICD-10-CM

## 2024-08-22 DIAGNOSIS — M48.062 LUMBAR STENOSIS WITH NEUROGENIC CLAUDICATION: Primary | ICD-10-CM

## 2024-08-28 ENCOUNTER — TELEPHONE (OUTPATIENT)
Dept: INTERNAL MEDICINE | Facility: CLINIC | Age: 69
End: 2024-08-28
Payer: MEDICARE

## 2024-08-28 NOTE — TELEPHONE ENCOUNTER
Patient in need of an electric hospital bed    Due to stage IV sacral ulcer and cervical stenosis, there is need for frequent changes in body and the patient has a medical condition which requires positioning of the body in ways not feasible with an ordinary bed. Also requires positioning of the body in ways not feasible with an ordinary bed in order to alleviate pain from ulcer.

## 2024-10-01 ENCOUNTER — TELEPHONE (OUTPATIENT)
Dept: INTERNAL MEDICINE | Facility: CLINIC | Age: 69
End: 2024-10-01

## 2024-10-01 ENCOUNTER — TELEPHONE (OUTPATIENT)
Dept: INTERNAL MEDICINE | Facility: CLINIC | Age: 69
End: 2024-10-01
Payer: MEDICARE

## 2024-10-01 NOTE — TELEPHONE ENCOUNTER
Dr. Ward is out with a sick child today. Called patient to reschedule. No answer. Left message on machine. HUB TO Relay

## 2024-10-01 NOTE — TELEPHONE ENCOUNTER
Caller: Carina Tan    Relationship to patient: Emergency Contact    Best call back number:     Patient is needing: CARINA IS ASKING FOR A CALLBACK FROM DR. APARICIO SO THAT THEY CAN DISCUSS THE STEPS FOR PLACING THE PATIENT IN LONG TERM CARE.

## 2024-10-02 NOTE — TELEPHONE ENCOUNTER
Nothing they need on my end  If looking at placement from outside the hospital, they just need to visit places and decide where would be best for him with their current situation  Jayne Tabler with [facility] can help, [phone], she is an excellent resource and knows way more about this than me  If he is in the hospital then placement is usually arranged through a

## 2024-10-03 ENCOUNTER — HOME HEALTH ADMISSION (OUTPATIENT)
Dept: HOME HEALTH SERVICES | Facility: HOME HEALTHCARE | Age: 69
End: 2024-10-03
Payer: MEDICARE

## 2024-10-03 ENCOUNTER — OFFICE VISIT (OUTPATIENT)
Dept: INTERNAL MEDICINE | Facility: CLINIC | Age: 69
End: 2024-10-03
Payer: MEDICARE

## 2024-10-03 VITALS
DIASTOLIC BLOOD PRESSURE: 82 MMHG | TEMPERATURE: 97.5 F | SYSTOLIC BLOOD PRESSURE: 140 MMHG | BODY MASS INDEX: 30.53 KG/M2 | OXYGEN SATURATION: 94 % | WEIGHT: 190 LBS | HEIGHT: 66 IN | HEART RATE: 79 BPM

## 2024-10-03 DIAGNOSIS — E11.43 TYPE 2 DIABETES MELLITUS WITH DIABETIC AUTONOMIC NEUROPATHY, WITH LONG-TERM CURRENT USE OF INSULIN: ICD-10-CM

## 2024-10-03 DIAGNOSIS — Z12.11 ENCOUNTER FOR SCREENING FOR COLORECTAL MALIGNANT NEOPLASM: ICD-10-CM

## 2024-10-03 DIAGNOSIS — Z23 NEED FOR IMMUNIZATION AGAINST INFLUENZA: ICD-10-CM

## 2024-10-03 DIAGNOSIS — Z79.4 TYPE 2 DIABETES MELLITUS WITH DIABETIC AUTONOMIC NEUROPATHY, WITH LONG-TERM CURRENT USE OF INSULIN: ICD-10-CM

## 2024-10-03 DIAGNOSIS — R29.898 WEAKNESS OF BOTH LOWER EXTREMITIES: ICD-10-CM

## 2024-10-03 DIAGNOSIS — N18.31 STAGE 3A CHRONIC KIDNEY DISEASE: Primary | ICD-10-CM

## 2024-10-03 DIAGNOSIS — Z12.12 ENCOUNTER FOR SCREENING FOR COLORECTAL MALIGNANT NEOPLASM: ICD-10-CM

## 2024-10-03 DIAGNOSIS — L89.153 PRESSURE INJURY OF SACRAL REGION, STAGE 3: ICD-10-CM

## 2024-10-03 DIAGNOSIS — G95.20 CERVICAL CORD COMPRESSION WITH MYELOPATHY: ICD-10-CM

## 2024-10-03 PROBLEM — R25.1 TREMOR: Status: ACTIVE | Noted: 2024-10-03

## 2024-10-03 NOTE — PROGRESS NOTES
Date of Encounter: 10/03/2024  Patient: Arden Carrasquillo,  1955    Subjective   History of Presenting Illness  Chief complaint: Follow-up and goals of care    Presents alongside his daughter    Patient presents for follow-up CKD 3A, recent ER visit  for fall with NEEL.  He does not drink water adequately.  He has not had any recent falls.  Point-of-care A1c  6.4%, CKD related anemia stable at 9.9 g/dL, discharge creatinine 1.4 not far from his baseline.    He currently lives in a house with his sister and his daughter comes by to help them with activities of daily living.  He needs help getting into the bath although he has a shower chair and can do everything on his own from there.  He is unable to prepare food for himself.  He is able to feed himself.  Urinary incontinence, but no stool incontinence, managed by briefs.  Daughter cleans the house.  Patient lives on the first floor.  They have a bedside commode if needed.  Daughter is currently working.  Patient is getting home health services and they are applying for Medicare waiver to continue these longer.  He does have a hospital bed for positioning at night.    They have noticed weakness in his lower extremities that returns once he stops physical therapy.  He does have a tremor in both hands that is constant but does not functionally impair him.    Overdue for colonoscopy after high risk polyp found in , medical events precluded colonoscopy up until now    Sacral decubitus ulcer being managed by wound care now within centimeters of resolution    Amenable to influenza vaccine    The following portions of the patient's history were reviewed and updated as appropriate: allergies, current medications, past family history, past medical history, past social history, past surgical history and problem list.    Patient Active Problem List   Diagnosis    Type 2 diabetes mellitus with diabetic autonomic neuropathy, with long-term current use of  insulin    Degenerative spondylolisthesis    Hypertension    Generalized anxiety disorder    Stage 3a chronic kidney disease    Hyperlipidemia    Prostate cancer    Anemia in stage 3b chronic kidney disease    Gynecomastia    Carcinoma in situ of colon    Bilateral lower extremity edema    Weakness of both lower extremities    Psychophysiological insomnia    History of colon polyps    Lumbar stenosis with neurogenic claudication    S/P cervical spinal fusion    Cervical cord compression with myelopathy    Aneurysm of descending thoracic aorta without rupture    Tremor     Past Medical History:   Diagnosis Date    Diabetes mellitus     Positive colorectal cancer screening using Cologuard test 12/03/2020    Severe peripheral polyneuropathy 10/19/2023     Past Surgical History:   Procedure Laterality Date    CYSTOSCOPY TRANSURETHRAL RESECTION OF PROSTATE      UPPER GASTROINTESTINAL ENDOSCOPY       Family History   Problem Relation Age of Onset    Colon cancer Neg Hx     Colon polyps Neg Hx        Current Outpatient Medications:     atorvastatin (LIPITOR) 10 MG tablet, Take 1 tab PO QD for cholesterol and heart health, Disp: 90 tablet, Rfl: 3    cyclobenzaprine (FLEXERIL) 5 MG tablet, Take 1 tablet by mouth 3 (Three) Times a Day As Needed for Muscle Spasms., Disp: 90 tablet, Rfl: 3    Ergocalciferol (Vitamin D2) 50 MCG (2000 UT) tablet, Take 1 tablet by mouth Daily., Disp: , Rfl:     ferrous sulfate 325 (65 FE) MG tablet, Take 1 tablet by mouth Daily With Breakfast., Disp: 90 tablet, Rfl: 3    glucose blood (Accu-Chek Guide) test strip, Use as instructed, Disp: 100 each, Rfl: 12    HYDROcodone-acetaminophen (NORCO) 7.5-325 MG per tablet, Take 1 tablet by mouth Every 6 (Six) Hours As Needed for Moderate Pain., Disp: , Rfl:     losartan (COZAAR) 100 MG tablet, TAKE 1 TABLET DAILY, Disp: 90 tablet, Rfl: 3    Melatonin 10 MG tablet, Take  by mouth., Disp: , Rfl:     Multiple Vitamins-Minerals (MENS MULTIVITAMIN PLUS PO),  "Take  by mouth., Disp: , Rfl:     pantoprazole (PROTONIX) 40 MG EC tablet, Take 1 tablet by mouth Daily., Disp: , Rfl:     pioglitazone (ACTOS) 30 MG tablet, Take 1 tablet by mouth Daily., Disp: 90 tablet, Rfl: 3    potassium chloride 10 MEQ CR tablet, Take 1 tablet by mouth Daily., Disp: , Rfl:     pregabalin (LYRICA) 100 MG capsule, Take 1 capsule by mouth 3 (Three) Times a Day As Needed (neuropathy)., Disp: , Rfl:     promethazine (PHENERGAN) 12.5 MG tablet, TAKE 1/2 TO 1 TABLET BY MOUTH EVERY 6 HOURS AS NEEDED FOR NAUSEA, Disp: 15 tablet, Rfl: 1    sodium hypochlorite (DAKIN'S) 0.25 % topical solution, Use as directed twice daily, Disp: 473 mL, Rfl: 3    tamsulosin (FLOMAX) 0.4 MG capsule 24 hr capsule, Take 1 capsule by mouth Daily., Disp: 90 capsule, Rfl: 3    torsemide 40 MG tablet, Take 40 mg by mouth 2 (Two) Times a Day., Disp: 180 tablet, Rfl: 3    vitamin C (ASCORBIC ACID) 500 MG tablet, Take 1 tablet by mouth Daily., Disp: 90 tablet, Rfl: 3    zolpidem (AMBIEN) 5 MG tablet, TAKE 1/2 TO 1 TABLET BY MOUTH ONCE NIGHTLY, Disp: 30 tablet, Rfl: 2  Allergies   Allergen Reactions    Vitamin D Analogs Rash     Social History     Tobacco Use    Smoking status: Never     Passive exposure: Never    Smokeless tobacco: Never   Vaping Use    Vaping status: Never Used   Substance Use Topics    Alcohol use: Defer    Drug use: Yes     Types: Hydrocodone          Objective   Physical Exam  Vitals:    10/03/24 1047   BP: 140/82   Pulse: 79   Temp: 97.5 °F (36.4 °C)   TempSrc: Infrared   SpO2: 94%   Weight: 86.2 kg (190 lb)   Height: 167.6 cm (65.98\")     Body mass index is 30.69 kg/m².    Constitutional: NAD.  Cardiovascular: RRR.  1+ pitting edema of the left lower leg, minimal edema of the right lower leg.  He is wearing zip up compression stockings.   Pulmonary: CTA b/l. Good effort.  Integumentary: No rashes or wounds on face or upper extremities.  Psychiatric: Normal affect. Normal thought content.  Neurologic: Unable " to assess for cogwheel rigidity due to post myelopathy changes in his stiffness in both upper extremities.  His tremor is not distractible, high-frequency 4 to 6 Hz, low moderate amplitude.  Does not diminish with movement or intention.     Assessment & Plan   Assessment and Plan  Very pleasant 69-year-old male with type 2 diabetes with polyneuropathy, hypertension, thoracic aortic aneurysm, hyperlipidemia, prostate cancer, cervical cord compression with myelopathy status post fusion, chronic lower back pain on opioid therapy, CKD 3, chronic lower extremity edema, anxiety and depression, who presents with the following:     Diagnoses and all orders for this visit:    1. Stage 3a chronic kidney disease (Primary): Creatinine has returned to baseline at discharge, discussed adequate water intake and reasons to return for further evaluation.  Has nephrology follow-up about 1.5 months out.  -     Ambulatory Referral to Home Health    2. Type 2 diabetes mellitus with diabetic autonomic neuropathy, with long-term current use of insulin: Controlled without any hypoglycemia  -     Ambulatory Referral to Home Health    3. Cervical cord compression with myelopathy: Weakness is stable, just needs to continue physical therapy.  CT of the cervical spine related to his fall showed intact hardware with no change in his spine otherwise.  -     Ambulatory Referral to Home Health  4. Weakness of both lower extremities  -     Ambulatory Referral to Home Health    5. Pressure injury of sacral region, stage 3: Continue to follow with wound care  -     Ambulatory Referral to Home Health    6. Need for immunization against influenza  -     Fluzone High-Dose 65+yrs ([id number])    7. Encounter for screening for colorectal malignant neoplasm: His long-term prognosis is currently guarded but we should update his colonoscopy due to high risk polyp and risk of colon cancer  -     Ambulatory Referral For Screening Colonoscopy    BMI is >= 30 and  <35. (Class 1 Obesity). The following options were offered after discussion;: none (medical contraindication)    Discussed current living situation, goals of care.  Gave the name of Jayne Sparksophelia at her senior care transitions service.  Recommend continuing to look into personal care options.  Also discussed assisted living options..    Follow-up in 3 to 4 months    Pollo Ward MD  Family Medicine  O: [phone]    Disclaimer: Parts of this note were dictated by speech recognition. Minor errors in transcription may be present. Please call if questions.

## 2024-10-09 ENCOUNTER — TELEPHONE (OUTPATIENT)
Dept: INTERNAL MEDICINE | Facility: CLINIC | Age: 69
End: 2024-10-09
Payer: MEDICARE

## 2024-10-09 NOTE — TELEPHONE ENCOUNTER
Caller: MICHELLE GUY    Relationship: Emergency Contact    Best call back number: [phone]     What medication are you requesting: ANTIBIOTIC    What are your current symptoms: HAVING TROUBLE STANDING, LEGS REALLY WEAK, AND HIS URINE IS REALLY STRONG.  PATIENT DAUGHTER SAYS THIS IS SIMILAR TO WHAT HE HAD LAST TIME HE HAD A UTI    How long have you been experiencing symptoms: PROGRESSED OVER PAST WEEK.    Have you had these symptoms before:    [x] Yes  [] No    Have you been treated for these symptoms before:   [x] Yes  [] No    If a prescription is needed, what is your preferred pharmacy and phone number: [facility] [id number] - [place], [place] - [address] - [phone] [facility] [phone] FX

## 2024-10-18 ENCOUNTER — READMISSION MANAGEMENT (OUTPATIENT)
Dept: CALL CENTER | Facility: HOSPITAL | Age: 69
End: 2024-10-18
Payer: MEDICARE

## 2024-10-18 NOTE — OUTREACH NOTE
Prep Survey      Flowsheet Row Responses   [facility] facility patient discharged from? Non-[facility]   Is LACE score < 7 ? Non- Discharge   Eligibility [facility]   Date of Admission 10/11/24   Date of Discharge 10/18/24   Discharge Disposition Home or Self Care   Discharge diagnosis Urinary retention (Primary Dx),  NEEL (acute kidney injury),  Pressure injury of skin of sacral region, unspecified injury stage,  Atherosclerosis of native coronary artery of native heart without angina pectoris,  Acute UTI,    Does the patient have one of the following disease processes/diagnoses(primary or secondary)? Other   Prep survey completed? Yes            Sivan GORDON - Registered Nurse

## 2024-10-21 ENCOUNTER — TRANSITIONAL CARE MANAGEMENT TELEPHONE ENCOUNTER (OUTPATIENT)
Dept: CALL CENTER | Facility: HOSPITAL | Age: 69
End: 2024-10-21
Payer: MEDICARE

## 2024-10-21 NOTE — OUTREACH NOTE
Call Center TCM Note      Flowsheet Row Responses   [facility] patient discharged from? Non-  [[place]]   Does the patient have one of the following disease processes/diagnoses(primary or secondary)? Other   TCM attempt successful? Yes   Call start time 1352   Call end time 1357   Discharge diagnosis Urinary retention (Primary Dx),  NEEL (acute kidney injury),  Pressure injury of skin of sacral region, unspecified injury stage,  Atherosclerosis of native coronary artery of native heart without angina pectoris,  Acute UTI,    Person spoke with today (if not patient) and relationship DaughterHeri Ravi   Meds reviewed with patient/caregiver? Yes   Prescription comments Needs a refill on pain medication- will send message to pcp.   Comments 10/29/2024  2:15 PM Arrive by 2:00 PM HOSPITAL FOLLOW UP 30 min [facility] Pollo Ward MD   Does the patient have an appointment with their PCP within 7-14 days of discharge? No   Nursing Interventions Assisted patient with making appointment per protocol   What is the Home health agency?  [facility].   Home health comments They have not been to the home yet-   Psychosocial issues? No   What is the patient's perception of their health status since discharge? Improving  [slowly better- weak HH is suppose to come to the house.]   Is the patient/caregiver able to teach back signs and symptoms related to disease process for when to call PCP? Yes   Is the patient/caregiver able to teach back signs and symptoms related to disease process for when to call 911? Yes   Is the patient/caregiver able to teach back the hierarchy of who to call/visit for symptoms/problems? PCP, Specialist, Home health nurse, Urgent Care, ED, 911 Yes   Additional teach back comments Patient has catheter- appt with urology on wed.   TCM call completed? Yes   Wrap up additional comments Khalida Ravi reports patient is still weak. Alexia has not called patient yet- Will  involve pcp office. Daughter needs assistance with transportation to get father to his appt. Daughter reports patient needs refill on pain medication until he can get back in with pain management since they had to cancel due to hospital admission at [facility].   Call end time 1357   Would this patient benefit from a Referral to [facility] Social Work? Yes   Reason for Social Work Referral Transportation  [Patient is very weak- needs help with transportation to get to appt.- please advise and call Breonna- daughter.]   Is the patient interested in additional calls from an ambulatory ? No            Greer De León RN    10/21/2024, 14:01 EDT

## 2024-10-22 DIAGNOSIS — M43.10 DEGENERATIVE SPONDYLOLISTHESIS: Primary | ICD-10-CM

## 2024-10-22 DIAGNOSIS — M48.062 LUMBAR STENOSIS WITH NEUROGENIC CLAUDICATION: ICD-10-CM

## 2024-10-22 NOTE — TELEPHONE ENCOUNTER
Caller: MICHELLE GUY    Relationship: Emergency Contact    Best call back number: [phone]     Requested Prescriptions:   Requested Prescriptions     Pending Prescriptions Disp Refills    HYDROcodone-acetaminophen (NORCO) 7.5-325 MG per tablet       Sig: Take 1 tablet by mouth Every 6 (Six) Hours As Needed for Moderate Pain.        Pharmacy where request should be sent: [facility] [id number] - [facility] [address] [facility] [phone] [facility] [phone] FX     Last office visit with prescribing clinician: 10/3/2024   Last telemedicine visit with prescribing clinician: Visit date not found   Next office visit with prescribing clinician: 10/29/2024     Additional details provided by patient: HAS PAIN MANAGEMENT APPOINTMENT BUT NOT UNTIL JANUARY    Does the patient have less than a 3 day supply:  [x] Yes  [] No    Would you like a call back once the refill request has been completed: [] Yes [x] No    If the office needs to give you a call back, can they leave a voicemail: [] Yes [x] No    Chad Mills Rep   10/22/24 15:05 EDT

## 2024-10-23 RX ORDER — HYDROCODONE BITARTRATE AND ACETAMINOPHEN 7.5; 325 MG/1; MG/1
1 TABLET ORAL EVERY 6 HOURS PRN
Qty: 120 TABLET | Refills: 0 | Status: ON HOLD | OUTPATIENT
Start: 2024-10-23

## 2024-10-23 NOTE — TELEPHONE ENCOUNTER
Rx Refill Note  Requested Prescriptions     Pending Prescriptions Disp Refills    HYDROcodone-acetaminophen (NORCO) 7.5-325 MG per tablet       Sig: Take 1 tablet by mouth Every 6 (Six) Hours As Needed for Moderate Pain.      Last office visit with prescribing clinician: 10/3/2024   Last telemedicine visit with prescribing clinician: Visit date not found   Next office visit with prescribing clinician: 10/29/2024  LF  ?  Listed as a historical med.  Appt with Pain Management not until January

## 2024-10-24 ENCOUNTER — APPOINTMENT (OUTPATIENT)
Dept: CT IMAGING | Facility: HOSPITAL | Age: 69
End: 2024-10-24
Payer: MEDICARE

## 2024-10-24 ENCOUNTER — APPOINTMENT (OUTPATIENT)
Dept: GENERAL RADIOLOGY | Facility: HOSPITAL | Age: 69
End: 2024-10-24
Payer: MEDICARE

## 2024-10-24 ENCOUNTER — HOSPITAL ENCOUNTER (INPATIENT)
Facility: HOSPITAL | Age: 69
LOS: 12 days | Discharge: HOME HEALTH | End: 2024-11-05
Attending: EMERGENCY MEDICINE | Admitting: INTERNAL MEDICINE
Payer: MEDICARE

## 2024-10-24 DIAGNOSIS — R33.9 URINARY RETENTION: ICD-10-CM

## 2024-10-24 DIAGNOSIS — L89.159 PRESSURE INJURY OF SKIN OF SACRAL REGION, UNSPECIFIED INJURY STAGE: ICD-10-CM

## 2024-10-24 DIAGNOSIS — R29.898 WEAKNESS OF BOTH LOWER EXTREMITIES: ICD-10-CM

## 2024-10-24 DIAGNOSIS — I95.9 HYPOTENSION, UNSPECIFIED HYPOTENSION TYPE: ICD-10-CM

## 2024-10-24 DIAGNOSIS — A41.9 SEVERE SEPSIS: ICD-10-CM

## 2024-10-24 DIAGNOSIS — D64.9 CHRONIC ANEMIA: ICD-10-CM

## 2024-10-24 DIAGNOSIS — N18.31 STAGE 3A CHRONIC KIDNEY DISEASE: ICD-10-CM

## 2024-10-24 DIAGNOSIS — M48.062 LUMBAR STENOSIS WITH NEUROGENIC CLAUDICATION: ICD-10-CM

## 2024-10-24 DIAGNOSIS — R65.20 SEVERE SEPSIS: ICD-10-CM

## 2024-10-24 DIAGNOSIS — N17.9 ACUTE RENAL FAILURE, UNSPECIFIED ACUTE RENAL FAILURE TYPE: Primary | ICD-10-CM

## 2024-10-24 DIAGNOSIS — R41.82 ALTERED MENTAL STATUS, UNSPECIFIED ALTERED MENTAL STATUS TYPE: ICD-10-CM

## 2024-10-24 LAB
ALBUMIN SERPL-MCNC: 2.7 G/DL (ref 3.5–5.2)
ALBUMIN/GLOB SERPL: 0.7 G/DL
ALP SERPL-CCNC: 106 U/L (ref 39–117)
ALT SERPL W P-5'-P-CCNC: 13 U/L (ref 1–41)
ANION GAP SERPL CALCULATED.3IONS-SCNC: 11.5 MMOL/L (ref 5–15)
ANION GAP SERPL CALCULATED.3IONS-SCNC: 13 MMOL/L (ref 5–15)
APTT PPP: 30 SECONDS (ref 22.7–35.4)
AST SERPL-CCNC: 13 U/L (ref 1–40)
BACTERIA UR QL AUTO: ABNORMAL /HPF
BASOPHILS # BLD AUTO: 0.03 10*3/MM3 (ref 0–0.2)
BASOPHILS NFR BLD AUTO: 0.3 % (ref 0–1.5)
BILIRUB SERPL-MCNC: 0.3 MG/DL (ref 0–1.2)
BILIRUB UR QL STRIP: NEGATIVE
BUN SERPL-MCNC: 66 MG/DL (ref 8–23)
BUN SERPL-MCNC: 72 MG/DL (ref 8–23)
BUN/CREAT SERPL: 16.8 (ref 7–25)
BUN/CREAT SERPL: 18.3 (ref 7–25)
CALCIUM SPEC-SCNC: 8 MG/DL (ref 8.6–10.5)
CALCIUM SPEC-SCNC: 8.5 MG/DL (ref 8.6–10.5)
CHLORIDE SERPL-SCNC: 103 MMOL/L (ref 98–107)
CHLORIDE SERPL-SCNC: 110 MMOL/L (ref 98–107)
CK SERPL-CCNC: 164 U/L (ref 20–200)
CLARITY UR: ABNORMAL
CO2 SERPL-SCNC: 18 MMOL/L (ref 22–29)
CO2 SERPL-SCNC: 20.5 MMOL/L (ref 22–29)
COLOR UR: ABNORMAL
CREAT SERPL-MCNC: 3.6 MG/DL (ref 0.76–1.27)
CREAT SERPL-MCNC: 4.29 MG/DL (ref 0.76–1.27)
D-LACTATE SERPL-SCNC: 1.2 MMOL/L (ref 0.5–2)
D-LACTATE SERPL-SCNC: 2.1 MMOL/L (ref 0.5–2)
DEPRECATED RDW RBC AUTO: 43.9 FL (ref 37–54)
EGFRCR SERPLBLD CKD-EPI 2021: 14 ML/MIN/1.73
EGFRCR SERPLBLD CKD-EPI 2021: 17.3 ML/MIN/1.73
EOSINOPHIL # BLD AUTO: 0.13 10*3/MM3 (ref 0–0.4)
EOSINOPHIL NFR BLD AUTO: 1.4 % (ref 0.3–6.2)
ERYTHROCYTE [DISTWIDTH] IN BLOOD BY AUTOMATED COUNT: 14.2 % (ref 12.3–15.4)
GEN 5 2HR TROPONIN T REFLEX: 135 NG/L
GLOBULIN UR ELPH-MCNC: 4 GM/DL
GLUCOSE BLDC GLUCOMTR-MCNC: 114 MG/DL (ref 70–130)
GLUCOSE SERPL-MCNC: 126 MG/DL (ref 65–99)
GLUCOSE SERPL-MCNC: 137 MG/DL (ref 65–99)
GLUCOSE UR STRIP-MCNC: NEGATIVE MG/DL
HCT VFR BLD AUTO: 30.8 % (ref 37.5–51)
HGB BLD-MCNC: 9.9 G/DL (ref 13–17.7)
HGB UR QL STRIP.AUTO: ABNORMAL
HOLD SPECIMEN: NORMAL
HOLD SPECIMEN: NORMAL
HYALINE CASTS UR QL AUTO: ABNORMAL /LPF
IMM GRANULOCYTES # BLD AUTO: 0.1 10*3/MM3 (ref 0–0.05)
IMM GRANULOCYTES NFR BLD AUTO: 1.1 % (ref 0–0.5)
INR PPP: 1.24 (ref 0.9–1.1)
KETONES UR QL STRIP: ABNORMAL
LEUKOCYTE ESTERASE UR QL STRIP.AUTO: ABNORMAL
LYMPHOCYTES # BLD AUTO: 1.41 10*3/MM3 (ref 0.7–3.1)
LYMPHOCYTES NFR BLD AUTO: 15.5 % (ref 19.6–45.3)
MAGNESIUM SERPL-MCNC: 2.5 MG/DL (ref 1.6–2.4)
MCH RBC QN AUTO: 27.3 PG (ref 26.6–33)
MCHC RBC AUTO-ENTMCNC: 32.1 G/DL (ref 31.5–35.7)
MCV RBC AUTO: 85.1 FL (ref 79–97)
MONOCYTES # BLD AUTO: 0.96 10*3/MM3 (ref 0.1–0.9)
MONOCYTES NFR BLD AUTO: 10.5 % (ref 5–12)
NEUTROPHILS NFR BLD AUTO: 6.48 10*3/MM3 (ref 1.7–7)
NEUTROPHILS NFR BLD AUTO: 71.2 % (ref 42.7–76)
NITRITE UR QL STRIP: NEGATIVE
NRBC BLD AUTO-RTO: 0 /100 WBC (ref 0–0.2)
PH UR STRIP.AUTO: <=5 [PH] (ref 5–8)
PLATELET # BLD AUTO: 283 10*3/MM3 (ref 140–450)
PMV BLD AUTO: 9.3 FL (ref 6–12)
POTASSIUM SERPL-SCNC: 4.8 MMOL/L (ref 3.5–5.2)
POTASSIUM SERPL-SCNC: 5.3 MMOL/L (ref 3.5–5.2)
PROCALCITONIN SERPL-MCNC: 0.22 NG/ML (ref 0–0.25)
PROT SERPL-MCNC: 6.7 G/DL (ref 6–8.5)
PROT UR QL STRIP: ABNORMAL
PROTHROMBIN TIME: 15.9 SECONDS (ref 11.7–14.2)
QT INTERVAL: 387 MS
QTC INTERVAL: 418 MS
RBC # BLD AUTO: 3.62 10*6/MM3 (ref 4.14–5.8)
RBC # UR STRIP: ABNORMAL /HPF
REF LAB TEST METHOD: ABNORMAL
SODIUM SERPL-SCNC: 135 MMOL/L (ref 136–145)
SODIUM SERPL-SCNC: 141 MMOL/L (ref 136–145)
SP GR UR STRIP: 1.03 (ref 1–1.03)
SQUAMOUS #/AREA URNS HPF: ABNORMAL /HPF
TROPONIN T DELTA: -13 NG/L
TROPONIN T SERPL HS-MCNC: 148 NG/L
UROBILINOGEN UR QL STRIP: ABNORMAL
WBC # UR STRIP: ABNORMAL /HPF
WBC NRBC COR # BLD AUTO: 9.11 10*3/MM3 (ref 3.4–10.8)
WHOLE BLOOD HOLD COAG: NORMAL
WHOLE BLOOD HOLD SPECIMEN: NORMAL

## 2024-10-24 PROCEDURE — 93010 ELECTROCARDIOGRAM REPORT: CPT | Performed by: INTERNAL MEDICINE

## 2024-10-24 PROCEDURE — 93005 ELECTROCARDIOGRAM TRACING: CPT | Performed by: EMERGENCY MEDICINE

## 2024-10-24 PROCEDURE — 25010000002 PIPERACILLIN SOD-TAZOBACTAM PER 1 G: Performed by: PHYSICIAN ASSISTANT

## 2024-10-24 PROCEDURE — 25810000003 LACTATED RINGERS SOLUTION: Performed by: PHYSICIAN ASSISTANT

## 2024-10-24 PROCEDURE — 84145 PROCALCITONIN (PCT): CPT | Performed by: PHYSICIAN ASSISTANT

## 2024-10-24 PROCEDURE — 25810000003 SODIUM CHLORIDE 0.9 % SOLUTION: Performed by: PHYSICIAN ASSISTANT

## 2024-10-24 PROCEDURE — 25810000003 SODIUM CHLORIDE 0.9 % SOLUTION: Performed by: INTERNAL MEDICINE

## 2024-10-24 PROCEDURE — 25810000003 SODIUM CHLORIDE 0.9 % SOLUTION: Performed by: EMERGENCY MEDICINE

## 2024-10-24 PROCEDURE — 82550 ASSAY OF CK (CPK): CPT | Performed by: PHYSICIAN ASSISTANT

## 2024-10-24 PROCEDURE — 85025 COMPLETE CBC W/AUTO DIFF WBC: CPT | Performed by: EMERGENCY MEDICINE

## 2024-10-24 PROCEDURE — 87147 CULTURE TYPE IMMUNOLOGIC: CPT | Performed by: PHYSICIAN ASSISTANT

## 2024-10-24 PROCEDURE — 82948 REAGENT STRIP/BLOOD GLUCOSE: CPT

## 2024-10-24 PROCEDURE — 83605 ASSAY OF LACTIC ACID: CPT | Performed by: PHYSICIAN ASSISTANT

## 2024-10-24 PROCEDURE — 25010000002 VANCOMYCIN 10 G RECONSTITUTED SOLUTION: Performed by: PHYSICIAN ASSISTANT

## 2024-10-24 PROCEDURE — 70450 CT HEAD/BRAIN W/O DYE: CPT

## 2024-10-24 PROCEDURE — 74176 CT ABD & PELVIS W/O CONTRAST: CPT

## 2024-10-24 PROCEDURE — 85610 PROTHROMBIN TIME: CPT | Performed by: PHYSICIAN ASSISTANT

## 2024-10-24 PROCEDURE — 85730 THROMBOPLASTIN TIME PARTIAL: CPT | Performed by: PHYSICIAN ASSISTANT

## 2024-10-24 PROCEDURE — 36415 COLL VENOUS BLD VENIPUNCTURE: CPT | Performed by: PHYSICIAN ASSISTANT

## 2024-10-24 PROCEDURE — 99291 CRITICAL CARE FIRST HOUR: CPT

## 2024-10-24 PROCEDURE — 71045 X-RAY EXAM CHEST 1 VIEW: CPT

## 2024-10-24 PROCEDURE — 83735 ASSAY OF MAGNESIUM: CPT | Performed by: EMERGENCY MEDICINE

## 2024-10-24 PROCEDURE — 84484 ASSAY OF TROPONIN QUANT: CPT | Performed by: EMERGENCY MEDICINE

## 2024-10-24 PROCEDURE — 81001 URINALYSIS AUTO W/SCOPE: CPT | Performed by: EMERGENCY MEDICINE

## 2024-10-24 PROCEDURE — 87154 CUL TYP ID BLD PTHGN 6+ TRGT: CPT | Performed by: PHYSICIAN ASSISTANT

## 2024-10-24 PROCEDURE — 87040 BLOOD CULTURE FOR BACTERIA: CPT | Performed by: PHYSICIAN ASSISTANT

## 2024-10-24 PROCEDURE — 80053 COMPREHEN METABOLIC PANEL: CPT | Performed by: EMERGENCY MEDICINE

## 2024-10-24 PROCEDURE — 36415 COLL VENOUS BLD VENIPUNCTURE: CPT

## 2024-10-24 RX ORDER — AMOXICILLIN 250 MG
2 CAPSULE ORAL 2 TIMES DAILY PRN
Status: DISCONTINUED | OUTPATIENT
Start: 2024-10-24 | End: 2024-11-05 | Stop reason: HOSPADM

## 2024-10-24 RX ORDER — AMLODIPINE BESYLATE 10 MG/1
5 TABLET ORAL DAILY
COMMUNITY
End: 2024-11-05 | Stop reason: HOSPADM

## 2024-10-24 RX ORDER — SODIUM CHLORIDE 9 MG/ML
100 INJECTION, SOLUTION INTRAVENOUS CONTINUOUS
Status: DISCONTINUED | OUTPATIENT
Start: 2024-10-24 | End: 2024-10-25

## 2024-10-24 RX ORDER — ACETAMINOPHEN 325 MG/1
650 TABLET ORAL EVERY 4 HOURS PRN
Status: DISCONTINUED | OUTPATIENT
Start: 2024-10-24 | End: 2024-11-05 | Stop reason: HOSPADM

## 2024-10-24 RX ORDER — ONDANSETRON 4 MG/1
4 TABLET, ORALLY DISINTEGRATING ORAL EVERY 6 HOURS PRN
Status: DISCONTINUED | OUTPATIENT
Start: 2024-10-24 | End: 2024-11-05 | Stop reason: HOSPADM

## 2024-10-24 RX ORDER — HYDROCODONE BITARTRATE AND ACETAMINOPHEN 7.5; 325 MG/1; MG/1
1 TABLET ORAL EVERY 6 HOURS PRN
Status: DISCONTINUED | OUTPATIENT
Start: 2024-10-24 | End: 2024-11-05 | Stop reason: HOSPADM

## 2024-10-24 RX ORDER — BISACODYL 10 MG
10 SUPPOSITORY, RECTAL RECTAL DAILY PRN
Status: DISCONTINUED | OUTPATIENT
Start: 2024-10-24 | End: 2024-11-05 | Stop reason: HOSPADM

## 2024-10-24 RX ORDER — FEXOFENADINE HCL 180 MG/1
180 TABLET ORAL DAILY
COMMUNITY

## 2024-10-24 RX ORDER — SODIUM CHLORIDE 0.9 % (FLUSH) 0.9 %
10 SYRINGE (ML) INJECTION AS NEEDED
Status: DISCONTINUED | OUTPATIENT
Start: 2024-10-24 | End: 2024-11-05 | Stop reason: HOSPADM

## 2024-10-24 RX ORDER — DOCUSATE SODIUM 100 MG/1
100 CAPSULE, LIQUID FILLED ORAL 2 TIMES DAILY
COMMUNITY

## 2024-10-24 RX ORDER — ACETAMINOPHEN 650 MG/1
650 SUPPOSITORY RECTAL EVERY 4 HOURS PRN
Status: DISCONTINUED | OUTPATIENT
Start: 2024-10-24 | End: 2024-11-05 | Stop reason: HOSPADM

## 2024-10-24 RX ORDER — POLYETHYLENE GLYCOL 3350 17 G/17G
17 POWDER, FOR SOLUTION ORAL DAILY PRN
Status: DISCONTINUED | OUTPATIENT
Start: 2024-10-24 | End: 2024-11-05 | Stop reason: HOSPADM

## 2024-10-24 RX ORDER — ONDANSETRON 2 MG/ML
4 INJECTION INTRAMUSCULAR; INTRAVENOUS EVERY 6 HOURS PRN
Status: DISCONTINUED | OUTPATIENT
Start: 2024-10-24 | End: 2024-11-05 | Stop reason: HOSPADM

## 2024-10-24 RX ORDER — BISACODYL 5 MG/1
5 TABLET, DELAYED RELEASE ORAL DAILY PRN
Status: DISCONTINUED | OUTPATIENT
Start: 2024-10-24 | End: 2024-11-05 | Stop reason: HOSPADM

## 2024-10-24 RX ORDER — ACETAMINOPHEN 160 MG/5ML
650 SOLUTION ORAL EVERY 4 HOURS PRN
Status: DISCONTINUED | OUTPATIENT
Start: 2024-10-24 | End: 2024-11-05 | Stop reason: HOSPADM

## 2024-10-24 RX ORDER — ATORVASTATIN CALCIUM 20 MG/1
10 TABLET, FILM COATED ORAL DAILY
Status: DISCONTINUED | OUTPATIENT
Start: 2024-10-25 | End: 2024-11-05 | Stop reason: HOSPADM

## 2024-10-24 RX ORDER — TAMSULOSIN HYDROCHLORIDE 0.4 MG/1
0.4 CAPSULE ORAL DAILY
Status: DISCONTINUED | OUTPATIENT
Start: 2024-10-25 | End: 2024-11-05 | Stop reason: HOSPADM

## 2024-10-24 RX ORDER — POLYETHYLENE GLYCOL 3350 17 G/17G
17 POWDER, FOR SOLUTION ORAL DAILY
COMMUNITY

## 2024-10-24 RX ORDER — PANTOPRAZOLE SODIUM 40 MG/1
40 TABLET, DELAYED RELEASE ORAL DAILY
Status: DISCONTINUED | OUTPATIENT
Start: 2024-10-25 | End: 2024-11-05 | Stop reason: HOSPADM

## 2024-10-24 RX ORDER — ASCORBIC ACID 500 MG
500 TABLET ORAL DAILY
Status: DISCONTINUED | OUTPATIENT
Start: 2024-10-25 | End: 2024-11-05 | Stop reason: HOSPADM

## 2024-10-24 RX ORDER — POLYETHYLENE GLYCOL 3350 17 G/17G
17 POWDER, FOR SOLUTION ORAL DAILY
Status: DISCONTINUED | OUTPATIENT
Start: 2024-10-25 | End: 2024-11-05 | Stop reason: HOSPADM

## 2024-10-24 RX ORDER — LIDOCAINE 40 MG/G
1 CREAM TOPICAL AS NEEDED
COMMUNITY

## 2024-10-24 RX ORDER — NOREPINEPHRINE BITARTRATE 0.03 MG/ML
.02-.3 INJECTION, SOLUTION INTRAVENOUS
Status: DISCONTINUED | OUTPATIENT
Start: 2024-10-24 | End: 2024-10-24

## 2024-10-24 RX ORDER — HYDROMORPHONE HYDROCHLORIDE 1 MG/ML
0.5 INJECTION, SOLUTION INTRAMUSCULAR; INTRAVENOUS; SUBCUTANEOUS
Status: DISPENSED | OUTPATIENT
Start: 2024-10-24 | End: 2024-10-31

## 2024-10-24 RX ORDER — LIDOCAINE 40 MG/G
1 CREAM TOPICAL AS NEEDED
Status: DISCONTINUED | OUTPATIENT
Start: 2024-10-24 | End: 2024-11-05 | Stop reason: HOSPADM

## 2024-10-24 RX ADMIN — VANCOMYCIN HYDROCHLORIDE 1750 MG: 10 INJECTION, POWDER, LYOPHILIZED, FOR SOLUTION INTRAVENOUS at 16:12

## 2024-10-24 RX ADMIN — SODIUM CHLORIDE, POTASSIUM CHLORIDE, SODIUM LACTATE AND CALCIUM CHLORIDE 1000 ML: 600; 310; 30; 20 INJECTION, SOLUTION INTRAVENOUS at 13:34

## 2024-10-24 RX ADMIN — HYOSCYAMINE SULFATE: 16 SOLUTION at 21:01

## 2024-10-24 RX ADMIN — PIPERACILLIN AND TAZOBACTAM 3.38 G: 3; .375 INJECTION, POWDER, FOR SOLUTION INTRAVENOUS at 15:19

## 2024-10-24 RX ADMIN — SODIUM CHLORIDE 2500 ML: 9 INJECTION, SOLUTION INTRAVENOUS at 13:51

## 2024-10-24 RX ADMIN — SODIUM CHLORIDE 100 ML/HR: 9 INJECTION, SOLUTION INTRAVENOUS at 17:50

## 2024-10-24 NOTE — ED NOTES
Nursing report ED to floor  Arden Carrasquillo  69 y.o.  male    HPI :  HPI  Stated Reason for Visit: ams sepsis alert    Chief Complaint  Chief Complaint   Patient presents with    Altered Mental Status     Sepsis alert       Admitting doctor:   Nito Shoemaker MD    Admitting diagnosis:   The primary encounter diagnosis was Acute renal failure, unspecified acute renal failure type. Diagnoses of Hypotension, unspecified hypotension type, Altered mental status, unspecified altered mental status type, Pressure injury of skin of sacral region, unspecified injury stage, Urinary retention, Chronic anemia, and Severe sepsis were also pertinent to this visit.    Code status:   Current Code Status       Date Active Code Status Order ID Comments User Context       Not on file            Allergies:   Vitamin d analogs    Isolation:   No active isolations    Intake and Output    Intake/Output Summary (Last 24 hours) at 10/24/2024 1618  Last data filed at 10/24/2024 1612  Gross per 24 hour   Intake 1350 ml   Output --   Net 1350 ml       Weight:   There were no vitals filed for this visit.    Most recent vitals:   Vitals:    10/24/24 1515 10/24/24 1516 10/24/24 1531 10/24/24 1601   BP:  92/58 92/57 92/55   Patient Position:    Lying   Pulse:  69 70 72   Resp:       Temp:       TempSrc:       SpO2: 100%  94% 95%   Height:           Active LDAs/IV Access:   Lines, Drains & Airways       Active LDAs       Name Placement date Placement time Site Days    Peripheral IV 10/24/24 1246 Anterior;Left;Proximal Forearm 10/24/24  1246  Forearm  less than 1    Peripheral IV 10/24/24 1504 Posterior;Right Hand 10/24/24  1504  Hand  less than 1                    Labs (abnormal labs have a star):   Labs Reviewed   COMPREHENSIVE METABOLIC PANEL - Abnormal; Notable for the following components:       Result Value    Glucose 126 (*)     BUN 72 (*)     Creatinine 4.29 (*)     Sodium 135 (*)     Potassium 5.3 (*)     CO2 20.5 (*)     Calcium 8.5  (*)     Albumin 2.7 (*)     eGFR 14.0 (*)     All other components within normal limits    Narrative:     GFR Normal >60  Chronic Kidney Disease <60  Kidney Failure <15    The GFR formula is only valid for adults with stable renal function between ages 18 and 70.   SINGLE HS TROPONIN T - Abnormal; Notable for the following components:    HS Troponin T 148 (*)     All other components within normal limits    Narrative:     High Sensitive Troponin T Reference Range:  <14.0 ng/L- Negative Female for AMI  <22.0 ng/L- Negative Male for AMI  >=14 - Abnormal Female indicating possible myocardial injury.  >=22 - Abnormal Male indicating possible myocardial injury.   Clinicians would have to utilize clinical acumen, EKG, Troponin, and serial changes to determine if it is an Acute Myocardial Infarction or myocardial injury due to an underlying chronic condition.        MAGNESIUM - Abnormal; Notable for the following components:    Magnesium 2.5 (*)     All other components within normal limits   URINALYSIS W/ MICROSCOPIC IF INDICATED (NO CULTURE) - Abnormal; Notable for the following components:    Color, UA Dark Yellow (*)     Appearance, UA Turbid (*)     Ketones, UA Trace (*)     Blood, UA Moderate (2+) (*)     Protein,  mg/dL (2+) (*)     Leuk Esterase, UA Moderate (2+) (*)     All other components within normal limits   CBC WITH AUTO DIFFERENTIAL - Abnormal; Notable for the following components:    RBC 3.62 (*)     Hemoglobin 9.9 (*)     Hematocrit 30.8 (*)     Lymphocyte % 15.5 (*)     Immature Grans % 1.1 (*)     Monocytes, Absolute 0.96 (*)     Immature Grans, Absolute 0.10 (*)     All other components within normal limits   PROTIME-INR - Abnormal; Notable for the following components:    Protime 15.9 (*)     INR 1.24 (*)     All other components within normal limits   LACTIC ACID, PLASMA - Abnormal; Notable for the following components:    Lactate 2.1 (*)     All other components within normal limits    URINALYSIS, MICROSCOPIC ONLY - Abnormal; Notable for the following components:    RBC, UA 6-10 (*)     WBC, UA Too Numerous to Count (*)     Bacteria, UA 1+ (*)     Squamous Epithelial Cells, UA Too Numerous to Count (*)     All other components within normal limits   HIGH SENSITIVITIY TROPONIN T 2HR - Abnormal; Notable for the following components:    HS Troponin T 135 (*)     Troponin T Delta -13 (*)     All other components within normal limits    Narrative:     High Sensitive Troponin T Reference Range:  <14.0 ng/L- Negative Female for AMI  <22.0 ng/L- Negative Male for AMI  >=14 - Abnormal Female indicating possible myocardial injury.  >=22 - Abnormal Male indicating possible myocardial injury.   Clinicians would have to utilize clinical acumen, EKG, Troponin, and serial changes to determine if it is an Acute Myocardial Infarction or myocardial injury due to an underlying chronic condition.        APTT - Normal   BLOOD CULTURE   BLOOD CULTURE   RAINBOW DRAW    Narrative:     The following orders were created for panel order [place] Draw.  Procedure                               Abnormality         Status                     ---------                               -----------         ------                     Green Top (Gel)[[id number]]                                  Final result               Lavender Top[[id number]]                                     Final result               Gold Top - SST[[id number]]                                   Final result               Light Blue Top[[id number]]                                   Final result                 Please view results for these tests on the individual orders.   PROCALCITONIN   CK   LACTIC ACID, REFLEX   POCT GLUCOSE FINGERSTICK   CBC AND DIFFERENTIAL    Narrative:     The following orders were created for panel order CBC & Differential.  Procedure                               Abnormality         Status                     ---------                                -----------         ------                     CBC Auto Differential[[id number]]        Abnormal            Final result                 Please view results for these tests on the individual orders.   GREEN TOP   LAVENDER TOP   GOLD TOP - SST   LIGHT BLUE TOP       EKG:   ECG 12 Lead ED Triage Standing Order; Weak / Dizzy / AMS   Final Result   HEART RATE=70  bpm   RR Weasfnwy=400  ms   ND Jclbmnng=884  ms   P Horizontal Axis=28  deg   P Front Axis=46  deg   QRSD Interval=88  ms   QT Bdzbcxig=941  ms   SJsO=885  ms   QRS Axis=-7  deg   T Wave Axis=44  deg   - OTHERWISE NORMAL ECG -   Sinus rhythm   Abnormal R-wave progression, early transition   No Prior Tracing for Comparison   Electronically Signed By: Rio Montano (DERRELL) ([facility]) 2024-10-24 13:59:51   Date and Time of Study:2024-10-24 13:35:47          Meds given in ED:   Medications   sodium chloride 0.9 % flush 10 mL (has no administration in time range)   vancomycin 1750 mg/500 mL 0.9% NS IVPB ([facility]) (1,750 mg Intravenous New Bag 10/24/24 1612)   norepinephrine (LEVOPHED) 8 mg in 250 mL NS infusion (premix) (has no administration in time range)   lactated ringers bolus 1,000 mL (0 mL Intravenous Stopped 10/24/24 1443)   sodium chloride 0.9 % bolus 2,500 mL ( Intravenous Currently Infusing 10/24/24 1616)   piperacillin-tazobactam (ZOSYN) 3.375 g IVPB in 100 mL NS MBP (CD) (0 g Intravenous Stopped 10/24/24 1612)       Imaging results:  CT Abdomen Pelvis Without Contrast    Result Date: 10/24/2024   1. Sacral decubitus ulceration, extends to the underlying sacrum. 2. Large amount of partial atelectasis in the basilar left lower lobe. 3. Mild cardiomegaly  This report was finalized on 10/24/2024 2:48 PM by Dr. Camacho Beal M.D on Workstation: [id number]      XR Chest 1 View    Result Date: 10/24/2024  No focal pulmonary consolidation. Borderline heart size. Ectatic appearing aorta. Follow-up as clinically indicated.    This report was finalized on  10/24/2024 2:08 PM by Dr. Ruddy De La Cruz M.D on Workstation: [id number]       Ambulatory status:   - has not ambulated in ED    Social issues:   Social History     Socioeconomic History    Marital status: Single   Tobacco Use    Smoking status: Never     Passive exposure: Never    Smokeless tobacco: Never   Vaping Use    Vaping status: Never Used   Substance and Sexual Activity    Alcohol use: Defer    Drug use: Yes     Types: Hydrocodone    Sexual activity: Defer       Peripheral Neurovascular       Neuro Cognitive       Learning  Learning Assessment  Learning Readiness and Ability: no barriers identified  Education Provided  Person Taught: patient    Respiratory  Respiratory WDL  Respiratory WDL: WDL    Abdominal Pain       Pain Assessments  Pain (Adult)  Preferred Pain Scale: FACES ([place] FACES Pain Rating Scale)  FACES Pain Rating: Rest: 2-->hurts little bit  FACES Pain Rating: Activity: 2-->hurts little bit    NIH Stroke Scale       Amna Sheridan RN  10/24/24 16:18 EDT

## 2024-10-24 NOTE — ED PROVIDER NOTES
EMERGENCY DEPARTMENT ENCOUNTER  Room Number:  01/01  PCP: Pollo Ward MD  Independent Historians: Patient and EMS      HPI:  Chief Complaint: had concerns including Altered Mental Status (Sepsis alert).     A complete HPI/ROS/PMH/PSH/SH/FH are unobtainable due to: None and Altered Mental Status    Chronic or social conditions impacting patient care (Social Determinants of Health): None      Context: Arden Carrasquillo is a 69 y.o. male with a medical history of diabetes, hypertension, CKD, hyperlipidemia, prostate cancer, and cervical cord compression who presents to the ED c/o acute altered mental status.  Patient was recently discharged from another facility after he was admitted for urosepsis.  Family reports for the last 2 days he has been increasingly altered and less responsive than his baseline.  Noted to have low-grade fever with EMS.  Patient does have chronic sacral decubitus ulcer.  Has had good urine output from his indwelling Armendariz catheter.  History is otherwise limited as patient is acutely altered.      Review of prior external notes (non-ED) -and- Review of prior external test results outside of this encounter:  Patient seen in office by PCP on 10/3/2024 for CKD.  Reviewed assessment and plan.  Will place orders for home health and have patient follow-up in 3 months.  Reviewed labs collected on 10/16/2024.  CBC with hemoglobin 11.0, BMP with creatinine 1.11.    Prescription drug monitoring program review:     N/A    PAST MEDICAL HISTORY  Active Ambulatory Problems     Diagnosis Date Noted    Type 2 diabetes mellitus with diabetic autonomic neuropathy, with long-term current use of insulin 05/26/2015    Degenerative spondylolisthesis 12/17/2012    Hypertension 05/26/2015    Generalized anxiety disorder 11/02/2020    Stage 3a chronic kidney disease 11/02/2020    Hyperlipidemia 11/02/2020    Prostate cancer 11/03/2020    Anemia in stage 3b chronic kidney disease 11/03/2020    Gynecomastia 12/03/2020     Carcinoma in situ of colon 01/20/2021    Bilateral lower extremity edema 07/06/2021    Weakness of both lower extremities 06/20/2022    Psychophysiological insomnia 07/25/2022    History of colon polyps 02/08/2023    Lumbar stenosis with neurogenic claudication 05/19/2023    S/P cervical spinal fusion 03/18/2024    Cervical cord compression with myelopathy 03/18/2024    Aneurysm of descending thoracic aorta without rupture 03/18/2024    Tremor 10/03/2024     Resolved Ambulatory Problems     Diagnosis Date Noted    BPH with obstruction/lower urinary tract symptoms 08/12/2015    Hyponatremia 05/26/2015    Positive colorectal cancer screening using Cologuard test 12/03/2020    Encounter for screening for malignant neoplasm of colon 02/08/2023    Severe peripheral polyneuropathy 10/19/2023     Past Medical History:   Diagnosis Date    Diabetes mellitus          PAST SURGICAL HISTORY  Past Surgical History:   Procedure Laterality Date    CYSTOSCOPY TRANSURETHRAL RESECTION OF PROSTATE      UPPER GASTROINTESTINAL ENDOSCOPY           FAMILY HISTORY  Family History   Problem Relation Age of Onset    Colon cancer Neg Hx     Colon polyps Neg Hx          SOCIAL HISTORY  Social History     Socioeconomic History    Marital status: Single   Tobacco Use    Smoking status: Never     Passive exposure: Never    Smokeless tobacco: Never   Vaping Use    Vaping status: Never Used   Substance and Sexual Activity    Alcohol use: Defer    Drug use: Yes     Types: Hydrocodone    Sexual activity: Defer         ALLERGIES  Vitamin d analogs      REVIEW OF SYSTEMS  Included in HPI  All systems reviewed and negative except for those discussed in HPI.      PHYSICAL EXAM    I have reviewed the triage vital signs and nursing notes.    ED Triage Vitals   Temp Pulse Resp BP SpO2   -- -- -- -- --      Temp src Heart Rate Source Patient Position BP Location FiO2 (%)   -- -- -- -- --       Physical Exam  Constitutional:       General: He is not in  acute distress.     Appearance: Normal appearance. He is ill-appearing.   HENT:      Head: Normocephalic and atraumatic.      Nose: Nose normal.      Mouth/Throat:      Mouth: Mucous membranes are moist.   Eyes:      Conjunctiva/sclera: Conjunctivae normal.      Pupils: Pupils are equal, round, and reactive to light.   Cardiovascular:      Rate and Rhythm: Normal rate and regular rhythm.      Pulses: Normal pulses.      Heart sounds: Normal heart sounds.   Pulmonary:      Effort: Pulmonary effort is normal.      Breath sounds: Normal breath sounds.   Abdominal:      General: There is no distension.   Genitourinary:     Comments: Indwelling Armendariz catheter  Musculoskeletal:         General: Normal range of motion.      Cervical back: Normal range of motion and neck supple.   Skin:     General: Skin is warm.      Capillary Refill: Capillary refill takes less than 2 seconds.      Comments: Sacral decubitus ulcer tunneling approximately 1 cm towards the sacrum with no exposed bone   Neurological:      General: No focal deficit present.      Mental Status: He is lethargic.   Psychiatric:         Mood and Affect: Mood normal.             LAB RESULTS  Recent Results (from the past 24 hours)   Comprehensive Metabolic Panel    Collection Time: 10/24/24 12:51 PM    Specimen: Blood   Result Value Ref Range    Glucose 126 (H) 65 - 99 mg/dL    BUN 72 (H) 8 - 23 mg/dL    Creatinine 4.29 (H) 0.76 - 1.27 mg/dL    Sodium 135 (L) 136 - 145 mmol/L    Potassium 5.3 (H) 3.5 - 5.2 mmol/L    Chloride 103 98 - 107 mmol/L    CO2 20.5 (L) 22.0 - 29.0 mmol/L    Calcium 8.5 (L) 8.6 - 10.5 mg/dL    Total Protein 6.7 6.0 - 8.5 g/dL    Albumin 2.7 (L) 3.5 - 5.2 g/dL    ALT (SGPT) 13 1 - 41 U/L    AST (SGOT) 13 1 - 40 U/L    Alkaline Phosphatase 106 39 - 117 U/L    Total Bilirubin 0.3 0.0 - 1.2 mg/dL    Globulin 4.0 gm/dL    A/G Ratio 0.7 g/dL    BUN/Creatinine Ratio 16.8 7.0 - 25.0    Anion Gap 11.5 5.0 - 15.0 mmol/L    eGFR 14.0 (L) >60.0  mL/min/1.73   Single High Sensitivity Troponin T    Collection Time: 10/24/24 12:51 PM    Specimen: Blood   Result Value Ref Range    HS Troponin T 148 (C) <22 ng/L   Magnesium    Collection Time: 10/24/24 12:51 PM    Specimen: Blood   Result Value Ref Range    Magnesium 2.5 (H) 1.6 - 2.4 mg/dL   Urinalysis With Microscopic If Indicated (No Culture) - Urine, Clean Catch    Collection Time: 10/24/24 12:51 PM    Specimen: Urine, Clean Catch   Result Value Ref Range    Color, UA Dark Yellow (A) Yellow, Straw    Appearance, UA Turbid (A) Clear    pH, UA <=5.0 5.0 - 8.0    Specific Gravity, UA 1.028 1.005 - 1.030    Glucose, UA Negative Negative    Ketones, UA Trace (A) Negative    Bilirubin, UA Negative Negative    Blood, UA Moderate (2+) (A) Negative    Protein,  mg/dL (2+) (A) Negative    Leuk Esterase, UA Moderate (2+) (A) Negative    Nitrite, UA Negative Negative    Urobilinogen, UA 1.0 E.U./dL 0.2 - 1.0 E.U./dL   Green Top (Gel)    Collection Time: 10/24/24 12:51 PM   Result Value Ref Range    Extra Tube Hold for add-ons.    Lavender Top    Collection Time: 10/24/24 12:51 PM   Result Value Ref Range    Extra Tube hold for add-on    Gold Top - SST    Collection Time: 10/24/24 12:51 PM   Result Value Ref Range    Extra Tube Hold for add-ons.    Light Blue Top    Collection Time: 10/24/24 12:51 PM   Result Value Ref Range    Extra Tube Hold for add-ons.    CBC Auto Differential    Collection Time: 10/24/24 12:51 PM    Specimen: Blood   Result Value Ref Range    WBC 9.11 3.40 - 10.80 10*3/mm3    RBC 3.62 (L) 4.14 - 5.80 10*6/mm3    Hemoglobin 9.9 (L) 13.0 - 17.7 g/dL    Hematocrit 30.8 (L) 37.5 - 51.0 %    MCV 85.1 79.0 - 97.0 fL    MCH 27.3 26.6 - 33.0 pg    MCHC 32.1 31.5 - 35.7 g/dL    RDW 14.2 12.3 - 15.4 %    RDW-SD 43.9 37.0 - 54.0 fl    MPV 9.3 6.0 - 12.0 fL    Platelets 283 140 - 450 10*3/mm3    Neutrophil % 71.2 42.7 - 76.0 %    Lymphocyte % 15.5 (L) 19.6 - 45.3 %    Monocyte % 10.5 5.0 - 12.0 %     Eosinophil % 1.4 0.3 - 6.2 %    Basophil % 0.3 0.0 - 1.5 %    Immature Grans % 1.1 (H) 0.0 - 0.5 %    Neutrophils, Absolute 6.48 1.70 - 7.00 10*3/mm3    Lymphocytes, Absolute 1.41 0.70 - 3.10 10*3/mm3    Monocytes, Absolute 0.96 (H) 0.10 - 0.90 10*3/mm3    Eosinophils, Absolute 0.13 0.00 - 0.40 10*3/mm3    Basophils, Absolute 0.03 0.00 - 0.20 10*3/mm3    Immature Grans, Absolute 0.10 (H) 0.00 - 0.05 10*3/mm3    nRBC 0.0 0.0 - 0.2 /100 WBC   Protime-INR    Collection Time: 10/24/24 12:51 PM    Specimen: Blood   Result Value Ref Range    Protime 15.9 (H) 11.7 - 14.2 Seconds    INR 1.24 (H) 0.90 - 1.10   aPTT    Collection Time: 10/24/24 12:51 PM    Specimen: Blood   Result Value Ref Range    PTT 30.0 22.7 - 35.4 seconds   Urinalysis, Microscopic Only - Urine, Clean Catch    Collection Time: 10/24/24 12:51 PM    Specimen: Urine, Clean Catch   Result Value Ref Range    RBC, UA 6-10 (A) None Seen, 0-2 /HPF    WBC, UA Too Numerous to Count (A) None Seen, 0-2 /HPF    Bacteria, UA 1+ (A) None Seen /HPF    Squamous Epithelial Cells, UA Too Numerous to Count (A) None Seen, 0-2 /HPF    Hyaline Casts, UA Too Numerous to Count None Seen /LPF    Methodology Automated Microscopy    Lactic Acid, Plasma    Collection Time: 10/24/24  1:21 PM    Specimen: Arm, Left; Blood   Result Value Ref Range    Lactate 2.1 (C) 0.5 - 2.0 mmol/L   ECG 12 Lead ED Triage Standing Order; Weak / Dizzy / AMS    Collection Time: 10/24/24  1:35 PM   Result Value Ref Range    QT Interval 387 ms    QTC Interval 418 ms   High Sensitivity Troponin T 2Hr    Collection Time: 10/24/24  3:04 PM    Specimen: Blood   Result Value Ref Range    HS Troponin T 135 (C) <22 ng/L    Troponin T Delta -13 (L) >=-4 - <+4 ng/L         RADIOLOGY  CT Abdomen Pelvis Without Contrast    Result Date: 10/24/2024  CT ABDOMEN PELVIS WO CONTRAST-  INDICATION: Sepsis  COMPARISON: None  TECHNIQUE: Routine CT abdomen/pelvis without IV contrast. Coronal and sagittal reformats. Radiation  dose reduction techniques were utilized, including automated exposure control and exposure modulation based on body size.  FINDINGS:  Lung bases: Trace left pleural effusion. Subsegmental atelectasis at the right lung base. Partial atelectasis in the basilar left lower lobe. Coronary artery atherosclerotic calcification. Mild cardiomegaly. Gynecomastia.  ABDOMEN: Normal liver, gallbladder and spleen. Mild to moderate pancreatic atrophy. No pancreatic ductal dilatation or mass seen. No adrenal nodules. Fluid attenuating cyst seen in the left mid kidney. Subcentimeter hyperattenuating lesion in the left mid kidney, series 2, axial mage 58, too small to characterize, typically a small hemorrhagic or proteinaceous cyst. No urolithiasis or hydronephrosis. Motion obscures the right kidney with a mildly prominent right renal collecting system.  Pelvis: Prostate is normal in size. Armendariz in the bladder. Gas in the nondependent bladder lumen, suspect iatrogenic. Bladder is collapsed. Phleboliths in the pelvis.  Bowel: No obstruction. Normal appendix.  Body wall: Large fat-containing supraumbilical ventral hernia. Small fat-containing umbilical hernia. Small fat-containing inguinal hernias.  Soft tissue defect seen posterior to the inferior sacrum and coccyx, containing packing material, consistent with a sacral decubitus ulceration, extends to the underlying bone. No fluid collection.  Retroperitoneum: No lymphadenopathy.  Vasculature: No abdominal aortic aneurysm. Mild aortoiliac atherosclerotic calcification.  Osseous structures: See above. Decreased bone mineralization. Lumbar facet degenerative arthropathy with grade 1 anterolisthesis of L4 on L5.  Artifacts: Motion       1. Sacral decubitus ulceration, extends to the underlying sacrum. 2. Large amount of partial atelectasis in the basilar left lower lobe. 3. Mild cardiomegaly  This report was finalized on 10/24/2024 2:48 PM by Dr. Camacho Beal M.D on Workstation:  [id number]      CT Head Without Contrast    Result Date: 10/24/2024  CT HEAD WO CONTRAST-  HISTORY:  AMS; N17.9-Acute kidney failure, unspecified; I95.9-Hypotension, unspecified; R41.82-Altered mental status, unspecified  COMPARISON: None  FINDINGS: The brain and ventricles are symmetrical. Moderate vascular calcification is noted. Areas of decreased attenuation involving the white matter of cerebral hemispheres are noted bilaterally consistent with mild to moderate small vessel ischemic disease. No focal area of decreased attenuation to suggest acute infarction is identified. Further evaluation could be performed with a MRI examination of the brain as indicated.      Radiation dose reduction techniques were utilized, including automated exposure modulation based on body size.  This report was finalized on 10/24/2024 2:39 PM by Dr. Damien Mathis M.D on Workstation: [id number]      XR Chest 1 View    Result Date: 10/24/2024  XR CHEST 1 VW-  HISTORY: Male who is 69 years-old, weakness  TECHNIQUE: frontal view of the chest  COMPARISON: None  FINDINGS: The heart size is borderline. Aorta appears ectatic. Pulmonary vasculature is unremarkable.  No focal pulmonary consolidation, pleural effusion, or pneumothorax. No acute osseous process.      No focal pulmonary consolidation. Borderline heart size. Ectatic appearing aorta. Follow-up as clinically indicated.    This report was finalized on 10/24/2024 2:08 PM by Dr. Ruddy De La Cruz M.D on Workstation: [id number]         MEDICATIONS GIVEN IN ER  Medications   sodium chloride 0.9 % flush 10 mL (has no administration in time range)   vancomycin 1750 mg/500 mL 0.9% NS IVPB ([facility]) (has no administration in time range)   norepinephrine (LEVOPHED) 8 mg in 250 mL NS infusion (premix) (has no administration in time range)   lactated ringers bolus 1,000 mL (0 mL Intravenous Stopped 10/24/24 1443)   sodium chloride 0.9 % bolus 2,500 mL (2,500 mL Intravenous New Bag 10/24/24  9081)   piperacillin-tazobactam (ZOSYN) 3.375 g IVPB in 100 mL NS MBP (CD) (3.375 g Intravenous New Bag 10/24/24 1519)         ORDERS PLACED DURING THIS VISIT:  Orders Placed This Encounter   Procedures    Blood Culture - Blood,    Blood Culture - Blood,    XR Chest 1 View    CT Head Without Contrast    CT Abdomen Pelvis Without Contrast    [place] Draw    Comprehensive Metabolic Panel    Single High Sensitivity Troponin T    Magnesium    Urinalysis With Microscopic If Indicated (No Culture) - Urine, Clean Catch    CBC Auto Differential    Protime-INR    aPTT    Procalcitonin    Lactic Acid, Plasma    CK    Urinalysis, Microscopic Only - Urine, Clean Catch    High Sensitivity Troponin T 2Hr    STAT Lactic Acid, Reflex    NPO Diet NPO Type: Strict NPO    Undress & Gown    Continuous Pulse Oximetry    Vital Signs    Orthostatic Blood Pressure    Pulmonology (on-call MD unless specified)    Oxygen Therapy- Nasal Cannula; Titrate 1-6 LPM Per SpO2; 90 - 95%    POC Glucose Once    ECG 12 Lead ED Triage Standing Order; Weak / Dizzy / AMS    Insert Peripheral IV    Inpatient Admission    Fall Precautions    CBC & Differential    Green Top (Gel)    Lavender Top    Gold Top - SST    Light Blue Top         OUTPATIENT MEDICATION MANAGEMENT:  Current Facility-Administered Medications Ordered in Epic   Medication Dose Route Frequency Provider Last Rate Last Admin    norepinephrine (LEVOPHED) 8 mg in 250 mL NS infusion (premix)  0.02-0.3 mcg/kg/min Intravenous Titrated Peggy Goldsmith PA-C        sodium chloride 0.9 % flush 10 mL  10 mL Intravenous PRN Nuno Aguirre MD        vancomycin 1750 mg/500 mL 0.9% NS IVPB ([facility])  20 mg/kg Intravenous Once Peggy Goldsmith PA-C         Current Outpatient Medications Ordered in Epic   Medication Sig Dispense Refill    amLODIPine (NORVASC) 10 MG tablet Take 0.5 tablets by mouth Daily.      atorvastatin (LIPITOR) 10 MG tablet Take 1 tab PO QD for cholesterol and heart health (Patient  taking differently: Take 1 tablet by mouth Daily.) 90 tablet 3    cyclobenzaprine (FLEXERIL) 5 MG tablet Take 1 tablet by mouth 3 (Three) Times a Day As Needed for Muscle Spasms. 90 tablet 3    docusate sodium (COLACE) 100 MG capsule Take 1 capsule by mouth 2 (Two) Times a Day.      ferrous sulfate 325 (65 FE) MG tablet Take 1 tablet by mouth Daily With Breakfast. 90 tablet 3    fexofenadine (ALLEGRA) 180 MG tablet Take 1 tablet by mouth Daily.      HYDROcodone-acetaminophen (NORCO) 7.5-325 MG per tablet Take 1 tablet by mouth Every 6 (Six) Hours As Needed for Severe Pain. 120 tablet 0    lidocaine (LMX) 4 % cream Apply 1 Application topically to the appropriate area as directed As Needed for Mild Pain.      losartan (COZAAR) 100 MG tablet TAKE 1 TABLET DAILY 90 tablet 3    Melatonin 10 MG tablet Take 1 tablet by mouth At Night As Needed.      pantoprazole (PROTONIX) 40 MG EC tablet Take 1 tablet by mouth Daily.      polyethylene glycol (MIRALAX) 17 g packet Take 17 g by mouth Daily.      promethazine (PHENERGAN) 12.5 MG tablet TAKE 1/2 TO 1 TABLET BY MOUTH EVERY 6 HOURS AS NEEDED FOR NAUSEA (Patient taking differently: Take 1 tablet by mouth Every 6 (Six) Hours As Needed.) 15 tablet 1    sodium hypochlorite (DAKIN'S) 0.25 % topical solution Use as directed twice daily (Patient taking differently: Apply  topically to the appropriate area as directed. Use as directed twice daily) 473 mL 3    tamsulosin (FLOMAX) 0.4 MG capsule 24 hr capsule Take 1 capsule by mouth Daily. 90 capsule 3    vitamin C (ASCORBIC ACID) 500 MG tablet Take 1 tablet by mouth Daily. 90 tablet 3    zolpidem (AMBIEN) 5 MG tablet TAKE 1/2 TO 1 TABLET BY MOUTH ONCE NIGHTLY (Patient taking differently: Take 0.5-1 tablets by mouth At Night As Needed.) 30 tablet 2    torsemide 40 MG tablet Take 40 mg by mouth 2 (Two) Times a Day. (Patient not taking: Reported on 10/24/2024) 180 tablet 3         50 minutes of critical care provided. This time excludes  other billable procedures. Time does include preparation of documents, medical consultations, review of old records, and direct bedside care. Patient is at high risk for life-threatening deterioration due to severe sepsis, altered mental status, acute renal failure requiring IV fluid bolus, pressors, broad-spectrum antibiotics, admission to the ICU.         PROGRESS, DATA ANALYSIS, CONSULTS, AND MEDICAL DECISION MAKING  All labs have been independently interpreted by me.  All radiology studies have been reviewed by me. All EKG's have been independently viewed and interpreted by me.  Discussion below represents my analysis of pertinent findings related to patient's condition, differential diagnosis, treatment plan and final disposition.    Differential diagnosis includes but is not limited to urosepsis, infected sacral decubitus ulcer, hypotension, stroke.        ED Course as of 10/24/24 1601   Thu Oct 24, 2024   1325 WBC: 9.11 [MP]   1325 Hemoglobin(!): 9.9 [MP]   1325 INR(!): 1.24 [MP]   1351 Nitrite, UA: Negative [MP]   1351 Leukocytes, UA(!): Moderate (2+) [MP]   1351 Blood, UA(!): Moderate (2+) [MP]   1351 RBC, UA(!): 6-10 [MP]   1351 WBC, UA(!): Too Numerous to Count [MP]   1351 Bacteria, UA(!): 1+ [MP]   1351 Squamous Epithelial Cells, UA(!): Too Numerous to Count [MP]   1351 HS Troponin T(!!): 148  Suspect secondary to acute renal failure [MP]   1351 BUN(!): 72 [MP]   1351 Creatinine(!): 4.29  1.11 eight days ago [MP]   1409 Pt to CT scan [MP]   1516 MAP did go up to 80 but has been slowly downtrending and is now 71.  Given labile blood pressure, will initiate patient on Levophed and admit to the ICU.  Will cover empirically with broad-spectrum Zosyn and vancomycin for suspected sacral wound and possible osteomyelitis. [MP]   1600 I spoke with Dr. Shoemaker with pulmonology.  Discussed patient presentation and ED findings.  He agrees to admit patient to an ICU bed. [MP]      ED Course User Index  [MP] Pleijero,  Peggy BAKER PA-C             AS OF 16:01 EDT VITALS:    BP - 92/57  HR - 70  TEMP - 99.8 °F (37.7 °C) (Rectal)  O2 SATS - 94%    COMPLEXITY OF CARE  The patient requires admission.      DIAGNOSIS  Final diagnoses:   Acute renal failure, unspecified acute renal failure type   Hypotension, unspecified hypotension type   Altered mental status, unspecified altered mental status type   Pressure injury of skin of sacral region, unspecified injury stage   Urinary retention   Chronic anemia   Severe sepsis         DISPOSITION  ED Disposition       ED Disposition   Decision to Admit    Condition   --    Comment   Level of Care: Telemetry [5]   Diagnosis: Acute renal failure [190405]   Admitting Physician: SANDRA MARAVILLA [[id number]]   Attending Physician: SANDRA MARAVILLA [[id number]]   Certification: I Certify That Inpatient Hospital Services Are Medically Necessary For Greater Than 2 Midnights                  Please note that portions of this document were completed with a voice recognition program.    Note Disclaimer: At [facility], we believe that sharing information builds trust and better relationships. You are receiving this note because you recently visited [facility]. It is possible you will see health information before a provider has talked with you about it. This kind of information can be easy to misunderstand. To help you fully understand what it means for your health, we urge you to discuss this note with your provider.     Peggy Goldsmith PA-C  10/24/24 9442

## 2024-10-24 NOTE — PROGRESS NOTES
Patient not in ER when I went to see. Was transferred to floor bed as not hypotensive. Fluid orders entered. Patient has already received initial dose of abx in ER.  I spoke to Dr. Muñoz who is on for [facility] and she will see patient - intensivist team will not follow unless called.    Electronically signed by Tres Beyer MD, 10/24/24, 5:27 PM EDT.

## 2024-10-24 NOTE — H&P
HISTORY AND PHYSICAL   [facility]        Date of Admission: 10/24/2024  Patient Identification:  Name: Arden Carrasquillo  Age: 69 y.o.  Sex: male  :  1955  MRN: [id number]                     Primary Care Physician: Pollo Ward MD    Chief Complaint:  69 year old gentleman who presented to the emergency room with altered mental status; he has a history of diabetes and has a sacral decubitus ulcer; he was recently admitted at another facility with sepsis and uti; he has been more lethargic and confused for the last two days; he lives with family; the patient remains drowsy and is not able to give any history;  he was initially hypotensive and admitted to the icu provider but then improved and was admitted to the floor so we were asked to assume care;    History of Present Illness:   As above    Past Medical History:  Past Medical History:   Diagnosis Date    Diabetes mellitus     Positive colorectal cancer screening using Cologuard test 2020    Severe peripheral polyneuropathy 10/19/2023     Past Surgical History:  Past Surgical History:   Procedure Laterality Date    CYSTOSCOPY TRANSURETHRAL RESECTION OF PROSTATE      UPPER GASTROINTESTINAL ENDOSCOPY        Home Meds:  Medications Prior to Admission   Medication Sig Dispense Refill Last Dose/Taking    amLODIPine (NORVASC) 10 MG tablet Take 0.5 tablets by mouth Daily.   Taking    atorvastatin (LIPITOR) 10 MG tablet Take 1 tab PO QD for cholesterol and heart health (Patient taking differently: Take 1 tablet by mouth Daily.) 90 tablet 3 Taking Differently    cyclobenzaprine (FLEXERIL) 5 MG tablet Take 1 tablet by mouth 3 (Three) Times a Day As Needed for Muscle Spasms. 90 tablet 3 Taking As Needed    docusate sodium (COLACE) 100 MG capsule Take 1 capsule by mouth 2 (Two) Times a Day.   Taking    ferrous sulfate 325 (65 FE) MG tablet Take 1 tablet by mouth Daily With Breakfast. 90 tablet 3 Taking    fexofenadine (ALLEGRA) 180 MG tablet  Take 1 tablet by mouth Daily.   Taking    HYDROcodone-acetaminophen (NORCO) 7.5-325 MG per tablet Take 1 tablet by mouth Every 6 (Six) Hours As Needed for Severe Pain. 120 tablet 0 Taking As Needed    lidocaine (LMX) 4 % cream Apply 1 Application topically to the appropriate area as directed As Needed for Mild Pain.   Taking As Needed    losartan (COZAAR) 100 MG tablet TAKE 1 TABLET DAILY 90 tablet 3 Not Taking    Melatonin 10 MG tablet Take 1 tablet by mouth At Night As Needed.   Taking As Needed    pantoprazole (PROTONIX) 40 MG EC tablet Take 1 tablet by mouth Daily.   Taking    polyethylene glycol (MIRALAX) 17 g packet Take 17 g by mouth Daily.   Taking    promethazine (PHENERGAN) 12.5 MG tablet TAKE 1/2 TO 1 TABLET BY MOUTH EVERY 6 HOURS AS NEEDED FOR NAUSEA (Patient taking differently: Take 1 tablet by mouth Every 6 (Six) Hours As Needed.) 15 tablet 1 Taking Differently    sodium hypochlorite (DAKIN'S) 0.25 % topical solution Use as directed twice daily (Patient taking differently: Apply  topically to the appropriate area as directed. Use as directed twice daily) 473 mL 3 Taking Differently    tamsulosin (FLOMAX) 0.4 MG capsule 24 hr capsule Take 1 capsule by mouth Daily. 90 capsule 3 Taking    vitamin C (ASCORBIC ACID) 500 MG tablet Take 1 tablet by mouth Daily. 90 tablet 3 Taking    zolpidem (AMBIEN) 5 MG tablet TAKE 1/2 TO 1 TABLET BY MOUTH ONCE NIGHTLY (Patient taking differently: Take 0.5-1 tablets by mouth At Night As Needed.) 30 tablet 2 Taking Differently    torsemide 40 MG tablet Take 40 mg by mouth 2 (Two) Times a Day. (Patient not taking: Reported on 10/24/2024) 180 tablet 3 Not Taking       Allergies:  Allergies   Allergen Reactions    Vitamin D Analogs Rash     Immunizations:  Immunization History   Administered Date(s) Administered    COVID-19 (PFIZER) BIVALENT 12+YRS 10/06/2022    COVID-19 (PFIZER) Purple Cap Monovalent 03/13/2021, 04/10/2021, 11/18/2021    Covid-19 (Pfizer) Gray Cap Monovalent  "2022    FLUAD TRI 65YR+ 2020    Fluzone High-Dose 65+YRS 10/03/2024    Fluzone High-Dose 65+yrs 10/06/2022    Influenza, Unspecified 10/06/2022, 2023    Pneumococcal Conjugate 20-Valent (PCV20) 2023    Pneumococcal Polysaccharide (PPSV23) 2020     Social History:   Social History     Social History Narrative    Not on file     Social History     Socioeconomic History    Marital status: Single   Tobacco Use    Smoking status: Never     Passive exposure: Never    Smokeless tobacco: Never   Vaping Use    Vaping status: Never Used   Substance and Sexual Activity    Alcohol use: Defer    Drug use: Yes     Types: Hydrocodone    Sexual activity: Defer       Family History:  Family History   Problem Relation Age of Onset    Colon cancer Neg Hx     Colon polyps Neg Hx         Review of Systems  As above       Objective:  T Max 24 hrs: Temp (24hrs), Av.7 °F (37.1 °C), Min:97.6 °F (36.4 °C), Max:99.8 °F (37.7 °C)    Vitals Ranges:   Temp:  [97.6 °F (36.4 °C)-99.8 °F (37.7 °C)] 97.6 °F (36.4 °C)  Heart Rate:  [67-77] 68  Resp:  [14-18] 18  BP: ()/(52-69) 107/69      Exam:  /69   Pulse 68   Temp 97.6 °F (36.4 °C) (Oral)   Resp 18   Ht 167.6 cm (65.98\")   Wt 90.2 kg (198 lb 13.7 oz)   SpO2 99%   BMI 32.11 kg/m²     General Appearance:    drowsy, cooperative, no distress, appears stated age; chronically ill appearing   Head:    Normocephalic, without obvious abnormality, atraumatic   Eyes:    PERRL, conjunctivae/corneas clear, EOM's intact, both eyes   Ears:    Normal external ear canals, both ears   Nose:   Nares normal, septum midline, mucosa normal, no drainage    or sinus tenderness   Throat:   Lips, mucosa, and tongue normal   Neck:   Supple, symmetrical, trachea midline, no adenopathy;     thyroid:  no enlargement/tenderness/nodules; no carotid    bruit or JVD   Back:     Symmetric, no curvature, ROM normal, no CVA tenderness   Lungs:    Decreased breath sounds " bilaterally, respirations unlabored   Chest Wall:    No tenderness or deformity    Heart:    Regular rate and rhythm, S1 and S2 normal, no murmur, rub   or gallop   Abdomen:     Soft, nontender, bowel sounds active all four quadrants,     no masses, no hepatomegaly, no splenomegaly   Extremities:   Extremities normal, atraumatic, no cyanosis or edema                       .    Data Review:  Labs in chart were reviewed.  WBC   Date Value Ref Range Status   10/24/2024 9.11 3.40 - 10.80 10*3/mm3 Final     Hemoglobin   Date Value Ref Range Status   10/24/2024 9.9 (L) 13.0 - 17.7 g/dL Final     Hematocrit   Date Value Ref Range Status   10/24/2024 30.8 (L) 37.5 - 51.0 % Final     Platelets   Date Value Ref Range Status   10/24/2024 283 140 - 450 10*3/mm3 Final     Sodium   Date Value Ref Range Status   10/24/2024 135 (L) 136 - 145 mmol/L Final     Potassium   Date Value Ref Range Status   10/24/2024 5.3 (H) 3.5 - 5.2 mmol/L Final     Chloride   Date Value Ref Range Status   10/24/2024 103 98 - 107 mmol/L Final     CO2   Date Value Ref Range Status   10/24/2024 20.5 (L) 22.0 - 29.0 mmol/L Final     BUN   Date Value Ref Range Status   10/24/2024 72 (H) 8 - 23 mg/dL Final     Creatinine   Date Value Ref Range Status   10/24/2024 4.29 (H) 0.76 - 1.27 mg/dL Final     Glucose   Date Value Ref Range Status   10/24/2024 126 (H) 65 - 99 mg/dL Final     Calcium   Date Value Ref Range Status   10/24/2024 8.5 (L) 8.6 - 10.5 mg/dL Final     Magnesium   Date Value Ref Range Status   10/24/2024 2.5 (H) 1.6 - 2.4 mg/dL Final     AST (SGOT)   Date Value Ref Range Status   10/24/2024 13 1 - 40 U/L Final     ALT (SGPT)   Date Value Ref Range Status   10/24/2024 13 1 - 41 U/L Final     Alkaline Phosphatase   Date Value Ref Range Status   10/24/2024 106 39 - 117 U/L Final                Imaging Results (All)       Procedure Component Value Units Date/Time    CT Abdomen Pelvis Without Contrast [[id number]] Collected: 10/24/24 5291     Updated:  10/24/24 1451    Narrative:      CT ABDOMEN PELVIS WO CONTRAST-     INDICATION: Sepsis     COMPARISON: None     TECHNIQUE:  Routine CT abdomen/pelvis without IV contrast. Coronal and sagittal  reformats. Radiation dose reduction techniques were utilized, including  automated exposure control and exposure modulation based on body size.     FINDINGS:      Lung bases: Trace left pleural effusion. Subsegmental atelectasis at the  right lung base. Partial atelectasis in the basilar left lower lobe.  Coronary artery atherosclerotic calcification. Mild cardiomegaly.  Gynecomastia.     ABDOMEN: Normal liver, gallbladder and spleen. Mild to moderate  pancreatic atrophy. No pancreatic ductal dilatation or mass seen. No  adrenal nodules. Fluid attenuating cyst seen in the left mid kidney.  Subcentimeter hyperattenuating lesion in the left mid kidney, series 2,  axial mage 58, too small to characterize, typically a small hemorrhagic  or proteinaceous cyst. No urolithiasis or hydronephrosis. Motion  obscures the right kidney with a mildly prominent right renal collecting  system.     Pelvis: Prostate is normal in size. Armendariz in the bladder. Gas in the  nondependent bladder lumen, suspect iatrogenic. Bladder is collapsed.  Phleboliths in the pelvis.     Bowel: No obstruction. Normal appendix.     Body wall: Large fat-containing supraumbilical ventral hernia. Small  fat-containing umbilical hernia. Small fat-containing inguinal hernias.     Soft tissue defect seen posterior to the inferior sacrum and coccyx,  containing packing material, consistent with a sacral decubitus  ulceration, extends to the underlying bone. No fluid collection.     Retroperitoneum: No lymphadenopathy.     Vasculature: No abdominal aortic aneurysm. Mild aortoiliac  atherosclerotic calcification.     Osseous structures: See above. Decreased bone mineralization. Lumbar  facet degenerative arthropathy with grade 1 anterolisthesis of L4 on L5.     Artifacts:  Motion       Impression:         1. Sacral decubitus ulceration, extends to the underlying sacrum.  2. Large amount of partial atelectasis in the basilar left lower lobe.  3. Mild cardiomegaly     This report was finalized on 10/24/2024 2:48 PM by Dr. Camacho Beal M.D on Workstation: [id number]       CT Head Without Contrast [[id number]] Collected: 10/24/24 1430     Updated: 10/24/24 1442    Narrative:      CT HEAD WO CONTRAST-     HISTORY:  AMS; N17.9-Acute kidney failure, unspecified;  I95.9-Hypotension, unspecified; R41.82-Altered mental status,  unspecified     COMPARISON: None     FINDINGS: The brain and ventricles are symmetrical. Moderate vascular  calcification is noted. Areas of decreased attenuation involving the  white matter of cerebral hemispheres are noted bilaterally consistent  with mild to moderate small vessel ischemic disease. No focal area of  decreased attenuation to suggest acute infarction is identified. Further  evaluation could be performed with a MRI examination of the brain as  indicated.                 Radiation dose reduction techniques were utilized, including automated  exposure modulation based on body size.     This report was finalized on 10/24/2024 2:39 PM by Dr. Damien Mathis M.D on Workstation: [id number]       XR Chest 1 View [[id number]] Collected: 10/24/24 1405     Updated: 10/24/24 1411    Narrative:      XR CHEST 1 VW-     HISTORY: Male who is 69 years-old, weakness     TECHNIQUE: frontal view of the chest     COMPARISON: None      FINDINGS: The heart size is borderline. Aorta appears ectatic. Pulmonary  vasculature is unremarkable.  No focal pulmonary consolidation, pleural  effusion, or pneumothorax. No acute osseous process.       Impression:      No focal pulmonary consolidation. Borderline heart size.  Ectatic appearing aorta. Follow-up as clinically indicated.           This report was finalized on 10/24/2024 2:08 PM by Dr. Ruddy De La Cruz M.D on  Workstation: [id number]                 Assessment:  Active Hospital Problems    Diagnosis  POA    **Acute renal failure [N17.9]  Yes      Resolved Hospital Problems   No resolved problems to display.   Ckd3  Sepsis  Diabetes  Hypertension  Anemia  Hyperkalemia  Retention of urine  Sacral decubitus poa    Plan:  Will repeat bmp  Ask nephrology to see him  Ask id to see him  Continue antibiotics  Trend labs  Monitor on telemetry  Wound nurse to see  D/w dr nemesio Muñoz MD  10/24/2024  19:53 EDT

## 2024-10-24 NOTE — LETTER
EMS Transport Request  For use at [facility], [place], [place], [place], and [place] only   Patient Name: Arden Carrasquillo : 1955   Weight:82.8 kg (182 lb 8.7 oz) Pick-up Location: [facility] BLS/ALS: BLS/ALS: BLS   Insurance: [employer] MEDIC[employer] REPLACEMENT Auth End Date:    Pre-Cert #: D/C Summary complete:    Destination: Home How many stairs None, Will the patient be on the main level yes, Is there a ramp available no, Can the patient stand and pivot no, Address [address] [place] [address] [address], and Name/contact number for who will be present Breonna Jernigan [phone]   Contact Precautions: None   Equipment (O2, Fluids, etc.): None   Arrive By Date/Time: 2024 0730 Stretcher/WC: Stretcher   CM Requesting: Tina Pérez RN Ext: [phone]   Notes/Medical Necessity: Pt unable to stand/pivt, Stage 4 PU to coccyx.      ______________________________________________________________________    *Only 2 patient bags OR 1 carry-on size bag are permitted.  Wheelchairs and walkers CANNOT transported with the patient. Acknowledge: Yes

## 2024-10-24 NOTE — PROGRESS NOTES
Clinical Pharmacy Services: Medication History    Arden Carrasquillo is a 69 y.o. male presenting to [facility] for   Chief Complaint   Patient presents with    Altered Mental Status     Sepsis alert       He  has a past medical history of Diabetes mellitus, Positive colorectal cancer screening using Cologuard test (12/03/2020), and Severe peripheral polyneuropathy (10/19/2023).    Allergies as of 10/24/2024 - Reviewed 10/24/2024   Allergen Reaction Noted    Vitamin d analogs Rash 05/19/2023       Medication information was obtained from: Patient Supplied medication List   Pharmacy and Phone Number:     Prior to Admission Medications       Prescriptions Last Dose Informant Patient Reported? Taking?    amLODIPine (NORVASC) 10 MG tablet  Self Yes Yes    Take 0.5 tablets by mouth Daily.    atorvastatin (LIPITOR) 10 MG tablet  Self No Yes    Take 1 tab PO QD for cholesterol and heart health    Patient taking differently:  Take 1 tablet by mouth Daily.    cyclobenzaprine (FLEXERIL) 5 MG tablet  Self No Yes    Take 1 tablet by mouth 3 (Three) Times a Day As Needed for Muscle Spasms.    docusate sodium (COLACE) 100 MG capsule  Self Yes Yes    Take 1 capsule by mouth 2 (Two) Times a Day.    ferrous sulfate 325 (65 FE) MG tablet  Self No Yes    Take 1 tablet by mouth Daily With Breakfast.    fexofenadine (ALLEGRA) 180 MG tablet  Self Yes Yes    Take 1 tablet by mouth Daily.    HYDROcodone-acetaminophen (NORCO) 7.5-325 MG per tablet  Pharmacy, Self No Yes    Take 1 tablet by mouth Every 6 (Six) Hours As Needed for Severe Pain.    lidocaine (LMX) 4 % cream  Self Yes Yes    Apply 1 Application topically to the appropriate area as directed As Needed for Mild Pain.    losartan (COZAAR) 100 MG tablet Not Taking Pharmacy, Self No Yes    TAKE 1 TABLET DAILY    Melatonin 10 MG tablet   Yes Yes    Take 1 tablet by mouth At Night As Needed.    pantoprazole (PROTONIX) 40 MG EC tablet  Pharmacy No Yes    Take 1 tablet by  mouth Daily.    polyethylene glycol (MIRALAX) 17 g packet  Self Yes Yes    Take 17 g by mouth Daily.    promethazine (PHENERGAN) 12.5 MG tablet  Self No Yes    TAKE 1/2 TO 1 TABLET BY MOUTH EVERY 6 HOURS AS NEEDED FOR NAUSEA    Patient taking differently:  Take 1 tablet by mouth Every 6 (Six) Hours As Needed.    sodium hypochlorite (DAKIN'S) 0.25 % topical solution  Self No Yes    Use as directed twice daily    Patient taking differently:  Apply  topically to the appropriate area as directed. Use as directed twice daily    tamsulosin (FLOMAX) 0.4 MG capsule 24 hr capsule  Self No Yes    Take 1 capsule by mouth Daily.    vitamin C (ASCORBIC ACID) 500 MG tablet  Self No Yes    Take 1 tablet by mouth Daily.    zolpidem (AMBIEN) 5 MG tablet  Pharmacy, Self No Yes    TAKE 1/2 TO 1 TABLET BY MOUTH ONCE NIGHTLY    Patient taking differently:  Take 0.5-1 tablets by mouth At Night As Needed.    torsemide 40 MG tablet Not Taking Pharmacy, Self No No    Take 40 mg by mouth 2 (Two) Times a Day.    Patient not taking:  Reported on 10/24/2024              Medication notes:     This medication list is complete to the best of my knowledge as of 10/24/2024    Please call if questions.    Abebe Conley  Medication History Technician  [phone]    10/24/2024 14:23 EDT

## 2024-10-24 NOTE — ED PROVIDER NOTES
SHARED VISIT: This visit was performed by BOTH a physician and an APC. The substantive portion of the medical decision making was performed by this attesting physician who made or approved the management plan and takes responsibility for patient management. All studies in the APC note (if performed) were independently interpreted by me.     The HARDIK and I have discussed this patient's history, physical exam, and treatment plan.  I have reviewed the documentation and personally had a face to face interaction with the patient. I affirm the documentation and agree with the treatment and plan.  The attached note describes my personal findings.      I provided a substantive portion of the care of the patient.  I personally performed the physical exam in its entirety, and below are my findings.     Brief history of present illness: 69-year-old male sent in from nursing facility for sepsis alert.  Patient has no complaints for me at this time tells me he feels really well.    Physical exam:    BP (!) 86/57   Pulse 72   Temp 99.8 °F (37.7 °C) (Rectal)   Resp 14   SpO2 93%       Physical Exam   Constitutional: No distress.  Acute on chronically ill-appearing though.  Sleeping but wakes easily.  HENT:  Head: Normocephalic and atraumatic.   Oropharynx: Mucous membranes are really dry  Eyes: . No scleral icterus.   Neck: Normal range of motion. Neck supple.   Cardiovascular: Pink warm and well perfused throughout.    Pulmonary/Chest: No respiratory distress.    Abdominal: Soft. There is no rebound or rigidity.  Benign  Musculoskeletal: Traumatic  Neurological: Alert and pleasant.  Skin: Skin is pink, warm, and dry.   Psychiatric: Mood and affect normal.   Nursing note and vitals reviewed.        MDM:    My differential diagnosis for altered mental status includes but is not limited to:  Hypoglycemia, hyperglycemia, DKA, overdose, ethanol intoxication, thiamine deficiency, niacin deficiency, hypothymia, hyperviscosity,  Carlyle’s disease, hyponatremia, hypernatremia, liver failure, kidney failure, hyper or hypothyroid, no insufficiency, hypoxia, hypercarbia, carbon monoxide poisoning, postanoxic encephalopathy, ischemic stroke, intracranial bleed, subarachnoid hemorrhage, brain tumor, closed head injury, epidural hematoma, epidural hematoma, seizure activity, postictal state, syncopal episode, disseminated encephalomyelitis, central pontine myelinolysis, post cardiac arrest, bacterial meningitis, viral meningitis, fungal meningitis, encephalitis, brain abscess, subdural empyema, hysteria, catatonic state, malingering, hypertensive encephalopathy, vasculitis, TTP, DIC    I have personally reviewed and interpreted the EKG obtained today in the emergency department at 1335 concomitant with treatment.  EKG reveals rhythm/rate -sinus, 70. NC-THALIA within normal limits.  QRS-narrow complex ST/T-wave -no STEMI; QTc within normal limits    RADIOLOGY      Study: Single view chest Findings: poor inspiratory effort limits evaluation.  No pneumothorax  I independently viewed and interpreted these images contemporaneously with treatment.       Please note that portions of this were completed with a voice recognition program.       Note Disclaimer: At [facility], we believe that sharing information builds trust and better relationships. You are receiving this note because you are receiving care at [facility] or recently visited. It is possible you will see health information before a provider has talked with you about it. This kind of information can be easy to misunderstand. To help you fully understand what it means for your health, we urge you to discuss this note with your provider.     Nuno Aguirre MD  10/24/24 [id number]

## 2024-10-25 ENCOUNTER — APPOINTMENT (OUTPATIENT)
Dept: ULTRASOUND IMAGING | Facility: HOSPITAL | Age: 69
End: 2024-10-25
Payer: MEDICARE

## 2024-10-25 PROBLEM — E87.5 HYPERKALEMIA: Status: RESOLVED | Noted: 2024-10-25 | Resolved: 2024-10-25

## 2024-10-25 PROBLEM — E87.20 ACIDOSIS: Status: ACTIVE | Noted: 2024-10-25

## 2024-10-25 PROBLEM — E87.5 HYPERKALEMIA: Status: ACTIVE | Noted: 2024-10-25

## 2024-10-25 LAB
ANION GAP SERPL CALCULATED.3IONS-SCNC: 14 MMOL/L (ref 5–15)
BACTERIA BLD CULT: ABNORMAL
BOTTLE TYPE: ABNORMAL
BUN SERPL-MCNC: 61 MG/DL (ref 8–23)
BUN/CREAT SERPL: 20.1 (ref 7–25)
CALCIUM SPEC-SCNC: 8.3 MG/DL (ref 8.6–10.5)
CHLORIDE SERPL-SCNC: 109 MMOL/L (ref 98–107)
CO2 SERPL-SCNC: 17 MMOL/L (ref 22–29)
CREAT SERPL-MCNC: 3.03 MG/DL (ref 0.76–1.27)
DEPRECATED RDW RBC AUTO: 45.8 FL (ref 37–54)
EGFRCR SERPLBLD CKD-EPI 2021: 21.3 ML/MIN/1.73
ERYTHROCYTE [DISTWIDTH] IN BLOOD BY AUTOMATED COUNT: 14.2 % (ref 12.3–15.4)
GLUCOSE SERPL-MCNC: 128 MG/DL (ref 65–99)
HCT VFR BLD AUTO: 36.5 % (ref 37.5–51)
HGB BLD-MCNC: 11 G/DL (ref 13–17.7)
MCH RBC QN AUTO: 26.8 PG (ref 26.6–33)
MCHC RBC AUTO-ENTMCNC: 30.1 G/DL (ref 31.5–35.7)
MCV RBC AUTO: 88.8 FL (ref 79–97)
PLATELET # BLD AUTO: 266 10*3/MM3 (ref 140–450)
PMV BLD AUTO: 9.9 FL (ref 6–12)
POTASSIUM SERPL-SCNC: 4.9 MMOL/L (ref 3.5–5.2)
RBC # BLD AUTO: 4.11 10*6/MM3 (ref 4.14–5.8)
SODIUM SERPL-SCNC: 140 MMOL/L (ref 136–145)
VANCOMYCIN SERPL-MCNC: 14.8 MCG/ML (ref 5–40)
WBC NRBC COR # BLD AUTO: 9.58 10*3/MM3 (ref 3.4–10.8)

## 2024-10-25 PROCEDURE — 99223 1ST HOSP IP/OBS HIGH 75: CPT | Performed by: INTERNAL MEDICINE

## 2024-10-25 PROCEDURE — 76775 US EXAM ABDO BACK WALL LIM: CPT

## 2024-10-25 PROCEDURE — 80202 ASSAY OF VANCOMYCIN: CPT | Performed by: INTERNAL MEDICINE

## 2024-10-25 PROCEDURE — 25010000002 HYDROMORPHONE PER 4 MG: Performed by: INTERNAL MEDICINE

## 2024-10-25 PROCEDURE — 25010000002 PIPERACILLIN SOD-TAZOBACTAM PER 1 G: Performed by: INTERNAL MEDICINE

## 2024-10-25 PROCEDURE — 25010000002 HEPARIN (PORCINE) PER 1000 UNITS: Performed by: HOSPITALIST

## 2024-10-25 PROCEDURE — 97162 PT EVAL MOD COMPLEX 30 MIN: CPT

## 2024-10-25 PROCEDURE — 80048 BASIC METABOLIC PNL TOTAL CA: CPT | Performed by: INTERNAL MEDICINE

## 2024-10-25 PROCEDURE — 85027 COMPLETE CBC AUTOMATED: CPT | Performed by: INTERNAL MEDICINE

## 2024-10-25 PROCEDURE — 25810000003 SODIUM CHLORIDE 0.9 % SOLUTION 250 ML FLEX CONT: Performed by: INTERNAL MEDICINE

## 2024-10-25 PROCEDURE — 97530 THERAPEUTIC ACTIVITIES: CPT

## 2024-10-25 PROCEDURE — 87481 CANDIDA DNA AMP PROBE: CPT | Performed by: HOSPITALIST

## 2024-10-25 PROCEDURE — 25010000002 VANCOMYCIN 1 G RECONSTITUTED SOLUTION 1 EACH VIAL: Performed by: INTERNAL MEDICINE

## 2024-10-25 RX ORDER — SODIUM BICARBONATE 650 MG/1
650 TABLET ORAL 2 TIMES DAILY
Status: DISCONTINUED | OUTPATIENT
Start: 2024-10-25 | End: 2024-10-26

## 2024-10-25 RX ORDER — HEPARIN SODIUM 5000 [USP'U]/ML
5000 INJECTION, SOLUTION INTRAVENOUS; SUBCUTANEOUS EVERY 12 HOURS SCHEDULED
Status: DISCONTINUED | OUTPATIENT
Start: 2024-10-25 | End: 2024-11-05 | Stop reason: HOSPADM

## 2024-10-25 RX ORDER — CASTOR OIL AND BALSAM, PERU 788; 87 MG/G; MG/G
1 OINTMENT TOPICAL EVERY 12 HOURS SCHEDULED
Status: DISCONTINUED | OUTPATIENT
Start: 2024-10-25 | End: 2024-11-05 | Stop reason: HOSPADM

## 2024-10-25 RX ADMIN — ATORVASTATIN CALCIUM 10 MG: 20 TABLET, FILM COATED ORAL at 08:52

## 2024-10-25 RX ADMIN — CASTOR OIL AND BALSAM, PERU 1 APPLICATION: 788; 87 OINTMENT TOPICAL at 20:18

## 2024-10-25 RX ADMIN — HYOSCYAMINE SULFATE: 16 SOLUTION at 08:52

## 2024-10-25 RX ADMIN — OXYCODONE HYDROCHLORIDE AND ACETAMINOPHEN 500 MG: 500 TABLET ORAL at 08:52

## 2024-10-25 RX ADMIN — PIPERACILLIN AND TAZOBACTAM 3.38 G: 3; .375 INJECTION, POWDER, FOR SOLUTION INTRAVENOUS at 00:50

## 2024-10-25 RX ADMIN — SODIUM BICARBONATE 650 MG: 650 TABLET ORAL at 11:39

## 2024-10-25 RX ADMIN — TAMSULOSIN HYDROCHLORIDE 0.4 MG: 0.4 CAPSULE ORAL at 08:52

## 2024-10-25 RX ADMIN — VANCOMYCIN HYDROCHLORIDE 1000 MG: 1 INJECTION, POWDER, LYOPHILIZED, FOR SOLUTION INTRAVENOUS at 06:00

## 2024-10-25 RX ADMIN — HEPARIN SODIUM 5000 UNITS: 5000 INJECTION INTRAVENOUS; SUBCUTANEOUS at 20:18

## 2024-10-25 RX ADMIN — SODIUM BICARBONATE 650 MG: 650 TABLET ORAL at 20:18

## 2024-10-25 RX ADMIN — PANTOPRAZOLE SODIUM 40 MG: 40 TABLET, DELAYED RELEASE ORAL at 08:52

## 2024-10-25 RX ADMIN — HYDROCODONE BITARTRATE AND ACETAMINOPHEN 1 TABLET: 7.5; 325 TABLET ORAL at 11:39

## 2024-10-25 RX ADMIN — HYDROMORPHONE HYDROCHLORIDE 0.5 MG: 1 INJECTION, SOLUTION INTRAMUSCULAR; INTRAVENOUS; SUBCUTANEOUS at 00:50

## 2024-10-25 RX ADMIN — PIPERACILLIN AND TAZOBACTAM 3.38 G: 3; .375 INJECTION, POWDER, FOR SOLUTION INTRAVENOUS at 11:40

## 2024-10-25 RX ADMIN — HYOSCYAMINE SULFATE: 16 SOLUTION at 20:18

## 2024-10-25 RX ADMIN — POLYETHYLENE GLYCOL 3350 17 G: 17 POWDER, FOR SOLUTION ORAL at 08:49

## 2024-10-25 RX ADMIN — HYDROMORPHONE HYDROCHLORIDE 0.5 MG: 1 INJECTION, SOLUTION INTRAMUSCULAR; INTRAVENOUS; SUBCUTANEOUS at 04:12

## 2024-10-25 NOTE — PLAN OF CARE
Goal Outcome Evaluation:               Pt oriented x 3, disoriented to time only. Pt is calm and cooperative. Pt's VSS currently now on 2 liters/min NC. Badin's solution ordered but no orders with specific instructions for wound care. Wound was irrigated/cleansed with sterile NS, patted dry. Wound is open, daykin's moist 4 x 4 packed into wound, dry 4 x 4 placed on top, then mepilex. Pt was given IVP dilaudid for pain after wound change.

## 2024-10-25 NOTE — CONSULTS
[facility] Consult Note      Patient Name: Arden Carrasquillo  : 1955  MRN: [id number]  Primary Care Physician:  Pollo Ward MD  Referring Physician: No ref. provider found  Date of admission: 10/24/2024    Subjective     Reason for Consult:  NEEL CKD3     HPI:   Arden Carrasquilol is a 69 y.o. male with CKD stage 3B, dCHF, HTN, DM2, BPH & prostate cancer s/p surgery, sacral decub ulcer who presented last night with confusion.  Initially hypotensive to 80s/50s and was evaluated by PULM but did not warrant ICU admission.  Found to have NEEL with BUN/Cr 72/4.3, K 5.3, bicar b20, normal AG.  UA with mod blood/LE.  Was hosp at [facility] 10/11 to 10/17/24 for e coli UTI + bacteremia.  Had NEEL too with peak Cr 2.1, in relation to urinary retention and hydronephrosis and dash placed with improvement, Cr down to 1.1 at discharge, lower than baseline ( mid 1's).  Sees Dr Ovalle in our office.  BP has improved with IVF boluses (2.5L) and was started on vanc/zosyn.  BUN/Cr better 61/3.0 today and K down to 4.9 (though bicarb lower 17 with AG 14 now).  He's afebrile with normal WBC.  CXR unremarkable.  CT abd shows sacral ulcer, LLL atelectasis, no hydronephrosis.  CT abd from 10/11/24 showed bladder distention and mod bilat hydronephrosis.   Home meds notable for flomax, torsemide, PPI, ARB.  Echo in May showed EF 55%.  UOP robust 1800 cc so far.      Mentation fairly sharp right now.  Denies dyspnea/swelling, is on NC O2.      Review of Systems:   14 point review of systems is otherwise negative except for mentioned above on HPI    Personal History     Past Medical History:   Diagnosis Date   • Diabetes mellitus    • Positive colorectal cancer screening using Cologuard test 2020   • Severe peripheral polyneuropathy 10/19/2023       Past Surgical History:   Procedure Laterality Date   • CYSTOSCOPY TRANSURETHRAL RESECTION OF PROSTATE     • UPPER GASTROINTESTINAL ENDOSCOPY         Family  History: family history is not on file.    Social History:  reports that he has never smoked. He has never been exposed to tobacco smoke. He has never used smokeless tobacco. Alcohol use questions deferred to the physician. He reports current drug use. Drug: Hydrocodone.    Home Medications:  Prior to Admission medications    Medication Sig Start Date End Date Taking? Authorizing Provider   amLODIPine (NORVASC) 10 MG tablet Take 0.5 tablets by mouth Daily.   Yes Aneta Pagan MD   atorvastatin (LIPITOR) 10 MG tablet Take 1 tab PO QD for cholesterol and heart health  Patient taking differently: Take 1 tablet by mouth Daily. 5/16/24  Yes Pollo Ward MD   cyclobenzaprine (FLEXERIL) 5 MG tablet Take 1 tablet by mouth 3 (Three) Times a Day As Needed for Muscle Spasms. 4/9/24  Yes Pollo Ward MD   docusate sodium (COLACE) 100 MG capsule Take 1 capsule by mouth 2 (Two) Times a Day.   Yes Aneta Pagan MD   ferrous sulfate 325 (65 FE) MG tablet Take 1 tablet by mouth Daily With Breakfast. 4/28/24  Yes Pollo Ward MD   fexofenadine (ALLEGRA) 180 MG tablet Take 1 tablet by mouth Daily.   Yes Aneta Pagan MD   HYDROcodone-acetaminophen (NORCO) 7.5-325 MG per tablet Take 1 tablet by mouth Every 6 (Six) Hours As Needed for Severe Pain. 10/23/24  Yes Pollo Ward MD   lidocaine (LMX) 4 % cream Apply 1 Application topically to the appropriate area as directed As Needed for Mild Pain.   Yes Aneta Pagan MD   losartan (COZAAR) 100 MG tablet TAKE 1 TABLET DAILY 5/28/24  Yes Pollo Ward MD   Melatonin 10 MG tablet Take 1 tablet by mouth At Night As Needed.   Yes Aneta Pagan MD   pantoprazole (PROTONIX) 40 MG EC tablet Take 1 tablet by mouth Daily. 5/16/24  Yes Pollo Ward MD   polyethylene glycol (MIRALAX) 17 g packet Take 17 g by mouth Daily.   Yes Aneta Pagan MD   promethazine (PHENERGAN) 12.5 MG tablet TAKE 1/2 TO 1 TABLET BY MOUTH EVERY 6 HOURS AS NEEDED FOR  NAUSEA  Patient taking differently: Take 1 tablet by mouth Every 6 (Six) Hours As Needed. 12/11/23  Yes Pollo Ward MD   sodium hypochlorite (DAKIN'S) 0.25 % topical solution Use as directed twice daily  Patient taking differently: Apply  topically to the appropriate area as directed. Use as directed twice daily 8/15/24  Yes Pollo Ward MD   tamsulosin (FLOMAX) 0.4 MG capsule 24 hr capsule Take 1 capsule by mouth Daily. 4/23/24  Yes Pollo Ward MD   vitamin C (ASCORBIC ACID) 500 MG tablet Take 1 tablet by mouth Daily. 4/28/24  Yes Pollo Ward MD   zolpidem (AMBIEN) 5 MG tablet TAKE 1/2 TO 1 TABLET BY MOUTH ONCE NIGHTLY  Patient taking differently: Take 0.5-1 tablets by mouth At Night As Needed. 5/13/24  Yes Pollo Ward MD   torsemide 40 MG tablet Take 40 mg by mouth 2 (Two) Times a Day.  Patient not taking: Reported on 10/24/2024 5/10/24   Pollo Ward MD       Allergies:  Allergies   Allergen Reactions   • Vitamin D Analogs Rash       Objective     Vitals:   Temp:  [97.3 °F (36.3 °C)-99.8 °F (37.7 °C)] 97.3 °F (36.3 °C)  Heart Rate:  [59-77] 63  Resp:  [14-18] 16  BP: ()/(52-77) 114/77  Flow (L/min) (Oxygen Therapy):  [2-3] 2    Intake/Output Summary (Last 24 hours) at 10/25/2024 0942  Last data filed at 10/25/2024 0600  Gross per 24 hour   Intake 2350 ml   Output 1800 ml   Net 550 ml       Physical Exam:    General Appearance: frail pleasant conversant AAM on NC O2 no distress  Skin: warm and dry  HEENT: oral mucosa normal, nonicteric sclera  Neck: supple, no JVD  Lungs: CTA bilat no rales  Heart: RRR, normal S1 and S2  Abdomen: soft, nontender, nondistended   dash with clear urine   Extremities: no edema, cyanosis or clubbing  Neuro: normal speech and mental status     Scheduled Meds:     vitamin C, 500 mg, Oral, Daily  atorvastatin, 10 mg, Oral, Daily  pantoprazole, 40 mg, Oral, Daily  piperacillin-tazobactam, 3.375 g, Intravenous, Q12H  polyethylene glycol, 17 g, Oral, Daily  sodium  hypochlorite, , Topical, BID  tamsulosin, 0.4 mg, Oral, Daily  Vancomycin Pharmacy Intermittent/Pulse Dosing, , Does not apply, Daily      IV Meds:   Pharmacy to dose vancomycin,   Pharmacy to Dose Zosyn,         Results Reviewed:   I have personally reviewed the results from the time of this admission to 10/25/2024 09:42 EDT     Lab Results   Component Value Date    GLUCOSE 128 (H) 10/25/2024    CALCIUM 8.3 (L) 10/25/2024     10/25/2024    K 4.9 10/25/2024    CO2 17.0 (L) 10/25/2024     (H) 10/25/2024    BUN 61 (H) 10/25/2024    CREATININE 3.03 (H) 10/25/2024    EGFRIFAFRI 53 (L) 07/06/2021    EGFRIFNONA 46 (L) 07/06/2021    BCR 20.1 10/25/2024    ANIONGAP 14.0 10/25/2024      Lab Results   Component Value Date    MG 2.5 (H) 10/24/2024    ALBUMIN 2.7 (L) 10/24/2024           Assessment / Plan     ASSESSMENT:  Non olig NEEL - suspect prerenal azotemia due to vol depletion in assoc with hypotension and diuretic use; impaired compensation via ARB.  Had NEEL whole hosp at [facility] couple weeks ago in relation to obs uropathy with retention and bilat hydro, but not contributory now - has dash, CT shows no hydro.  BUN/Cr improved 66/3.6 to 61/3.0 with IVF boluses and correction of BP.  Na generous 140.  Hold further IVF for now   CKD stage 3B - BL Cr mid 1's, sees Dr Ovalle (was lower than usual 1.1 at discharge [facility])  Hyperkalemia - mild/improved, K 5.3 to 4.9, in assoc with NEEL/ARB/acidemia   Metabolic acidosis - bicarb down 18 to 17 with AG 14 in assoc with NEEL.  Lactate WNL  Hypotension (hx HTN) - resolved with IVF boluses   UTI - had e coli at [facility] (+bacteremia).  Empiric broad spec abx initiated  Recent urinary retention + bilat hydro - dash placed at [facility].  Again hydro resolved on CT  Hx diastolic CHF, compensated (was intravasc depleted initially).  No central bob on CXR nor periph edema   Hx BPH & prostate cancer.  On flomax     PLAN:  Hold further IVF and encourage PO fluid intake  Hold  antihypertensives and torsemide  Start oral bicarb  Consider UROL involvement   D/W Dr Christy & RN     Thank you for involving us in the care of Arden Carrasquillo.  Please feel free to call with any questions.    Camacho Esparza MD  10/25/24  09:42 EDT    [facility]  [phone]

## 2024-10-25 NOTE — CONSULTS
Referring Provider: Dr Muñoz      Subjective   History of present illness: 69-year-old with extensive past infectious disease history we are asked evaluate for possible sepsis.  He required admission at [facility] in May/June 2024 for infected sacral coccygeal ulcer.  He underwent debridement and purulent drainage was noted in the OR with cultures growing E. coli Bacteroides and other anaerobes.  He was given 4 weeks of amoxicillin clavulanic acid.  Did well but required earlier this month for E. coli septicemia secondary from urinary source occurring in the setting of obstruction.  He has a history of BPH and TURP.  He was started on IV antibiotics and given amoxicillin at discharge which was due to finish on 10/25/2024.    Patient came to the emergency department with acute altered mental status.  Past 2 days.  He was noted to be in acute kidney injury with a creatinine of 4 and had hypotension.  His hypotension improved with fluids and the patient was admitted to the floor.  Patient says that he has not had any nausea vomiting or diarrhea.  No fevers.  His white count is normal as was his procalcitonin.    Physical Exam:   Vital Signs   Temp:  [97.3 °F (36.3 °C)-99.8 °F (37.7 °C)] 97.3 °F (36.3 °C)  Heart Rate:  [59-77] 63  Resp:  [14-18] 16  BP: ()/(52-77) 114/77    GENERAL: Awake and alert, in no acute distress.   HEENT: Oropharynx is clear. Hearing is grossly normal.   EYES: . No conjunctival injection. No lid lag.   LUNGS:normal respiratory effort.   SKIN: no cutaneous eruptions in exposed areas  PSYCHIATRIC: Appropriate mood, affect, insight, and judgment.     Results Review:  White count 9.58, hemoglobin 11, platelets 266  Creatinine 3.03  Procalcitonin 0.22  Blood cultures pending  CT abdomen pelvis with sacral decubitus ulceration, partial atelectasis in the base of the left lower lobe, mild cardiomegaly  Chest x-ray independently interpreted: No focal pulmonary consolidation      A/p  Acute  kidney injury on chronic kidney disease stage IIIb, baseline creatinine mid threes  Hypotension  Concern for sepsis  Recent admission to [facility] for E. coli septicemia status post treatment    His white count was normal and he did not have elevated procalcitonin or fevers.  Nephrology is evaluated and thought he might have acute kidney injury related to believe depletion in the setting of diuretic use.  This has been held and has been getting IV fluids.  He is currently on vancomycin and Zosyn.  I will stop the vancomycin today.  If his blood cultures remain negative and he is otherwise doing okay then I think we can stop the Zosyn tomorrow.           Thank you for this consult.  We will continue to follow along and tailor antibiotics as the patient's clinical course evolves.

## 2024-10-25 NOTE — CONSULTS
FIRST UROLOGY CONSULT      Patient Identification:  NAME:  Arden Carrasquillo  Age:  69 y.o.   Sex:  male   :  1955   MRN:  [id number]     Chief complaint: Altered mental status    History of present illness:      Pt is a 69 y.o. male with a history of BPH with LUTS, urinary retention, and gross hematuria that presented to the ED on 10/24/24 with altered mental status. Patient recently discharged from [facility] for urosepsis. Went home with indwelling dash catheter. Family reports increasing confusion over the last few days. Pt was diagnosed and admitted with NEEL, AMS, sepsis, and urinary retention.     Urology was consulted for evaluation and treatment of urinary retention with indwelling catheter and sepsis. Patient seen at [facility] on 10/11 and had indwelling catheter placed for urinary retention. Plan at that time was to discharge patient with catheter and have him follow-up with  urology as an outpatient. Blood and urine cultures + E.coli. Patient is tolerating the catheter well. He is scheduled for outpatient office visit next week.     In hospital:  -Hypostensive on admission, currently AVSS, good UOP  -WBC - 9.58 (9.11)  -Creat - 3.03 (3.60) - 4.29 on admission  -UA - Moderate LE, negative nitrites, moderate blood, 6-10 RBC, TNTC WBC, 1+ bacteria, TNTC SE cells  -Blood culture - In process    -CT report from 10/11/24- Severely distended urinary bladder with moderate bilateral hydronephrosis     Asked to see    Past medical history:  Past Medical History:   Diagnosis Date    Diabetes mellitus     Positive colorectal cancer screening using Cologuard test 2020    Severe peripheral polyneuropathy 10/19/2023       Past surgical history:  Past Surgical History:   Procedure Laterality Date    CYSTOSCOPY TRANSURETHRAL RESECTION OF PROSTATE      UPPER GASTROINTESTINAL ENDOSCOPY         Allergies:  Vitamin d analogs    Home medications:  Medications Prior to Admission   Medication Sig Dispense Refill  Last Dose/Taking    amLODIPine (NORVASC) 10 MG tablet Take 0.5 tablets by mouth Daily.   Taking    atorvastatin (LIPITOR) 10 MG tablet Take 1 tab PO QD for cholesterol and heart health (Patient taking differently: Take 1 tablet by mouth Daily.) 90 tablet 3 Taking Differently    cyclobenzaprine (FLEXERIL) 5 MG tablet Take 1 tablet by mouth 3 (Three) Times a Day As Needed for Muscle Spasms. 90 tablet 3 Taking As Needed    docusate sodium (COLACE) 100 MG capsule Take 1 capsule by mouth 2 (Two) Times a Day.   Taking    ferrous sulfate 325 (65 FE) MG tablet Take 1 tablet by mouth Daily With Breakfast. 90 tablet 3 Taking    fexofenadine (ALLEGRA) 180 MG tablet Take 1 tablet by mouth Daily.   Taking    HYDROcodone-acetaminophen (NORCO) 7.5-325 MG per tablet Take 1 tablet by mouth Every 6 (Six) Hours As Needed for Severe Pain. 120 tablet 0 Taking As Needed    lidocaine (LMX) 4 % cream Apply 1 Application topically to the appropriate area as directed As Needed for Mild Pain.   Taking As Needed    losartan (COZAAR) 100 MG tablet TAKE 1 TABLET DAILY 90 tablet 3 Not Taking    Melatonin 10 MG tablet Take 1 tablet by mouth At Night As Needed.   Taking As Needed    pantoprazole (PROTONIX) 40 MG EC tablet Take 1 tablet by mouth Daily.   Taking    polyethylene glycol (MIRALAX) 17 g packet Take 17 g by mouth Daily.   Taking    promethazine (PHENERGAN) 12.5 MG tablet TAKE 1/2 TO 1 TABLET BY MOUTH EVERY 6 HOURS AS NEEDED FOR NAUSEA (Patient taking differently: Take 1 tablet by mouth Every 6 (Six) Hours As Needed.) 15 tablet 1 Taking Differently    sodium hypochlorite (DAKIN'S) 0.25 % topical solution Use as directed twice daily (Patient taking differently: Apply  topically to the appropriate area as directed. Use as directed twice daily) 473 mL 3 Taking Differently    tamsulosin (FLOMAX) 0.4 MG capsule 24 hr capsule Take 1 capsule by mouth Daily. 90 capsule 3 Taking    vitamin C (ASCORBIC ACID) 500 MG tablet Take 1 tablet by mouth  Daily. 90 tablet 3 Taking    zolpidem (AMBIEN) 5 MG tablet TAKE 1/2 TO 1 TABLET BY MOUTH ONCE NIGHTLY (Patient taking differently: Take 0.5-1 tablets by mouth At Night As Needed.) 30 tablet 2 Taking Differently    torsemide 40 MG tablet Take 40 mg by mouth 2 (Two) Times a Day. (Patient not taking: Reported on 10/24/2024) 180 tablet 3 Not Taking        Hospital medications:  vitamin C, 500 mg, Oral, Daily  atorvastatin, 10 mg, Oral, Daily  pantoprazole, 40 mg, Oral, Daily  piperacillin-tazobactam, 3.375 g, Intravenous, Q12H  polyethylene glycol, 17 g, Oral, Daily  sodium bicarbonate, 650 mg, Oral, BID  sodium hypochlorite, , Topical, BID  tamsulosin, 0.4 mg, Oral, Daily           acetaminophen **OR** acetaminophen **OR** acetaminophen    senna-docusate sodium **AND** polyethylene glycol **AND** bisacodyl **AND** bisacodyl    HYDROcodone-acetaminophen    HYDROmorphone    lidocaine    melatonin    ondansetron ODT **OR** ondansetron    sodium chloride    Family history:  Family History   Problem Relation Age of Onset    Colon cancer Neg Hx     Colon polyps Neg Hx        Social history:  Social History     Tobacco Use    Smoking status: Never     Passive exposure: Never    Smokeless tobacco: Never   Vaping Use    Vaping status: Never Used   Substance Use Topics    Alcohol use: Defer    Drug use: Yes     Types: Hydrocodone       Review of systems:      12 point negative except as in HPI    Objective:  TMax 24 hours:   Temp (24hrs), Av.2 °F (36.8 °C), Min:97.3 °F (36.3 °C), Max:99.8 °F (37.7 °C)      Vitals Ranges:   Temp:  [97.3 °F (36.3 °C)-99.8 °F (37.7 °C)] 97.3 °F (36.3 °C)  Heart Rate:  [59-77] 63  Resp:  [14-18] 16  BP: ()/(52-77) 114/77    Intake/Output Last 3 shifts:  I/O last 3 completed shifts:  In: 2350 [I.V.:1150; IV Piggyback:1200]  Out: 1800 [Urine:1800]     Physical Exam:    General Appearance:    Alert, cooperative, NAD   Back:     No CVA tenderness   Lungs:     Respirations unlabored, no  wheezing   Abdomen:  :      Soft, NDNT, no palpable bladder distension, nontender    Armendariz catheter in place draining pale yellow urine     Results review:   I reviewed the patient's new clinical results.    Data review:  Lab Results (last 24 hours)       Procedure Component Value Units Date/Time    Basic Metabolic Panel [[id number]]  (Abnormal) Collected: 10/25/24 0300    Specimen: Blood Updated: 10/25/24 0433     Glucose 128 mg/dL      BUN 61 mg/dL      Creatinine 3.03 mg/dL      Sodium 140 mmol/L      Potassium 4.9 mmol/L      Chloride 109 mmol/L      CO2 17.0 mmol/L      Calcium 8.3 mg/dL      BUN/Creatinine Ratio 20.1     Anion Gap 14.0 mmol/L      eGFR 21.3 mL/min/1.73     Narrative:      GFR Normal >60  Chronic Kidney Disease <60  Kidney Failure <15    The GFR formula is only valid for adults with stable renal function between ages 18 and 70.    Vancomycin, Random [[id number]]  (Normal) Collected: 10/25/24 0300    Specimen: Blood Updated: 10/25/24 0433     Vancomycin Random 14.80 mcg/mL     Narrative:      Therapeutic Ranges for Vancomycin    Vancomycin Random   5.0-40.0 mcg/mL  Vancomycin Trough   5.0-20.0 mcg/mL  Vancomycin Peak     20.0-40.0 mcg/mL    CBC (No Diff) [[id number]]  (Abnormal) Collected: 10/25/24 0300    Specimen: Blood Updated: 10/25/24 0411     WBC 9.58 10*3/mm3      RBC 4.11 10*6/mm3      Hemoglobin 11.0 g/dL      Hematocrit 36.5 %      MCV 88.8 fL      MCH 26.8 pg      MCHC 30.1 g/dL      RDW 14.2 %      RDW-SD 45.8 fl      MPV 9.9 fL      Platelets 266 10*3/mm3     Basic Metabolic Panel [[id number]]  (Abnormal) Collected: 10/24/24 2139    Specimen: Blood Updated: 10/24/24 2220     Glucose 137 mg/dL      BUN 66 mg/dL      Creatinine 3.60 mg/dL      Sodium 141 mmol/L      Potassium 4.8 mmol/L      Chloride 110 mmol/L      CO2 18.0 mmol/L      Calcium 8.0 mg/dL      BUN/Creatinine Ratio 18.3     Anion Gap 13.0 mmol/L      eGFR 17.3 mL/min/1.73     Narrative:      GFR Normal >60  Chronic Kidney  "Disease <60  Kidney Failure <15    The GFR formula is only valid for adults with stable renal function between ages 18 and 70.    STAT Lactic Acid, Reflex [[id number]]  (Normal) Collected: 10/24/24 1755    Specimen: Blood Updated: 10/24/24 1838     Lactate 1.2 mmol/L     POC Glucose Once [[id number]]  (Normal) Collected: 10/24/24 1730    Specimen: Blood Updated: 10/24/24 1738     Glucose 114 mg/dL     CK [[id number]]  (Normal) Collected: 10/24/24 1251    Specimen: Blood Updated: 10/24/24 1642     Creatine Kinase 164 U/L     Procalcitonin [[id number]]  (Normal) Collected: 10/24/24 1251    Specimen: Blood Updated: 10/24/24 1641     Procalcitonin 0.22 ng/mL     Narrative:      As a Marker for Sepsis (Non-Neonates):    1. <0.5 ng/mL represents a low risk of severe sepsis and/or septic shock.  2. >2 ng/mL represents a high risk of severe sepsis and/or septic shock.    As a Marker for Lower Respiratory Tract Infections that require antibiotic therapy:    PCT on Admission    Antibiotic Therapy       6-12 Hrs later    >0.5                Strongly Recommended  >0.25 - <0.5        Recommended   0.1 - 0.25          Discouraged              Remeasure/reassess PCT  <0.1                Strongly Discouraged     Remeasure/reassess PCT    As 28 day mortality risk marker: \"Change in Procalcitonin Result\" (>80% or <=80%) if Day 0 (or Day 1) and Day 4 values are available. Refer to http://www.[employer]-pct-calculator.com    Change in PCT <=80%  A decrease of PCT levels below or equal to 80% defines a positive change in PCT test result representing a higher risk for 28-day all-cause mortality of patients diagnosed with severe sepsis for septic shock.    Change in PCT >80%  A decrease of PCT levels of more than 80% defines a negative change in PCT result representing a lower risk for 28-day all-cause mortality of patients diagnosed with severe sepsis or septic shock.       High Sensitivity Troponin T 2Hr [[id number]]  (Abnormal) Collected: " 10/24/24 1504    Specimen: Blood Updated: 10/24/24 1542     HS Troponin T 135 ng/L      Troponin T Delta -13 ng/L     Narrative:      High Sensitive Troponin T Reference Range:  <14.0 ng/L- Negative Female for AMI  <22.0 ng/L- Negative Male for AMI  >=14 - Abnormal Female indicating possible myocardial injury.  >=22 - Abnormal Male indicating possible myocardial injury.   Clinicians would have to utilize clinical acumen, EKG, Troponin, and serial changes to determine if it is an Acute Myocardial Infarction or myocardial injury due to an underlying chronic condition.         Lactic Acid, Plasma [[id number]]  (Abnormal) Collected: 10/24/24 1321    Specimen: Blood from Arm, Left Updated: 10/24/24 1524     Lactate 2.1 mmol/L     Urinalysis With Microscopic If Indicated (No Culture) - Urine, Clean Catch [[id number]]  (Abnormal) Collected: 10/24/24 1251    Specimen: Urine, Clean Catch Updated: 10/24/24 1346     Color, UA Dark Yellow     Appearance, UA Turbid     pH, UA <=5.0     Specific Gravity, UA 1.028     Glucose, UA Negative     Ketones, UA Trace     Bilirubin, UA Negative     Blood, UA Moderate (2+)     Protein,  mg/dL (2+)     Leuk Esterase, UA Moderate (2+)     Nitrite, UA Negative     Urobilinogen, UA 1.0 E.U./dL    Urinalysis, Microscopic Only - Urine, Clean Catch [[id number]]  (Abnormal) Collected: 10/24/24 1251    Specimen: Urine, Clean Catch Updated: 10/24/24 1346     RBC, UA 6-10 /HPF      WBC, UA Too Numerous to Count /HPF      Bacteria, UA 1+ /HPF      Squamous Epithelial Cells, UA Too Numerous to Count /HPF      Hyaline Casts, UA Too Numerous to Count /LPF      Methodology Automated Microscopy    Blood Culture - Blood, Arm, Right [[id number]] Collected: 10/24/24 1315    Specimen: Blood from Arm, Right Updated: 10/24/24 1334    Blood Culture - Blood, Arm, Left [[id number]] Collected: 10/24/24 1321    Specimen: Blood from Arm, Left Updated: 10/24/24 1334    Magnesium [[id number]]  (Abnormal) Collected:  10/24/24 1251    Specimen: Blood Updated: 10/24/24 1331     Magnesium 2.5 mg/dL     Comprehensive Metabolic Panel [[id number]]  (Abnormal) Collected: 10/24/24 1251    Specimen: Blood Updated: 10/24/24 1331     Glucose 126 mg/dL      BUN 72 mg/dL      Creatinine 4.29 mg/dL      Sodium 135 mmol/L      Potassium 5.3 mmol/L      Chloride 103 mmol/L      CO2 20.5 mmol/L      Calcium 8.5 mg/dL      Total Protein 6.7 g/dL      Albumin 2.7 g/dL      ALT (SGPT) 13 U/L      AST (SGOT) 13 U/L      Alkaline Phosphatase 106 U/L      Total Bilirubin 0.3 mg/dL      Globulin 4.0 gm/dL      A/G Ratio 0.7 g/dL      BUN/Creatinine Ratio 16.8     Anion Gap 11.5 mmol/L      eGFR 14.0 mL/min/1.73      Comment: <15 Indicative of kidney failure       Narrative:      GFR Normal >60  Chronic Kidney Disease <60  Kidney Failure <15    The GFR formula is only valid for adults with stable renal function between ages 18 and 70.    Single High Sensitivity Troponin T [[id number]]  (Abnormal) Collected: 10/24/24 1251    Specimen: Blood Updated: 10/24/24 1330     HS Troponin T 148 ng/L     Narrative:      High Sensitive Troponin T Reference Range:  <14.0 ng/L- Negative Female for AMI  <22.0 ng/L- Negative Male for AMI  >=14 - Abnormal Female indicating possible myocardial injury.  >=22 - Abnormal Male indicating possible myocardial injury.   Clinicians would have to utilize clinical acumen, EKG, Troponin, and serial changes to determine if it is an Acute Myocardial Infarction or myocardial injury due to an underlying chronic condition.         aPTT [[id number]]  (Normal) Collected: 10/24/24 1251    Specimen: Blood Updated: 10/24/24 1321     PTT 30.0 seconds     Protime-INR [[id number]]  (Abnormal) Collected: 10/24/24 1251    Specimen: Blood Updated: 10/24/24 1321     Protime 15.9 Seconds      INR 1.24    [place] Draw [[id number]] Collected: 10/24/24 1251    Specimen: Blood Updated: 10/24/24 1315    Narrative:      The following orders were created for  panel order [facility] Draw.  Procedure                               Abnormality         Status                     ---------                               -----------         ------                     Green Top (Gel)[[id number]]                                  Final result               Lavender Top[[id number]]                                     Final result               Gold Top - SST[[id number]]                                   Final result               Light Blue Top[[id number]]                                   Final result                 Please view results for these tests on the individual orders.    Green Top (Gel) [[id number]] Collected: 10/24/24 1251    Specimen: Blood Updated: 10/24/24 1315     Extra Tube Hold for add-ons.     Comment: Auto resulted.       Lavender Top [[id number]] Collected: 10/24/24 1251    Specimen: Blood Updated: 10/24/24 1315     Extra Tube hold for add-on     Comment: Auto resulted       Gold Top - SST [[id number]] Collected: 10/24/24 1251    Specimen: Blood Updated: 10/24/24 1315     Extra Tube Hold for add-ons.     Comment: Auto resulted.       Light Blue Top [[id number]] Collected: 10/24/24 1251    Specimen: Blood Updated: 10/24/24 1315     Extra Tube Hold for add-ons.     Comment: Auto resulted       CBC & Differential [[id number]]  (Abnormal) Collected: 10/24/24 1251    Specimen: Blood Updated: 10/24/24 1309    Narrative:      The following orders were created for panel order CBC & Differential.  Procedure                               Abnormality         Status                     ---------                               -----------         ------                     CBC Auto Differential[[id number]]        Abnormal            Final result                 Please view results for these tests on the individual orders.    CBC Auto Differential [[id number]]  (Abnormal) Collected: 10/24/24 1251    Specimen: Blood Updated: 10/24/24 1309     WBC 9.11 10*3/mm3      RBC 3.62 10*6/mm3       Hemoglobin 9.9 g/dL      Hematocrit 30.8 %      MCV 85.1 fL      MCH 27.3 pg      MCHC 32.1 g/dL      RDW 14.2 %      RDW-SD 43.9 fl      MPV 9.3 fL      Platelets 283 10*3/mm3      Neutrophil % 71.2 %      Lymphocyte % 15.5 %      Monocyte % 10.5 %      Eosinophil % 1.4 %      Basophil % 0.3 %      Immature Grans % 1.1 %      Neutrophils, Absolute 6.48 10*3/mm3      Lymphocytes, Absolute 1.41 10*3/mm3      Monocytes, Absolute 0.96 10*3/mm3      Eosinophils, Absolute 0.13 10*3/mm3      Basophils, Absolute 0.03 10*3/mm3      Immature Grans, Absolute 0.10 10*3/mm3      nRBC 0.0 /100 WBC              Imaging:  Imaging Results (Last 24 Hours)       Procedure Component Value Units Date/Time    CT Abdomen Pelvis Without Contrast [[id number]] Collected: 10/24/24 1435     Updated: 10/24/24 1451    Narrative:      CT ABDOMEN PELVIS WO CONTRAST-     INDICATION: Sepsis     COMPARISON: None     TECHNIQUE:  Routine CT abdomen/pelvis without IV contrast. Coronal and sagittal  reformats. Radiation dose reduction techniques were utilized, including  automated exposure control and exposure modulation based on body size.     FINDINGS:      Lung bases: Trace left pleural effusion. Subsegmental atelectasis at the  right lung base. Partial atelectasis in the basilar left lower lobe.  Coronary artery atherosclerotic calcification. Mild cardiomegaly.  Gynecomastia.     ABDOMEN: Normal liver, gallbladder and spleen. Mild to moderate  pancreatic atrophy. No pancreatic ductal dilatation or mass seen. No  adrenal nodules. Fluid attenuating cyst seen in the left mid kidney.  Subcentimeter hyperattenuating lesion in the left mid kidney, series 2,  axial mage 58, too small to characterize, typically a small hemorrhagic  or proteinaceous cyst. No urolithiasis or hydronephrosis. Motion  obscures the right kidney with a mildly prominent right renal collecting  system.     Pelvis: Prostate is normal in size. Armendariz in the bladder. Gas in  the  nondependent bladder lumen, suspect iatrogenic. Bladder is collapsed.  Phleboliths in the pelvis.     Bowel: No obstruction. Normal appendix.     Body wall: Large fat-containing supraumbilical ventral hernia. Small  fat-containing umbilical hernia. Small fat-containing inguinal hernias.     Soft tissue defect seen posterior to the inferior sacrum and coccyx,  containing packing material, consistent with a sacral decubitus  ulceration, extends to the underlying bone. No fluid collection.     Retroperitoneum: No lymphadenopathy.     Vasculature: No abdominal aortic aneurysm. Mild aortoiliac  atherosclerotic calcification.     Osseous structures: See above. Decreased bone mineralization. Lumbar  facet degenerative arthropathy with grade 1 anterolisthesis of L4 on L5.     Artifacts: Motion       Impression:         1. Sacral decubitus ulceration, extends to the underlying sacrum.  2. Large amount of partial atelectasis in the basilar left lower lobe.  3. Mild cardiomegaly     This report was finalized on 10/24/2024 2:48 PM by Dr. Camacho Beal M.D on Workstation: [id number]       CT Head Without Contrast [[id number]] Collected: 10/24/24 1430     Updated: 10/24/24 1442    Narrative:      CT HEAD WO CONTRAST-     HISTORY:  AMS; N17.9-Acute kidney failure, unspecified;  I95.9-Hypotension, unspecified; R41.82-Altered mental status,  unspecified     COMPARISON: None     FINDINGS: The brain and ventricles are symmetrical. Moderate vascular  calcification is noted. Areas of decreased attenuation involving the  white matter of cerebral hemispheres are noted bilaterally consistent  with mild to moderate small vessel ischemic disease. No focal area of  decreased attenuation to suggest acute infarction is identified. Further  evaluation could be performed with a MRI examination of the brain as  indicated.                 Radiation dose reduction techniques were utilized, including automated  exposure modulation based on  body size.     This report was finalized on 10/24/2024 2:39 PM by Dr. Damien Mathis M.D on Workstation: [id number]       XR Chest 1 View [[id number]] Collected: 10/24/24 1405     Updated: 10/24/24 1411    Narrative:      XR CHEST 1 VW-     HISTORY: Male who is 69 years-old, weakness     TECHNIQUE: frontal view of the chest     COMPARISON: None      FINDINGS: The heart size is borderline. Aorta appears ectatic. Pulmonary  vasculature is unremarkable.  No focal pulmonary consolidation, pleural  effusion, or pneumothorax. No acute osseous process.       Impression:      No focal pulmonary consolidation. Borderline heart size.  Ectatic appearing aorta. Follow-up as clinically indicated.           This report was finalized on 10/24/2024 2:08 PM by Dr. Ruddy De La Cruz M.D on Workstation: [id number]                  Assessment:     NEEL  Urinary retention  Sepsis    Plan:   - No acute urologic surgical intervention planned  - Replace indwelling catheter   - Renal ultrasound ordered to assess for resolution of hydronephrosis seen on scan from 10/11  - Continue IV Zosyn- culture in process, recommend tailoring antibiotic selection based on results of culture data  - Follow-up as previously scheduled with Dr. Zuniga on 10/30. If still admitted to the hospital, please contact office to reschedule for 1-2 weeks after discharge  - Urology will sign off; please call with any questions/concerns or clinical change     JAIRO Chandler  10/25/24  11:29 EDT    Plan reviewed and discussed with Dr. Lira

## 2024-10-25 NOTE — CASE MANAGEMENT/SOCIAL WORK
Continued Stay Note  [facility]     Patient Name: Arden Carrasquillo  MRN: [id number]  Today's Date: 10/25/2024    Admit Date: 10/24/2024        Discharge Plan       Row Name 10/25/24 1145       Plan    Plan Comments LVM for both Carina, pts sister and Breonna, pts daughter to complete screen. Awaiting return call so screening can be completed. Ap RN/CCP                   Discharge Codes    No documentation.                       Tina Pérez RN

## 2024-10-25 NOTE — PROGRESS NOTES
[facility]    [phone]   LOS: 1 day     Name: Arden Carrasquillo  Age/Sex: 69 y.o. male  :  1955        PCP: Pollo Ward MD  Chief Complaint   Patient presents with    Altered Mental Status     Sepsis alert      Subjective   He still feels pretty rough this morning.  Denies new issues or complaints.  He rested decently overnight.  He is not sure what happened he was feeling much better when he left [place] but over the last few days he has progressively worsened.  General: No Fever or Chills, Resp: No Cough or SOA, and Other: No bleeding    vitamin C, 500 mg, Oral, Daily  atorvastatin, 10 mg, Oral, Daily  pantoprazole, 40 mg, Oral, Daily  piperacillin-tazobactam, 3.375 g, Intravenous, Q12H  polyethylene glycol, 17 g, Oral, Daily  sodium bicarbonate, 650 mg, Oral, BID  sodium hypochlorite, , Topical, BID  tamsulosin, 0.4 mg, Oral, Daily           Objective   Vital Signs  Temp:  [97.3 °F (36.3 °C)-98 °F (36.7 °C)] 97.3 °F (36.3 °C)  Heart Rate:  [59-75] 63  Resp:  [16-18] 16  BP: ()/(55-77) 114/77  Body mass index is 32.82 kg/m².    Intake/Output Summary (Last 24 hours) at 10/25/2024 1444  Last data filed at 10/25/2024 1157  Gross per 24 hour   Intake 1100 ml   Output 2700 ml   Net -1600 ml       Physical Exam  Vitals reviewed.   Constitutional:       General: He is not in acute distress.     Appearance: He is obese. He is ill-appearing.   Cardiovascular:      Rate and Rhythm: Normal rate and regular rhythm.   Pulmonary:      Effort: Pulmonary effort is normal. No respiratory distress.   Abdominal:      General: Bowel sounds are normal. There is no distension.      Palpations: Abdomen is soft.   Neurological:      Mental Status: He is alert and oriented to person, place, and time. Mental status is at baseline.           Results Review:       I reviewed the patient's new clinical results.  Results from last 7 days   Lab Units 10/25/24  0300 10/24/24  1251   WBC 10*3/mm3 9.58 9.11    HEMOGLOBIN g/dL 11.0* 9.9*   PLATELETS 10*3/mm3 266 283     Results from last 7 days   Lab Units 10/25/24  0300 10/24/24  2139 10/24/24  1251   SODIUM mmol/L 140 141 135*   POTASSIUM mmol/L 4.9 4.8 5.3*   CHLORIDE mmol/L 109* 110* 103   CO2 mmol/L 17.0* 18.0* 20.5*   BUN mg/dL 61* 66* 72*   CREATININE mg/dL 3.03* 3.60* 4.29*   CALCIUM mg/dL 8.3* 8.0* 8.5*   MAGNESIUM mg/dL  --   --  2.5*   Estimated Creatinine Clearance: 23.8 mL/min (A) (by C-G formula based on SCr of 3.03 mg/dL (H)).  Lab Results   Component Value Date    HGBA1C 6.4 (H) 08/19/2024    HGBA1C 7.3 (A) 05/10/2024    HGBA1C 7.2 (H) 05/09/2024     Glucose   Date/Time Value Ref Range Status   10/24/2024 1730 114 70 - 130 mg/dL Final         Assessment & Plan   Active Hospital Problems    Diagnosis  POA    **Acute renal failure [N17.9]  Yes    Acidosis [E87.20]  Unknown    Prostate cancer [C61]  Yes    Hyperlipidemia [E78.5]  Yes    Stage 3a chronic kidney disease [N18.31]  Yes    Hypertension [I10]  Yes    Type 2 diabetes mellitus with diabetic autonomic neuropathy, with long-term current use of insulin [E11.43, Z79.4]  Not Applicable      Resolved Hospital Problems    Diagnosis Date Resolved POA    Hyperkalemia [E87.5] 10/25/2024 Yes       PLAN  This is a 69-year-old gentleman with a history of prostate cancer hypertension hyperlipidemia type 2 diabetes and chronic kidney disease and was recently admitted at [facility] for urinary retention urinary tract infection bacteremia sepsis and left the hospital with Armendariz catheter in place.  He presents on this admission with altered mental status and is admitted back to the hospital with concern for urinary tract infection and noted to have significant acute renal failure with hyperkalemia and acidosis  -Diabetes under relative good control for now.  Last A1c in August was 6.4.  Blood sugar here in the hospital is stable continue to check on daily BMP  -Appreciate nephrology input discussed with   Vilma today.  Holding antihypertensives and diuretics.  Encouraging oral fluids.  Baseline creatinine around 1.5.  -Urology consulted with chronic Armendariz catheter in place and urinary retention.  Will follow-up their recommendations  -Urine and blood cultures are pending.  Agree with infectious disease discontinuation of vancomycin and continuing Zosyn.  -Reviewed PT notes from today.  I think we probably need to get him to skilled nursing facility after this discharge.  -Initiating subcu heparin for DVT prophylaxis  -DNR      Disposition  Expected Discharge Date: 10/29/2024; Expected Discharge Time:        Damien Christy MD  [facility]  10/25/24  14:44 EDT

## 2024-10-25 NOTE — PROGRESS NOTES
[facility] Clinical Pharmacy Services: Piperacillin-Tazobactam Consult    Pt Name: Arden Carrasquillo   : 1955    Indication: Skin and Soft Tissue    Relevant clinical data and objective history reviewed:    Past Medical History:   Diagnosis Date    Diabetes mellitus     Positive colorectal cancer screening using Cologuard test 2020    Severe peripheral polyneuropathy 10/19/2023     Creatinine   Date Value Ref Range Status   10/24/2024 3.60 (H) 0.76 - 1.27 mg/dL Final   10/24/2024 4.29 (H) 0.76 - 1.27 mg/dL Final   2023 1.76 (H) 0.76 - 1.27 mg/dL Final   2022 1.34 (H) 0.76 - 1.27 mg/dL Final   2022 1.36 (H) 0.76 - 1.27 mg/dL Final   2020 1.2 0.7 - 1.5 mg/dL Final   2019 1.28 (H) 0.76 - 1.27 mg/dL Final     BUN   Date Value Ref Range Status   10/24/2024 66 (H) 8 - 23 mg/dL Final   2020 19 7 - 20 mg/dL Final     Estimated Creatinine Clearance: 19.8 mL/min (A) (by C-G formula based on SCr of 3.6 mg/dL (H)).    Lab Results   Component Value Date    WBC 9.11 10/24/2024     Temp Readings from Last 3 Encounters:   10/24/24 97.6 °F (36.4 °C) (Oral)   10/03/24 97.5 °F (36.4 °C) (Infrared)   05/10/24 97.5 °F (36.4 °C) (Infrared)      Assessment/Plan  Estimated CrCl <20 mL/min at this time; BMI 32.11 kg/m2  Will start piperacillin-tazobactam 3.375 g IV every 12 hours     Pharmacy will continue to follow daily while on piperacillin-tazobactam and adjust as needed. Thank you for this consult.    Stephanie Mason [facility]  Clinical Pharmacist

## 2024-10-25 NOTE — NURSING NOTE
10/25/24 1024   Wound 10/24/24 1818 coccyx   Placement Date/Time: 10/24/24 1818   Location: coccyx   Pressure Injury Stage 4   Base clean;moist;red;granulating   Periwound excoriated;moist  (skin tear to right buttocks from MARSI)   Periwound Temperature warm   Periwound Skin Turgor soft   Wound Length (cm) 3 cm   Wound Width (cm) 4.2 cm   Wound Depth (cm) 1 cm   Wound Surface Area (cm^2) 12.6 cm^2   Wound Volume (cm^3) 12.6 cm^3   Drainage Characteristics/Odor serosanguineous   Drainage Amount small   Care, Wound cleansed with;antimicrobial agent applied   Dressing Care dressing changed  (Dakins wet to moist packing to wound base, barrier cream applied to periwound skin and vaseline guaze applied to skin tear.)   Periwound Care barrier ointment applied   Skin Interventions   Pressure Reduction Devices pressure-redistributing mattress utilized  (I have asked staff to place him on a Pro Plus mattress)   Pressure Reduction Techniques heels elevated off bed;positioned off wounds     CWON note: pt seen for evaluation of skin issues POA. Pt is alert, requires assistance of 2 to turn and reposition. Sacral wound as noted above. I placed a piece of Vaseline guaze over the skin tear from medical adhesive related skin injury (MARSI) to right buttocks, and barrier ointment was applied to protect periwound skin. Dressing should be changed BID. Pt will need a Pro Plus mattress for adequate pressure redistribution. Rodrigue heels with slowly blanchable erythema, left >right, heel boots and venelex ointment ordered.  Wound care and prevention standing orders placed into EPIC. WOC team will follow prn.

## 2024-10-25 NOTE — PROGRESS NOTES
[facility] Clinical Pharmacy Services: Vancomycin Level Monitoring Note    Arden Carrasquillo is a 69 y.o. male who is on day 2 of pharmacy to dose vancomycin for Skin and Soft Tissue.    Estimated Creatinine Clearance: 23.5 mL/min (A) (by C-G formula based on SCr of 3.03 mg/dL (H)).    Current Vanc Dose: intermittent dosing  Results from last 7 days   Lab Units 10/25/24  0300   VANCOMYCIN RM mcg/mL 14.80     Assessment/Plan:  Renal function improving. Vancomycin random level was drawn this AM ~11 hours post loading dose and resulted at 14.8 mcg/mL. Will order vancomycin 1000 mg IV x1 to be given at 0600. Will follow with a 12-hr random level at 1800 this evening for consideration of scheduled dosing.     Pharmacy is continuing to monitor and will adjust as needed.    Orlando Bills, [facility]  Clinical Pharmacist

## 2024-10-25 NOTE — PROGRESS NOTES
"[facility] Clinical Pharmacy Services: Vancomycin Pharmacokinetic Initial Consult Note    Arden Carrasquillo is a 69 y.o. male who is on day 1 of pharmacy to dose vancomycin.    Indication: Skin and Soft Tissue  Consulting Provider: Wanda  Planned Duration of Therapy: 7  Loading Dose Ordered or Given: 1750 mg on 10/24 at 1612  MRSA PCR performed: no  Culture/Source:   10/24 BloodCx - pending x2  Target: Dose by Levels  Pertinent Vanc Dosing History: n/a  Other Antimicrobials: Zosyn    Vitals/Labs  Ht: 167.6 cm (65.98\"); Wt: 90.2 kg (198 lb 13.7 oz)  Temp Readings from Last 1 Encounters:   10/24/24 97.6 °F (36.4 °C) (Oral)    Estimated Creatinine Clearance: 19.8 mL/min (A) (by C-G formula based on SCr of 3.6 mg/dL (H)).  NEEL     Results from last 7 days   Lab Units 10/24/24  2139 10/24/24  1251   CREATININE mg/dL 3.60* 4.29*   WBC 10*3/mm3  --  9.11     Assessment/Plan:    Vancomycin Dose: no further doses needed tonight  Vanc Random has been ordered for 10/25 at 0600 with AM labs     Pharmacy will follow patient's kidney function and will adjust doses and obtain levels as necessary. Thank you for involving pharmacy in this patient's care. Please contact pharmacy with any questions or concerns.                           Stephanie Mason, [facility]  Clinical Pharmacist    "

## 2024-10-25 NOTE — THERAPY EVALUATION
Patient Name: Arden Carrasquillo  : 1955    MRN: [id number]                              Today's Date: 10/25/2024       Admit Date: 10/24/2024    Visit Dx:     ICD-10-CM ICD-9-CM   1. Acute renal failure, unspecified acute renal failure type  N17.9 584.9   2. Hypotension, unspecified hypotension type  I95.9 458.9   3. Altered mental status, unspecified altered mental status type  R41.82 780.97   4. Pressure injury of skin of sacral region, unspecified injury stage  L89.159 707.03     707.20   5. Urinary retention  R33.9 788.20   6. Chronic anemia  D64.9 285.9   7. Severe sepsis  A41.9 038.9    R65.20 995.92     Patient Active Problem List   Diagnosis    Type 2 diabetes mellitus with diabetic autonomic neuropathy, with long-term current use of insulin    Degenerative spondylolisthesis    Hypertension    Generalized anxiety disorder    Stage 3a chronic kidney disease    Hyperlipidemia    Prostate cancer    Anemia in stage 3b chronic kidney disease    Gynecomastia    Carcinoma in situ of colon    Bilateral lower extremity edema    Weakness of both lower extremities    Psychophysiological insomnia    History of colon polyps    Lumbar stenosis with neurogenic claudication    S/P cervical spinal fusion    Cervical cord compression with myelopathy    Aneurysm of descending thoracic aorta without rupture    Tremor    Acute renal failure     Past Medical History:   Diagnosis Date    Diabetes mellitus     Positive colorectal cancer screening using Cologuard test 2020    Severe peripheral polyneuropathy 10/19/2023     Past Surgical History:   Procedure Laterality Date    CYSTOSCOPY TRANSURETHRAL RESECTION OF PROSTATE      UPPER GASTROINTESTINAL ENDOSCOPY        General Information       Row Name 10/25/24 1150          Physical Therapy Time and Intention    Document Type evaluation  -DJ     Mode of Treatment individual therapy;physical therapy  -DJ       Row Name 10/25/24 1150          General Information     Patient Profile Reviewed yes  -DJ     Prior Level of Function min assist:;ADL's  amb short distance with r wx; independent with oral hygeine but req A for ADLs  -DJ     Existing Precautions/Restrictions fall;oxygen therapy device and L/min  -DJ     Barriers to Rehab medically complex;previous functional deficit  -DJ       Row Name 10/25/24 1150          Living Environment    People in Home sibling(s);child(sven), adult  -DJ       Row Name 10/25/24 1150          Home Main Entrance    Number of Stairs, Main Entrance none  -DJ       Row Name 10/25/24 1150          Stairs Within Home, Primary    Number of Stairs, Within Home, Primary none  -DJ       Row Name 10/25/24 1150          Cognition    Orientation Status (Cognition) oriented x 3;other (see comments)  slow to respond  -DJ       Row Name 10/25/24 1150          Safety Issues/Impairments Affecting Functional Mobility    Safety Issues Affecting Function (Mobility) judgment;safety precaution awareness;sequencing abilities  -DJ     Impairments Affecting Function (Mobility) balance;cognition;endurance/activity tolerance;strength  -DJ     Cognitive Impairments, Mobility Safety/Performance judgment;safety precaution awareness;insight into deficits/self-awareness  -DJ     Comment, Safety Issues/Impairments (Mobility) gt belt, nonskid socks  -DJ               User Key  (r) = Recorded By, (t) = Taken By, (c) = Cosigned By      Initials Name Provider Type    Radha Medina, PT Physical Therapist                   Mobility       Row Name 10/25/24 1158          Bed Mobility    Bed Mobility supine-sit;sit-supine  -DJ     Supine-Sit [place] (Bed Mobility) moderate assist (50% patient effort);maximum assist (25% patient effort);2 person assist;verbal cues  -DJ     Sit-Supine [place] (Bed Mobility) dependent (less than 25% patient effort);2 person assist;verbal cues  -DJ     Assistive Device (Bed Mobility) bed rails;head of bed elevated;repositioning sheet  -DJ      Comment, (Bed Mobility) vc for sequencing, increased time  -DJ       Row Name 10/25/24 1156          Transfers    Comment, (Transfers) sit/stand from EOB x 2 attempts  -DJ       Row Name 10/25/24 1156          Bed-Chair Transfer    Bed-Chair [place] (Transfers) unable to assess  -DJ       Row Name 10/25/24 1156          Sit-Stand Transfer    Sit-Stand [place] (Transfers) maximum assist (25% patient effort);2 person assist;verbal cues  -DJ     Assistive Device (Sit-Stand Transfers) walker, front-wheeled  -DJ     Comment, (Sit-Stand Transfer) raised EOB; 1st attempt unable to clear bottom off bed; 2nd attempt able to clear hips but unable to straighten knees  -DJ       Row Name 10/25/24 1156          Gait/Stairs (Locomotion)    [place] Level (Gait) unable to assess  -DJ     Distance in Feet (Gait) 0  -DJ     [place] Level (Stairs) not tested  -DJ               User Key  (r) = Recorded By, (t) = Taken By, (c) = Cosigned By      Initials Name Provider Type    Radha Medina, MIRA Physical Therapist                   Obj/Interventions       Row Name 10/25/24 1159          Range of Motion Comprehensive    Comment, General Range of Motion L sh <50% due to rotator cuff injury  -DJ       Row Name 10/25/24 1159          Strength Comprehensive (MMT)    Comment, General Manual Muscle Testing (MMT) Assessment generally weak, moves all 4s  -DJ       Row Name 10/25/24 1159          Motor Skills    Motor Skills functional endurance  -DJ     Functional Endurance poor  -DJ       Row Name 10/25/24 1159          Balance    Balance Assessment standing static balance;standing dynamic balance  -DJ     Static Standing Balance maximum assist;2-person assist;verbal cues  -DJ     Dynamic Standing Balance maximum assist;2-person assist;verbal cues  -DJ     Position/Device Used, Standing Balance walker, front-wheeled;supported  -DJ     Balance Interventions sitting;standing;sit to stand;supported;weight shifting activity  -DJ      Comment, Balance poor  -DJ       Row Name 10/25/24 1159          Sensory Assessment (Somatosensory)    Sensory Assessment (Somatosensory) not tested  -DJ               User Key  (r) = Recorded By, (t) = Taken By, (c) = Cosigned By      Initials Name Provider Type    Radha Medina, PT Physical Therapist                   Goals/Plan       Row Name 10/25/24 1207          Bed Mobility Goal 1 (PT)    Activity/Assistive Device (Bed Mobility Goal 1, PT) sit to supine;supine to sit  -DJ     [place] Level/Cues Needed (Bed Mobility Goal 1, PT) minimum assist (75% or more patient effort);verbal cues required  -DJ     Time Frame (Bed Mobility Goal 1, PT) 10 days  -DJ       Row Name 10/25/24 1207          Transfer Goal 1 (PT)    Activity/Assistive Device (Transfer Goal 1, PT) sit-to-stand/stand-to-sit;walker, rolling  -DJ     [place] Level/Cues Needed (Transfer Goal 1, PT) minimum assist (75% or more patient effort);verbal cues required  -DJ       Row Name 10/25/24 1207          Gait Training Goal 1 (PT)    Activity/Assistive Device (Gait Training Goal 1, PT) gait (walking locomotion);assistive device use;improve balance and speed;increase endurance/gait distance;increase energy conservation;walker, rolling  -DJ     [place] Level (Gait Training Goal 1, PT) minimum assist (75% or more patient effort)  -DJ     Distance (Gait Training Goal 1, PT) 15'  -DJ     Time Frame (Gait Training Goal 1, PT) 10 days  -DJ       Row Name 10/25/24 1207          Balance Goal 1 (PT)    Activity/Assistive Device (Balance Goal) sitting dynamic balance  -DJ     [place] Level/Cues Needed (Balance Goal 1, PT) independent  -DJ     Time Frame (Balance Goal 1, PT) 3-5 days  -DJ       Row Name 10/25/24 1207          Patient Education Goal (PT)    Activity (Patient Education Goal, PT) HEP  -DJ     [place]/Cues/Accuracy (Memory Goal 2, PT) demonstrates adequately;verbalizes understanding  -DJ     Time Frame (Patient Education  Goal, PT) 5 days;short term goal (STG)  -DJ       Row Name 10/25/24 1207          Therapy Assessment/Plan (PT)    Planned Therapy Interventions (PT) balance training;bed mobility training;gait training;home exercise program;strengthening;postural re-education;patient/family education;transfer training  -DJ               User Key  (r) = Recorded By, (t) = Taken By, (c) = Cosigned By      Initials Name Provider Type    Radha Medina, PT Physical Therapist                   Clinical Impression       Row Name 10/25/24 1200          Pain    Pain Location buttock  -DJ     Pain Side/Orientation generalized  -DJ     Pain Management Interventions exercise or physical activity utilized;nursing notified  pt requested pain meds  -DJ     Response to Pain Interventions activity participation with tolerable pain  -DJ       Row Name 10/25/24 1200          Plan of Care Review    Plan of Care Reviewed With patient  -DJ     Outcome Evaluation 70yo pleasant, obese black male admitted 10/24/24 with acute renal failure, sepsis. PMH includes diabetes with neuropathy, hbp, anxiety, CKD3b, prostate CA, anemia, thoracic aortic aneurysm, cervical arthodesis. PLOF: Pt lives at home with walt and sister with no stairs. He reports he was independent with oral hygeine and could amb short distances with r wx. He req A for ADLs. Today, he was found resting in bed in NAD, pleasant and cooperative. HE req mod-max A Of 2 and vc for sequencing to sit EOB and initially req min A for sitting balance. He stood x 2 attempts from raised EOB using r wx - 1st attempt unable to clear bottom off bed; 2nd attempt able to clear hips but unable to fully  straighten knees. He was dependent to return supine. He may benefit from skilled PT sewrvices to address functional deficits and prepare for d/c. Pt not safe to retun home at this time - recommend SNF  -DJ       Row Name 10/25/24 1200          Therapy Assessment/Plan (PT)    Rehab Potential (PT) limited  -DJ      Criteria for Skilled Interventions Met (PT) yes;meets criteria;skilled treatment is necessary  -DJ     Therapy Frequency (PT) 5 times/wk  -DJ       Row Name 10/25/24 1200          Vital Signs    Pre Patient Position Supine  -DJ     Intra Patient Position Standing  -DJ     Post Patient Position Supine  -DJ       Row Name 10/25/24 1200          Positioning and Restraints    Pre-Treatment Position in bed  -DJ     Post Treatment Position bed  -DJ     In Bed notified nsg;supine;call light within reach;encouraged to call for assist;exit alarm on;heels elevated  -DJ               User Key  (r) = Recorded By, (t) = Taken By, (c) = Cosigned By      Initials Name Provider Type    Radha Medina, PT Physical Therapist                   Outcome Measures       Row Name 10/25/24 1209          How much help from another person do you currently need...    Turning from your back to your side while in flat bed without using bedrails? 2  -DJ     Moving from lying on back to sitting on the side of a flat bed without bedrails? 2  -DJ     Moving to and from a bed to a chair (including a wheelchair)? 1  -DJ     Standing up from a chair using your arms (e.g., wheelchair, bedside chair)? 2  -DJ     Climbing 3-5 steps with a railing? 1  -DJ     To walk in hospital room? 1  -DJ     AM-PAC 6 Clicks Score (PT) 9  -DJ     Highest Level of Mobility Goal 3 --> Sit at edge of bed  -DJ       Row Name 10/25/24 1209          Functional Assessment    Outcome Measure Options AM-PAC 6 Clicks Basic Mobility (PT)  -DJ               User Key  (r) = Recorded By, (t) = Taken By, (c) = Cosigned By      Initials Name Provider Type    Radha Medina, PT Physical Therapist                                 Physical Therapy Education       Title: PT OT SLP Therapies (In Progress)       Topic: Physical Therapy (In Progress)       Point: Mobility training (Done)       Learning Progress Summary            Patient Acceptance, E, VU,NR by ELISE at 10/25/2024 1206                       Point: Home exercise program (Not Started)       Learner Progress:  Not documented in this visit.              Point: Body mechanics (Done)       Learning Progress Summary            Patient Acceptance, E, VU,NR by ELISE at 10/25/2024 1209                      Point: Precautions (Done)       Learning Progress Summary            Patient Acceptance, E, VU,NR by ELISE at 10/25/2024 1209                                      User Key       Initials Effective Dates Name Provider Type Discipline    ELISE 10/25/19 -  Radha Prasad, PT Physical Therapist PT                  PT Recommendation and Plan  Planned Therapy Interventions (PT): balance training, bed mobility training, gait training, home exercise program, strengthening, postural re-education, patient/family education, transfer training  Outcome Evaluation: 68yo pleasant, obese black male admitted 10/24/24 with acute renal failure, sepsis. PMH includes diabetes with neuropathy, hbp, anxiety, CKD3b, prostate CA, anemia, thoracic aortic aneurysm, cervical arthodesis. PLOF: Pt lives at home with walt and sister with no stairs. He reports he was independent with oral hygeine and could amb short distances with r wx. He req A for ADLs. Today, he was found resting in bed in NAD, pleasant and cooperative. HE req mod-max A Of 2 and vc for sequencing to sit EOB and initially req min A for sitting balance. He stood x 2 attempts from raised EOB using r wx - 1st attempt unable to clear bottom off bed; 2nd attempt able to clear hips but unable to fully  straighten knees. He was dependent to return supine. He may benefit from skilled PT sewrvices to address functional deficits and prepare for d/c. Pt not safe to retun home at this time - recommend SNF     Time Calculation:   PT Evaluation Complexity  History, PT Evaluation Complexity: 3 or more personal factors and/or comorbidities  Examination of Body Systems (PT Eval Complexity): total of 4 or more elements  Clinical  Presentation (PT Evaluation Complexity): evolving  Clinical Decision Making (PT Evaluation Complexity): moderate complexity  Overall Complexity (PT Evaluation Complexity): moderate complexity     PT Charges       Row Name 10/25/24 1211 10/25/24 1210          Time Calculation    Start Time -- 1028  -DJ     Stop Time -- 1054  -DJ     Time Calculation (min) -- 26 min  -DJ     PT Non-Billable Time (min) -- 10 min  -DJ     PT Received On -- 10/25/24  -DJ     PT - Next Appointment -- 10/28/24  -ELISE     PT Goal Re-Cert Due Date 11/04/24  -DJ 11/04/24  -DJ               User Key  (r) = Recorded By, (t) = Taken By, (c) = Cosigned By      Initials Name Provider Type    Radha Medina, MIRA Physical Therapist                  Therapy Charges for Today       Code Description Service Date Service Provider Modifiers Qty    09040593883 HC PT EVAL MOD COMPLEXITY 3 10/25/2024 Radha Prasad, PT GP 1    66799608655 HC PT THERAPEUTIC ACT EA 15 MIN 10/25/2024 Radha Prasad, PT GP 2    02160289888 HC PT THER SUPP EA 15 MIN 10/25/2024 Radha Prasad, PT GP 2            PT G-Codes  Outcome Measure Options: AM-PAC 6 Clicks Basic Mobility (PT)  AM-PAC 6 Clicks Score (PT): 9  PT Discharge Summary  Anticipated Discharge Disposition (PT): skilled nursing facility    Radha Prasad PT  10/25/2024

## 2024-10-25 NOTE — PLAN OF CARE
Goal Outcome Evaluation:  Plan of Care Reviewed With: patient           Outcome Evaluation: 68yo pleasant, obese black male admitted 10/24/24 with acute renal failure, sepsis. PMH includes diabetes with neuropathy, hbp, anxiety, CKD3b, prostate CA, anemia, thoracic aortic aneurysm, cervical arthodesis. PLOF: Pt lives at home with walt and sister with no stairs. He reports he was independent with oral hygeine and could amb short distances with r wx. He req A for ADLs. Today, he was found resting in bed in NAD, pleasant and cooperative. HE req mod-max A Of 2 and vc for sequencing to sit EOB and initially req min A for sitting balance. He stood x 2 attempts from raised EOB using r wx - 1st attempt unable to clear bottom off bed; 2nd attempt able to clear hips but unable to fully  straighten knees. He was dependent to return supine. He may benefit from skilled PT sewrvices to address functional deficits and prepare for d/c. Pt not safe to retun home at this time - recommend SNF    Anticipated Discharge Disposition (PT): skilled nursing facility

## 2024-10-26 LAB
ALBUMIN SERPL-MCNC: 3.1 G/DL (ref 3.5–5.2)
ANION GAP SERPL CALCULATED.3IONS-SCNC: 9 MMOL/L (ref 5–15)
BACTERIA SPEC AEROBE CULT: ABNORMAL
BASOPHILS # BLD AUTO: 0.02 10*3/MM3 (ref 0–0.2)
BASOPHILS NFR BLD AUTO: 0.3 % (ref 0–1.5)
BUN SERPL-MCNC: 43 MG/DL (ref 8–23)
BUN/CREAT SERPL: 25 (ref 7–25)
CALCIUM SPEC-SCNC: 8.7 MG/DL (ref 8.6–10.5)
CHLORIDE SERPL-SCNC: 112 MMOL/L (ref 98–107)
CO2 SERPL-SCNC: 22 MMOL/L (ref 22–29)
CREAT SERPL-MCNC: 1.72 MG/DL (ref 0.76–1.27)
DEPRECATED RDW RBC AUTO: 44.1 FL (ref 37–54)
EGFRCR SERPLBLD CKD-EPI 2021: 42 ML/MIN/1.73
EOSINOPHIL # BLD AUTO: 0.17 10*3/MM3 (ref 0–0.4)
EOSINOPHIL NFR BLD AUTO: 2.2 % (ref 0.3–6.2)
ERYTHROCYTE [DISTWIDTH] IN BLOOD BY AUTOMATED COUNT: 14 % (ref 12.3–15.4)
GLUCOSE SERPL-MCNC: 147 MG/DL (ref 65–99)
GRAM STN SPEC: ABNORMAL
HCT VFR BLD AUTO: 32.7 % (ref 37.5–51)
HGB BLD-MCNC: 10.3 G/DL (ref 13–17.7)
IMM GRANULOCYTES # BLD AUTO: 0.04 10*3/MM3 (ref 0–0.05)
IMM GRANULOCYTES NFR BLD AUTO: 0.5 % (ref 0–0.5)
ISOLATED FROM: ABNORMAL
LYMPHOCYTES # BLD AUTO: 0.64 10*3/MM3 (ref 0.7–3.1)
LYMPHOCYTES NFR BLD AUTO: 8.2 % (ref 19.6–45.3)
MCH RBC QN AUTO: 27.5 PG (ref 26.6–33)
MCHC RBC AUTO-ENTMCNC: 31.5 G/DL (ref 31.5–35.7)
MCV RBC AUTO: 87.2 FL (ref 79–97)
MONOCYTES # BLD AUTO: 0.77 10*3/MM3 (ref 0.1–0.9)
MONOCYTES NFR BLD AUTO: 9.9 % (ref 5–12)
NEUTROPHILS NFR BLD AUTO: 6.16 10*3/MM3 (ref 1.7–7)
NEUTROPHILS NFR BLD AUTO: 78.9 % (ref 42.7–76)
NRBC BLD AUTO-RTO: 0 /100 WBC (ref 0–0.2)
PHOSPHATE SERPL-MCNC: 2.6 MG/DL (ref 2.5–4.5)
PLATELET # BLD AUTO: 271 10*3/MM3 (ref 140–450)
PMV BLD AUTO: 10.4 FL (ref 6–12)
POTASSIUM SERPL-SCNC: 4.8 MMOL/L (ref 3.5–5.2)
RBC # BLD AUTO: 3.75 10*6/MM3 (ref 4.14–5.8)
SODIUM SERPL-SCNC: 143 MMOL/L (ref 136–145)
WBC NRBC COR # BLD AUTO: 7.8 10*3/MM3 (ref 3.4–10.8)

## 2024-10-26 PROCEDURE — 80069 RENAL FUNCTION PANEL: CPT | Performed by: INTERNAL MEDICINE

## 2024-10-26 PROCEDURE — 99233 SBSQ HOSP IP/OBS HIGH 50: CPT | Performed by: STUDENT IN AN ORGANIZED HEALTH CARE EDUCATION/TRAINING PROGRAM

## 2024-10-26 PROCEDURE — 85025 COMPLETE CBC W/AUTO DIFF WBC: CPT | Performed by: INTERNAL MEDICINE

## 2024-10-26 PROCEDURE — 25010000002 HEPARIN (PORCINE) PER 1000 UNITS: Performed by: HOSPITALIST

## 2024-10-26 PROCEDURE — 25010000002 PIPERACILLIN SOD-TAZOBACTAM PER 1 G: Performed by: INTERNAL MEDICINE

## 2024-10-26 RX ADMIN — HYDROCODONE BITARTRATE AND ACETAMINOPHEN 1 TABLET: 7.5; 325 TABLET ORAL at 20:42

## 2024-10-26 RX ADMIN — HEPARIN SODIUM 5000 UNITS: 5000 INJECTION INTRAVENOUS; SUBCUTANEOUS at 08:11

## 2024-10-26 RX ADMIN — HYDROCODONE BITARTRATE AND ACETAMINOPHEN 1 TABLET: 7.5; 325 TABLET ORAL at 08:11

## 2024-10-26 RX ADMIN — HYOSCYAMINE SULFATE: 16 SOLUTION at 20:40

## 2024-10-26 RX ADMIN — CASTOR OIL AND BALSAM, PERU 1 APPLICATION: 788; 87 OINTMENT TOPICAL at 08:12

## 2024-10-26 RX ADMIN — HEPARIN SODIUM 5000 UNITS: 5000 INJECTION INTRAVENOUS; SUBCUTANEOUS at 20:40

## 2024-10-26 RX ADMIN — PANTOPRAZOLE SODIUM 40 MG: 40 TABLET, DELAYED RELEASE ORAL at 08:11

## 2024-10-26 RX ADMIN — SODIUM BICARBONATE 650 MG: 650 TABLET ORAL at 08:11

## 2024-10-26 RX ADMIN — HYOSCYAMINE SULFATE: 16 SOLUTION at 08:12

## 2024-10-26 RX ADMIN — PIPERACILLIN AND TAZOBACTAM 3.38 G: 3; .375 INJECTION, POWDER, FOR SOLUTION INTRAVENOUS at 00:46

## 2024-10-26 RX ADMIN — OXYCODONE HYDROCHLORIDE AND ACETAMINOPHEN 500 MG: 500 TABLET ORAL at 08:11

## 2024-10-26 RX ADMIN — TAMSULOSIN HYDROCHLORIDE 0.4 MG: 0.4 CAPSULE ORAL at 08:11

## 2024-10-26 RX ADMIN — ATORVASTATIN CALCIUM 10 MG: 20 TABLET, FILM COATED ORAL at 08:11

## 2024-10-26 RX ADMIN — CASTOR OIL AND BALSAM, PERU 1 APPLICATION: 788; 87 OINTMENT TOPICAL at 20:40

## 2024-10-26 RX ADMIN — POLYETHYLENE GLYCOL 3350 17 G: 17 POWDER, FOR SOLUTION ORAL at 08:11

## 2024-10-26 NOTE — PROGRESS NOTES
[facility]    [phone]   LOS: 2 days     Name: Arden Carrasquillo  Age/Sex: 69 y.o. male  :  1955        PCP: Pollo Ward MD  Chief Complaint   Patient presents with    Altered Mental Status     Sepsis alert      Subjective   He still feels pretty rough this morning.  Denies new issues or complaints.  He rested decently overnight.  He is not sure what happened he was feeling much better when he left [place] but over the last few days he has progressively worsened.  General: No Fever or Chills, Resp: No Cough or SOA, and Other: No bleeding    vitamin C, 500 mg, Oral, Daily  atorvastatin, 10 mg, Oral, Daily  castor oil-balsam peru, 1 Application, Topical, Q12H  heparin (porcine), 5,000 Units, Subcutaneous, Q12H  pantoprazole, 40 mg, Oral, Daily  piperacillin-tazobactam, 3.375 g, Intravenous, Q12H  polyethylene glycol, 17 g, Oral, Daily  sodium bicarbonate, 650 mg, Oral, BID  sodium hypochlorite, , Topical, BID  tamsulosin, 0.4 mg, Oral, Daily           Objective   Vital Signs  Temp:  [97.7 °F (36.5 °C)-97.9 °F (36.6 °C)] 97.7 °F (36.5 °C)  Heart Rate:  [71-81] 81  Resp:  [16-18] 18  BP: (129-146)/(72-84) 146/82  Body mass index is 33 kg/m².    Intake/Output Summary (Last 24 hours) at 10/26/2024 0817  Last data filed at 10/26/2024 0100  Gross per 24 hour   Intake --   Output 2800 ml   Net -2800 ml       Physical Exam  Vitals reviewed.   Constitutional:       General: He is not in acute distress.     Appearance: He is obese. He is ill-appearing.   Cardiovascular:      Rate and Rhythm: Normal rate and regular rhythm.   Pulmonary:      Effort: Pulmonary effort is normal. No respiratory distress.   Abdominal:      General: Bowel sounds are normal. There is no distension.      Palpations: Abdomen is soft.   Musculoskeletal:      Comments: UE movement is limited and ROM is limited; he has an intention tremor in the right UE with any movement LUE noted with significant limited mobility secondary  to rotator cuff injury and frozen shoulder   Neurological:      Mental Status: He is alert and oriented to person, place, and time. Mental status is at baseline.           Results Review:       I reviewed the patient's new clinical results.  Results from last 7 days   Lab Units 10/26/24  0312 10/25/24  0300 10/24/24  1251   WBC 10*3/mm3 7.80 9.58 9.11   HEMOGLOBIN g/dL 10.3* 11.0* 9.9*   PLATELETS 10*3/mm3 271 266 283     Results from last 7 days   Lab Units 10/26/24  0312 10/25/24  0300 10/24/24  2139 10/24/24  1251   SODIUM mmol/L 143 140 141 135*   POTASSIUM mmol/L 4.8 4.9 4.8 5.3*   CHLORIDE mmol/L 112* 109* 110* 103   CO2 mmol/L 22.0 17.0* 18.0* 20.5*   BUN mg/dL 43* 61* 66* 72*   CREATININE mg/dL 1.72* 3.03* 3.60* 4.29*   CALCIUM mg/dL 8.7 8.3* 8.0* 8.5*   MAGNESIUM mg/dL  --   --   --  2.5*   PHOSPHORUS mg/dL 2.6  --   --   --    Estimated Creatinine Clearance: 42 mL/min (A) (by C-G formula based on SCr of 1.72 mg/dL (H)).  Lab Results   Component Value Date    HGBA1C 6.4 (H) 08/19/2024    HGBA1C 7.3 (A) 05/10/2024    HGBA1C 7.2 (H) 05/09/2024     Glucose   Date/Time Value Ref Range Status   10/24/2024 1730 114 70 - 130 mg/dL Final         Assessment & Plan   Active Hospital Problems    Diagnosis  POA    **Acute renal failure [N17.9]  Yes    Acidosis [E87.20]  Unknown    Prostate cancer [C61]  Yes    Hyperlipidemia [E78.5]  Yes    Stage 3a chronic kidney disease [N18.31]  Yes    Hypertension [I10]  Yes    Type 2 diabetes mellitus with diabetic autonomic neuropathy, with long-term current use of insulin [E11.43, Z79.4]  Not Applicable      Resolved Hospital Problems    Diagnosis Date Resolved POA    Hyperkalemia [E87.5] 10/25/2024 Yes       PLAN  This is a 69-year-old gentleman with a history of prostate cancer hypertension hyperlipidemia type 2 diabetes and chronic kidney disease and was recently admitted at [facility] for urinary retention urinary tract infection bacteremia sepsis and left the hospital  with Armendariz catheter in place.  He presents on this admission with altered mental status and is admitted back to the hospital with concern for urinary tract infection and noted to have significant acute renal failure with hyperkalemia and acidosis  -Diabetes under relative good control for now.  Last A1c in August was 6.4.  Blood sugar here in the hospital is stable continue to check on daily BMP  -Appreciate nephrology input.  Holding antihypertensives and diuretics.  Encouraging oral fluids.  Baseline creatinine around 1.5.  Noted significant improvement in renal function today  -Urology consulted with chronic Armendariz catheter in place and urinary retention.  Follow up in office to be arranged   -Urine and blood cultures are pending.  Initial blood 1/2 positive for coag neg staph which is likely a contaminent  -Reviewed PT notes from today.  I think we probably need to get him to skilled nursing facility after this discharge.  -Initiating subcu heparin for DVT prophylaxis  -DNR      Disposition  Expected Discharge Date: 10/29/2024; Expected Discharge Time:        Damien Christy MD  [facility]  10/26/24  08:17 EDT

## 2024-10-26 NOTE — PLAN OF CARE
Problem: Adult Inpatient Plan of Care  Goal: Plan of Care Review  Outcome: Progressing  Flowsheets (Taken 10/26/2024 1604)  Outcome Evaluation: Pt AxOx4, medicatons given as scheduled, Q2 turns, assist with feeding, room air, VSS, continue plan of care  Plan of Care Reviewed With: patient  Goal: Patient-Specific Goal (Individualized)  Outcome: Progressing  Goal: Absence of Hospital-Acquired Illness or Injury  Outcome: Progressing  Intervention: Identify and Manage Fall Risk  Recent Flowsheet Documentation  Taken 10/26/2024 1413 by Valeriano Johnston RN  Safety Promotion/Fall Prevention:   activity supervised   assistive device/personal items within reach   clutter free environment maintained   fall prevention program maintained   nonskid shoes/slippers when out of bed   safety round/check completed  Taken 10/26/2024 0812 by Valeriano Johnston RN  Safety Promotion/Fall Prevention:   activity supervised   assistive device/personal items within reach   clutter free environment maintained   fall prevention program maintained   nonskid shoes/slippers when out of bed   safety round/check completed  Intervention: Prevent Skin Injury  Recent Flowsheet Documentation  Taken 10/26/2024 0812 by Valeriano Johnston RN  Body Position: weight shifting  Intervention: Prevent Infection  Recent Flowsheet Documentation  Taken 10/26/2024 1413 by Valeriano Johnston RN  Infection Prevention: hand hygiene promoted  Taken 10/26/2024 0812 by Valeriano Johnston RN  Infection Prevention: hand hygiene promoted  Goal: Optimal Comfort and Wellbeing  Outcome: Progressing  Intervention: Provide Person-Centered Care  Recent Flowsheet Documentation  Taken 10/26/2024 1413 by Valeriano Johnston RN  Trust Relationship/Rapport:   care explained   choices provided   emotional support provided   empathic listening provided   questions answered   questions encouraged   reassurance provided   thoughts/feelings acknowledged  Taken 10/26/2024 0812 by Valeriano Johnston RN  Trust  Relationship/Rapport:   care explained   choices provided   emotional support provided   empathic listening provided   questions answered   questions encouraged   reassurance provided   thoughts/feelings acknowledged  Goal: Readiness for Transition of Care  Outcome: Progressing     Problem: Fall Injury Risk  Goal: Absence of Fall and Fall-Related Injury  Outcome: Progressing  Intervention: Identify and Manage Contributors  Recent Flowsheet Documentation  Taken 10/26/2024 1413 by Valeriano Johnston RN  Medication Review/Management: medications reviewed  Taken 10/26/2024 0812 by Valeriano Johnston RN  Medication Review/Management: medications reviewed  Intervention: Promote Injury-Free Environment  Recent Flowsheet Documentation  Taken 10/26/2024 1413 by Valeriano Johnston RN  Safety Promotion/Fall Prevention:   activity supervised   assistive device/personal items within reach   clutter free environment maintained   fall prevention program maintained   nonskid shoes/slippers when out of bed   safety round/check completed  Taken 10/26/2024 0812 by Valeriano Johnston RN  Safety Promotion/Fall Prevention:   activity supervised   assistive device/personal items within reach   clutter free environment maintained   fall prevention program maintained   nonskid shoes/slippers when out of bed   safety round/check completed     Problem: Skin Injury Risk Increased  Goal: Skin Health and Integrity  Outcome: Progressing  Intervention: Optimize Skin Protection  Recent Flowsheet Documentation  Taken 10/26/2024 1413 by Valeriano Johnston RN  Activity Management: activity encouraged  Taken 10/26/2024 0812 by Valeriano Johnston RN  Activity Management: activity encouraged  Head of Bed (HOB) Positioning: HOB at 30-45 degrees     Problem: Sepsis/Septic Shock  Goal: Optimal Coping  Outcome: Progressing  Intervention: Support Patient and Family Response  Recent Flowsheet Documentation  Taken 10/26/2024 1413 by Valeriano Johnston RN  Family/Support System  Care: involvement promoted  Taken 10/26/2024 0812 by Valeriano Johnston RN  Family/Support System Care: involvement promoted  Goal: Absence of Bleeding  Outcome: Progressing  Goal: Blood Glucose Level Within Target Range  Outcome: Progressing  Goal: Absence of Infection Signs and Symptoms  Outcome: Progressing  Intervention: Initiate Sepsis Management  Recent Flowsheet Documentation  Taken 10/26/2024 1413 by Valeriano Johnston RN  Infection Prevention: hand hygiene promoted  Isolation Precautions:   contact   precautions maintained  Taken 10/26/2024 0812 by Valeriano Johnston RN  Infection Prevention: hand hygiene promoted  Isolation Precautions:   contact   precautions maintained  Intervention: Promote Recovery  Recent Flowsheet Documentation  Taken 10/26/2024 1413 by Valeriano Johnston, RN  Activity Management: activity encouraged  Taken 10/26/2024 0812 by Valeriano Johnston RN  Activity Management: activity encouraged  Goal: Optimal Nutrition Delivery  Outcome: Progressing   Goal Outcome Evaluation:  Plan of Care Reviewed With: patient           Outcome Evaluation: Pt AxOx4, medicatons given as scheduled, Q2 turns, assist with feeding, room air, VSS, continue plan of care

## 2024-10-26 NOTE — PROGRESS NOTES
LOS: 2 days     Chief Complaint: Concern for sepsis    Interval History: Afebrile this morning with no leukocytosis.  Blood cultures 1 out of 2 coag negative staph.  Tolerating antibiotics.    Vital Signs  Temp:  [97.7 °F (36.5 °C)-98.6 °F (37 °C)] 98.6 °F (37 °C)  Heart Rate:  [71-81] 75  Resp:  [16-18] 18  BP: (129-146)/(72-84) 137/78    Physical Exam:  General: In no acute distress  HEENT: Oropharynx clear, moist mucous membranes  Respiratory: Normal work of breathing  Skin: No rashes or lesions in exposed areas  : Armendariz catheter  Access: Peripheral IV    Antibiotics:  Anti-Infectives (From admission, onward)      Ordered     Dose/Rate Route Frequency Start Stop    10/25/24 0450  vancomycin (VANCOCIN) 1,000 mg in sodium chloride 0.9 % 250 mL IVPB-VTB        Ordering Provider: Becky Muñoz MD    1,000 mg  250 mL/hr over 60 Minutes Intravenous Once 10/25/24 0600 10/25/24 0700    10/24/24 2225  piperacillin-tazobactam (ZOSYN) 3.375 g IVPB in 100 mL NS MBP (CD)        Ordering Provider: Becky Muñoz MD    3.375 g  over 4 Hours Intravenous Every 12 Hours 10/25/24 0000 10/31/24 2359    10/24/24 1502  piperacillin-tazobactam (ZOSYN) 3.375 g IVPB in 100 mL NS MBP (CD)        Ordering Provider: Peggy Goldsmith PA-C    3.375 g Intravenous Once 10/24/24 1518 10/24/24 1612    10/24/24 1502  vancomycin 1750 mg/500 mL 0.9% NS IVPB ([facility])        Ordering Provider: Peggy Goldsmith PA-C    20 mg/kg × 86.2 kg  over 105 Minutes Intravenous Once 10/24/24 1518 10/24/24 1757             Results Review:     I reviewed the patient's new clinical results.    Lab Results   Component Value Date    WBC 7.80 10/26/2024    HGB 10.3 (L) 10/26/2024    HCT 32.7 (L) 10/26/2024    MCV 87.2 10/26/2024     10/26/2024     Lab Results   Component Value Date    GLUCOSE 147 (H) 10/26/2024    BUN 43 (H) 10/26/2024    CREATININE 1.72 (H) 10/26/2024    EGFRIFNONA 46 (L) 07/06/2021    EGFRIFAFRI 53 (L) 07/06/2021    BCR  25.0 10/26/2024    CO2 22.0 10/26/2024    CALCIUM 8.7 10/26/2024    PROTENTOTREF 7.5 08/04/2023    ALBUMIN 3.1 (L) 10/26/2024    LABIL2 1.1 (L) 08/04/2023    AST 13 10/24/2024    ALT 13 10/24/2024       Microbiology:  10/24 blood cultures 1 out of 2 coag negative staph    Assessment    #Acute kidney injury on chronic kidney disease stage IIIb, baseline creatinine mid threes  #Hypotension, resolved  #Concern for sepsis  #Recent admission to [place] for E. coli septicemia status post treatment  #Positive blood culture 1 out of 2 coag negative staph, likely contaminant  (All new to me)     Blood culture with 1 out of 2 coag negative staph which is likely contamination.  Continues to remain stable and will plan to monitor off antibiotics.    ID will follow.

## 2024-10-27 LAB
ALBUMIN SERPL-MCNC: 2.9 G/DL (ref 3.5–5.2)
ANION GAP SERPL CALCULATED.3IONS-SCNC: 6.6 MMOL/L (ref 5–15)
BASOPHILS # BLD AUTO: 0.02 10*3/MM3 (ref 0–0.2)
BASOPHILS NFR BLD AUTO: 0.3 % (ref 0–1.5)
BUN SERPL-MCNC: 29 MG/DL (ref 8–23)
BUN/CREAT SERPL: 24.4 (ref 7–25)
CALCIUM SPEC-SCNC: 9.3 MG/DL (ref 8.6–10.5)
CHLORIDE SERPL-SCNC: 113 MMOL/L (ref 98–107)
CO2 SERPL-SCNC: 22.4 MMOL/L (ref 22–29)
CREAT SERPL-MCNC: 1.19 MG/DL (ref 0.76–1.27)
DEPRECATED RDW RBC AUTO: 44 FL (ref 37–54)
EGFRCR SERPLBLD CKD-EPI 2021: 65.3 ML/MIN/1.73
EOSINOPHIL # BLD AUTO: 0.1 10*3/MM3 (ref 0–0.4)
EOSINOPHIL NFR BLD AUTO: 1.6 % (ref 0.3–6.2)
ERYTHROCYTE [DISTWIDTH] IN BLOOD BY AUTOMATED COUNT: 13.8 % (ref 12.3–15.4)
GLUCOSE SERPL-MCNC: 144 MG/DL (ref 65–99)
HCT VFR BLD AUTO: 32.1 % (ref 37.5–51)
HGB BLD-MCNC: 10.6 G/DL (ref 13–17.7)
IMM GRANULOCYTES # BLD AUTO: 0.03 10*3/MM3 (ref 0–0.05)
IMM GRANULOCYTES NFR BLD AUTO: 0.5 % (ref 0–0.5)
LYMPHOCYTES # BLD AUTO: 0.77 10*3/MM3 (ref 0.7–3.1)
LYMPHOCYTES NFR BLD AUTO: 12.6 % (ref 19.6–45.3)
MCH RBC QN AUTO: 28.7 PG (ref 26.6–33)
MCHC RBC AUTO-ENTMCNC: 33 G/DL (ref 31.5–35.7)
MCV RBC AUTO: 87 FL (ref 79–97)
MONOCYTES # BLD AUTO: 0.61 10*3/MM3 (ref 0.1–0.9)
MONOCYTES NFR BLD AUTO: 10 % (ref 5–12)
NEUTROPHILS NFR BLD AUTO: 4.59 10*3/MM3 (ref 1.7–7)
NEUTROPHILS NFR BLD AUTO: 75 % (ref 42.7–76)
NRBC BLD AUTO-RTO: 0 /100 WBC (ref 0–0.2)
PHOSPHATE SERPL-MCNC: 2.2 MG/DL (ref 2.5–4.5)
PLATELET # BLD AUTO: 224 10*3/MM3 (ref 140–450)
PMV BLD AUTO: 10.5 FL (ref 6–12)
POTASSIUM SERPL-SCNC: 4.8 MMOL/L (ref 3.5–5.2)
RBC # BLD AUTO: 3.69 10*6/MM3 (ref 4.14–5.8)
SODIUM SERPL-SCNC: 142 MMOL/L (ref 136–145)
URATE SERPL-MCNC: 7.1 MG/DL (ref 3.4–7)
WBC NRBC COR # BLD AUTO: 6.12 10*3/MM3 (ref 3.4–10.8)

## 2024-10-27 PROCEDURE — 80069 RENAL FUNCTION PANEL: CPT | Performed by: INTERNAL MEDICINE

## 2024-10-27 PROCEDURE — 99232 SBSQ HOSP IP/OBS MODERATE 35: CPT | Performed by: STUDENT IN AN ORGANIZED HEALTH CARE EDUCATION/TRAINING PROGRAM

## 2024-10-27 PROCEDURE — 25010000002 HEPARIN (PORCINE) PER 1000 UNITS: Performed by: HOSPITALIST

## 2024-10-27 PROCEDURE — 25010000002 HYDROMORPHONE PER 4 MG: Performed by: INTERNAL MEDICINE

## 2024-10-27 PROCEDURE — 85025 COMPLETE CBC W/AUTO DIFF WBC: CPT | Performed by: HOSPITALIST

## 2024-10-27 PROCEDURE — 84550 ASSAY OF BLOOD/URIC ACID: CPT | Performed by: INTERNAL MEDICINE

## 2024-10-27 RX ORDER — TORSEMIDE 20 MG/1
20 TABLET ORAL DAILY
Status: DISCONTINUED | OUTPATIENT
Start: 2024-10-27 | End: 2024-11-01

## 2024-10-27 RX ADMIN — POLYETHYLENE GLYCOL 3350 17 G: 17 POWDER, FOR SOLUTION ORAL at 08:38

## 2024-10-27 RX ADMIN — HEPARIN SODIUM 5000 UNITS: 5000 INJECTION INTRAVENOUS; SUBCUTANEOUS at 22:08

## 2024-10-27 RX ADMIN — TAMSULOSIN HYDROCHLORIDE 0.4 MG: 0.4 CAPSULE ORAL at 08:38

## 2024-10-27 RX ADMIN — TORSEMIDE 20 MG: 20 TABLET ORAL at 11:56

## 2024-10-27 RX ADMIN — HYOSCYAMINE SULFATE: 16 SOLUTION at 22:09

## 2024-10-27 RX ADMIN — CASTOR OIL AND BALSAM, PERU 1 APPLICATION: 788; 87 OINTMENT TOPICAL at 08:39

## 2024-10-27 RX ADMIN — HYDROCODONE BITARTRATE AND ACETAMINOPHEN 1 TABLET: 7.5; 325 TABLET ORAL at 04:46

## 2024-10-27 RX ADMIN — PANTOPRAZOLE SODIUM 40 MG: 40 TABLET, DELAYED RELEASE ORAL at 08:39

## 2024-10-27 RX ADMIN — CASTOR OIL AND BALSAM, PERU 1 APPLICATION: 788; 87 OINTMENT TOPICAL at 22:09

## 2024-10-27 RX ADMIN — ATORVASTATIN CALCIUM 10 MG: 20 TABLET, FILM COATED ORAL at 08:38

## 2024-10-27 RX ADMIN — OXYCODONE HYDROCHLORIDE AND ACETAMINOPHEN 500 MG: 500 TABLET ORAL at 08:38

## 2024-10-27 RX ADMIN — HYOSCYAMINE SULFATE: 16 SOLUTION at 08:39

## 2024-10-27 RX ADMIN — HEPARIN SODIUM 5000 UNITS: 5000 INJECTION INTRAVENOUS; SUBCUTANEOUS at 08:38

## 2024-10-27 RX ADMIN — HYDROMORPHONE HYDROCHLORIDE 0.5 MG: 1 INJECTION, SOLUTION INTRAMUSCULAR; INTRAVENOUS; SUBCUTANEOUS at 22:08

## 2024-10-27 NOTE — PROGRESS NOTES
Name: Arden Carrasquillo ADMIT: 10/24/2024   : 1955  PCP: Pollo Ward MD    MRN: [id number] LOS: 3 days   AGE/SEX: 69 y.o. male  ROOM: [facility]     Subjective   Subjective     No events overnight. He reports that he's feeling better       Objective   Objective   Vital Signs  Temp:  [97.6 °F (36.4 °C)-98.4 °F (36.9 °C)] 98.4 °F (36.9 °C)  Heart Rate:  [71-74] 73  Resp:  [16-18] 16  BP: (125-149)/(80-89) 145/89  SpO2:  [97 %-100 %] 97 %  on  Flow (L/min) (Oxygen Therapy):  [2] 2;   Device (Oxygen Therapy): nasal cannula  Body mass index is 30.87 kg/m².  Physical Exam  Constitutional:       General: He is not in acute distress.     Appearance: He is obese. He is not toxic-appearing.   Cardiovascular:      Rate and Rhythm: Normal rate and regular rhythm.      Heart sounds: Normal heart sounds.   Pulmonary:      Effort: Pulmonary effort is normal.      Breath sounds: Normal breath sounds.   Abdominal:      General: Bowel sounds are normal.      Palpations: Abdomen is soft.   Genitourinary:     Comments: dash in place  Musculoskeletal:         General: No tenderness.      Right lower leg: No edema.      Left lower leg: No edema.   Neurological:      Mental Status: He is alert.   Psychiatric:         Mood and Affect: Mood normal.         Behavior: Behavior normal.         Results Review     I reviewed the patient's new clinical results.  Results from last 7 days   Lab Units 10/27/24  0329 10/26/24  0312 10/25/24  0300 10/24/24  1251   WBC 10*3/mm3 6.12 7.80 9.58 9.11   HEMOGLOBIN g/dL 10.6* 10.3* 11.0* 9.9*   PLATELETS 10*3/mm3 224 271 266 283     Results from last 7 days   Lab Units 10/27/24  0329 10/26/24  0312 10/25/24  0300 10/24/24  2139   SODIUM mmol/L 142 143 140 141   POTASSIUM mmol/L 4.8 4.8 4.9 4.8   CHLORIDE mmol/L 113* 112* 109* 110*   CO2 mmol/L 22.4 22.0 17.0* 18.0*   BUN mg/dL 29* 43* 61* 66*   CREATININE mg/dL 1.19 1.72* 3.03* 3.60*   GLUCOSE mg/dL 144* 147* 128* 137*   Estimated Creatinine  Clearance: 58.8 mL/min (by C-G formula based on SCr of 1.19 mg/dL).  Results from last 7 days   Lab Units 10/27/24  0329 10/26/24  0312 10/24/24  1251   ALBUMIN g/dL 2.9* 3.1* 2.7*   BILIRUBIN mg/dL  --   --  0.3   ALK PHOS U/L  --   --  106   AST (SGOT) U/L  --   --  13   ALT (SGPT) U/L  --   --  13     Results from last 7 days   Lab Units 10/27/24  0329 10/26/24  0312 10/25/24  0300 10/24/24  2139 10/24/24  1251   CALCIUM mg/dL 9.3 8.7 8.3* 8.0* 8.5*   ALBUMIN g/dL 2.9* 3.1*  --   --  2.7*   MAGNESIUM mg/dL  --   --   --   --  2.5*   PHOSPHORUS mg/dL 2.2* 2.6  --   --   --      Results from last 7 days   Lab Units 10/24/24  1755 10/24/24  1321 10/24/24  1251   PROCALCITONIN ng/mL  --   --  0.22   LACTATE mmol/L 1.2 2.1*  --      SARS-CoV-2 PCR   Date Value Ref Range Status   01/16/2021 Not Detected Not Detected Final     Comment:     Nucleic acid specific to SARS-CoV-2 (COVID-19) virus was not detected in  this sample by the TaqPath (TM) COVID-19 Combo Kit.          SARS-CoV-2 (COVID-19) nucleic acid testing performed using [employer] Aptima (R) SARS-CoV-2 Assay or [employer] TaqPath (TM)  COVID-19 Combo Kit as indicated above under Emergency Use Authorization (EUA) from the FDA. Aptima (R) and TaqPath (TM) test performance  characteristics verified by "[employer] in accordance with the FDAs Guidance Document (Policy for Diagnostic Tests for Coronavirus Disease-2019  during the Public Health Emergency) issued on March 16, 2020. The laboratory is regulated under CLIA as qualified to perform high-complexity testing  Unless otherwise noted, all testing was performed at "[facility] [facility] CLIA No. [id number], [place] State Licensee No. [id number]     Glucose   Date/Time Value Ref Range Status   10/24/2024 1730 114 70 - 130 mg/dL Final       US Renal Bilateral  Narrative: ULTRASOUND RENAL BILATERAL     INDICATION: HISTORY of hydronephrosis.     COMPARISON: CT abdomen pelvis October 24, 2024     TECHNIQUE: Real-time  sonographic evaluation of the kidneys with  grayscale and color-flow imaging. Representative images were saved for  review.     FINDINGS:     RIGHT KIDNEY: 10.5 cm in length. Simple appearing cyst in the mid kidney  measuring 7 x 4 x 7 mm. No solid-appearing renal mass. Small amount of  pelvicaliectasis. Trace perinephric edema.     LEFT KIDNEY: 11.7 cm in length. Simple appearing cyst in the superior  pole measuring 2.3 x 2.1 x 2.0 cm. Simple appearing cyst in the mid  kidney measuring 5 x 3 x 5 mm. No solid-appearing renal mass. Small  amount of pelvicaliectasis.     BLADDER: Armendariz in the bladder. Bladder is collapsed.     Impression:    1. Simple appearing renal cysts. No solid mass identified.  2. Small amount of bilateral pelvicaliectasis.  3. Armendariz in the bladder. Bladder is collapsed.              This report was finalized on 10/25/2024 2:33 PM by Dr. Camacho Beal M.D on Workstation: [id number]       Scheduled Medications  vitamin C, 500 mg, Oral, Daily  atorvastatin, 10 mg, Oral, Daily  castor oil-balsam peru, 1 Application, Topical, Q12H  heparin (porcine), 5,000 Units, Subcutaneous, Q12H  pantoprazole, 40 mg, Oral, Daily  polyethylene glycol, 17 g, Oral, Daily  sodium hypochlorite, , Topical, BID  tamsulosin, 0.4 mg, Oral, Daily  torsemide, 20 mg, Oral, Daily    Infusions   Diet  Diet: Cardiac; Healthy Heart (2-3 Na+); Fluid Consistency: Thin (IDDSI 0)       Assessment/Plan     Active Hospital Problems    Diagnosis  POA    **Acute renal failure [N17.9]  Yes    Acidosis [E87.20]  Unknown    Prostate cancer [C61]  Yes    Hyperlipidemia [E78.5]  Yes    Stage 3a chronic kidney disease [N18.31]  Yes    Hypertension [I10]  Yes    Type 2 diabetes mellitus with diabetic autonomic neuropathy, with long-term current use of insulin [E11.43, Z79.4]  Not Applicable      Resolved Hospital Problems    Diagnosis Date Resolved POA    Hyperkalemia [E87.5] 10/25/2024 Yes       69 y.o. male admitted with Acute renal  failure.    NEEL on CKD 3a with hyperkalemia and metabolic acidosis-prerenal due to hypotension and diuretics. Resolved with IVF. Nephrology has started some torsemide   Hypotension/concern for sepsis-resolved with IVF. No infection has been identified this admission   Urinary retention causing hydronephrosis during hospitalization at [place]-dash catheter in place. Renal ultrasound this admission doesn't show any hydronephrosis. On tamsulosin. Has follow up with Dr. Zuniga on 10/30. If he's still hospitalized by then, then urology recommends rescheduling for 1-2 weeks later.  1/2 blood cultures positive for coagulase negative staph-likely a contaminate  Hypertension-adequate control acutely  Hyperlipidemia-statin  Type 2 diabetes-glucose at goal acutely  GERD-ppi  Chronic decubitus ulcer  Recent hospitalization at [place] for E coli bacteremia/UTI  Heparin SC for DVT prophylaxis.  Limited code (no CPR, no intubation).  Discussed with patient and family.  Anticipate discharge to SNU facility when cleared by consultants.      Kemar Vargas MD  [facility]  10/27/24  16:40 EDT

## 2024-10-27 NOTE — PROGRESS NOTES
[facility] Progress Note      Patient Name: Arden Carrasquillo  : 1955  MRN: [id number]  Primary Care Physician:  Pollo Ward MD  Date of admission: 10/24/2024    Subjective     Interval History:     Patient resting comfortably  Denies any chest pain, nausea or vomiting    Review of Systems:   As noted above    Objective     Vitals:   Temp:  [97.6 °F (36.4 °C)-98.2 °F (36.8 °C)] 98.2 °F (36.8 °C)  Heart Rate:  [71-74] 74  Resp:  [18] 18  BP: (125-149)/(80-92) 149/87  Flow (L/min) (Oxygen Therapy):  [2] 2    Intake/Output Summary (Last 24 hours) at 10/27/2024 0913  Last data filed at 10/27/2024 0750  Gross per 24 hour   Intake 318 ml   Output 3700 ml   Net -3382 ml       Physical Exam:      General Appearance: frail pleasant conversant AAM on NC O2 no distress  Skin: warm and dry  HEENT: oral mucosa normal, nonicteric sclera  Neck: supple, no JVD  Lungs: CTA bilat no rales  Heart: RRR, normal S1 and S2  Abdomen: soft, nontender, nondistended   dash with clear urine   Extremities: Trace edema  Neuro: normal speech and mental status     Scheduled Meds:     vitamin C, 500 mg, Oral, Daily  atorvastatin, 10 mg, Oral, Daily  castor oil-balsam peru, 1 Application, Topical, Q12H  heparin (porcine), 5,000 Units, Subcutaneous, Q12H  pantoprazole, 40 mg, Oral, Daily  polyethylene glycol, 17 g, Oral, Daily  sodium hypochlorite, , Topical, BID  tamsulosin, 0.4 mg, Oral, Daily      IV Meds:        Results Reviewed:   I have personally reviewed the results from the time of this admission to 10/27/2024 09:13 EDT     Results from last 7 days   Lab Units 10/27/24  0329 10/26/24  0312 10/25/24  0300 10/24/24  2139 10/24/24  1251   SODIUM mmol/L 142 143 140   < > 135*   POTASSIUM mmol/L 4.8 4.8 4.9   < > 5.3*   CHLORIDE mmol/L 113* 112* 109*   < > 103   CO2 mmol/L 22.4 22.0 17.0*   < > 20.5*   BUN mg/dL 29* 43* 61*   < > 72*   CREATININE mg/dL 1.19 1.72* 3.03*   < > 4.29*   CALCIUM mg/dL 9.3 8.7  8.3*   < > 8.5*   BILIRUBIN mg/dL  --   --   --   --  0.3   ALK PHOS U/L  --   --   --   --  106   ALT (SGPT) U/L  --   --   --   --  13   AST (SGOT) U/L  --   --   --   --  13   GLUCOSE mg/dL 144* 147* 128*   < > 126*    < > = values in this interval not displayed.     Estimated Creatinine Clearance: 58.8 mL/min (by C-G formula based on SCr of 1.19 mg/dL).  Results from last 7 days   Lab Units 10/27/24  0329 10/26/24  0312 10/24/24  1251   MAGNESIUM mg/dL  --   --  2.5*   PHOSPHORUS mg/dL 2.2* 2.6  --      Results from last 7 days   Lab Units 10/27/24  0329   URIC ACID mg/dL 7.1*     Results from last 7 days   Lab Units 10/27/24  0329 10/26/24  0312 10/25/24  0300 10/24/24  1251   WBC 10*3/mm3 6.12 7.80 9.58 9.11   HEMOGLOBIN g/dL 10.6* 10.3* 11.0* 9.9*   PLATELETS 10*3/mm3 224 271 266 283     Results from last 7 days   Lab Units 10/24/24  1251   INR  1.24*       Assessment / Plan     ASSESSMENT:    Acute kidney injury on chronic kidney disease stage IIIa.  Prerenal azotemia due to volume depletion with hypotension and diuretic use.  Nonoliguric.  Renal function improving creatinine 1.19 this morning from peak of 4.29 Mg/DL.  Electrolytes okay  Hyperkalemia.  Resolved  Metabolic acidosis.  Resolved  Hypotension.  Resolved with IV fluids  Recent urinary retention and hydronephrosis.  Armendariz catheter in place.  Hydronephrosis resolved now  History of diastolic CHF.  Patient stable hemodynamically    PLAN:  Restart torsemide at low-dose  Keep off losartan  Repeat labs in a.m.    Bala Wright MD  10/27/24  09:13 EDT    [facility]  [phone]

## 2024-10-27 NOTE — PROGRESS NOTES
LOS: 3 days     Chief Complaint: Concern for sepsis    Interval History: A remains afebrile off antibiotics.  No leukocytosis.    Vital Signs  Temp:  [97.6 °F (36.4 °C)-98.2 °F (36.8 °C)] 97.6 °F (36.4 °C)  Heart Rate:  [71-72] 71  Resp:  [18] 18  BP: (125-147)/(80-92) 137/80    Physical Exam:  General: In no acute distress  HEENT: Oropharynx clear, moist mucous membranes  Respiratory: Normal work of breathing  Skin: No rashes or lesions in exposed areas  : Armendariz catheter  Access: Peripheral IV    Antibiotics:  Anti-Infectives (From admission, onward)      Ordered     Dose/Rate Route Frequency Start Stop    10/25/24 0450  vancomycin (VANCOCIN) 1,000 mg in sodium chloride 0.9 % 250 mL IVPB-VTB        Ordering Provider: Becky Muñoz MD    1,000 mg  250 mL/hr over 60 Minutes Intravenous Once 10/25/24 0600 10/25/24 0700    10/24/24 1502  piperacillin-tazobactam (ZOSYN) 3.375 g IVPB in 100 mL NS MBP (CD)        Ordering Provider: Peggy Goldsmith PA-C    3.375 g Intravenous Once 10/24/24 1518 10/24/24 1612    10/24/24 1502  vancomycin 1750 mg/500 mL 0.9% NS IVPB ([facility])        Ordering Provider: Peggy Goldsmith PA-C    20 mg/kg × 86.2 kg  over 105 Minutes Intravenous Once 10/24/24 1518 10/24/24 1757             Results Review:     I reviewed the patient's new clinical results.    Lab Results   Component Value Date    WBC 6.12 10/27/2024    HGB 10.6 (L) 10/27/2024    HCT 32.1 (L) 10/27/2024    MCV 87.0 10/27/2024     10/27/2024     Lab Results   Component Value Date    GLUCOSE 144 (H) 10/27/2024    BUN 29 (H) 10/27/2024    CREATININE 1.19 10/27/2024    EGFRIFNONA 46 (L) 07/06/2021    EGFRIFAFRI 53 (L) 07/06/2021    BCR 24.4 10/27/2024    CO2 22.4 10/27/2024    CALCIUM 9.3 10/27/2024    PROTENTOTREF 7.5 08/04/2023    ALBUMIN 2.9 (L) 10/27/2024    LABIL2 1.1 (L) 08/04/2023    AST 13 10/24/2024    ALT 13 10/24/2024       Microbiology:  10/24 blood cultures 1 out of 2 coag negative staph    Assessment     #Acute kidney injury on chronic kidney disease stage IIIb, baseline creatinine mid threes  #Hypotension, resolved  #Concern for sepsis  #Recent admission to [place] for E. coli septicemia status post treatment  #Positive blood culture 1 out of 2 coag negative staph, likely contaminant    Patient remains afebrile off antibiotics.  Continue to monitor off antibiotics.  Blood culture with 1 out of 2 coag negative staph which is likely contamination.

## 2024-10-28 LAB
ALBUMIN SERPL-MCNC: 3.1 G/DL (ref 3.5–5.2)
ANION GAP SERPL CALCULATED.3IONS-SCNC: 10 MMOL/L (ref 5–15)
BUN SERPL-MCNC: 22 MG/DL (ref 8–23)
BUN/CREAT SERPL: 21.4 (ref 7–25)
C AURIS DNA SPEC QL NAA+NON-PROBE: NOT DETECTED
CALCIUM SPEC-SCNC: 9.1 MG/DL (ref 8.6–10.5)
CHLORIDE SERPL-SCNC: 106 MMOL/L (ref 98–107)
CO2 SERPL-SCNC: 26 MMOL/L (ref 22–29)
CREAT SERPL-MCNC: 1.03 MG/DL (ref 0.76–1.27)
EGFRCR SERPLBLD CKD-EPI 2021: 77.7 ML/MIN/1.73
GLUCOSE SERPL-MCNC: 121 MG/DL (ref 65–99)
PHOSPHATE SERPL-MCNC: 1.9 MG/DL (ref 2.5–4.5)
PHOSPHATE SERPL-MCNC: 2.7 MG/DL (ref 2.5–4.5)
POTASSIUM SERPL-SCNC: 4 MMOL/L (ref 3.5–5.2)
SODIUM SERPL-SCNC: 142 MMOL/L (ref 136–145)

## 2024-10-28 PROCEDURE — 97530 THERAPEUTIC ACTIVITIES: CPT

## 2024-10-28 PROCEDURE — 25010000002 HEPARIN (PORCINE) PER 1000 UNITS: Performed by: HOSPITALIST

## 2024-10-28 PROCEDURE — 99232 SBSQ HOSP IP/OBS MODERATE 35: CPT | Performed by: INTERNAL MEDICINE

## 2024-10-28 PROCEDURE — 84100 ASSAY OF PHOSPHORUS: CPT | Performed by: STUDENT IN AN ORGANIZED HEALTH CARE EDUCATION/TRAINING PROGRAM

## 2024-10-28 PROCEDURE — 80069 RENAL FUNCTION PANEL: CPT | Performed by: INTERNAL MEDICINE

## 2024-10-28 RX ORDER — CETIRIZINE HYDROCHLORIDE 10 MG/1
10 TABLET ORAL DAILY PRN
Status: DISCONTINUED | OUTPATIENT
Start: 2024-10-28 | End: 2024-11-05 | Stop reason: HOSPADM

## 2024-10-28 RX ORDER — HYDROXYZINE HYDROCHLORIDE 25 MG/1
25 TABLET, FILM COATED ORAL ONCE AS NEEDED
Status: COMPLETED | OUTPATIENT
Start: 2024-10-28 | End: 2024-10-28

## 2024-10-28 RX ADMIN — Medication 2 PACKET: at 09:57

## 2024-10-28 RX ADMIN — ATORVASTATIN CALCIUM 10 MG: 20 TABLET, FILM COATED ORAL at 09:43

## 2024-10-28 RX ADMIN — TAMSULOSIN HYDROCHLORIDE 0.4 MG: 0.4 CAPSULE ORAL at 09:43

## 2024-10-28 RX ADMIN — HYOSCYAMINE SULFATE: 16 SOLUTION at 21:30

## 2024-10-28 RX ADMIN — CETIRIZINE HYDROCHLORIDE 10 MG: 10 TABLET ORAL at 18:24

## 2024-10-28 RX ADMIN — PANTOPRAZOLE SODIUM 40 MG: 40 TABLET, DELAYED RELEASE ORAL at 09:43

## 2024-10-28 RX ADMIN — CASTOR OIL AND BALSAM, PERU 1 APPLICATION: 788; 87 OINTMENT TOPICAL at 09:44

## 2024-10-28 RX ADMIN — HEPARIN SODIUM 5000 UNITS: 5000 INJECTION INTRAVENOUS; SUBCUTANEOUS at 20:11

## 2024-10-28 RX ADMIN — HEPARIN SODIUM 5000 UNITS: 5000 INJECTION INTRAVENOUS; SUBCUTANEOUS at 09:44

## 2024-10-28 RX ADMIN — HYDROXYZINE HYDROCHLORIDE 25 MG: 25 TABLET ORAL at 22:30

## 2024-10-28 RX ADMIN — CASTOR OIL AND BALSAM, PERU 1 APPLICATION: 788; 87 OINTMENT TOPICAL at 20:13

## 2024-10-28 RX ADMIN — TORSEMIDE 20 MG: 20 TABLET ORAL at 09:43

## 2024-10-28 RX ADMIN — OXYCODONE HYDROCHLORIDE AND ACETAMINOPHEN 500 MG: 500 TABLET ORAL at 09:43

## 2024-10-28 RX ADMIN — POLYETHYLENE GLYCOL 3350 17 G: 17 POWDER, FOR SOLUTION ORAL at 09:43

## 2024-10-28 RX ADMIN — HYDROCODONE BITARTRATE AND ACETAMINOPHEN 1 TABLET: 7.5; 325 TABLET ORAL at 12:17

## 2024-10-28 NOTE — PROGRESS NOTES
Name: Arden Carrasquillo ADMIT: 10/24/2024   : 1955  PCP: Pollo Ward MD    MRN: [id number] LOS: 4 days   AGE/SEX: 69 y.o. male  ROOM: [facility]     Subjective   Subjective     No events overnight.  He complains of generalized pruritus after using some hospital soap yesterday       Objective   Objective   Vital Signs  Temp:  [98.4 °F (36.9 °C)-99 °F (37.2 °C)] 99 °F (37.2 °C)  Heart Rate:  [74-83] 83  Resp:  [16] 16  BP: (135-159)/(82-99) 136/99  SpO2:  [94 %-95 %] 95 %  on   ;   Device (Oxygen Therapy): room air  Body mass index is 30.87 kg/m².  Physical Exam  Constitutional:       General: He is not in acute distress.     Appearance: He is obese. He is not toxic-appearing.   Cardiovascular:      Rate and Rhythm: Normal rate and regular rhythm.      Heart sounds: Normal heart sounds.   Pulmonary:      Effort: Pulmonary effort is normal.      Breath sounds: Normal breath sounds.   Abdominal:      General: Bowel sounds are normal.      Palpations: Abdomen is soft.   Genitourinary:     Comments: dash in place  Musculoskeletal:         General: No tenderness.      Right lower leg: No edema.      Left lower leg: No edema.   Neurological:      Mental Status: He is alert.   Psychiatric:         Mood and Affect: Mood normal.         Behavior: Behavior normal.         Results Review     I reviewed the patient's new clinical results.  Results from last 7 days   Lab Units 10/27/24  0329 10/26/24  0312 10/25/24  0300 10/24/24  1251   WBC 10*3/mm3 6.12 7.80 9.58 9.11   HEMOGLOBIN g/dL 10.6* 10.3* 11.0* 9.9*   PLATELETS 10*3/mm3 224 271 266 283     Results from last 7 days   Lab Units 10/28/24  0236 10/27/24  0329 10/26/24  0312 10/25/24  0300   SODIUM mmol/L 142 142 143 140   POTASSIUM mmol/L 4.0 4.8 4.8 4.9   CHLORIDE mmol/L 106 113* 112* 109*   CO2 mmol/L 26.0 22.4 22.0 17.0*   BUN mg/dL 22 29* 43* 61*   CREATININE mg/dL 1.03 1.19 1.72* 3.03*   GLUCOSE mg/dL 121* 144* 147* 128*   Estimated Creatinine Clearance:  "67.9 mL/min (by C-G formula based on SCr of 1.03 mg/dL).  Results from last 7 days   Lab Units 10/28/24  0236 10/27/24  0329 10/26/24  0312 10/24/24  1251   ALBUMIN g/dL 3.1* 2.9* 3.1* 2.7*   BILIRUBIN mg/dL  --   --   --  0.3   ALK PHOS U/L  --   --   --  106   AST (SGOT) U/L  --   --   --  13   ALT (SGPT) U/L  --   --   --  13     Results from last 7 days   Lab Units 10/28/24  0236 10/27/24  0329 10/26/24  0312 10/25/24  0300 10/24/24  2139 10/24/24  1251   CALCIUM mg/dL 9.1 9.3 8.7 8.3*   < > 8.5*   ALBUMIN g/dL 3.1* 2.9* 3.1*  --   --  2.7*   MAGNESIUM mg/dL  --   --   --   --   --  2.5*   PHOSPHORUS mg/dL 1.9* 2.2* 2.6  --   --   --     < > = values in this interval not displayed.     Results from last 7 days   Lab Units 10/24/24  1755 10/24/24  1321 10/24/24  1251   PROCALCITONIN ng/mL  --   --  0.22   LACTATE mmol/L 1.2 2.1*  --      SARS-CoV-2 PCR   Date Value Ref Range Status   01/16/2021 Not Detected Not Detected Final     Comment:     Nucleic acid specific to SARS-CoV-2 (COVID-19) virus was not detected in  this sample by the TaqPath (TM) COVID-19 Combo Kit.          SARS-CoV-2 (COVID-19) nucleic acid testing performed using [employer] Aptima (R) SARS-CoV-2 Assay or [employer] TaqPath (TM)  COVID-19 Combo Kit as indicated above under Emergency Use Authorization (EUA) from the FDA. Aptima (R) and TaqPath (TM) test performance  characteristics verified by [facility] in accordance with the FDAs Guidance Document (Policy for Diagnostic Tests for Coronavirus Disease-2019  during the Public Health Emergency) issued on March 16, 2020. The laboratory is regulated under CLIA as qualified to perform high-complexity testing  Unless otherwise noted, all testing was performed at [facility], CLIA No. [id number], [place] State Licensee No. [id number]     No results found for: \"HGBA1C\", \"POCGLU\"      US Renal Bilateral  Narrative: ULTRASOUND RENAL BILATERAL     INDICATION: HISTORY of hydronephrosis.   "   COMPARISON: CT abdomen pelvis October 24, 2024     TECHNIQUE: Real-time sonographic evaluation of the kidneys with  grayscale and color-flow imaging. Representative images were saved for  review.     FINDINGS:     RIGHT KIDNEY: 10.5 cm in length. Simple appearing cyst in the mid kidney  measuring 7 x 4 x 7 mm. No solid-appearing renal mass. Small amount of  pelvicaliectasis. Trace perinephric edema.     LEFT KIDNEY: 11.7 cm in length. Simple appearing cyst in the superior  pole measuring 2.3 x 2.1 x 2.0 cm. Simple appearing cyst in the mid  kidney measuring 5 x 3 x 5 mm. No solid-appearing renal mass. Small  amount of pelvicaliectasis.     BLADDER: Armendariz in the bladder. Bladder is collapsed.     Impression:    1. Simple appearing renal cysts. No solid mass identified.  2. Small amount of bilateral pelvicaliectasis.  3. Armendariz in the bladder. Bladder is collapsed.              This report was finalized on 10/25/2024 2:33 PM by Dr. Camacho Beal M.D on Workstation: [id number]       Scheduled Medications  vitamin C, 500 mg, Oral, Daily  atorvastatin, 10 mg, Oral, Daily  castor oil-balsam peru, 1 Application, Topical, Q12H  heparin (porcine), 5,000 Units, Subcutaneous, Q12H  pantoprazole, 40 mg, Oral, Daily  polyethylene glycol, 17 g, Oral, Daily  sodium hypochlorite, , Topical, BID  tamsulosin, 0.4 mg, Oral, Daily  torsemide, 20 mg, Oral, Daily    Infusions   Diet  Diet: Cardiac; Healthy Heart (2-3 Na+); Fluid Consistency: Thin (IDDSI 0)       Assessment/Plan     Active Hospital Problems    Diagnosis  POA    **Acute renal failure [N17.9]  Yes    Acidosis [E87.20]  Unknown    Prostate cancer [C61]  Yes    Hyperlipidemia [E78.5]  Yes    Stage 3a chronic kidney disease [N18.31]  Yes    Hypertension [I10]  Yes    Type 2 diabetes mellitus with diabetic autonomic neuropathy, with long-term current use of insulin [E11.43, Z79.4]  Not Applicable      Resolved Hospital Problems    Diagnosis Date Resolved POA     Hyperkalemia [E87.5] 10/25/2024 Yes       69 y.o. male admitted with Acute renal failure.    NEEL on CKD 3a with hyperkalemia and metabolic acidosis-prerenal due to hypotension and diuretics. Resolved with IVF.  Back on torsemide for lower extremity edema per nephrology, and appears to be tolerating this.  Hypotension/concern for sepsis-resolved with IVF. No infection has been identified this admission   Urinary retention causing hydronephrosis during hospitalization at [facility]-dash catheter in place. Renal ultrasound this admission doesn't show any hydronephrosis. On tamsulosin. Has follow up with Dr. Zuniga on 10/30. If he's still hospitalized by then, then urology recommends rescheduling for 1-2 weeks later.  1/2 blood cultures positive for coagulase negative staph-likely a contaminate.  Doing well without antibiotics  Hypophosphatemia-replace via protocol  Hypertension-adequate control acutely  Hyperlipidemia-statin  Type 2 diabetes-glucose at goal acutely  GERD-ppi  Chronic decubitus ulcer  Recent hospitalization at [facility] for E coli bacteremia/UTI  Heparin SC for DVT prophylaxis.  Limited code (no CPR, no intubation).  Discussed with patient and nursing staff.  Anticipate discharge to SNU facility once arrangements have been made.      Kemar Vargas MD  [facility]  10/28/24  15:06 EDT

## 2024-10-28 NOTE — DISCHARGE PLACEMENT REQUEST
"Arden Carrasquillo (69 y.o. Male)       Date of Birth   1955    Social Security Number       Address   [address] [place] [address]    Home Phone   [phone]    MRN   [id number]       Sabianism   None    Marital Status   Single                            Admission Date   10/24/24    Admission Type   Emergency    Admitting Provider   Becky Muñoz MD    Attending Provider   Kemar Vargas MD    Department, Room/Bed   [address], S406/1       Discharge Date       Discharge Disposition       Discharge Destination                                 Attending Provider: Kemar Vargas MD    Allergies: Vitamin D Analogs    Isolation: Contact   Infection: Candida Auris (rule out) (10/25/24)   Code Status: No CPR    Ht: 167.6 cm (65.98\")   Wt: 86.7 kg (191 lb 2.2 oz)    Admission Cmt: None   Principal Problem: Acute renal failure [N17.9]                   Active Insurance as of 10/24/2024       Primary Coverage       Payor Plan Insurance Group Employer/Plan Group    ANTH MEDICARE REPLACEMENT ANTH MEDICARE ADVANTAGE [id number]       Payor Plan Address Payor Plan Phone Number Payor Plan Fax Number Effective Dates    [address] [phone]  1/1/2018 - None Entered    [facility] [address]         Subscriber Name Subscriber Birth Date Member ID       ARDEN CARRASQUILLO 1955 [id number]                     Emergency Contacts        (Rel.) Home Phone Work Phone Mobile Phone    DominickCarina (Sister) [phone] -- --    MICHELLE GUY (Daughter) -- -- [phone]                "

## 2024-10-28 NOTE — PLAN OF CARE
Goal Outcome Evaluation:      Pt alert and oriented, calm and cooperative. Pt's had x 1 stool last night. Pt VSS on RA. Pt given x 1 dose of dilaudid for severe pain. Wound cleansed and dressing changed per orders.

## 2024-10-28 NOTE — PLAN OF CARE
Goal Outcome Evaluation:  Plan of Care Reviewed With: patient        Progress: declining  Outcome Evaluation: Pt resting in bed, agreeable to participate in PT. He sat EOB with max A Of 2/dependent x 2 and vc for sequencing and increased time for processing.Pt able to eventually sit EOB with CGA. He attempted sit/stand x 2 - once with and once without AD. He was unable to clear his hips off bed on either attempt with max A of 2. He performed seated LE ther ex thru limited ROM. He was dependent x 2 to return supine and reposition in bed. Overall, pt is not progressing with his mobility. He would like to return home but may not be safe to do so. Cont PT to address functional defiicts and prepare for d/c. Decreased freq to TIW due to decline in mobility    Anticipated Discharge Disposition (PT): skilled nursing facility

## 2024-10-28 NOTE — CASE MANAGEMENT/SOCIAL WORK
Discharge Planning Assessment  [facility]     Patient Name: Arden Carrasquillo  MRN: [id number]  Today's Date: 10/28/2024    Admit Date: 10/24/2024    Plan: SNF referrals pending.   Discharge Needs Assessment       Row Name 10/28/24 1537       Living Environment    People in Home sibling(s)    Current Living Arrangements apartment    Family Caregiver if Needed child(sven), adult    Quality of Family Relationships helpful;involved;supportive    Able to Return to Prior Arrangements no       Transition Planning    Patient/Family Anticipates Transition to long-term care facility;inpatient rehabilitation facility;home with family    Patient/Family Anticipated Services at Transition     Transportation Anticipated health plan transportation       Discharge Needs Assessment    Readmission Within the Last 30 Days no previous admission in last 30 days    Equipment Currently Used at Home wheelchair;walker, rolling;wheelchair, motorized;shower chair;grab bar;commode    Concerns to be Addressed discharge planning    Equipment Needed After Discharge none    Outpatient/Agency/Support Group Needs skilled nursing facility    Discharge Facility/Level of Care Needs nursing facility, skilled;nursing facility, intermediate                   Discharge Plan       Row Name 10/28/24 1537       Plan    Plan SNF referrals pending.    Patient/Family in Agreement with Plan yes    Plan Comments CCP s/w Breonna, pts daughter, via phone. Introduced self and role of CCP. Face sheet information and pharmacy verified. Pt lives in a single-story apartment with his sister Carina. Pt does not drive; Breonna assists with all ADL's and transport. Pt has a walker, wheelchair, electric wheelchair, shower chair, grab bars and bedside commode at home. Pt has a living will. Pt is enrolled in meds to beds and denies trouble affording or managing his medications. Pt has used [facility]  and has been to [facility]. Breonna stated pt DC'd from  [facility] to early. CCP discussed PT eval and recs for SNF. Breonna verbalized understanding and requests referral to [facility]. CCP explained IRF vs SNF qualifications. Breonna verbalized understanding but adamantly requesting referral to both [facility] and [facility]. [facility] and [facility] notified of referrals and both notified CCP pt is not a candidate for IRF. CCP s/w Breonna again via phone to update. CCP explained both [facility] and [facility] unable to accept d/t pt not being a candidate for IRF. Breonna verbalized frustration and stated \"pt is not leaving the hospital or rehab until he is able to walk\". CCP explained SNF referrals and LTC Medicaid pending. Breonna stated she will not put her dad in a nursing home. Breonna stated pt is Medicaid pending for his secondary insurance and should have assistance in the home when active. CCP discussed private pay caregivers at home. Breonna states she cannot afford private pay caregivers. Breonna is agreeable to referrals to [facility] and [facility]. CCP offered to email the pt choice list to her for additional referrals. Breonna is agreeable. Breonna asked that CCP email list to [email or web address] Danette/Deborah and Isela/Rochelle notified of referrals and unable to accept at their facilities. Ap CADE/JOHANA                  Continued Care and Services - Admitted Since 10/24/2024       Destination       Service Provider Request Status Services Address Phone Fax Patient Preferred    [facility] Pending - Request Sent -- [address] [facility] [address] [facility] [place] [id number] [phone] --    [facility] Pending - Request Sent -- [address] [address] [phone] --    [facility] Declined  Clinical Denial -- [address] [facility] [address] [phone] --    [facility] Declined  Bed not available -- [address] " [facility] [address] [phone] --                  Selected Continued Care - Episodes Includes continued care and service providers with selected services from the active episodes listed below      Ambulatory Social Work Case Management Episode start date: 10/21/2024 (Paused)   There are no active outsourced providers for this episode.                 Expected Discharge Date and Time       Expected Discharge Date Expected Discharge Time    Oct 29, 2024            Demographic Summary       Row Name 10/28/24 1536       General Information    Admission Type inpatient    Arrived From home    Required Notices Provided Important Message from Medicare    Referral Source case finding;admission list    Reason for Consult discharge planning    Preferred Language English       Contact Information    Permission Granted to Share Info With ;family/designee                   Functional Status       Row Name 10/28/24 1536       Functional Status    Usual Activity Tolerance fair    Current Activity Tolerance poor       Assessment of Health Literacy    How often do you have someone help you read hospital materials? Sometimes    How often do you have problems learning about your medical condition because of difficulty understanding written information? Sometimes    How often do you have a problem understanding what is told to you about your medical condition? Sometimes    How confident are you filling out medical forms by yourself? Somewhat    Health Literacy Moderate       Functional Status, IADL    Medications assistive person    Meal Preparation assistive person    Housekeeping assistive person    Laundry assistive person    Shopping assistive person                               Tina Pérez RN

## 2024-10-28 NOTE — THERAPY TREATMENT NOTE
Patient Name: Arden Carrasquillo  : 1955    MRN: [id number]                              Today's Date: 10/28/2024       Admit Date: 10/24/2024    Visit Dx:     ICD-10-CM ICD-9-CM   1. Acute renal failure, unspecified acute renal failure type  N17.9 584.9   2. Hypotension, unspecified hypotension type  I95.9 458.9   3. Altered mental status, unspecified altered mental status type  R41.82 780.97   4. Pressure injury of skin of sacral region, unspecified injury stage  L89.159 707.03     707.20   5. Urinary retention  R33.9 788.20   6. Chronic anemia  D64.9 285.9   7. Severe sepsis  A41.9 038.9    R65.20 995.92     Patient Active Problem List   Diagnosis    Type 2 diabetes mellitus with diabetic autonomic neuropathy, with long-term current use of insulin    Degenerative spondylolisthesis    Hypertension    Generalized anxiety disorder    Stage 3a chronic kidney disease    Hyperlipidemia    Prostate cancer    Anemia in stage 3b chronic kidney disease    Gynecomastia    Carcinoma in situ of colon    Bilateral lower extremity edema    Weakness of both lower extremities    Psychophysiological insomnia    History of colon polyps    Lumbar stenosis with neurogenic claudication    S/P cervical spinal fusion    Cervical cord compression with myelopathy    Aneurysm of descending thoracic aorta without rupture    Tremor    Acute renal failure    Acidosis     Past Medical History:   Diagnosis Date    Diabetes mellitus     Positive colorectal cancer screening using Cologuard test 2020    Severe peripheral polyneuropathy 10/19/2023     Past Surgical History:   Procedure Laterality Date    CYSTOSCOPY TRANSURETHRAL RESECTION OF PROSTATE      UPPER GASTROINTESTINAL ENDOSCOPY        General Information       Row Name 10/28/24 1155          Physical Therapy Time and Intention    Document Type therapy note (daily note)  -DJ     Mode of Treatment individual therapy;physical therapy  -DJ       Row Name 10/28/24 3994           General Information    Patient Profile Reviewed yes  -DJ       Row Name 10/28/24 1155          Cognition    Orientation Status (Cognition) oriented x 3;other (see comments)  slow to respond  -DJ       Row Name 10/28/24 1155          Safety Issues/Impairments Affecting Functional Mobility    Comment, Safety Issues/Impairments (Mobility) gt belt, nonskid socks  -DJ               User Key  (r) = Recorded By, (t) = Taken By, (c) = Cosigned By      Initials Name Provider Type    Radha Medina, PT Physical Therapist                   Mobility       Row Name 10/28/24 1156          Bed Mobility    Bed Mobility supine-sit;sit-supine  -DJ     Supine-Sit [place] (Bed Mobility) maximum assist (25% patient effort);dependent (less than 25% patient effort);2 person assist;verbal cues  -DJ     Sit-Supine [place] (Bed Mobility) dependent (less than 25% patient effort);2 person assist;verbal cues  -DJ     Assistive Device (Bed Mobility) bed rails;head of bed elevated;repositioning sheet  -DJ     Comment, (Bed Mobility) vc for sequencing, increased time for processing; very little participation from pt in moving legs  -DJ       Row Name 10/28/24 1156          Transfers    Comment, (Transfers) sit/stand from EOB x 2 attempts  -DJ       Row Name 10/28/24 1156          Bed-Chair Transfer    Bed-Chair [place] (Transfers) unable to assess  -DJ       Row Name 10/28/24 1156          Sit-Stand Transfer    Sit-Stand [place] (Transfers) maximum assist (25% patient effort);2 person assist;verbal cues  -DJ     Assistive Device (Sit-Stand Transfers) walker, front-wheeled  -DJ     Comment, (Sit-Stand Transfer) attempt 1 without AD - unable to clear bottom off bed; attempt 2 with r wx also unable to clear hips off bed.  -DJ       Row Name 10/28/24 1156          Gait/Stairs (Locomotion)    [place] Level (Gait) unable to assess  -DJ     Distance in Feet (Gait) 0  -DJ               User Key  (r) = Recorded By, (t) = Taken By,  (c) = Cosigned By      Initials Name Provider Type    Radha Medina, PT Physical Therapist                   Obj/Interventions       Row Name 10/28/24 1158          Motor Skills    Motor Skills functional endurance  -DJ     Functional Endurance poor  -DJ     Therapeutic Exercise other (see comments)  seated marching 10x, LAQ 5x thru limited ROM  -DJ       Row Name 10/28/24 1158          Balance    Balance Assessment sitting dynamic balance  -DJ     Dynamic Sitting Balance contact guard;verbal cues  -DJ     Position, Sitting Balance unsupported;sitting edge of bed  -DJ     Balance Interventions sitting;sit to stand;supported  -DJ     Comment, Balance poor  -DJ               User Key  (r) = Recorded By, (t) = Taken By, (c) = Cosigned By      Initials Name Provider Type    Radha Medina, PT Physical Therapist                   Goals/Plan    No documentation.                  Clinical Impression       Row Name 10/28/24 1159          Pain    Pre/Posttreatment Pain Comment Pt c/o generalized weakness  -DJ       Row Name 10/28/24 1159          Plan of Care Review    Plan of Care Reviewed With patient  -DJ     Progress declining  -DJ     Outcome Evaluation Pt resting in bed, agreeable to participate in PT. He sat EOB with max A Of 2/dependent x 2 and vc for sequencing and increased time for processing.Pt able to eventually sit EOB with CGA. He attempted sit/stand x 2 - once with and once without AD. He was unable to clear his hips off bed on either attempt with max A of 2. He performed seated LE ther ex thru limited ROM. He was dependent x 2 to return supine and reposition in bed. Overall, pt is not progressing with his mobility. He would like to return home but may not be safe to do so. Cont PT to address functional defiicts and prepare for d/c. Decreased freq to TIW due to decline in mobility  -DJ       Row Name 10/28/24 1158          Therapy Assessment/Plan (PT)    Patient/Family Therapy Goals Statement (PT) return  home  -DJ     Criteria for Skilled Interventions Met (PT) skilled treatment is necessary  -DJ     Therapy Frequency (PT) 3 times/wk  -DJ       Row Name 10/28/24 1159          Vital Signs    Pre Patient Position Supine  -DJ     Intra Patient Position Sitting  -DJ     Post Patient Position Supine  -DJ       Row Name 10/28/24 1159          Positioning and Restraints    Pre-Treatment Position in bed  -DJ     Post Treatment Position bed  -DJ     In Bed notified nsg;supine;call light within reach;encouraged to call for assist;exit alarm on;waffle boots/both;SCD pump applied  -DJ               User Key  (r) = Recorded By, (t) = Taken By, (c) = Cosigned By      Initials Name Provider Type    Radha Medina, PT Physical Therapist                   Outcome Measures       Row Name 10/28/24 1203          How much help from another person do you currently need...    Turning from your back to your side while in flat bed without using bedrails? 2  -DJ     Moving from lying on back to sitting on the side of a flat bed without bedrails? 2  -DJ     Moving to and from a bed to a chair (including a wheelchair)? 1  -DJ     Standing up from a chair using your arms (e.g., wheelchair, bedside chair)? 1  -DJ     Climbing 3-5 steps with a railing? 1  -DJ     To walk in hospital room? 1  -DJ     AM-PAC 6 Clicks Score (PT) 8  -DJ     Highest Level of Mobility Goal 3 --> Sit at edge of bed  -DJ       Row Name 10/28/24 1203          Functional Assessment    Outcome Measure Options AM-PAC 6 Clicks Basic Mobility (PT)  -DJ               User Key  (r) = Recorded By, (t) = Taken By, (c) = Cosigned By      Initials Name Provider Type    Radha Medina, PT Physical Therapist                                 Physical Therapy Education       Title: PT OT SLP Therapies (Done)       Topic: Physical Therapy (Done)       Point: Mobility training (Done)       Learning Progress Summary            Patient Acceptance, E, VU,NR by ELISE at 10/28/2024 1203     Acceptance, E, VU by LB at 10/26/2024 1604    Acceptance, E, VU,NR by DJ at 10/25/2024 1209                      Point: Home exercise program (Done)       Learning Progress Summary            Patient Acceptance, E, VU,NR by DJ at 10/28/2024 1203    Acceptance, E, VU by LB at 10/26/2024 1604                      Point: Body mechanics (Done)       Learning Progress Summary            Patient Acceptance, E, VU,NR by DJ at 10/28/2024 1203    Acceptance, E, VU by LB at 10/26/2024 1604    Acceptance, E, VU,NR by DJ at 10/25/2024 1209                      Point: Precautions (Done)       Learning Progress Summary            Patient Acceptance, E, VU,NR by DJ at 10/28/2024 1203    Acceptance, E, VU by LB at 10/26/2024 1604    Acceptance, E, VU,NR by DJ at 10/25/2024 1209                                      User Key       Initials Effective Dates Name Provider Type Discipline    DJ 10/25/19 -  Radha Prasad, PT Physical Therapist PT     06/16/21 -  Valeriano Johnston, RN Registered Nurse Nurse                  PT Recommendation and Plan  Planned Therapy Interventions (PT): balance training, bed mobility training, gait training, home exercise program, strengthening, postural re-education, patient/family education, transfer training  Progress: declining  Outcome Evaluation: Pt resting in bed, agreeable to participate in PT. He sat EOB with max A Of 2/dependent x 2 and vc for sequencing and increased time for processing.Pt able to eventually sit EOB with CGA. He attempted sit/stand x 2 - once with and once without AD. He was unable to clear his hips off bed on either attempt with max A of 2. He performed seated LE ther ex thru limited ROM. He was dependent x 2 to return supine and reposition in bed. Overall, pt is not progressing with his mobility. He would like to return home but may not be safe to do so. Cont PT to address functional defiicts and prepare for d/c. Decreased freq to TIW due to decline in mobility     Time  Calculation:   PT Evaluation Complexity  History, PT Evaluation Complexity: 3 or more personal factors and/or comorbidities  Examination of Body Systems (PT Eval Complexity): total of 4 or more elements  Clinical Presentation (PT Evaluation Complexity): evolving  Clinical Decision Making (PT Evaluation Complexity): moderate complexity  Overall Complexity (PT Evaluation Complexity): moderate complexity     PT Charges       Row Name 10/28/24 1204             Time Calculation    Start Time 1045  -DJ      Stop Time 1110  -DJ      Time Calculation (min) 25 min  -DJ      PT Non-Billable Time (min) 10 min  -DJ      PT Received On 10/28/24  -DJ      PT - Next Appointment 10/29/24  -DJ                User Key  (r) = Recorded By, (t) = Taken By, (c) = Cosigned By      Initials Name Provider Type    Radha Medina, PT Physical Therapist                  Therapy Charges for Today       Code Description Service Date Service Provider Modifiers Qty    59882638887 HC PT THERAPEUTIC ACT EA 15 MIN 10/28/2024 Radha Prasad, PT GP 2    62406207590 HC PT THER SUPP EA 15 MIN 10/28/2024 Radha Prasad, PT GP 2            PT G-Codes  Outcome Measure Options: AM-PAC 6 Clicks Basic Mobility (PT)  AM-PAC 6 Clicks Score (PT): 8  PT Discharge Summary  Anticipated Discharge Disposition (PT): skilled nursing facility    Radha Prasad PT  10/28/2024

## 2024-10-28 NOTE — PROGRESS NOTES
LOS: 4 days     Chief Complaint: Concern for sepsis    Interval History: He is complaining of some itching.  No pain.  No fevers.    Vital Signs  Temp:  [98.4 °F (36.9 °C)-99 °F (37.2 °C)] 99 °F (37.2 °C)  Heart Rate:  [73-74] 74  Resp:  [16] 16  BP: (135-159)/(82-90) 146/82    Physical Exam:  General: In no acute distress  HEENT: Oropharynx clear, moist mucous membranes  Respiratory: Normal work of breathing  Skin: No rashes or lesions in exposed areas  : Armendariz catheter  Access: Peripheral IV      White count 6.1  Creatinine 1.03      Microbiology:  10/24 blood cultures 1 out of 2 coag negative staph        Assessment and plan  Acute kidney injury on chronic kidney disease stage IIIb, baseline creatinine mid 1  Hypotension, resolved  Concern for sepsis  Recent admission to [place] for E. coli septicemia status post treatment  Positive blood culture 1 out of 2 coag negative staph, likely contaminant    He is doing very well off the antibiotics.  No evidence of recrudescent infection.  Had 1 out of 2 blood cultures positive for skin charla which is most likely contaminant and will not require treatment.  Nothing more to add from my perspective and we will sign off

## 2024-10-28 NOTE — PROGRESS NOTES
[facility] Progress Note      Patient Name: Arden Carrasquillo  : 1955  MRN: [id number]  Primary Care Physician:  Pollo Ward MD  Date of admission: 10/24/2024    Subjective     Interval History:   Follow-up acute kidney injury    The patient is feeling better, denies any chest pain or shortness of air, no orthopnea or PND, no nausea or vomiting, he has Dash catheter.      Review of Systems:   As noted above    Objective     Vitals:   Temp:  [98.4 °F (36.9 °C)-99 °F (37.2 °C)] 99 °F (37.2 °C)  Heart Rate:  [73-74] 74  Resp:  [16] 16  BP: (135-159)/(82-90) 146/82  Flow (L/min) (Oxygen Therapy):  [2] 2    Intake/Output Summary (Last 24 hours) at 10/28/2024 1139  Last data filed at 10/28/2024 0515  Gross per 24 hour   Intake 60 ml   Output 2300 ml   Net -2240 ml       Physical Exam:      General Appearance: Awake, alert, chronically ill, no acute disc  Skin: warm and dry  HEENT: oral mucosa normal, nonicteric sclera  Neck: No JVD  Lungs: To auscultation, unlabored breathing effort.  Heart: RRR, no rub  Abdomen: soft, nontender, nondistended  : dash with clear urine   Extremities: No edema  Neuro: normal speech and mental status     Scheduled Meds:     vitamin C, 500 mg, Oral, Daily  atorvastatin, 10 mg, Oral, Daily  castor oil-balsam peru, 1 Application, Topical, Q12H  heparin (porcine), 5,000 Units, Subcutaneous, Q12H  pantoprazole, 40 mg, Oral, Daily  polyethylene glycol, 17 g, Oral, Daily  sodium hypochlorite, , Topical, BID  tamsulosin, 0.4 mg, Oral, Daily  torsemide, 20 mg, Oral, Daily      IV Meds:        Results Reviewed:   I have personally reviewed the results from the time of this admission to 10/28/2024 11:39 EDT     Results from last 7 days   Lab Units 10/28/24  0236 10/27/24  0329 10/26/24  0312 10/24/24  2139 10/24/24  1251   SODIUM mmol/L 142 142 143   < > 135*   POTASSIUM mmol/L 4.0 4.8 4.8   < > 5.3*   CHLORIDE mmol/L 106 113* 112*   < > 103   CO2 mmol/L 26.0  22.4 22.0   < > 20.5*   BUN mg/dL 22 29* 43*   < > 72*   CREATININE mg/dL 1.03 1.19 1.72*   < > 4.29*   CALCIUM mg/dL 9.1 9.3 8.7   < > 8.5*   BILIRUBIN mg/dL  --   --   --   --  0.3   ALK PHOS U/L  --   --   --   --  106   ALT (SGPT) U/L  --   --   --   --  13   AST (SGOT) U/L  --   --   --   --  13   GLUCOSE mg/dL 121* 144* 147*   < > 126*    < > = values in this interval not displayed.     Estimated Creatinine Clearance: 67.9 mL/min (by C-G formula based on SCr of 1.03 mg/dL).  Results from last 7 days   Lab Units 10/28/24  0236 10/27/24  0329 10/26/24  0312 10/24/24  1251   MAGNESIUM mg/dL  --   --   --  2.5*   PHOSPHORUS mg/dL 1.9* 2.2* 2.6  --      Results from last 7 days   Lab Units 10/27/24  0329   URIC ACID mg/dL 7.1*     Results from last 7 days   Lab Units 10/27/24  0329 10/26/24  0312 10/25/24  0300 10/24/24  1251   WBC 10*3/mm3 6.12 7.80 9.58 9.11   HEMOGLOBIN g/dL 10.6* 10.3* 11.0* 9.9*   PLATELETS 10*3/mm3 224 271 266 283     Results from last 7 days   Lab Units 10/24/24  1251   INR  1.24*       Assessment / Plan     ASSESSMENT:    Acute kidney injury on chronic kidney disease stage IIIa.  Prerenal azotemia due to volume depletion with hypotension and diuretic use.  Nonoliguric.  Renal function improving creatinine is down to 1.03 his electrolytes within acceptable range and the creatinine peaked at 4.29.  Hyperkalemia.  NEEL has resolved  Recent urinary retention and hydronephrosis.  Armendariz catheter in place.  Hydronephrosis resolved now  History of diastolic CHF.  Patient stable hemodynamically    PLAN:  Continue the same treatment  Monitor for diuretic toxicity  Surveillance labs  Will consider restarting losartan probably after discharge    I reviewed the chart and other providers notes, reviewed labs.  I discussed the case with the patient and he voiced good understand  Copied text in this note has been reviewed and is accurate as of 10/28/24.         Lionel Cabral MD  10/28/24  11:39  ALBINO    [facility]  [phone]

## 2024-10-29 LAB
ALBUMIN SERPL-MCNC: 3.1 G/DL (ref 3.5–5.2)
ANION GAP SERPL CALCULATED.3IONS-SCNC: 7 MMOL/L (ref 5–15)
BACTERIA SPEC AEROBE CULT: NORMAL
BASOPHILS # BLD AUTO: 0.02 10*3/MM3 (ref 0–0.2)
BASOPHILS NFR BLD AUTO: 0.3 % (ref 0–1.5)
BUN SERPL-MCNC: 20 MG/DL (ref 8–23)
BUN/CREAT SERPL: 17.4 (ref 7–25)
CALCIUM SPEC-SCNC: 9.1 MG/DL (ref 8.6–10.5)
CHLORIDE SERPL-SCNC: 104 MMOL/L (ref 98–107)
CO2 SERPL-SCNC: 28 MMOL/L (ref 22–29)
CREAT SERPL-MCNC: 1.15 MG/DL (ref 0.76–1.27)
DEPRECATED RDW RBC AUTO: 42.1 FL (ref 37–54)
EGFRCR SERPLBLD CKD-EPI 2021: 68 ML/MIN/1.73
EOSINOPHIL # BLD AUTO: 0.11 10*3/MM3 (ref 0–0.4)
EOSINOPHIL NFR BLD AUTO: 1.9 % (ref 0.3–6.2)
ERYTHROCYTE [DISTWIDTH] IN BLOOD BY AUTOMATED COUNT: 13.7 % (ref 12.3–15.4)
GLUCOSE SERPL-MCNC: 144 MG/DL (ref 65–99)
HCT VFR BLD AUTO: 33.5 % (ref 37.5–51)
HGB BLD-MCNC: 11.2 G/DL (ref 13–17.7)
IMM GRANULOCYTES # BLD AUTO: 0.03 10*3/MM3 (ref 0–0.05)
IMM GRANULOCYTES NFR BLD AUTO: 0.5 % (ref 0–0.5)
LYMPHOCYTES # BLD AUTO: 1.17 10*3/MM3 (ref 0.7–3.1)
LYMPHOCYTES NFR BLD AUTO: 20 % (ref 19.6–45.3)
MAGNESIUM SERPL-MCNC: 1.9 MG/DL (ref 1.6–2.4)
MCH RBC QN AUTO: 28.1 PG (ref 26.6–33)
MCHC RBC AUTO-ENTMCNC: 33.4 G/DL (ref 31.5–35.7)
MCV RBC AUTO: 84 FL (ref 79–97)
MONOCYTES # BLD AUTO: 0.62 10*3/MM3 (ref 0.1–0.9)
MONOCYTES NFR BLD AUTO: 10.6 % (ref 5–12)
NEUTROPHILS NFR BLD AUTO: 3.9 10*3/MM3 (ref 1.7–7)
NEUTROPHILS NFR BLD AUTO: 66.7 % (ref 42.7–76)
NRBC BLD AUTO-RTO: 0 /100 WBC (ref 0–0.2)
PHOSPHATE SERPL-MCNC: 2.9 MG/DL (ref 2.5–4.5)
PLATELET # BLD AUTO: 220 10*3/MM3 (ref 140–450)
PMV BLD AUTO: 9.9 FL (ref 6–12)
POTASSIUM SERPL-SCNC: 4 MMOL/L (ref 3.5–5.2)
RBC # BLD AUTO: 3.99 10*6/MM3 (ref 4.14–5.8)
SODIUM SERPL-SCNC: 139 MMOL/L (ref 136–145)
URATE SERPL-MCNC: 8.3 MG/DL (ref 3.4–7)
WBC NRBC COR # BLD AUTO: 5.85 10*3/MM3 (ref 3.4–10.8)

## 2024-10-29 PROCEDURE — 84550 ASSAY OF BLOOD/URIC ACID: CPT | Performed by: INTERNAL MEDICINE

## 2024-10-29 PROCEDURE — 25010000002 HEPARIN (PORCINE) PER 1000 UNITS: Performed by: HOSPITALIST

## 2024-10-29 PROCEDURE — 85025 COMPLETE CBC W/AUTO DIFF WBC: CPT | Performed by: INTERNAL MEDICINE

## 2024-10-29 PROCEDURE — 83735 ASSAY OF MAGNESIUM: CPT | Performed by: INTERNAL MEDICINE

## 2024-10-29 PROCEDURE — 80069 RENAL FUNCTION PANEL: CPT | Performed by: INTERNAL MEDICINE

## 2024-10-29 RX ORDER — HYDROXYZINE HYDROCHLORIDE 25 MG/1
25 TABLET, FILM COATED ORAL EVERY 8 HOURS PRN
Status: COMPLETED | OUTPATIENT
Start: 2024-10-29 | End: 2024-10-30

## 2024-10-29 RX ADMIN — HEPARIN SODIUM 5000 UNITS: 5000 INJECTION INTRAVENOUS; SUBCUTANEOUS at 08:54

## 2024-10-29 RX ADMIN — CASTOR OIL AND BALSAM, PERU 1 APPLICATION: 788; 87 OINTMENT TOPICAL at 13:03

## 2024-10-29 RX ADMIN — CETIRIZINE HYDROCHLORIDE 10 MG: 10 TABLET ORAL at 12:44

## 2024-10-29 RX ADMIN — Medication 10 ML: at 08:51

## 2024-10-29 RX ADMIN — POLYETHYLENE GLYCOL 3350 17 G: 17 POWDER, FOR SOLUTION ORAL at 08:54

## 2024-10-29 RX ADMIN — PANTOPRAZOLE SODIUM 40 MG: 40 TABLET, DELAYED RELEASE ORAL at 08:54

## 2024-10-29 RX ADMIN — HYOSCYAMINE SULFATE: 16 SOLUTION at 20:14

## 2024-10-29 RX ADMIN — HYDROCODONE BITARTRATE AND ACETAMINOPHEN 1 TABLET: 7.5; 325 TABLET ORAL at 09:04

## 2024-10-29 RX ADMIN — OXYCODONE HYDROCHLORIDE AND ACETAMINOPHEN 500 MG: 500 TABLET ORAL at 08:51

## 2024-10-29 RX ADMIN — HYDROXYZINE HYDROCHLORIDE 25 MG: 25 TABLET ORAL at 21:02

## 2024-10-29 RX ADMIN — HEPARIN SODIUM 5000 UNITS: 5000 INJECTION INTRAVENOUS; SUBCUTANEOUS at 20:14

## 2024-10-29 RX ADMIN — ATORVASTATIN CALCIUM 10 MG: 20 TABLET, FILM COATED ORAL at 08:54

## 2024-10-29 RX ADMIN — HYOSCYAMINE SULFATE: 16 SOLUTION at 13:03

## 2024-10-29 RX ADMIN — TAMSULOSIN HYDROCHLORIDE 0.4 MG: 0.4 CAPSULE ORAL at 08:51

## 2024-10-29 RX ADMIN — CASTOR OIL AND BALSAM, PERU 1 APPLICATION: 788; 87 OINTMENT TOPICAL at 20:14

## 2024-10-29 RX ADMIN — TORSEMIDE 20 MG: 20 TABLET ORAL at 08:51

## 2024-10-29 NOTE — PROGRESS NOTES
Name: Arden Carrasquillo ADMIT: 10/24/2024   : 1955  PCP: Pollo Ward MD    MRN: [id number] LOS: 5 days   AGE/SEX: 69 y.o. male  ROOM: [facility]     Subjective   Subjective     No events overnight.  No complaints today       Objective   Objective   Vital Signs  Temp:  [98.1 °F (36.7 °C)-98.3 °F (36.8 °C)] 98.1 °F (36.7 °C)  Heart Rate:  [65-83] 75  Resp:  [16-18] 18  BP: (130-156)/(75-90) 130/75  SpO2:  [93 %-100 %] 93 %  on  Flow (L/min) (Oxygen Therapy):  [2] 2;   Device (Oxygen Therapy): room air  Body mass index is 31.33 kg/m².  Physical Exam  Constitutional:       General: He is not in acute distress.     Appearance: He is obese. He is not toxic-appearing.   Cardiovascular:      Rate and Rhythm: Normal rate and regular rhythm.      Heart sounds: Normal heart sounds.   Pulmonary:      Effort: Pulmonary effort is normal.      Breath sounds: Normal breath sounds.   Abdominal:      General: Bowel sounds are normal.      Palpations: Abdomen is soft.   Genitourinary:     Comments: dash in place  Musculoskeletal:         General: No tenderness.      Right lower leg: No edema.      Left lower leg: No edema.   Neurological:      Mental Status: He is alert.   Psychiatric:         Mood and Affect: Mood normal.         Behavior: Behavior normal.         Results Review     I reviewed the patient's new clinical results.  Results from last 7 days   Lab Units 10/29/24  0255 10/27/24  0329 10/26/24  0312 10/25/24  0300   WBC 10*3/mm3 5.85 6.12 7.80 9.58   HEMOGLOBIN g/dL 11.2* 10.6* 10.3* 11.0*   PLATELETS 10*3/mm3 220 224 271 266     Results from last 7 days   Lab Units 10/29/24  0255 10/28/24  0236 10/27/24  0329 10/26/24  0312   SODIUM mmol/L 139 142 142 143   POTASSIUM mmol/L 4.0 4.0 4.8 4.8   CHLORIDE mmol/L 104 106 113* 112*   CO2 mmol/L 28.0 26.0 22.4 22.0   BUN mg/dL 20 22 29* 43*   CREATININE mg/dL 1.15 1.03 1.19 1.72*   GLUCOSE mg/dL 144* 121* 144* 147*   Estimated Creatinine Clearance: 61.3 mL/min (by C-G  "formula based on SCr of 1.15 mg/dL).  Results from last 7 days   Lab Units 10/29/24  0255 10/28/24  0236 10/27/24  0329 10/26/24  0312 10/24/24  1251   ALBUMIN g/dL 3.1* 3.1* 2.9* 3.1* 2.7*   BILIRUBIN mg/dL  --   --   --   --  0.3   ALK PHOS U/L  --   --   --   --  106   AST (SGOT) U/L  --   --   --   --  13   ALT (SGPT) U/L  --   --   --   --  13     Results from last 7 days   Lab Units 10/29/24  0255 10/28/24  1604 10/28/24  0236 10/27/24  0329 10/26/24  0312 10/24/24  2139 10/24/24  1251   CALCIUM mg/dL 9.1  --  9.1 9.3 8.7   < > 8.5*   ALBUMIN g/dL 3.1*  --  3.1* 2.9* 3.1*  --  2.7*   MAGNESIUM mg/dL 1.9  --   --   --   --   --  2.5*   PHOSPHORUS mg/dL 2.9 2.7 1.9* 2.2* 2.6   < >  --     < > = values in this interval not displayed.     Results from last 7 days   Lab Units 10/24/24  1755 10/24/24  1321 10/24/24  1251   PROCALCITONIN ng/mL  --   --  0.22   LACTATE mmol/L 1.2 2.1*  --      SARS-CoV-2 PCR   Date Value Ref Range Status   01/16/2021 Not Detected Not Detected Final     Comment:     Nucleic acid specific to SARS-CoV-2 (COVID-19) virus was not detected in  this sample by the TaqPath (TM) COVID-19 Combo Kit.          SARS-CoV-2 (COVID-19) nucleic acid testing performed using [employer] Aptima (R) SARS-CoV-2 Assay or [employer] TaqPath (TM)  COVID-19 Combo Kit as indicated above under Emergency Use Authorization (EUA) from the FDA. Aptima (R) and TaqPath (TM) test performance  characteristics verified by [facility] in accordance with the FDAs Guidance Document (Policy for Diagnostic Tests for Coronavirus Disease-2019  during the Public Health Emergency) issued on March 16, 2020. The laboratory is regulated under CLIA as qualified to perform high-complexity testing  Unless otherwise noted, all testing was performed at [facility], CLIA No. [id number], [place] State Licensee No. [id number]     No results found for: \"HGBA1C\", \"POCGLU\"      US Renal Bilateral  Narrative: ULTRASOUND RENAL BILATERAL   "   INDICATION: HISTORY of hydronephrosis.     COMPARISON: CT abdomen pelvis October 24, 2024     TECHNIQUE: Real-time sonographic evaluation of the kidneys with  grayscale and color-flow imaging. Representative images were saved for  review.     FINDINGS:     RIGHT KIDNEY: 10.5 cm in length. Simple appearing cyst in the mid kidney  measuring 7 x 4 x 7 mm. No solid-appearing renal mass. Small amount of  pelvicaliectasis. Trace perinephric edema.     LEFT KIDNEY: 11.7 cm in length. Simple appearing cyst in the superior  pole measuring 2.3 x 2.1 x 2.0 cm. Simple appearing cyst in the mid  kidney measuring 5 x 3 x 5 mm. No solid-appearing renal mass. Small  amount of pelvicaliectasis.     BLADDER: Armendariz in the bladder. Bladder is collapsed.     Impression:    1. Simple appearing renal cysts. No solid mass identified.  2. Small amount of bilateral pelvicaliectasis.  3. Armendariz in the bladder. Bladder is collapsed.              This report was finalized on 10/25/2024 2:33 PM by Dr. Camacho Beal M.D on Workstation: [id number]       Scheduled Medications  vitamin C, 500 mg, Oral, Daily  atorvastatin, 10 mg, Oral, Daily  castor oil-balsam peru, 1 Application, Topical, Q12H  heparin (porcine), 5,000 Units, Subcutaneous, Q12H  pantoprazole, 40 mg, Oral, Daily  polyethylene glycol, 17 g, Oral, Daily  sodium hypochlorite, , Topical, BID  tamsulosin, 0.4 mg, Oral, Daily  torsemide, 20 mg, Oral, Daily    Infusions   Diet  Diet: Cardiac; Healthy Heart (2-3 Na+); Fluid Consistency: Thin (IDDSI 0)       Assessment/Plan     Active Hospital Problems    Diagnosis  POA    **Acute renal failure [N17.9]  Yes    Acidosis [E87.20]  Unknown    Prostate cancer [C61]  Yes    Hyperlipidemia [E78.5]  Yes    Stage 3a chronic kidney disease [N18.31]  Yes    Hypertension [I10]  Yes    Type 2 diabetes mellitus with diabetic autonomic neuropathy, with long-term current use of insulin [E11.43, Z79.4]  Not Applicable      Resolved Hospital Problems     Diagnosis Date Resolved POA    Hyperkalemia [E87.5] 10/25/2024 Yes       69 y.o. male admitted with Acute renal failure.    NEEL on CKD 3a with hyperkalemia and metabolic acidosis-prerenal due to hypotension and diuretics. Resolved with IVF.  Back on torsemide for lower extremity edema per nephrology, and appears to be tolerating this.  Hypotension/concern for sepsis-resolved with IVF. No infection has been identified this admission   Urinary retention causing hydronephrosis during hospitalization at [place]-dash catheter in place. Renal ultrasound this admission doesn't show any hydronephrosis. On tamsulosin. Has follow up with Dr. Zuniga on tomorrow, so he'll probably need to reschedule his appointment for 1-2 weeks from now.   1/2 blood cultures positive for coagulase negative staph-likely a contaminate.  Doing well without antibiotics  Hypophosphatemia-improved after replacement   Hypertension-adequate control acutely  Hyperlipidemia-statin  Type 2 diabetes-glucose at goal acutely  GERD-ppi  Chronic decubitus ulcer  Recent hospitalization at [place] for E coli bacteremia/UTI  Heparin SC for DVT prophylaxis.  Limited code (no CPR, no intubation).  Discussed with patient and nursing staff.  Anticipate discharge to SNU facility once arrangements have been made.      Kemar Vargas MD  [facility]  10/29/24  15:47 EDT

## 2024-10-29 NOTE — PLAN OF CARE
Goal Outcome Evaluation:  VSS throughout shift. Pt sleeping between care, pt tearful at times. Daughter unable to visit due to illness. Pt turned from side to side. Wound care done, dash care done and charted. Pt c/o pain 10/10 but responded well to PO pain medication. Pt tolerating PO intake without c/o nause, appetite is poor. Pt did not eat any lunch or dinner. Call bell in reach, bed alarm set.

## 2024-10-29 NOTE — PROGRESS NOTES
[facility] Progress Note      Patient Name: Arden Carrasquillo  : 1955  MRN: [id number]  Primary Care Physician:  Pollo Ward MD  Date of admission: 10/24/2024    Subjective     Interval History:   Follow-up acute kidney injury    The patient is feeling better, denies any chest pain or shortness of air, no orthopnea or PND, no nausea or vomiting, he has Dash catheter.      Review of Systems:   As noted above    Objective     Vitals:   Temp:  [98.1 °F (36.7 °C)-98.3 °F (36.8 °C)] 98.1 °F (36.7 °C)  Heart Rate:  [65-89] 65  Resp:  [16-18] 18  BP: (136-156)/(87-99) 142/87  Flow (L/min) (Oxygen Therapy):  [2] 2    Intake/Output Summary (Last 24 hours) at 10/29/2024 1032  Last data filed at 10/29/2024 0529  Gross per 24 hour   Intake 240 ml   Output 2100 ml   Net -1860 ml       Physical Exam:      General Appearance: Awake, alert, chronically ill, no acute disc  Skin: warm and dry  HEENT: oral mucosa normal, nonicteric sclera  Neck: No JVD  Lungs: To auscultation, unlabored breathing effort.  Heart: RRR, no rub  Abdomen: soft, nontender, nondistended  : dash with clear urine   Extremities: No edema  Neuro: normal speech and mental status     Scheduled Meds:     vitamin C, 500 mg, Oral, Daily  atorvastatin, 10 mg, Oral, Daily  castor oil-balsam peru, 1 Application, Topical, Q12H  heparin (porcine), 5,000 Units, Subcutaneous, Q12H  pantoprazole, 40 mg, Oral, Daily  polyethylene glycol, 17 g, Oral, Daily  sodium hypochlorite, , Topical, BID  tamsulosin, 0.4 mg, Oral, Daily  torsemide, 20 mg, Oral, Daily      IV Meds:        Results Reviewed:   I have personally reviewed the results from the time of this admission to 10/29/2024 10:32 EDT     Results from last 7 days   Lab Units 10/29/24  0255 10/28/24  0236 10/27/24  0329 10/24/24  2139 10/24/24  1251   SODIUM mmol/L 139 142 142   < > 135*   POTASSIUM mmol/L 4.0 4.0 4.8   < > 5.3*   CHLORIDE mmol/L 104 106 113*   < > 103   CO2  mmol/L 28.0 26.0 22.4   < > 20.5*   BUN mg/dL 20 22 29*   < > 72*   CREATININE mg/dL 1.15 1.03 1.19   < > 4.29*   CALCIUM mg/dL 9.1 9.1 9.3   < > 8.5*   BILIRUBIN mg/dL  --   --   --   --  0.3   ALK PHOS U/L  --   --   --   --  106   ALT (SGPT) U/L  --   --   --   --  13   AST (SGOT) U/L  --   --   --   --  13   GLUCOSE mg/dL 144* 121* 144*   < > 126*    < > = values in this interval not displayed.     Estimated Creatinine Clearance: 61.3 mL/min (by C-G formula based on SCr of 1.15 mg/dL).  Results from last 7 days   Lab Units 10/29/24  0255 10/28/24  1604 10/28/24  0236 10/26/24  0312 10/24/24  1251   MAGNESIUM mg/dL 1.9  --   --   --  2.5*   PHOSPHORUS mg/dL 2.9 2.7 1.9*   < >  --     < > = values in this interval not displayed.     Results from last 7 days   Lab Units 10/29/24  0255 10/27/24  0329   URIC ACID mg/dL 8.3* 7.1*     Results from last 7 days   Lab Units 10/29/24  0255 10/27/24  0329 10/26/24  0312 10/25/24  0300 10/24/24  1251   WBC 10*3/mm3 5.85 6.12 7.80 9.58 9.11   HEMOGLOBIN g/dL 11.2* 10.6* 10.3* 11.0* 9.9*   PLATELETS 10*3/mm3 220 224 271 266 283     Results from last 7 days   Lab Units 10/24/24  1251   INR  1.24*       Assessment / Plan     ASSESSMENT:    Acute kidney injury on chronic kidney disease stage IIIa.  Prerenal azotemia due to volume depletion with hypotension and diuretic use.  Nonoliguric.  Renal function improving creatinine is 1.15, stable, his electrolytes within acceptable range and the creatinine peaked at 4.29.  Hyperkalemia.  NEEL has resolved  Recent urinary retention and hydronephrosis.  Armendariz catheter in place.  Hydronephrosis resolved now  History of diastolic CHF.  Patient stable hemodynamically    PLAN:  Continue the same treatment  Monitor for diuretic toxicity  Surveillance labs  Will consider restarting losartan probably after discharge    I reviewed the chart and other providers notes, reviewed labs.  I discussed the case with the patient and he voiced good  understand  Copied text in this note has been reviewed and is accurate as of 10/29/24.         Lionel Cabral MD  10/29/24  10:32 EDT    [facility]  [phone]

## 2024-10-29 NOTE — CASE MANAGEMENT/SOCIAL WORK
Continued Stay Note  [facility]     Patient Name: Arden Carrasquillo  MRN: [id number]  Today's Date: 10/29/2024    Admit Date: 10/24/2024    Plan: SNF referrals pending.   Discharge Plan       Row Name 10/29/24 1428       Plan    Plan Comments [facility] made several attempts to reach Breonna, pts daughter, to discuss additional SNF referrals. Call goes directly to  and unable to leave a message d/t mailbox being full. Ap CADE/CCP                   Discharge Codes    No documentation.                 Expected Discharge Date and Time       Expected Discharge Date Expected Discharge Time    Oct 29, 2024               Tina Pérez RN

## 2024-10-30 LAB
ALBUMIN SERPL-MCNC: 3.3 G/DL (ref 3.5–5.2)
ANION GAP SERPL CALCULATED.3IONS-SCNC: 10.3 MMOL/L (ref 5–15)
BUN SERPL-MCNC: 21 MG/DL (ref 8–23)
BUN/CREAT SERPL: 16.5 (ref 7–25)
CALCIUM SPEC-SCNC: 9.2 MG/DL (ref 8.6–10.5)
CHLORIDE SERPL-SCNC: 102 MMOL/L (ref 98–107)
CO2 SERPL-SCNC: 27.7 MMOL/L (ref 22–29)
CREAT SERPL-MCNC: 1.27 MG/DL (ref 0.76–1.27)
EGFRCR SERPLBLD CKD-EPI 2021: 60.4 ML/MIN/1.73
GLUCOSE SERPL-MCNC: 124 MG/DL (ref 65–99)
MAGNESIUM SERPL-MCNC: 2 MG/DL (ref 1.6–2.4)
PHOSPHATE SERPL-MCNC: 3.3 MG/DL (ref 2.5–4.5)
POTASSIUM SERPL-SCNC: 4 MMOL/L (ref 3.5–5.2)
SODIUM SERPL-SCNC: 140 MMOL/L (ref 136–145)
URATE SERPL-MCNC: 8.2 MG/DL (ref 3.4–7)

## 2024-10-30 PROCEDURE — 84550 ASSAY OF BLOOD/URIC ACID: CPT | Performed by: INTERNAL MEDICINE

## 2024-10-30 PROCEDURE — 97530 THERAPEUTIC ACTIVITIES: CPT

## 2024-10-30 PROCEDURE — 83735 ASSAY OF MAGNESIUM: CPT | Performed by: INTERNAL MEDICINE

## 2024-10-30 PROCEDURE — 25010000002 HEPARIN (PORCINE) PER 1000 UNITS: Performed by: HOSPITALIST

## 2024-10-30 PROCEDURE — 80069 RENAL FUNCTION PANEL: CPT | Performed by: INTERNAL MEDICINE

## 2024-10-30 RX ORDER — LOSARTAN POTASSIUM 50 MG/1
50 TABLET ORAL
Status: DISCONTINUED | OUTPATIENT
Start: 2024-10-30 | End: 2024-10-31

## 2024-10-30 RX ADMIN — HYOSCYAMINE SULFATE: 16 SOLUTION at 08:17

## 2024-10-30 RX ADMIN — ATORVASTATIN CALCIUM 10 MG: 20 TABLET, FILM COATED ORAL at 08:07

## 2024-10-30 RX ADMIN — TORSEMIDE 20 MG: 20 TABLET ORAL at 08:09

## 2024-10-30 RX ADMIN — LOSARTAN POTASSIUM 50 MG: 50 TABLET, FILM COATED ORAL at 11:01

## 2024-10-30 RX ADMIN — HYDROXYZINE HYDROCHLORIDE 25 MG: 25 TABLET ORAL at 08:23

## 2024-10-30 RX ADMIN — HYDROCODONE BITARTRATE AND ACETAMINOPHEN 1 TABLET: 7.5; 325 TABLET ORAL at 14:58

## 2024-10-30 RX ADMIN — CASTOR OIL AND BALSAM, PERU 1 APPLICATION: 788; 87 OINTMENT TOPICAL at 21:00

## 2024-10-30 RX ADMIN — HEPARIN SODIUM 5000 UNITS: 5000 INJECTION INTRAVENOUS; SUBCUTANEOUS at 21:00

## 2024-10-30 RX ADMIN — HYDROCODONE BITARTRATE AND ACETAMINOPHEN 1 TABLET: 7.5; 325 TABLET ORAL at 21:00

## 2024-10-30 RX ADMIN — HYDROXYZINE HYDROCHLORIDE 25 MG: 25 TABLET ORAL at 16:14

## 2024-10-30 RX ADMIN — HYDROCODONE BITARTRATE AND ACETAMINOPHEN 1 TABLET: 7.5; 325 TABLET ORAL at 05:50

## 2024-10-30 RX ADMIN — HYOSCYAMINE SULFATE: 16 SOLUTION at 21:00

## 2024-10-30 RX ADMIN — PANTOPRAZOLE SODIUM 40 MG: 40 TABLET, DELAYED RELEASE ORAL at 08:09

## 2024-10-30 RX ADMIN — HEPARIN SODIUM 5000 UNITS: 5000 INJECTION INTRAVENOUS; SUBCUTANEOUS at 08:09

## 2024-10-30 RX ADMIN — CASTOR OIL AND BALSAM, PERU 1 APPLICATION: 788; 87 OINTMENT TOPICAL at 08:17

## 2024-10-30 RX ADMIN — TAMSULOSIN HYDROCHLORIDE 0.4 MG: 0.4 CAPSULE ORAL at 08:09

## 2024-10-30 RX ADMIN — OXYCODONE HYDROCHLORIDE AND ACETAMINOPHEN 500 MG: 500 TABLET ORAL at 08:16

## 2024-10-30 NOTE — CASE MANAGEMENT/SOCIAL WORK
Continued Stay Note  [facility]     Patient Name: Arden Carrasquillo  MRN: [id number]  Today's Date: 10/30/2024    Admit Date: 10/24/2024    Plan: SNF to possible LTC referrals pending   Discharge Plan       Row Name 10/30/24 1247       Plan    Plan SNF to possible LTC referrals pending    Patient/Family in Agreement with Plan yes    Plan Comments CCP s/w Breonna, via phone, who asked that CCP make additional SNF referrals to [facility] and [facility]. CCP explained [facility] unable to accept and any building d/t progress with therapies and [facility] does not have SNF unless pts already lives there. Breonna verbalized understanding. CCP again discussed additional SNF referrals VS SNF to LTC Medicaid pending vs home with private caregiver. CCP again discussed progress with therapies and possibility of no insurance approval for SNF and possible needs for LTC. Breonna voiced feeling overwhelmed with decisions to be made regarding her dad's care. Breonna is agreeable to area referrals for SNF to possible LTC Medicaid pending. Breonna did state she did not want a referral made to any [facility]. CCP explained referrals would be made and we would notify her with facilities that can accept and bed availability.  Epic referrals made to [facility], [facility], [facility], [facility], [facility], [facility], [facility], [facility], [facility], [facility], [facility], [facility], [facility], [facility], [facility] and [place]. Ap CADE/CCP                   Discharge Codes    No documentation.                 Expected Discharge Date and Time       Expected Discharge Date Expected Discharge Time    Nov 1, 2024               Tina Pérez RN

## 2024-10-30 NOTE — THERAPY TREATMENT NOTE
Patient Name: Arden Carrasquillo  : 1955    MRN: [id number]                              Today's Date: 10/30/2024       Admit Date: 10/24/2024    Visit Dx:     ICD-10-CM ICD-9-CM   1. Acute renal failure, unspecified acute renal failure type  N17.9 584.9   2. Hypotension, unspecified hypotension type  I95.9 458.9   3. Altered mental status, unspecified altered mental status type  R41.82 780.97   4. Pressure injury of skin of sacral region, unspecified injury stage  L89.159 707.03     707.20   5. Urinary retention  R33.9 788.20   6. Chronic anemia  D64.9 285.9   7. Severe sepsis  A41.9 038.9    R65.20 995.92     Patient Active Problem List   Diagnosis    Type 2 diabetes mellitus with diabetic autonomic neuropathy, with long-term current use of insulin    Degenerative spondylolisthesis    Hypertension    Generalized anxiety disorder    Stage 3a chronic kidney disease    Hyperlipidemia    Prostate cancer    Anemia in stage 3b chronic kidney disease    Gynecomastia    Carcinoma in situ of colon    Bilateral lower extremity edema    Weakness of both lower extremities    Psychophysiological insomnia    History of colon polyps    Lumbar stenosis with neurogenic claudication    S/P cervical spinal fusion    Cervical cord compression with myelopathy    Aneurysm of descending thoracic aorta without rupture    Tremor    Acute renal failure    Acidosis     Past Medical History:   Diagnosis Date    Diabetes mellitus     Positive colorectal cancer screening using Cologuard test 2020    Severe peripheral polyneuropathy 10/19/2023     Past Surgical History:   Procedure Laterality Date    CYSTOSCOPY TRANSURETHRAL RESECTION OF PROSTATE      UPPER GASTROINTESTINAL ENDOSCOPY        General Information       Row Name 10/30/24 7432          Physical Therapy Time and Intention    Document Type therapy note (daily note)  -DJ     Mode of Treatment individual therapy;physical therapy  -DJ       Row Name 10/30/24 4913           General Information    Patient Profile Reviewed yes  -DJ       Row Name 10/30/24 1309          Cognition    Orientation Status (Cognition) oriented x 3  more alert today but still slow to respond  -DJ       Row Name 10/30/24 1309          Safety Issues/Impairments Affecting Functional Mobility    Comment, Safety Issues/Impairments (Mobility) gt belt, nonskid socks  -DJ               User Key  (r) = Recorded By, (t) = Taken By, (c) = Cosigned By      Initials Name Provider Type    Radha Medina, PT Physical Therapist                   Mobility       Row Name 10/30/24 1309          Bed Mobility    Bed Mobility supine-sit;sit-supine  -DJ     Supine-Sit Talent (Bed Mobility) moderate assist (50% patient effort);maximum assist (25% patient effort);2 person assist;verbal cues  -DJ     Sit-Supine Talent (Bed Mobility) maximum assist (25% patient effort);2 person assist;verbal cues  -DJ     Assistive Device (Bed Mobility) bed rails;head of bed elevated;repositioning sheet;leg   -DJ     Comment, (Bed Mobility) vc for sequencing, increased time for processing  -DJ       Row Name 10/30/24 1309          Transfers    Comment, (Transfers) sit/stand from EOB x 2 attempts  -DJ       Row Name 10/30/24 1309          Bed-Chair Transfer    Bed-Chair Talent (Transfers) unable to assess  -DJ       Row Name 10/30/24 1309          Sit-Stand Transfer    Sit-Stand Talent (Transfers) maximum assist (25% patient effort);2 person assist;verbal cues  -DJ     Assistive Device (Sit-Stand Transfers) walker, front-wheeled  -DJ     Comment, (Sit-Stand Transfer) both attempts pt able to clear hips off bed but unable to fully straighten knees  -DJ       Row Name 10/30/24 1309          Gait/Stairs (Locomotion)    Talent Level (Gait) unable to assess  -DJ     Talent Level (Stairs) unable to assess  -DJ     Comment, (Gait/Stairs) Pt stood x 2 from EOB but unable to take any steps  -DJ               User Bernal  (r)  = Recorded By, (t) = Taken By, (c) = Cosigned By      Initials Name Provider Type    Radha Medina PT Physical Therapist                   Obj/Interventions       Row Name 10/30/24 1314          Motor Skills    Motor Skills functional endurance  -DJ     Functional Endurance poor  -DJ       Row Name 10/30/24 1314          Balance    Balance Assessment standing static balance;standing dynamic balance  -DJ     Static Standing Balance maximum assist;2-person assist;verbal cues  -DJ     Dynamic Standing Balance maximum assist;2-person assist;verbal cues  -DJ     Position/Device Used, Standing Balance supported;walker, front-wheeled  -DJ     Balance Interventions sitting;standing;sit to stand;supported;weight shifting activity  -DJ     Comment, Balance unsteady  -DJ               User Key  (r) = Recorded By, (t) = Taken By, (c) = Cosigned By      Initials Name Provider Type    Radha Medina, MIRA Physical Therapist                   Goals/Plan    No documentation.                  Clinical Impression       Row Name 10/30/24 1316          Pain    Pre/Posttreatment Pain Comment Pt c/o skin itching around eyes  -DJ       Row Name 10/30/24 1316          Plan of Care Review    Plan of Care Reviewed With patient  -DJ     Progress improving  -DJ     Outcome Evaluation Pt resting in bed, more alert today, reluctant to participate in therapy but agrees to with encouragement. He req mod-max A Of 2 to sit EOB with vc for sequencing and increased time for processing. He was abl eto slightly move his legs toward EOB. He stood from EOB x 2 attempts w ith max A of 2 - on both attempts, pt able to clear hips off bed but unable to fully straighten knees. Her was unable to take any steps. Sitting balance is fair once positioned. He req max A of 2 to return supine and was dependent to reposition in bed. He dem slight improvement in mobility today and was able to clear his hips off bed but we were unable to get to chair or take any steps.  Overall, pt progress is slight at best. Cont PT to address functional deficits and prepare for d/c as appropriate. Pt fatigues quickly and would not tolerate inpt rehab. He is a better candidate for SNF.  -       Row Name 10/30/24 1316          Therapy Assessment/Plan (PT)    Criteria for Skilled Interventions Met (PT) skilled treatment is necessary  -DJ       Row Name 10/30/24 1316          Vital Signs    Pre Patient Position Supine  -DJ     Intra Patient Position Standing  -DJ     Post Patient Position Supine  -DJ       Row Name 10/30/24 1316          Positioning and Restraints    Pre-Treatment Position in bed  -DJ     Post Treatment Position bed  -DJ     In Bed notified nsg;supine;call light within reach;encouraged to call for assist;exit alarm on;waffle boots/both;SCD pump applied;heels elevated  -DJ               User Key  (r) = Recorded By, (t) = Taken By, (c) = Cosigned By      Initials Name Provider Type    Radha Medina, MIRA Physical Therapist                   Outcome Measures       Row Name 10/30/24 1322          How much help from another person do you currently need...    Turning from your back to your side while in flat bed without using bedrails? 2  -DJ     Moving from lying on back to sitting on the side of a flat bed without bedrails? 2  -DJ     Moving to and from a bed to a chair (including a wheelchair)? 1  -DJ     Standing up from a chair using your arms (e.g., wheelchair, bedside chair)? 2  -DJ     Climbing 3-5 steps with a railing? 1  -DJ     To walk in hospital room? 1  -DJ     AM-PAC 6 Clicks Score (PT) 9  -DJ     Highest Level of Mobility Goal 3 --> Sit at edge of bed  -DJ       Row Name 10/30/24 1322          Functional Assessment    Outcome Measure Options AM-PAC 6 Clicks Basic Mobility (PT)  -DJ               User Key  (r) = Recorded By, (t) = Taken By, (c) = Cosigned By      Initials Name Provider Type    Radha Medina PT Physical Therapist                                 Physical  Therapy Education       Title: PT OT SLP Therapies (In Progress)       Topic: Physical Therapy (In Progress)       Point: Mobility training (In Progress)       Learning Progress Summary            Patient Acceptance, E, NR by DJ at 10/30/2024 1322    Acceptance, E, VU,NR by DJ at 10/28/2024 1203    Acceptance, E, VU by LB at 10/26/2024 1604    Acceptance, E, VU,NR by DJ at 10/25/2024 1209                      Point: Home exercise program (Done)       Learning Progress Summary            Patient Acceptance, E, VU,NR by DJ at 10/28/2024 1203    Acceptance, E, VU by LB at 10/26/2024 1604                      Point: Body mechanics (In Progress)       Learning Progress Summary            Patient Acceptance, E, NR by DJ at 10/30/2024 1322    Acceptance, E, VU,NR by DJ at 10/28/2024 1203    Acceptance, E, VU by LB at 10/26/2024 1604    Acceptance, E, VU,NR by DJ at 10/25/2024 1209                      Point: Precautions (In Progress)       Learning Progress Summary            Patient Acceptance, E, NR by DJ at 10/30/2024 1322    Acceptance, E, VU,NR by DJ at 10/28/2024 1203    Acceptance, E, VU by LB at 10/26/2024 1604    Acceptance, E, VU,NR by DJ at 10/25/2024 1209                                      User Key       Initials Effective Dates Name Provider Type Discipline     10/25/19 -  Radha Prasad, PT Physical Therapist PT     06/16/21 -  Valeriano Johnston, RN Registered Nurse Nurse                  PT Recommendation and Plan  Planned Therapy Interventions (PT): balance training, bed mobility training, gait training, home exercise program, strengthening, postural re-education, patient/family education, transfer training  Progress: improving  Outcome Evaluation: Pt resting in bed, more alert today, reluctant to participate in therapy but agrees to with encouragement. He req mod-max A Of 2 to sit EOB with vc for sequencing and increased time for processing. He was abl eto slightly move his legs toward EOB. He stood from  EOB x 2 attempts w ith max A of 2 - on both attempts, pt able to clear hips off bed but unable to fully straighten knees. Her was unable to take any steps. Sitting balance is fair once positioned. He req max A of 2 to return supine and was dependent to reposition in bed. He dem slight improvement in mobility today and was able to clear his hips off bed but we were unable to get to chair or take any steps. Overall, pt progress is slight at best. Cont PT to address functional deficits and prepare for d/c as appropriate. Pt fatigues quickly and would not tolerate inpt rehab. He is a better candidate for SNF.     Time Calculation:   PT Evaluation Complexity  History, PT Evaluation Complexity: 3 or more personal factors and/or comorbidities  Examination of Body Systems (PT Eval Complexity): total of 4 or more elements  Clinical Presentation (PT Evaluation Complexity): evolving  Clinical Decision Making (PT Evaluation Complexity): moderate complexity  Overall Complexity (PT Evaluation Complexity): moderate complexity     PT Charges       Row Name 10/30/24 1324             Time Calculation    Start Time 1131  -DJ      Stop Time 1151  -DJ      Time Calculation (min) 20 min  -DJ      PT Non-Billable Time (min) 10 min  -DJ      PT Received On 10/30/24  -DJ      PT - Next Appointment 11/01/24  -DJ                User Key  (r) = Recorded By, (t) = Taken By, (c) = Cosigned By      Initials Name Provider Type    Radha Medina, MIRA Physical Therapist                  Therapy Charges for Today       Code Description Service Date Service Provider Modifiers Qty    40285894741 HC PT THERAPEUTIC ACT EA 15 MIN 10/30/2024 Radha Prasad, PT GP 1    85412893999 HC PT THER SUPP EA 15 MIN 10/30/2024 Radha Prasad, PT GP 1            PT G-Codes  Outcome Measure Options: AM-PAC 6 Clicks Basic Mobility (PT)  AM-PAC 6 Clicks Score (PT): 9  PT Discharge Summary  Anticipated Discharge Disposition (PT): skilled nursing facility    Radha Prasad  PT  10/30/2024

## 2024-10-30 NOTE — PLAN OF CARE
Goal Outcome Evaluation:  Plan of Care Reviewed With: patient        Progress: improving  Outcome Evaluation: Pt resting in bed, more alert today, reluctant to participate in therapy but agrees to with encouragement. He req mod-max A Of 2 to sit EOB with vc for sequencing and increased time for processing. He was abl eto slightly move his legs toward EOB. He stood from EOB x 2 attempts w ith max A of 2 - on both attempts, pt able to clear hips off bed but unable to fully straighten knees. Her was unable to take any steps. Sitting balance is fair once positioned. He req max A of 2 to return supine and was dependent to reposition in bed. He dem slight improvement in mobility today and was able to clear his hips off bed but we were unable to get to chair or take any steps. Overall, pt progress is slight at best. Cont PT to address functional deficits and prepare for d/c as appropriate. Pt fatigues quickly and would not tolerate inpt rehab. He is a better candidate for SNF.    Anticipated Discharge Disposition (PT): skilled nursing facility

## 2024-10-30 NOTE — PLAN OF CARE
Goal Outcome Evaluation:  Plan of Care Reviewed With: patient        Progress: no change  Outcome Evaluation: AOx3 (-time). VSS. Room air all night. Denies any soa, N/V. PRN norco x1 dose given overnight. C/o itching, MD notified. PRN atarax re-ordered/given. Armendariz in place for voiding. One incontinent BM this shift. Dressings changed, CDI.

## 2024-10-30 NOTE — PROGRESS NOTES
[facility] Progress Note      Patient Name: Arden Carrasquillo  : 1955  MRN: [id number]  Primary Care Physician:  Pollo Ward MD  Date of admission: 10/24/2024    Subjective     Interval History:   Follow-up acute kidney injury    The patient is feeling better, denies any chest pain or shortness of air, no orthopnea or PND, no nausea or vomiting, he has Dash catheter.      Review of Systems:   As noted above    Objective     Vitals:   Temp:  [97.3 °F (36.3 °C)-98.6 °F (37 °C)] 98.1 °F (36.7 °C)  Heart Rate:  [75-87] 87  Resp:  [18] 18  BP: (130-165)/() 165/102    Intake/Output Summary (Last 24 hours) at 10/30/2024 0926  Last data filed at 10/29/2024 2316  Gross per 24 hour   Intake 300 ml   Output 1750 ml   Net -1450 ml       Physical Exam:      General Appearance: Awake, alert, chronically ill, no acute disc  Skin: warm and dry  HEENT: oral mucosa normal, nonicteric sclera  Neck: No JVD  Lungs: To auscultation, unlabored breathing effort.  Heart: RRR, no rub  Abdomen: soft, nontender, nondistended  : dash with clear urine   Extremities: No edema  Neuro: normal speech and mental status     Scheduled Meds:     vitamin C, 500 mg, Oral, Daily  atorvastatin, 10 mg, Oral, Daily  castor oil-balsam peru, 1 Application, Topical, Q12H  heparin (porcine), 5,000 Units, Subcutaneous, Q12H  pantoprazole, 40 mg, Oral, Daily  polyethylene glycol, 17 g, Oral, Daily  sodium hypochlorite, , Topical, BID  tamsulosin, 0.4 mg, Oral, Daily  torsemide, 20 mg, Oral, Daily      IV Meds:        Results Reviewed:   I have personally reviewed the results from the time of this admission to 10/30/2024 09:26 EDT     Results from last 7 days   Lab Units 10/30/24  0346 10/29/24  0255 10/28/24  0236 10/24/24  2139 10/24/24  1251   SODIUM mmol/L 140 139 142   < > 135*   POTASSIUM mmol/L 4.0 4.0 4.0   < > 5.3*   CHLORIDE mmol/L 102 104 106   < > 103   CO2 mmol/L 27.7 28.0 26.0   < > 20.5*   BUN mg/dL 21  20 22   < > 72*   CREATININE mg/dL 1.27 1.15 1.03   < > 4.29*   CALCIUM mg/dL 9.2 9.1 9.1   < > 8.5*   BILIRUBIN mg/dL  --   --   --   --  0.3   ALK PHOS U/L  --   --   --   --  106   ALT (SGPT) U/L  --   --   --   --  13   AST (SGOT) U/L  --   --   --   --  13   GLUCOSE mg/dL 124* 144* 121*   < > 126*    < > = values in this interval not displayed.     Estimated Creatinine Clearance: 53.8 mL/min (by C-G formula based on SCr of 1.27 mg/dL).  Results from last 7 days   Lab Units 10/30/24  0346 10/29/24  0255 10/28/24  1604 10/26/24  0312 10/24/24  1251   MAGNESIUM mg/dL 2.0 1.9  --   --  2.5*   PHOSPHORUS mg/dL 3.3 2.9 2.7   < >  --     < > = values in this interval not displayed.     Results from last 7 days   Lab Units 10/30/24  0346 10/29/24  0255 10/27/24  0329   URIC ACID mg/dL 8.2* 8.3* 7.1*     Results from last 7 days   Lab Units 10/29/24  0255 10/27/24  0329 10/26/24  0312 10/25/24  0300 10/24/24  1251   WBC 10*3/mm3 5.85 6.12 7.80 9.58 9.11   HEMOGLOBIN g/dL 11.2* 10.6* 10.3* 11.0* 9.9*   PLATELETS 10*3/mm3 220 224 271 266 283     Results from last 7 days   Lab Units 10/24/24  1251   INR  1.24*       Assessment / Plan     ASSESSMENT:    Acute kidney injury on chronic kidney disease stage IIIa.  Prerenal azotemia due to volume depletion with hypotension and diuretic use.  Nonoliguric.  Renal function improving creatinine is 1.27, stable, his electrolytes within acceptable range and the creatinine peaked at 4.29.  Hyperkalemia.  NEEL has resolved  Recent urinary retention and hydronephrosis.  Armendariz catheter in place.  Hydronephrosis resolved now  History of diastolic CHF.  Patient stable hemodynamically  Hypertension not well-controlled    PLAN:  Continue the same treatment and the current dose of torsemide  Monitor for diuretic toxicity  Surveillance labs  I will restart losartan 50 mg daily    I reviewed the chart and other providers notes, reviewed labs.  I discussed the case with the patient and he voiced  good understand  Copied text in this note has been reviewed and is accurate as of 10/30/24.         Lionel Cabral MD  10/30/24  09:26 EDT    [facility]  [phone]

## 2024-10-30 NOTE — PROGRESS NOTES
Name: Arden Carrasquillo ADMIT: 10/24/2024   : 1955  PCP: Pollo Ward MD    MRN: [id number] LOS: 6 days   AGE/SEX: 69 y.o. male  [facility]     Subjective   Subjective     No events overnight.  No complaints today       Objective   Objective   Vital Signs  Temp:  [97.3 °F (36.3 °C)-98.6 °F (37 °C)] 98.1 °F (36.7 °C)  Heart Rate:  [75-87] 87  Resp:  [18] 18  BP: (130-165)/() 133/76  SpO2:  [93 %-96 %] 95 %  on   ;   Device (Oxygen Therapy): room air  Body mass index is 29.37 kg/m².  Physical Exam  Constitutional:       General: He is not in acute distress.     Appearance: He is obese. He is not toxic-appearing.   Cardiovascular:      Rate and Rhythm: Normal rate and regular rhythm.      Heart sounds: Normal heart sounds.   Pulmonary:      Effort: Pulmonary effort is normal.      Breath sounds: Normal breath sounds.   Abdominal:      General: Bowel sounds are normal.      Palpations: Abdomen is soft.   Genitourinary:     Comments: dash in place  Musculoskeletal:         General: No tenderness.      Right lower leg: No edema.      Left lower leg: No edema.   Neurological:      Mental Status: He is alert.   Psychiatric:         Mood and Affect: Mood normal.         Behavior: Behavior normal.         Results Review     I reviewed the patient's new clinical results.  Results from last 7 days   Lab Units 10/29/24  0255 10/27/24  0329 10/26/24  0312 10/25/24  0300   WBC 10*3/mm3 5.85 6.12 7.80 9.58   HEMOGLOBIN g/dL 11.2* 10.6* 10.3* 11.0*   PLATELETS 10*3/mm3 220 224 271 266     Results from last 7 days   Lab Units 10/30/24  0346 10/29/24  0255 10/28/24  0236 10/27/24  0329   SODIUM mmol/L 140 139 142 142   POTASSIUM mmol/L 4.0 4.0 4.0 4.8   CHLORIDE mmol/L 102 104 106 113*   CO2 mmol/L 27.7 28.0 26.0 22.4   BUN mg/dL 21 20 22 29*   CREATININE mg/dL 1.27 1.15 1.03 1.19   GLUCOSE mg/dL 124* 144* 121* 144*   Estimated Creatinine Clearance: 53.8 mL/min (by C-G formula based on SCr of 1.27  "mg/dL).  Results from last 7 days   Lab Units 10/30/24  0346 10/29/24  0255 10/28/24  0236 10/27/24  0329 10/26/24  0312 10/24/24  1251   ALBUMIN g/dL 3.3* 3.1* 3.1* 2.9*   < > 2.7*   BILIRUBIN mg/dL  --   --   --   --   --  0.3   ALK PHOS U/L  --   --   --   --   --  106   AST (SGOT) U/L  --   --   --   --   --  13   ALT (SGPT) U/L  --   --   --   --   --  13    < > = values in this interval not displayed.     Results from last 7 days   Lab Units 10/30/24  0346 10/29/24  0255 10/28/24  1604 10/28/24  0236 10/27/24  0329 10/24/24  2139 10/24/24  1251   CALCIUM mg/dL 9.2 9.1  --  9.1 9.3   < > 8.5*   ALBUMIN g/dL 3.3* 3.1*  --  3.1* 2.9*   < > 2.7*   MAGNESIUM mg/dL 2.0 1.9  --   --   --   --  2.5*   PHOSPHORUS mg/dL 3.3 2.9 2.7 1.9* 2.2*   < >  --     < > = values in this interval not displayed.     Results from last 7 days   Lab Units 10/24/24  1755 10/24/24  1321 10/24/24  1251   PROCALCITONIN ng/mL  --   --  0.22   LACTATE mmol/L 1.2 2.1*  --      SARS-CoV-2 PCR   Date Value Ref Range Status   01/16/2021 Not Detected Not Detected Final     Comment:     Nucleic acid specific to SARS-CoV-2 (COVID-19) virus was not detected in  this sample by the TaqPath (TM) COVID-19 Combo Kit.          SARS-CoV-2 (COVID-19) nucleic acid testing performed using [employer] Aptima (R) SARS-CoV-2 Assay or [employer] TaqPath (TM)  COVID-19 Combo Kit as indicated above under Emergency Use Authorization (EUA) from the FDA. Aptima (R) and TaqPath (TM) test performance  characteristics verified by [facility] in accordance with the FDAs Guidance Document (Policy for Diagnostic Tests for Coronavirus Disease-2019  during the Public Health Emergency) issued on March 16, 2020. The laboratory is regulated under CLIA as qualified to perform high-complexity testing  Unless otherwise noted, all testing was performed at [facility], CLIA No. [id number], [place] State Licensee No. [id number]     No results found for: \"HGBA1C\", " "\"POCGLU\"      US Renal Bilateral  Narrative: ULTRASOUND RENAL BILATERAL     INDICATION: HISTORY of hydronephrosis.     COMPARISON: CT abdomen pelvis October 24, 2024     TECHNIQUE: Real-time sonographic evaluation of the kidneys with  grayscale and color-flow imaging. Representative images were saved for  review.     FINDINGS:     RIGHT KIDNEY: 10.5 cm in length. Simple appearing cyst in the mid kidney  measuring 7 x 4 x 7 mm. No solid-appearing renal mass. Small amount of  pelvicaliectasis. Trace perinephric edema.     LEFT KIDNEY: 11.7 cm in length. Simple appearing cyst in the superior  pole measuring 2.3 x 2.1 x 2.0 cm. Simple appearing cyst in the mid  kidney measuring 5 x 3 x 5 mm. No solid-appearing renal mass. Small  amount of pelvicaliectasis.     BLADDER: Armendariz in the bladder. Bladder is collapsed.     Impression:    1. Simple appearing renal cysts. No solid mass identified.  2. Small amount of bilateral pelvicaliectasis.  3. Armendariz in the bladder. Bladder is collapsed.              This report was finalized on 10/25/2024 2:33 PM by Dr. Camacho Beal M.D on Workstation: [id number]       Scheduled Medications  vitamin C, 500 mg, Oral, Daily  atorvastatin, 10 mg, Oral, Daily  castor oil-balsam peru, 1 Application, Topical, Q12H  heparin (porcine), 5,000 Units, Subcutaneous, Q12H  losartan, 50 mg, Oral, Q24H  pantoprazole, 40 mg, Oral, Daily  polyethylene glycol, 17 g, Oral, Daily  sodium hypochlorite, , Topical, BID  tamsulosin, 0.4 mg, Oral, Daily  torsemide, 20 mg, Oral, Daily    Infusions   Diet  Diet: Cardiac; Healthy Heart (2-3 Na+); Fluid Consistency: Thin (IDDSI 0)       Assessment/Plan     Active Hospital Problems    Diagnosis  POA   • **Acute renal failure [N17.9]  Yes   • Acidosis [E87.20]  Unknown   • Prostate cancer [C61]  Yes   • Hyperlipidemia [E78.5]  Yes   • Stage 3a chronic kidney disease [N18.31]  Yes   • Hypertension [I10]  Yes   • Type 2 diabetes mellitus with diabetic autonomic " neuropathy, with long-term current use of insulin [E11.43, Z79.4]  Not Applicable      Resolved Hospital Problems    Diagnosis Date Resolved POA   • Hyperkalemia [E87.5] 10/25/2024 Yes       69 y.o. male admitted with Acute renal failure.    NEEL on CKD 3a with hyperkalemia and metabolic acidosis-prerenal due to hypotension and diuretics. Resolved with IVF.  Back on torsemide for lower extremity edema per nephrology, and appears to be tolerating this.  Hypotension/concern for sepsis-resolved with IVF. No infection has been identified this admission   Urinary retention causing hydronephrosis during hospitalization at [facility]-dash catheter in place. Renal ultrasound this admission doesn't show any hydronephrosis. On tamsulosin. He needs to follow up with urology in 1-2 weeks   1/2 blood cultures positive for coagulase negative staph-likely a contaminate.  Doing well without antibiotics  Hypophosphatemia-improved after replacement   Hypertension-losartan resumed today  Hyperlipidemia-statin  Type 2 diabetes-glucose at goal acutely  GERD-ppi  Chronic decubitus ulcer  Recent hospitalization at [facility] for E coli bacteremia/UTI  Heparin SC for DVT prophylaxis.  Limited code (no CPR, no intubation).  Discussed with patient and nursing staff.  Anticipate discharge to SNU facility once arrangements have been made.      Kemar Vargas MD  [facility]  10/30/24  12:39 EDT

## 2024-10-31 LAB
ALBUMIN SERPL-MCNC: 3.2 G/DL (ref 3.5–5.2)
ANION GAP SERPL CALCULATED.3IONS-SCNC: 12 MMOL/L (ref 5–15)
BASOPHILS # BLD AUTO: 0.03 10*3/MM3 (ref 0–0.2)
BASOPHILS NFR BLD AUTO: 0.4 % (ref 0–1.5)
BUN SERPL-MCNC: 30 MG/DL (ref 8–23)
BUN/CREAT SERPL: 17.3 (ref 7–25)
CALCIUM SPEC-SCNC: 9.4 MG/DL (ref 8.6–10.5)
CHLORIDE SERPL-SCNC: 97 MMOL/L (ref 98–107)
CO2 SERPL-SCNC: 27 MMOL/L (ref 22–29)
CREAT SERPL-MCNC: 1.73 MG/DL (ref 0.76–1.27)
DEPRECATED RDW RBC AUTO: 41.8 FL (ref 37–54)
EGFRCR SERPLBLD CKD-EPI 2021: 41.7 ML/MIN/1.73
EOSINOPHIL # BLD AUTO: 0.16 10*3/MM3 (ref 0–0.4)
EOSINOPHIL NFR BLD AUTO: 2.2 % (ref 0.3–6.2)
ERYTHROCYTE [DISTWIDTH] IN BLOOD BY AUTOMATED COUNT: 13.7 % (ref 12.3–15.4)
GLUCOSE SERPL-MCNC: 122 MG/DL (ref 65–99)
HCT VFR BLD AUTO: 35.9 % (ref 37.5–51)
HGB BLD-MCNC: 11.5 G/DL (ref 13–17.7)
IMM GRANULOCYTES # BLD AUTO: 0.07 10*3/MM3 (ref 0–0.05)
IMM GRANULOCYTES NFR BLD AUTO: 1 % (ref 0–0.5)
LYMPHOCYTES # BLD AUTO: 1.4 10*3/MM3 (ref 0.7–3.1)
LYMPHOCYTES NFR BLD AUTO: 19.3 % (ref 19.6–45.3)
MAGNESIUM SERPL-MCNC: 2.1 MG/DL (ref 1.6–2.4)
MCH RBC QN AUTO: 26.9 PG (ref 26.6–33)
MCHC RBC AUTO-ENTMCNC: 32 G/DL (ref 31.5–35.7)
MCV RBC AUTO: 83.9 FL (ref 79–97)
MONOCYTES # BLD AUTO: 0.87 10*3/MM3 (ref 0.1–0.9)
MONOCYTES NFR BLD AUTO: 12 % (ref 5–12)
NEUTROPHILS NFR BLD AUTO: 4.74 10*3/MM3 (ref 1.7–7)
NEUTROPHILS NFR BLD AUTO: 65.1 % (ref 42.7–76)
NRBC BLD AUTO-RTO: 0 /100 WBC (ref 0–0.2)
PHOSPHATE SERPL-MCNC: 3.8 MG/DL (ref 2.5–4.5)
PLATELET # BLD AUTO: 201 10*3/MM3 (ref 140–450)
PMV BLD AUTO: 10.6 FL (ref 6–12)
POTASSIUM SERPL-SCNC: 4 MMOL/L (ref 3.5–5.2)
RBC # BLD AUTO: 4.28 10*6/MM3 (ref 4.14–5.8)
SODIUM SERPL-SCNC: 136 MMOL/L (ref 136–145)
URATE SERPL-MCNC: 9 MG/DL (ref 3.4–7)
WBC NRBC COR # BLD AUTO: 7.27 10*3/MM3 (ref 3.4–10.8)

## 2024-10-31 PROCEDURE — 25010000002 HEPARIN (PORCINE) PER 1000 UNITS: Performed by: HOSPITALIST

## 2024-10-31 PROCEDURE — 84550 ASSAY OF BLOOD/URIC ACID: CPT | Performed by: INTERNAL MEDICINE

## 2024-10-31 PROCEDURE — 83735 ASSAY OF MAGNESIUM: CPT | Performed by: INTERNAL MEDICINE

## 2024-10-31 PROCEDURE — 80069 RENAL FUNCTION PANEL: CPT | Performed by: INTERNAL MEDICINE

## 2024-10-31 PROCEDURE — 85025 COMPLETE CBC W/AUTO DIFF WBC: CPT | Performed by: INTERNAL MEDICINE

## 2024-10-31 RX ORDER — HYDROXYZINE HYDROCHLORIDE 25 MG/1
25 TABLET, FILM COATED ORAL EVERY 6 HOURS PRN
Status: DISCONTINUED | OUTPATIENT
Start: 2024-10-31 | End: 2024-11-05 | Stop reason: HOSPADM

## 2024-10-31 RX ADMIN — ATORVASTATIN CALCIUM 10 MG: 20 TABLET, FILM COATED ORAL at 09:15

## 2024-10-31 RX ADMIN — CASTOR OIL AND BALSAM, PERU 1 APPLICATION: 788; 87 OINTMENT TOPICAL at 09:15

## 2024-10-31 RX ADMIN — HEPARIN SODIUM 5000 UNITS: 5000 INJECTION INTRAVENOUS; SUBCUTANEOUS at 09:15

## 2024-10-31 RX ADMIN — HYDROCODONE BITARTRATE AND ACETAMINOPHEN 1 TABLET: 7.5; 325 TABLET ORAL at 05:11

## 2024-10-31 RX ADMIN — HYDROXYZINE HYDROCHLORIDE 25 MG: 25 TABLET ORAL at 12:20

## 2024-10-31 RX ADMIN — HYDROCODONE BITARTRATE AND ACETAMINOPHEN 1 TABLET: 7.5; 325 TABLET ORAL at 12:20

## 2024-10-31 RX ADMIN — POLYETHYLENE GLYCOL 3350 17 G: 17 POWDER, FOR SOLUTION ORAL at 09:16

## 2024-10-31 RX ADMIN — HYDROCODONE BITARTRATE AND ACETAMINOPHEN 1 TABLET: 7.5; 325 TABLET ORAL at 23:06

## 2024-10-31 RX ADMIN — TAMSULOSIN HYDROCHLORIDE 0.4 MG: 0.4 CAPSULE ORAL at 09:15

## 2024-10-31 RX ADMIN — PANTOPRAZOLE SODIUM 40 MG: 40 TABLET, DELAYED RELEASE ORAL at 09:15

## 2024-10-31 RX ADMIN — HYOSCYAMINE SULFATE: 16 SOLUTION at 23:06

## 2024-10-31 RX ADMIN — CASTOR OIL AND BALSAM, PERU 1 APPLICATION: 788; 87 OINTMENT TOPICAL at 23:06

## 2024-10-31 RX ADMIN — TORSEMIDE 20 MG: 20 TABLET ORAL at 09:16

## 2024-10-31 RX ADMIN — OXYCODONE HYDROCHLORIDE AND ACETAMINOPHEN 500 MG: 500 TABLET ORAL at 09:15

## 2024-10-31 RX ADMIN — HYOSCYAMINE SULFATE: 16 SOLUTION at 09:15

## 2024-10-31 RX ADMIN — HEPARIN SODIUM 5000 UNITS: 5000 INJECTION INTRAVENOUS; SUBCUTANEOUS at 23:08

## 2024-10-31 NOTE — PLAN OF CARE
Pt. VS WNL. Pt. C/o sacral pain at times, relieved by PO pain meds. Pt. C/o itching at times, relieved by PO Atarax. Pt. Awaiting a kidney duplex, unable to obtain today per department, will try in a.m. Pt. Will be NPO at [facility]. Pt. Q2 turns. Pt. Receiving subq Lovenox and SCDs for DVT proph. Pt. With FC, cath care complete per protocol, adequate UOP, chlorhexidine bath complete. Pt. A&O x3, confused to situation and time at times. Pt. With sacral ulcer, dressings changed per orders. Pt. Resting comfortably at present, will continue to monitor closely for the remainder of this RN's shift.      Problem: Adult Inpatient Plan of Care  Goal: Plan of Care Review  Outcome: Progressing  Flowsheets (Taken 10/31/2024 6042)  Progress: improving  Plan of Care Reviewed With: patient

## 2024-10-31 NOTE — CASE MANAGEMENT/SOCIAL WORK
Continued Stay Note  [facility]     Patient Name: Arden Carrasquillo  MRN: [id number]  Today's Date: 10/31/2024    Admit Date: 10/24/2024    Plan: SNF referrals pending.   Discharge Plan       Row Name 10/31/24 1423       Plan    Plan SNF referrals pending.    Patient/Family in Agreement with Plan yes    Plan Comments [facility] notified CCP pt has been accepted and beds available at both [facility] and [facility]. [facility] notified CCP that pts daughter is touring tomorrow at 2pm. Multiple referrals still pending. CCP LVM for Breonna, pts daughter, to update. Ap RN/CCP                   Discharge Codes    No documentation.                 Expected Discharge Date and Time       Expected Discharge Date Expected Discharge Time    Nov 2, 2024               Tina Pérez RN

## 2024-10-31 NOTE — PROGRESS NOTES
[facility] Progress Note      Patient Name: Arden Carrasquillo  : 1955  MRN: [id number]  Primary Care Physician:  Pollo Ward MD  Date of admission: 10/24/2024    Subjective     Interval History:   Follow-up acute kidney injury    The patient is feeling better, denies any chest pain or shortness of air, no orthopnea or PND, no nausea or vomiting, he has Dash catheter.  The patient was restarted on losartan yesterday      Review of Systems:   As noted above    Objective     Vitals:   Temp:  [98.1 °F (36.7 °C)-99.2 °F (37.3 °C)] 98.6 °F (37 °C)  Heart Rate:  [75-90] 83  Resp:  [16-18] 18  BP: (117-158)/(69-92) 151/92    Intake/Output Summary (Last 24 hours) at 10/31/2024 0901  Last data filed at 10/30/2024 1720  Gross per 24 hour   Intake --   Output 800 ml   Net -800 ml       Physical Exam:      General Appearance: Awake, alert, chronically ill, no acute disc  Skin: warm and dry  HEENT: oral mucosa normal, nonicteric sclera  Neck: No JVD  Lungs: To auscultation, unlabored breathing effort.  Heart: RRR, no rub  Abdomen: soft, nontender, nondistended  : dash with clear urine   Extremities: No edema  Neuro: normal speech and mental status     Scheduled Meds:     vitamin C, 500 mg, Oral, Daily  atorvastatin, 10 mg, Oral, Daily  castor oil-balsam peru, 1 Application, Topical, Q12H  heparin (porcine), 5,000 Units, Subcutaneous, Q12H  pantoprazole, 40 mg, Oral, Daily  polyethylene glycol, 17 g, Oral, Daily  sodium hypochlorite, , Topical, BID  tamsulosin, 0.4 mg, Oral, Daily  torsemide, 20 mg, Oral, Daily      IV Meds:        Results Reviewed:   I have personally reviewed the results from the time of this admission to 10/31/2024 09:01 EDT     Results from last 7 days   Lab Units 10/31/24  0341 10/30/24  0346 10/29/24  0255 10/24/24  2139 10/24/24  1251   SODIUM mmol/L 136 140 139   < > 135*   POTASSIUM mmol/L 4.0 4.0 4.0   < > 5.3*   CHLORIDE mmol/L 97* 102 104   < > 103   CO2  mmol/L 27.0 27.7 28.0   < > 20.5*   BUN mg/dL 30* 21 20   < > 72*   CREATININE mg/dL 1.73* 1.27 1.15   < > 4.29*   CALCIUM mg/dL 9.4 9.2 9.1   < > 8.5*   BILIRUBIN mg/dL  --   --   --   --  0.3   ALK PHOS U/L  --   --   --   --  106   ALT (SGPT) U/L  --   --   --   --  13   AST (SGOT) U/L  --   --   --   --  13   GLUCOSE mg/dL 122* 124* 144*   < > 126*    < > = values in this interval not displayed.     Estimated Creatinine Clearance: 39.5 mL/min (A) (by C-G formula based on SCr of 1.73 mg/dL (H)).  Results from last 7 days   Lab Units 10/31/24  0341 10/30/24  0346 10/29/24  0255   MAGNESIUM mg/dL 2.1 2.0 1.9   PHOSPHORUS mg/dL 3.8 3.3 2.9     Results from last 7 days   Lab Units 10/31/24  0341 10/30/24  0346 10/29/24  0255 10/27/24  0329   URIC ACID mg/dL 9.0* 8.2* 8.3* 7.1*     Results from last 7 days   Lab Units 10/31/24  0341 10/29/24  0255 10/27/24  0329 10/26/24  0312 10/25/24  0300   WBC 10*3/mm3 7.27 5.85 6.12 7.80 9.58   HEMOGLOBIN g/dL 11.5* 11.2* 10.6* 10.3* 11.0*   PLATELETS 10*3/mm3 201 220 224 271 266     Results from last 7 days   Lab Units 10/24/24  1251   INR  1.24*       Assessment / Plan     ASSESSMENT:    Acute kidney injury on chronic kidney disease stage IIIa.  Prerenal azotemia due to volume depletion with hypotension and diuretic use.  Nonoliguric.  Renal function improving creatinine is up to 1.73 after restarting losartan, stable, his electrolytes within acceptable range and the creatinine peaked at 4.29.  I am concerned about renovascular disease  Recent urinary retention and hydronephrosis.  Armendariz catheter in place.  Hydronephrosis resolved now  History of diastolic CHF.  Patient stable hemodynamically  Hypertension not well-controlled    PLAN:  Continue the same dose of torsemide  Discontinue losartan  Monitor for diuretic toxicity  Surveillance labs  I will check renal artery Doppler    I reviewed the chart and other providers notes, reviewed labs.  I discussed the case with the  patient and he voiced good understand  Copied text in this note has been reviewed and is accurate as of 10/31/24.         Lionel Cabral MD  10/31/24  09:01 EDT    [facility]  [phone]

## 2024-10-31 NOTE — PROGRESS NOTES
[facility]     LOS: 7 days     Subjective:    CC:Altered Mental Status (Sepsis alert)    DIET:  Diet Order   Procedures    Diet: Cardiac; Healthy Heart (2-3 Na+); Fluid Consistency: Thin (IDDSI 0)       Objective:    Vital Signs:  Temp:  [97.9 °F (36.6 °C)-98.6 °F (37 °C)] 97.9 °F (36.6 °C)  Heart Rate:  [75-86] 81  Resp:  [16-18] 18  BP: (117-158)/(69-92) 136/82    SpO2:  [92 %-96 %] 95 %  on   ;   Device (Oxygen Therapy): room air  Body mass index is 29.37 kg/m².    Physical Exam  Constitutional:       General: He is not in acute distress.  Cardiovascular:      Rate and Rhythm: Normal rate and regular rhythm.   Pulmonary:      Effort: Pulmonary effort is normal.      Breath sounds: Normal breath sounds.   Abdominal:      General: There is no distension.      Palpations: Abdomen is soft.      Tenderness: There is no abdominal tenderness.   Skin:     General: Skin is warm and dry.   Neurological:      General: No focal deficit present.      Mental Status: He is alert.   Psychiatric:         Mood and Affect: Mood normal.         Behavior: Behavior normal.         Results Review:    Glucose   Date Value Ref Range Status   10/31/2024 122 (H) 65 - 99 mg/dL Final   10/30/2024 124 (H) 65 - 99 mg/dL Final   10/29/2024 144 (H) 65 - 99 mg/dL Final     Results from last 7 days   Lab Units 10/31/24  0341   WBC 10*3/mm3 7.27   HEMOGLOBIN g/dL 11.5*   HEMATOCRIT % 35.9*   PLATELETS 10*3/mm3 201     Results from last 7 days   Lab Units 10/31/24  0341   SODIUM mmol/L 136   POTASSIUM mmol/L 4.0   CHLORIDE mmol/L 97*   CO2 mmol/L 27.0   BUN mg/dL 30*   CREATININE mg/dL 1.73*   CALCIUM mg/dL 9.4   GLUCOSE mg/dL 122*         Results from last 7 days   Lab Units 10/31/24  0341   MAGNESIUM mg/dL 2.1         Cultures:  No results found for: \"BLOODCX\", \"URINECX\", \"WOUNDCX\", \"MRSACX\", \"RESPCX\", \"STOOLCX\"    I have reviewed daily medications and changes in CPOE    Scheduled meds  vitamin C, 500 mg, Oral, " Daily  atorvastatin, 10 mg, Oral, Daily  castor oil-balsam peru, 1 Application, Topical, Q12H  heparin (porcine), 5,000 Units, Subcutaneous, Q12H  pantoprazole, 40 mg, Oral, Daily  polyethylene glycol, 17 g, Oral, Daily  sodium hypochlorite, , Topical, BID  tamsulosin, 0.4 mg, Oral, Daily  torsemide, 20 mg, Oral, Daily           PRN meds    acetaminophen **OR** acetaminophen **OR** acetaminophen    senna-docusate sodium **AND** polyethylene glycol **AND** bisacodyl **AND** bisacodyl    Calcium Replacement - Follow Nurse / BPA Driven Protocol    cetirizine    HYDROcodone-acetaminophen    HYDROmorphone    hydrOXYzine    lidocaine    Magnesium Standard Dose Replacement - Follow Nurse / BPA Driven Protocol    melatonin    ondansetron ODT **OR** ondansetron    Phosphorus Replacement - Follow Nurse / BPA Driven Protocol    Potassium Replacement - Follow Nurse / BPA Driven Protocol    sodium chloride        Acute renal failure    Type 2 diabetes mellitus with diabetic autonomic neuropathy, with long-term current use of insulin    Hypertension    Stage 3a chronic kidney disease    Hyperlipidemia    Prostate cancer    Acidosis        Assessment/Plan:  Acute on chronic kidney failure with hyperkalemia and metabolic acidosis  -Likely prerenal due to hypotension and diuretics  -Resolved with IV fluids  -Creatinine has had a significant jump today from yesterday  -Discontinued losartan last dose 10/30 at 11 AM  -Blood pressures are currently reasonably controlled    Urinary retention hydronephrosis  -Catheter in place  -Continue with Flomax  -Follow-up urology outpatient    1-2 blood cultures positive for coag negative staph likely contamination  -Seen by infectious disease  -No further workup    History of diastolic CHF    Hypertension        Possible discharge 1 to 2 days      Damien Scott MD  10/31/24  17:28 EDT

## 2024-11-01 ENCOUNTER — APPOINTMENT (OUTPATIENT)
Dept: CARDIOLOGY | Facility: HOSPITAL | Age: 69
End: 2024-11-01
Payer: MEDICARE

## 2024-11-01 LAB
ALBUMIN SERPL-MCNC: 3.4 G/DL (ref 3.5–5.2)
ANION GAP SERPL CALCULATED.3IONS-SCNC: 12 MMOL/L (ref 5–15)
BASOPHILS # BLD AUTO: 0.02 10*3/MM3 (ref 0–0.2)
BASOPHILS NFR BLD AUTO: 0.3 % (ref 0–1.5)
BUN SERPL-MCNC: 35 MG/DL (ref 8–23)
BUN/CREAT SERPL: 18.9 (ref 7–25)
CALCIUM SPEC-SCNC: 8.9 MG/DL (ref 8.6–10.5)
CHLORIDE SERPL-SCNC: 98 MMOL/L (ref 98–107)
CO2 SERPL-SCNC: 26 MMOL/L (ref 22–29)
CREAT SERPL-MCNC: 1.85 MG/DL (ref 0.76–1.27)
DEPRECATED RDW RBC AUTO: 43.3 FL (ref 37–54)
EGFRCR SERPLBLD CKD-EPI 2021: 38.5 ML/MIN/1.73
EOSINOPHIL # BLD AUTO: 0.15 10*3/MM3 (ref 0–0.4)
EOSINOPHIL NFR BLD AUTO: 2 % (ref 0.3–6.2)
ERYTHROCYTE [DISTWIDTH] IN BLOOD BY AUTOMATED COUNT: 14.2 % (ref 12.3–15.4)
GLUCOSE SERPL-MCNC: 129 MG/DL (ref 65–99)
HCT VFR BLD AUTO: 36.4 % (ref 37.5–51)
HGB BLD-MCNC: 11.5 G/DL (ref 13–17.7)
IMM GRANULOCYTES # BLD AUTO: 0.07 10*3/MM3 (ref 0–0.05)
IMM GRANULOCYTES NFR BLD AUTO: 0.9 % (ref 0–0.5)
LYMPHOCYTES # BLD AUTO: 1.46 10*3/MM3 (ref 0.7–3.1)
LYMPHOCYTES NFR BLD AUTO: 19.2 % (ref 19.6–45.3)
MAGNESIUM SERPL-MCNC: 2.2 MG/DL (ref 1.6–2.4)
MCH RBC QN AUTO: 26.7 PG (ref 26.6–33)
MCHC RBC AUTO-ENTMCNC: 31.6 G/DL (ref 31.5–35.7)
MCV RBC AUTO: 84.7 FL (ref 79–97)
MONOCYTES # BLD AUTO: 0.71 10*3/MM3 (ref 0.1–0.9)
MONOCYTES NFR BLD AUTO: 9.4 % (ref 5–12)
NEUTROPHILS NFR BLD AUTO: 5.18 10*3/MM3 (ref 1.7–7)
NEUTROPHILS NFR BLD AUTO: 68.2 % (ref 42.7–76)
NRBC BLD AUTO-RTO: 0 /100 WBC (ref 0–0.2)
PHOSPHATE SERPL-MCNC: 3.9 MG/DL (ref 2.5–4.5)
PLATELET # BLD AUTO: 192 10*3/MM3 (ref 140–450)
PMV BLD AUTO: 10.9 FL (ref 6–12)
POTASSIUM SERPL-SCNC: 3.7 MMOL/L (ref 3.5–5.2)
RBC # BLD AUTO: 4.3 10*6/MM3 (ref 4.14–5.8)
SODIUM SERPL-SCNC: 136 MMOL/L (ref 136–145)
URATE SERPL-MCNC: 10 MG/DL (ref 3.4–7)
WBC NRBC COR # BLD AUTO: 7.59 10*3/MM3 (ref 3.4–10.8)

## 2024-11-01 PROCEDURE — 84550 ASSAY OF BLOOD/URIC ACID: CPT | Performed by: INTERNAL MEDICINE

## 2024-11-01 PROCEDURE — 85025 COMPLETE CBC W/AUTO DIFF WBC: CPT | Performed by: INTERNAL MEDICINE

## 2024-11-01 PROCEDURE — 83735 ASSAY OF MAGNESIUM: CPT | Performed by: INTERNAL MEDICINE

## 2024-11-01 PROCEDURE — 80069 RENAL FUNCTION PANEL: CPT | Performed by: INTERNAL MEDICINE

## 2024-11-01 PROCEDURE — 25010000002 HEPARIN (PORCINE) PER 1000 UNITS: Performed by: HOSPITALIST

## 2024-11-01 RX ADMIN — POLYETHYLENE GLYCOL 3350 17 G: 17 POWDER, FOR SOLUTION ORAL at 08:45

## 2024-11-01 RX ADMIN — ATORVASTATIN CALCIUM 10 MG: 20 TABLET, FILM COATED ORAL at 08:44

## 2024-11-01 RX ADMIN — HEPARIN SODIUM 5000 UNITS: 5000 INJECTION INTRAVENOUS; SUBCUTANEOUS at 08:49

## 2024-11-01 RX ADMIN — HYDROCODONE BITARTRATE AND ACETAMINOPHEN 1 TABLET: 7.5; 325 TABLET ORAL at 06:06

## 2024-11-01 RX ADMIN — PANTOPRAZOLE SODIUM 40 MG: 40 TABLET, DELAYED RELEASE ORAL at 08:45

## 2024-11-01 RX ADMIN — TAMSULOSIN HYDROCHLORIDE 0.4 MG: 0.4 CAPSULE ORAL at 08:45

## 2024-11-01 RX ADMIN — HYDROCODONE BITARTRATE AND ACETAMINOPHEN 1 TABLET: 7.5; 325 TABLET ORAL at 13:32

## 2024-11-01 RX ADMIN — HYOSCYAMINE SULFATE: 16 SOLUTION at 08:46

## 2024-11-01 RX ADMIN — HYDROXYZINE HYDROCHLORIDE 25 MG: 25 TABLET ORAL at 13:32

## 2024-11-01 RX ADMIN — HEPARIN SODIUM 5000 UNITS: 5000 INJECTION INTRAVENOUS; SUBCUTANEOUS at 20:12

## 2024-11-01 RX ADMIN — HYOSCYAMINE SULFATE: 16 SOLUTION at 20:12

## 2024-11-01 RX ADMIN — CASTOR OIL AND BALSAM, PERU 1 APPLICATION: 788; 87 OINTMENT TOPICAL at 20:12

## 2024-11-01 RX ADMIN — OXYCODONE HYDROCHLORIDE AND ACETAMINOPHEN 500 MG: 500 TABLET ORAL at 08:45

## 2024-11-01 RX ADMIN — CASTOR OIL AND BALSAM, PERU 1 APPLICATION: 788; 87 OINTMENT TOPICAL at 08:46

## 2024-11-01 NOTE — CASE MANAGEMENT/SOCIAL WORK
Post-Acute Authorization Submission      Post Acute Pre-Cert Documentation  Request Submitted by Facility - Type:: Hospital  Post-Acute Authorization Type Submitted:: SNF  Date Post Acute Pre-Cert Inititated per Facility: 11/01/24  Date Post Acute Pre-Cert Completed: 11/01/24  Accepting Facility: [facility]  Hospital Discharge Date Requested: 11/02/24  All Clinicals Submitted?: Yes  Had Accepting Facility at Time of Submission: Yes  Response Received from Insurance?: Denial  Response Communicated to:: , Accepting Facility Liaison  Authorization Number:: DENIED [id number]  Post Acute Pre-Cert Initiated Comment: RECEIVED CALL FROM REP ANTHONY QUIJANO - PATIENT HAS USED ALL SKILLED DAYS (OVER 100 SHE STATED) - CASE IS NOT SENT FOR P2P - CASE DENIED - WILL FOLLOW UP MONDAY.              Varun Valiente, PCT

## 2024-11-01 NOTE — CASE MANAGEMENT/SOCIAL WORK
Continued Stay Note  [facility]     Patient Name: Arden Carrasquillo  MRN: [id number]  Today's Date: 11/1/2024    Admit Date: 10/24/2024    Plan: SNF referrals pending.   Discharge Plan       Row Name 11/01/24 1340       Plan    Plan SNF referrals pending.    Patient/Family in Agreement with Plan yes    Plan Comments CCP s/w Zaida/[facility] who stated pt is still under review. LVM for Laura/[facility] and [facility] to confirm status of referral. Carina/Abdiel notified CCP able to accept pt at [facility]. CCP s/w Dimitry[facility], who stated he does not have Epic access and asked that referral be faxed to [phone]; CCP faxed referral. CCP to follow up with Breonna after her visit at [facility]. Ap CADE/CCP                   Discharge Codes    No documentation.                 Expected Discharge Date and Time       Expected Discharge Date Expected Discharge Time    Nov 5, 2024               Tina Pérez RN

## 2024-11-01 NOTE — PROGRESS NOTES
[facility] Progress Note      Patient Name: Arden Carrasquillo  : 1955  MRN: [id number]  Primary Care Physician:  Pollo Ward MD  Date of admission: 10/24/2024    Subjective     Interval History:   Follow-up acute kidney injury    The patient is feeling better, denies any chest pain or shortness of air, no orthopnea or PND, no nausea or vomiting, he has Dash catheter.  The patient was restarted on losartan yesterday      Review of Systems:   As noted above    Objective     Vitals:   Temp:  [97.9 °F (36.6 °C)-98.4 °F (36.9 °C)] 98.4 °F (36.9 °C)  Heart Rate:  [72-81] 80  Resp:  [18] 18  BP: (125-140)/(69-89) 125/69    Intake/Output Summary (Last 24 hours) at 2024 0832  Last data filed at 10/31/2024 1853  Gross per 24 hour   Intake --   Output 700 ml   Net -700 ml       Physical Exam:      General Appearance: Awake, alert, chronically ill, no acute disc  Skin: warm and dry  HEENT: oral mucosa normal, nonicteric sclera  Neck: No JVD  Lungs: To auscultation, unlabored breathing effort.  Heart: RRR, no rub  Abdomen: soft, nontender, nondistended  : dash with clear urine   Extremities: No edema  Neuro: normal speech and mental status     Scheduled Meds:     vitamin C, 500 mg, Oral, Daily  atorvastatin, 10 mg, Oral, Daily  castor oil-balsam peru, 1 Application, Topical, Q12H  heparin (porcine), 5,000 Units, Subcutaneous, Q12H  pantoprazole, 40 mg, Oral, Daily  polyethylene glycol, 17 g, Oral, Daily  sodium hypochlorite, , Topical, BID  tamsulosin, 0.4 mg, Oral, Daily  torsemide, 20 mg, Oral, Daily      IV Meds:        Results Reviewed:   I have personally reviewed the results from the time of this admission to 2024 08:32 EDT     Results from last 7 days   Lab Units 24  0328 10/31/24  0341 10/30/24  0346   SODIUM mmol/L 136 136 140   POTASSIUM mmol/L 3.7 4.0 4.0   CHLORIDE mmol/L 98 97* 102   CO2 mmol/L 26.0 27.0 27.7   BUN mg/dL 35* 30* 21   CREATININE mg/dL 1.85*  1.73* 1.27   CALCIUM mg/dL 8.9 9.4 9.2   GLUCOSE mg/dL 129* 122* 124*     Estimated Creatinine Clearance: 37.6 mL/min (A) (by C-G formula based on SCr of 1.85 mg/dL (H)).  Results from last 7 days   Lab Units 11/01/24  0328 10/31/24  0341 10/30/24  0346   MAGNESIUM mg/dL 2.2 2.1 2.0   PHOSPHORUS mg/dL 3.9 3.8 3.3     Results from last 7 days   Lab Units 11/01/24  0328 10/31/24  0341 10/30/24  0346 10/29/24  0255 10/27/24  0329   URIC ACID mg/dL 10.0* 9.0* 8.2* 8.3* 7.1*     Results from last 7 days   Lab Units 11/01/24  0328 10/31/24  0341 10/29/24  0255 10/27/24  0329 10/26/24  0312   WBC 10*3/mm3 7.59 7.27 5.85 6.12 7.80   HEMOGLOBIN g/dL 11.5* 11.5* 11.2* 10.6* 10.3*   PLATELETS 10*3/mm3 192 201 220 224 271             Assessment / Plan     ASSESSMENT:    Acute kidney injury on chronic kidney disease stage IIIa.  Prerenal azotemia due to volume depletion with hypotension and diuretic use.  Nonoliguric.  Renal function improving creatinine is up to 1.89, the losartan was discontinued, stable, his electrolytes within acceptable range and the creatinine peaked at 4.29.  I am concerned about renovascular disease  Recent urinary retention and hydronephrosis.  Armendariz catheter in place.  Hydronephrosis resolved now  History of diastolic CHF.  Patient stable hemodynamically  Hypertension not well-controlled    PLAN:  Continue the same dose of torsemide  Waiting for the renal artery Doppler  Discontinue the diuretics today  Surveillance labs      I reviewed the chart and other providers notes, reviewed labs.  I discussed the case with the patient and he voiced good understand  Copied text in this note has been reviewed and is accurate as of 11/01/24.         Lionel Cabral MD  11/01/24  08:32 EDT    [facility]  [phone]

## 2024-11-01 NOTE — SIGNIFICANT NOTE
"   11/01/24 1331   OTHER   Discipline physical therapy assistant   Rehab Time/Intention   Session Not Performed patient/family declined, not feeling well  (Pt declined PT this PM stating he had been ANGUS and was in \"10/10\" pain. Pt further stated he was too tired to get up this PM. Notified RN. PT will follow up 11/4. Contact PT office if pt needs to be seen prior to this date.)       "

## 2024-11-01 NOTE — PROGRESS NOTES
[facility]     LOS: 8 days     Subjective:    CC:Altered Mental Status (Sepsis alert)    DIET:  Diet Order   Procedures    Diet: Cardiac; Healthy Heart (2-3 Na+); Fluid Consistency: Thin (IDDSI 0)       Objective:    Vital Signs:  Temp:  [97.9 °F (36.6 °C)-98.4 °F (36.9 °C)] 98.1 °F (36.7 °C)  Heart Rate:  [72-80] 79  Resp:  [16-18] 16  BP: (125-141)/(69-89) 141/81    SpO2:  [94 %-96 %] 94 %  on   ;   Device (Oxygen Therapy): room air  Body mass index is 30.55 kg/m².    Physical Exam  Constitutional:       General: He is not in acute distress.  Cardiovascular:      Rate and Rhythm: Normal rate and regular rhythm.   Pulmonary:      Effort: Pulmonary effort is normal.      Breath sounds: Normal breath sounds.   Abdominal:      General: There is no distension.      Palpations: Abdomen is soft.      Tenderness: There is no abdominal tenderness.   Skin:     General: Skin is warm and dry.   Neurological:      General: No focal deficit present.      Mental Status: He is alert.   Psychiatric:         Mood and Affect: Mood normal.         Behavior: Behavior normal.         Results Review:    Glucose   Date Value Ref Range Status   11/01/2024 129 (H) 65 - 99 mg/dL Final   10/31/2024 122 (H) 65 - 99 mg/dL Final   10/30/2024 124 (H) 65 - 99 mg/dL Final     Results from last 7 days   Lab Units 11/01/24  0328   WBC 10*3/mm3 7.59   HEMOGLOBIN g/dL 11.5*   HEMATOCRIT % 36.4*   PLATELETS 10*3/mm3 192     Results from last 7 days   Lab Units 11/01/24  0328   SODIUM mmol/L 136   POTASSIUM mmol/L 3.7   CHLORIDE mmol/L 98   CO2 mmol/L 26.0   BUN mg/dL 35*   CREATININE mg/dL 1.85*   CALCIUM mg/dL 8.9   GLUCOSE mg/dL 129*         Results from last 7 days   Lab Units 11/01/24  0328   MAGNESIUM mg/dL 2.2         Cultures:  No results found for: \"BLOODCX\", \"URINECX\", \"WOUNDCX\", \"MRSACX\", \"RESPCX\", \"STOOLCX\"    I have reviewed daily medications and changes in CPOE    Scheduled meds  vitamin C, 500 mg, Oral, " Daily  atorvastatin, 10 mg, Oral, Daily  castor oil-balsam peru, 1 Application, Topical, Q12H  heparin (porcine), 5,000 Units, Subcutaneous, Q12H  pantoprazole, 40 mg, Oral, Daily  polyethylene glycol, 17 g, Oral, Daily  sodium hypochlorite, , Topical, BID  tamsulosin, 0.4 mg, Oral, Daily           PRN meds    acetaminophen **OR** acetaminophen **OR** acetaminophen    senna-docusate sodium **AND** polyethylene glycol **AND** bisacodyl **AND** bisacodyl    Calcium Replacement - Follow Nurse / BPA Driven Protocol    cetirizine    HYDROcodone-acetaminophen    hydrOXYzine    lidocaine    Magnesium Standard Dose Replacement - Follow Nurse / BPA Driven Protocol    melatonin    ondansetron ODT **OR** ondansetron    Phosphorus Replacement - Follow Nurse / BPA Driven Protocol    Potassium Replacement - Follow Nurse / BPA Driven Protocol    sodium chloride        Acute renal failure    Type 2 diabetes mellitus with diabetic autonomic neuropathy, with long-term current use of insulin    Hypertension    Stage 3a chronic kidney disease    Hyperlipidemia    Prostate cancer    Acidosis        Assessment/Plan:  Acute on chronic kidney failure with hyperkalemia and metabolic acidosis  -Likely prerenal due to hypotension and diuretics  -Resolved with IV fluids  -Creatinine stable today compared to yesterday  -Discontinued losartan last dose 10/30 at 11 AM  -Blood pressures are currently reasonably controlled    Urinary retention hydronephrosis  -Catheter in place  -Continue with Flomax  -Follow-up urology outpatient    1-2 blood cultures positive for coag negative staph likely contamination  -Seen by infectious disease  -No further workup    History of diastolic CHF    Hypertension        Possible discharge 1 to 2 days      Damien Scott MD  11/01/24  17:25 EDT

## 2024-11-01 NOTE — CASE MANAGEMENT/SOCIAL WORK
Post-Acute Authorization Submission      Post Acute Pre-Cert Documentation  Request Submitted by Facility - Type:: Hospital  Post-Acute Authorization Type Submitted:: SNF  Date Post Acute Pre-Cert Inititated per Facility: 11/01/24  Accepting Facility: [facility]  Hospital Discharge Date Requested: 11/02/24  All Clinicals Submitted?: Yes  Had Accepting Facility at Time of Submission: Yes  Response Communicated to:: , Accepting Facility Liaison  Authorization Number:: PENDING [id number]              AMI Giraldo

## 2024-11-01 NOTE — CASE MANAGEMENT/SOCIAL WORK
Continued Stay Note  [facility]     Patient Name: Arden Carrasquillo  MRN: [id number]  Today's Date: 11/1/2024    Admit Date: 10/24/2024    Plan: [facility], pre-cert initiated.   Discharge Plan       Row Name 11/01/24 1517       Plan    Plan [facility], pre-cert initiated.    Patient/Family in Agreement with Plan yes    Plan Comments Zaida/Wagner notified CCP pt has been accepted and bed available. CCP s/w Breonna via phone to update. CCP explained some referrals still pending but [facility], [facility], [facility] and [facility] have accepted and beds available. Breonna verbalized understanding and stated she tour [facility] and accepts bed at [facility]. CCP explained pre-cert would be initiated. CCP also explained pre-cert may not be approved and possible needs for LTC Medicaid pending vs home with family/24/7 care. Breonna verbalized understanding and wants to initiate pre-cert. Pt submitted to Post Acute Authorizations to initiate pre-cert. Partial packet in CCP office, pharmacy updated to [facility] in [facility]. Ap CADE/CCP      Row Name 11/01/24 1340       Plan    Plan SNF referrals pending.    Patient/Family in Agreement with Plan yes    Plan Comments CCP s/w Zaida/Wagner who stated pt is still under review. LVM for Laura/[facility] and [facility] to confirm status of referral. Carina/Abdiel notified CCP able to accept pt at [facility]. CCP s/w Dimitry[facility], formerly [facility], who stated he does not have Epic access and asked that referral be faxed to [phone]; CCP faxed referral. CCP to follow up with Breonna after her visit at [facility]. Ap CADE/CCP                   Discharge Codes    No documentation.                 Expected Discharge Date and Time       Expected Discharge Date Expected Discharge Time    Nov 5, 2024               Tina Pérez RN

## 2024-11-01 NOTE — DISCHARGE PLACEMENT REQUEST
"Arden Carrasquillo (69 y.o. Male)       Date of Birth   1955    Social Security Number       Address   [address] [place] [address]    Home Phone   [phone]    MRN   [id number]       Taoism   None    Marital Status   Single                            Admission Date   10/24/24    Admission Type   Emergency    Admitting Provider   Becky Muñoz MD    Attending Provider   Damien Scott MD    Department, Room/Bed   [address], S406/1       Discharge Date       Discharge Disposition       Discharge Destination                                 Attending Provider: Damien Scott MD    Allergies: Vitamin D Analogs    Isolation: None   Infection: None   Code Status: No CPR    Ht: 167.6 cm (65.98\")   Wt: 85.8 kg (189 lb 2.5 oz)    Admission Cmt: None   Principal Problem: Acute renal failure [N17.9]                   Active Insurance as of 10/24/2024       Primary Coverage       Payor Plan Insurance Group Employer/Plan Group    ANTHEM MEDICARE REPLACEMENT ANTH MEDICARE ADVANTAGE [id number]       Payor Plan Address Payor Plan Phone Number Payor Plan Fax Number Effective Dates    [address] [phone]  1/1/2018 - None Entered    [facility] [id number]         Subscriber Name Subscriber Birth Date Member ID       ARDEN CARRASQUILLO 1955 [id number]                     Emergency Contacts        (Rel.) Home Phone Work Phone Mobile Phone    Carina Tan (Sister) [phone] -- --    MICHELLE GUY (Daughter) -- -- [phone]              Emergency Contact Information       Name Relation Home Work Mobile    Carina Tan Sister [phone]      MICHELLE GUY Daughter   [phone]          Other Contacts    None on File          History & Physical        Becky Muñoz MD at 10/1955          HISTORY AND PHYSICAL   [facility]        Date of Admission: 10/24/2024  Patient Identification:  Name: " Arden Carrasquillo  Age: 69 y.o.  Sex: male  :  1955  MRN: [id number]                     Primary Care Physician: Pollo Ward MD    Chief Complaint:  69 year old gentleman who presented to the emergency room with altered mental status; he has a history of diabetes and has a sacral decubitus ulcer; he was recently admitted at another facility with sepsis and uti; he has been more lethargic and confused for the last two days; he lives with family; the patient remains drowsy and is not able to give any history;  he was initially hypotensive and admitted to the icu provider but then improved and was admitted to the floor so we were asked to assume care;    History of Present Illness:   As above    Past Medical History:  Past Medical History:   Diagnosis Date    Diabetes mellitus     Positive colorectal cancer screening using Cologuard test 2020    Severe peripheral polyneuropathy 10/19/2023     Past Surgical History:  Past Surgical History:   Procedure Laterality Date    CYSTOSCOPY TRANSURETHRAL RESECTION OF PROSTATE      UPPER GASTROINTESTINAL ENDOSCOPY        Home Meds:  Medications Prior to Admission   Medication Sig Dispense Refill Last Dose/Taking    amLODIPine (NORVASC) 10 MG tablet Take 0.5 tablets by mouth Daily.   Taking    atorvastatin (LIPITOR) 10 MG tablet Take 1 tab PO QD for cholesterol and heart health (Patient taking differently: Take 1 tablet by mouth Daily.) 90 tablet 3 Taking Differently    cyclobenzaprine (FLEXERIL) 5 MG tablet Take 1 tablet by mouth 3 (Three) Times a Day As Needed for Muscle Spasms. 90 tablet 3 Taking As Needed    docusate sodium (COLACE) 100 MG capsule Take 1 capsule by mouth 2 (Two) Times a Day.   Taking    ferrous sulfate 325 (65 FE) MG tablet Take 1 tablet by mouth Daily With Breakfast. 90 tablet 3 Taking    fexofenadine (ALLEGRA) 180 MG tablet Take 1 tablet by mouth Daily.   Taking    HYDROcodone-acetaminophen (NORCO) 7.5-325 MG per tablet Take 1 tablet by mouth  Every 6 (Six) Hours As Needed for Severe Pain. 120 tablet 0 Taking As Needed    lidocaine (LMX) 4 % cream Apply 1 Application topically to the appropriate area as directed As Needed for Mild Pain.   Taking As Needed    losartan (COZAAR) 100 MG tablet TAKE 1 TABLET DAILY 90 tablet 3 Not Taking    Melatonin 10 MG tablet Take 1 tablet by mouth At Night As Needed.   Taking As Needed    pantoprazole (PROTONIX) 40 MG EC tablet Take 1 tablet by mouth Daily.   Taking    polyethylene glycol (MIRALAX) 17 g packet Take 17 g by mouth Daily.   Taking    promethazine (PHENERGAN) 12.5 MG tablet TAKE 1/2 TO 1 TABLET BY MOUTH EVERY 6 HOURS AS NEEDED FOR NAUSEA (Patient taking differently: Take 1 tablet by mouth Every 6 (Six) Hours As Needed.) 15 tablet 1 Taking Differently    sodium hypochlorite (DAKIN'S) 0.25 % topical solution Use as directed twice daily (Patient taking differently: Apply  topically to the appropriate area as directed. Use as directed twice daily) 473 mL 3 Taking Differently    tamsulosin (FLOMAX) 0.4 MG capsule 24 hr capsule Take 1 capsule by mouth Daily. 90 capsule 3 Taking    vitamin C (ASCORBIC ACID) 500 MG tablet Take 1 tablet by mouth Daily. 90 tablet 3 Taking    zolpidem (AMBIEN) 5 MG tablet TAKE 1/2 TO 1 TABLET BY MOUTH ONCE NIGHTLY (Patient taking differently: Take 0.5-1 tablets by mouth At Night As Needed.) 30 tablet 2 Taking Differently    torsemide 40 MG tablet Take 40 mg by mouth 2 (Two) Times a Day. (Patient not taking: Reported on 10/24/2024) 180 tablet 3 Not Taking       Allergies:  Allergies   Allergen Reactions    Vitamin D Analogs Rash     Immunizations:  Immunization History   Administered Date(s) Administered    COVID-19 (PFIZER) BIVALENT 12+YRS 10/06/2022    COVID-19 (PFIZER) Purple Cap Monovalent 03/13/2021, 04/10/2021, 11/18/2021    Covid-19 (Pfizer) Gray Cap Monovalent 07/25/2022    FLUAD TRI 65YR+ 12/03/2020    Fluzone High-Dose 65+YRS 10/03/2024    Fluzone High-Dose 65+yrs 10/06/2022     "Influenza, Unspecified 10/06/2022, 2023    Pneumococcal Conjugate 20-Valent (PCV20) 2023    Pneumococcal Polysaccharide (PPSV23) 2020     Social History:   Social History     Social History Narrative    Not on file     Social History     Socioeconomic History    Marital status: Single   Tobacco Use    Smoking status: Never     Passive exposure: Never    Smokeless tobacco: Never   Vaping Use    Vaping status: Never Used   Substance and Sexual Activity    Alcohol use: Defer    Drug use: Yes     Types: Hydrocodone    Sexual activity: Defer       Family History:  Family History   Problem Relation Age of Onset    Colon cancer Neg Hx     Colon polyps Neg Hx         Review of Systems  As above       Objective:  T Max 24 hrs: Temp (24hrs), Av.7 °F (37.1 °C), Min:97.6 °F (36.4 °C), Max:99.8 °F (37.7 °C)    Vitals Ranges:   Temp:  [97.6 °F (36.4 °C)-99.8 °F (37.7 °C)] 97.6 °F (36.4 °C)  Heart Rate:  [67-77] 68  Resp:  [14-18] 18  BP: ()/(52-69) 107/69      Exam:  /69   Pulse 68   Temp 97.6 °F (36.4 °C) (Oral)   Resp 18   Ht 167.6 cm (65.98\")   Wt 90.2 kg (198 lb 13.7 oz)   SpO2 99%   BMI 32.11 kg/m²     General Appearance:    drowsy, cooperative, no distress, appears stated age; chronically ill appearing   Head:    Normocephalic, without obvious abnormality, atraumatic   Eyes:    PERRL, conjunctivae/corneas clear, EOM's intact, both eyes   Ears:    Normal external ear canals, both ears   Nose:   Nares normal, septum midline, mucosa normal, no drainage    or sinus tenderness   Throat:   Lips, mucosa, and tongue normal   Neck:   Supple, symmetrical, trachea midline, no adenopathy;     thyroid:  no enlargement/tenderness/nodules; no carotid    bruit or JVD   Back:     Symmetric, no curvature, ROM normal, no CVA tenderness   Lungs:    Decreased breath sounds bilaterally, respirations unlabored   Chest Wall:    No tenderness or deformity    Heart:    Regular rate and rhythm, S1 and S2 " normal, no murmur, rub   or gallop   Abdomen:     Soft, nontender, bowel sounds active all four quadrants,     no masses, no hepatomegaly, no splenomegaly   Extremities:   Extremities normal, atraumatic, no cyanosis or edema                       .    Data Review:  Labs in chart were reviewed.  WBC   Date Value Ref Range Status   10/24/2024 9.11 3.40 - 10.80 10*3/mm3 Final     Hemoglobin   Date Value Ref Range Status   10/24/2024 9.9 (L) 13.0 - 17.7 g/dL Final     Hematocrit   Date Value Ref Range Status   10/24/2024 30.8 (L) 37.5 - 51.0 % Final     Platelets   Date Value Ref Range Status   10/24/2024 283 140 - 450 10*3/mm3 Final     Sodium   Date Value Ref Range Status   10/24/2024 135 (L) 136 - 145 mmol/L Final     Potassium   Date Value Ref Range Status   10/24/2024 5.3 (H) 3.5 - 5.2 mmol/L Final     Chloride   Date Value Ref Range Status   10/24/2024 103 98 - 107 mmol/L Final     CO2   Date Value Ref Range Status   10/24/2024 20.5 (L) 22.0 - 29.0 mmol/L Final     BUN   Date Value Ref Range Status   10/24/2024 72 (H) 8 - 23 mg/dL Final     Creatinine   Date Value Ref Range Status   10/24/2024 4.29 (H) 0.76 - 1.27 mg/dL Final     Glucose   Date Value Ref Range Status   10/24/2024 126 (H) 65 - 99 mg/dL Final     Calcium   Date Value Ref Range Status   10/24/2024 8.5 (L) 8.6 - 10.5 mg/dL Final     Magnesium   Date Value Ref Range Status   10/24/2024 2.5 (H) 1.6 - 2.4 mg/dL Final     AST (SGOT)   Date Value Ref Range Status   10/24/2024 13 1 - 40 U/L Final     ALT (SGPT)   Date Value Ref Range Status   10/24/2024 13 1 - 41 U/L Final     Alkaline Phosphatase   Date Value Ref Range Status   10/24/2024 106 39 - 117 U/L Final                Imaging Results (All)       Procedure Component Value Units Date/Time    CT Abdomen Pelvis Without Contrast [[id number]] Collected: 10/24/24 1435     Updated: 10/24/24 1451    Narrative:      CT ABDOMEN PELVIS WO CONTRAST-     INDICATION: Sepsis     COMPARISON: None      TECHNIQUE:  Routine CT abdomen/pelvis without IV contrast. Coronal and sagittal  reformats. Radiation dose reduction techniques were utilized, including  automated exposure control and exposure modulation based on body size.     FINDINGS:      Lung bases: Trace left pleural effusion. Subsegmental atelectasis at the  right lung base. Partial atelectasis in the basilar left lower lobe.  Coronary artery atherosclerotic calcification. Mild cardiomegaly.  Gynecomastia.     ABDOMEN: Normal liver, gallbladder and spleen. Mild to moderate  pancreatic atrophy. No pancreatic ductal dilatation or mass seen. No  adrenal nodules. Fluid attenuating cyst seen in the left mid kidney.  Subcentimeter hyperattenuating lesion in the left mid kidney, series 2,  axial mage 58, too small to characterize, typically a small hemorrhagic  or proteinaceous cyst. No urolithiasis or hydronephrosis. Motion  obscures the right kidney with a mildly prominent right renal collecting  system.     Pelvis: Prostate is normal in size. Armendariz in the bladder. Gas in the  nondependent bladder lumen, suspect iatrogenic. Bladder is collapsed.  Phleboliths in the pelvis.     Bowel: No obstruction. Normal appendix.     Body wall: Large fat-containing supraumbilical ventral hernia. Small  fat-containing umbilical hernia. Small fat-containing inguinal hernias.     Soft tissue defect seen posterior to the inferior sacrum and coccyx,  containing packing material, consistent with a sacral decubitus  ulceration, extends to the underlying bone. No fluid collection.     Retroperitoneum: No lymphadenopathy.     Vasculature: No abdominal aortic aneurysm. Mild aortoiliac  atherosclerotic calcification.     Osseous structures: See above. Decreased bone mineralization. Lumbar  facet degenerative arthropathy with grade 1 anterolisthesis of L4 on L5.     Artifacts: Motion       Impression:         1. Sacral decubitus ulceration, extends to the underlying sacrum.  2. Large  amount of partial atelectasis in the basilar left lower lobe.  3. Mild cardiomegaly     This report was finalized on 10/24/2024 2:48 PM by Dr. Camacho Beal M.D on Workstation: [id number]       CT Head Without Contrast [[id number]] Collected: 10/24/24 1430     Updated: 10/24/24 1442    Narrative:      CT HEAD WO CONTRAST-     HISTORY:  AMS; N17.9-Acute kidney failure, unspecified;  I95.9-Hypotension, unspecified; R41.82-Altered mental status,  unspecified     COMPARISON: None     FINDINGS: The brain and ventricles are symmetrical. Moderate vascular  calcification is noted. Areas of decreased attenuation involving the  white matter of cerebral hemispheres are noted bilaterally consistent  with mild to moderate small vessel ischemic disease. No focal area of  decreased attenuation to suggest acute infarction is identified. Further  evaluation could be performed with a MRI examination of the brain as  indicated.                 Radiation dose reduction techniques were utilized, including automated  exposure modulation based on body size.     This report was finalized on 10/24/2024 2:39 PM by Dr. Damien Mathis M.D on Workstation: [id number]       XR Chest 1 View [[id number]] Collected: 10/24/24 1405     Updated: 10/24/24 1411    Narrative:      XR CHEST 1 VW-     HISTORY: Male who is 69 years-old, weakness     TECHNIQUE: frontal view of the chest     COMPARISON: None      FINDINGS: The heart size is borderline. Aorta appears ectatic. Pulmonary  vasculature is unremarkable.  No focal pulmonary consolidation, pleural  effusion, or pneumothorax. No acute osseous process.       Impression:      No focal pulmonary consolidation. Borderline heart size.  Ectatic appearing aorta. Follow-up as clinically indicated.           This report was finalized on 10/24/2024 2:08 PM by Dr. Ruddy De La Cruz M.D on Workstation: [id number]                 Assessment:  Active Hospital Problems    Diagnosis  POA    **Acute renal failure  [N17.9]  Yes      Resolved Hospital Problems   No resolved problems to display.   Ckd3  Sepsis  Diabetes  Hypertension  Anemia  Hyperkalemia  Retention of urine  Sacral decubitus poa    Plan:  Will repeat bmp  Ask nephrology to see him  Ask id to see him  Continue antibiotics  Trend labs  Monitor on telemetry  Wound nurse to see  D/w dr nemesio Muñoz MD  10/24/2024  19:53 EDT      Electronically signed by Becky Muñoz MD at 10/24/24 2002       Vital Signs (last 3 days)       Date/Time Temp Temp src Pulse Resp BP Patient Position SpO2    11/01/24 1309 98.1 (36.7) Oral 79 16 141/81 Lying 94    11/01/24 0733 98.4 (36.9) Oral 80 18 125/69 Lying 95    10/31/24 2323 98.2 (36.8) Oral 72 18 140/89 Lying 96    10/31/24 1916 97.9 (36.6) Oral 77 18 133/80 Lying 96    10/31/24 1314 97.9 (36.6) Oral 81 18 136/82 Lying 95    10/31/24 0723 98.6 (37) Oral 83 18 151/92 Lying 92    10/31/24 0357 98.4 (36.9) Oral 84 16 158/85 Lying 96    10/30/24 2323 98.1 (36.7) Oral 75 18 131/83 Lying 96    10/30/24 1955 98.6 (37) Oral 86 18 117/69 Lying 96    10/30/24 1429 99.2 (37.3) Oral 90 18 128/80 Lying 97    10/30/24 1104 -- -- -- -- 133/76 Lying --    10/30/24 0732 98.1 (36.7) Oral 87 18 165/102 Lying 95    10/29/24 2316 98.6 (37) Oral 81 18 147/92 Lying 96    10/29/24 1930 97.3 (36.3) Oral 75 18 134/87 Lying 96    10/29/24 1347 -- -- 75 -- 130/75 Lying 93    10/29/24 0732 -- -- 65 -- 142/87 Lying 100          Oxygen Therapy (last 2 days)       Date/Time SpO2 Device (Oxygen Therapy) Flow (L/min) (Oxygen Therapy) Oxygen Concentration (%) ETCO2 (mmHg)    11/01/24 1309 94 room air -- -- --    11/01/24 0835 -- room air -- -- --    11/01/24 0733 95 room air -- -- --    11/01/24 0000 -- room air -- -- --    10/31/24 2323 96 room air -- -- --    10/31/24 2000 -- room air -- -- --    10/31/24 1916 96 room air -- -- --    10/31/24 1314 95 room air -- -- --    10/31/24 0723 92 room air -- -- --    10/31/24 0450 -- room air --  -- --    10/31/24 0357 96 -- -- -- --    10/31/24 0055 -- room air -- -- --    10/30/24 2323 96 -- -- -- --    10/30/24 2153 -- room air -- -- --    10/30/24 2010 -- room air -- -- --    10/30/24 1955 96 -- -- -- --    10/30/24 1429 97 room air -- -- --    10/30/24 0845 -- room air -- -- --    10/30/24 0732 95 room air -- -- --          Intake & Output (last 2 days)         10/30 0701  10/31 0700 10/31 0701  11/01 0700 11/01 0701  11/02 0700    P.O.       Total Intake(mL/kg)       Urine (mL/kg/hr) 800 (0.4) 700 (0.3) 300 (0.5)    Stool 0      Total Output 800 700 300    Net -800 -700 -300           Stool Unmeasured Occurrence 1 x 1 x           Current Facility-Administered Medications   Medication Dose Route Frequency Provider Last Rate Last Admin    acetaminophen (TYLENOL) tablet 650 mg  650 mg Oral Q4H PRN Becky Muñoz MD        Or    acetaminophen (TYLENOL) 160 MG/5ML oral solution 650 mg  650 mg Oral Q4H PRN Becky Muñoz MD        Or    acetaminophen (TYLENOL) suppository 650 mg  650 mg Rectal Q4H PRN Becky Muñoz MD        ascorbic acid (VITAMIN C) tablet 500 mg  500 mg Oral Daily Becky Muñoz MD   500 mg at 11/01/24 0845    atorvastatin (LIPITOR) tablet 10 mg  10 mg Oral Daily Becky Muñoz MD   10 mg at 11/01/24 0844    sennosides-docusate (PERICOLACE) 8.6-50 MG per tablet 2 tablet  2 tablet Oral BID PRN eBcky Muñoz MD        And    polyethylene glycol (MIRALAX) packet 17 g  17 g Oral Daily PRN Becky Muñoz MD   17 g at 10/25/24 0849    And    bisacodyl (DULCOLAX) EC tablet 5 mg  5 mg Oral Daily PRN Becky Muñoz MD        And    bisacodyl (DULCOLAX) suppository 10 mg  10 mg Rectal Daily PRN Becky Muñoz MD        Calcium Replacement - Follow Nurse / BPA Driven Protocol   Does not apply PRN Kemar Vargas MD        castor oil-balsam peru (VENELEX) ointment 1 Application  1 Application Topical Q12H Damien Christy MD    1 Application at 11/01/24 0846    cetirizine (zyrTEC) tablet 10 mg  10 mg Oral Daily PRN Kemar Vargas MD   10 mg at 10/29/24 1244    heparin (porcine) 5000 UNIT/ML injection 5,000 Units  5,000 Units Subcutaneous Q12H Damien Christy MD   5,000 Units at 11/01/24 0849    HYDROcodone-acetaminophen (NORCO) 7.5-325 MG per tablet 1 tablet  1 tablet Oral Q6H PRN Becky Muñoz MD   1 tablet at 11/01/24 0606    hydrOXYzine (ATARAX) tablet 25 mg  25 mg Oral Q6H PRN Damien Scott MD   25 mg at 10/31/24 1220    lidocaine (LMX) 4 % cream 1 Application  1 Application Topical PRN Becky Muñoz MD        Magnesium Standard Dose Replacement - Follow Nurse / BPA Driven Protocol   Does not apply PRN Kemar Vargas MD        melatonin tablet 2.5 mg  2.5 mg Oral Nightly PRN Becky Muñoz MD        ondansetron ODT (ZOFRAN-ODT) disintegrating tablet 4 mg  4 mg Oral Q6H PRN Becky Muñoz MD        Or    ondansetron (ZOFRAN) injection 4 mg  4 mg Intravenous Q6H PRN Becky Muñoz MD        pantoprazole (PROTONIX) EC tablet 40 mg  40 mg Oral Daily Becky Muñoz MD   40 mg at 11/01/24 0845    Phosphorus Replacement - Follow Nurse / BPA Driven Protocol   Does not apply PRN Kemar Vargas MD        polyethylene glycol (MIRALAX) packet 17 g  17 g Oral Daily Becky Muñoz MD   17 g at 11/01/24 0845    Potassium Replacement - Follow Nurse / BPA Driven Protocol   Does not apply PRN Kemar Vargas MD        sodium chloride 0.9 % flush 10 mL  10 mL Intravenous PRN Nuno Aguirre MD   10 mL at 10/29/24 0851    sodium hypochlorite (HYSEPT) 0.25 % topical solution   Topical BID Wanda, Becky Perry MD   Given at 11/01/24 0846    tamsulosin (FLOMAX) 24 hr capsule 0.4 mg  0.4 mg Oral Daily Stingl, Becky Perry MD   0.4 mg at 11/01/24 0845     Medication Administration Report for Arden Carrasquillo as of 10/31/24 through 11/1/24    Given  Hold  Not Given  Due  Canceled  Entry  Other Actions  Time  Time  (Time)  Time  Time-Action    Discontinued  Completed  Future  MAR Hold  Linked    Medications  10/31/24  11/01/24    ascorbic acid (VITAMIN C) tablet 500 mg  Dose: 500 mg  Freq: Daily Route: PO  Start: 10/25/24 0900  0915-Given  0845-Given    atorvastatin (LIPITOR) tablet 10 mg  Dose: 10 mg  Freq: Daily Route: PO  Start: 10/25/24 0900    Avoid grapefruit juice.  0915-Given  0844-Given  castor oil-balsam peru (VENELEX) ointment 1 Application  Dose: 1 Application  Freq: Every 12 Hours Scheduled Route: TOP  Start: 10/25/24 2100    Admin Instructions:  Do not crush or chew the capsules or tablets. The drug may not work as designed if the capsule or tablet is crushed or chewed. Swallow whole.  Swallow whole; do not crush, split, or chew.  0915-Given  0845-Given    polyethylene glycol (MIRALAX) packet 17 g  Dose: 17 g  Freq: Daily Route: PO  Start: 10/25/24 0900    Use 4-8 ounces of water, tea, or juice for each 17 gram dose.  0916-Given  0845-Given    sodium hypochlorite (HYSEPT) 0.25 % topical solution  Freq: 2 Times Daily Route: TOP  Start: 10/24/24 2100  Admin Instructions:, Medication Administration Report for Arden Carrasquillo as of 10/31/24 through 11/1/24    Given  Hold  Not Given  Due  Canceled Entry  Other Actions  Time  Time  (Time)  Time  Time-Action    Discontinued  Completed  Future  MAR Hold  Linked    Medications  10/31/24  11/01/24    Discontinued Medications    norepinephrine (LEVOPHED) 8 mg in 250 mL NS infusion (premix)  Rate: 3.23-48.49 mL/hr Dose: 0.02-0.3 mcg/kg/min  Weight Dosing Info: 86.2 kg  Freq: Titrated Route: IV  Start: 10/24/24 1523 End: 10/24/24 1727    Initiate infusion at 0.02 mcg/kg/min and titrate up or down by 0.02 - 0.06 mcg/kg/min every 5 - 10 minutes to use the lowest dose possible to maintain a MAP greater than or equal to 65 mmHg. Contact provider if unable to maintain MAP target at the maximum dose or if MAP is greater than 95 mmHg. Once MAP  target achieved, obtain vitals a minimum of every 30 minutes.  Must contact provider and obtain new order to exceed dose of 0.3 mcg/kg/min.  Concentration 8 mg/250 mL          Central line preferred, if unavailable use large bore IV access with frequent nurse monitoring of IV site.  Pharmacy to dose vancomycin  Freq: Continuous PRN Route: XX  PRN Reason: Consult  Indications of Use: SKIN AND SOFT TISSUE INFECTION  Start: 10/24/24 1956 End: 10/25/24 1050  Pharmacy to Dose Zosyn  Freq: Continuous PRN Route: XX  PRN Reason: Consult  Indications of Use: SKIN AND SOFT TISSUE INFECTION  Start: 10/24/24 1956 End: 10/25/24 1050  sodium chloride 0.9 % infusion  Rate: 100 mL/hr Dose: 100 mL/hr  Freq: Continuous Route: IV  Start: 10/24/24 1815 End: 10/25/24 0349      , and Medication Administration Report for Foreign Arden M as of 10/31/24 through 11/1/24    Given  Hold  Not Given  Due  Canceled Entry  Other Actions  Time  Time  (Time)  Time  Time-Action    Discontinued  Completed  Future  MAR Hold  Linked    Medications  10/31/24  11/01/24        acetaminophen (TYLENOL) tablet 650 mg  Dose: 650 mg  Freq: Every 4 Hours PRN Route: PO  PRN Reason: Mild Pain  Start: 10/24/24 1803    If given for fever, use fever parameter: fever greater than 100.4 °F  Based on patient request - if ordered for moderate or severe pain, provider allows for administration of a medication prescribed for a lower pain scale.    Do not exceed 4 grams of acetaminophen in a 24 hr period. Max dose of 2gm for AST/ALT greater than 120 units/L.    If given for pain, use the following pain scale:   Mild Pain = Pain Score of 1-3, CPOT 1-2  Moderate Pain = Pain Score of 4-6, CPOT 3-4  Severe Pain = Pain Score of 7-10, CPOT 5-8  Or    acetaminophen (TYLENOL) 160 MG/5ML oral solution 650 mg  Dose: 650 mg  Freq: Every 4 Hours PRN Route: PO  PRN Reason: Mild Pain  Start: 10/24/24 1803      Admin Instructions:  If given for fever, use fever parameter: fever greater  than 100.4 °F  Based on patient request - if ordered for moderate or severe pain, provider allows for administration of a medication prescribed for a lower pain scale.    Do not exceed 4 grams of acetaminophen in a 24 hr period. Max dose of 2gm for AST/ALT greater than 120 units/L.    If given for pain, use the following pain scale:   Mild Pain = Pain Score of 1-3, CPOT 1-2  Moderate Pain = Pain Score of 4-6, CPOT 3-4  Severe Pain = Pain Score of 7-10, CPOT 5-8      sennosides-docusate (PERICOLACE) 8.6-50 MG per tablet 2 tablet  Dose: 2 tablet  Freq: 2 Times Daily PRN Route: PO  PRN Reason: Constipation  Start: 10/24/24 1803    Start bowel management regimen if patient has not had a bowel movement after 12 hours.  And      polyethylene glycol (MIRALAX) packet 17 g  Dose: 17 g  Freq: Daily PRN Route: PO  PRN Reason: Constipation  PRN Comment: Use if senna-docusate is ineffective  Start: 10/24/24 1803  Admin Instructions:     Physician Progress Notes (last 72 hours)        Lionel Cabral MD at 24 0832              [facility] Progress Note      Patient Name: Arden Carrasquillo  : 1955  MRN: [id number]  Primary Care Physician:  Pollo Ward MD  Date of admission: 10/24/2024    Subjective     Interval History:   Follow-up acute kidney injury    The patient is feeling better, denies any chest pain or shortness of air, no orthopnea or PND, no nausea or vomiting, he has Armendariz catheter.  The patient was restarted on losartan yesterday      Review of Systems:   As noted above    Objective     Vitals:   Temp:  [97.9 °F (36.6 °C)-98.4 °F (36.9 °C)] 98.4 °F (36.9 °C)  Heart Rate:  [72-81] 80  Resp:  [18] 18  BP: (125-140)/(69-89) 125/69    Intake/Output Summary (Last 24 hours) at 2024 0832  Last data filed at 10/31/2024 1853  Gross per 24 hour   Intake --   Output 700 ml   Net -700 ml       Physical Exam:      General Appearance: Awake, alert, chronically ill, no acute  disc  Skin: warm and dry  HEENT: oral mucosa normal, nonicteric sclera  Neck: No JVD  Lungs: To auscultation, unlabored breathing effort.  Heart: RRR, no rub  Abdomen: soft, nontender, nondistended  : dash with clear urine   Extremities: No edema  Neuro: normal speech and mental status     Scheduled Meds:     vitamin C, 500 mg, Oral, Daily  atorvastatin, 10 mg, Oral, Daily  castor oil-balsam peru, 1 Application, Topical, Q12H  heparin (porcine), 5,000 Units, Subcutaneous, Q12H  pantoprazole, 40 mg, Oral, Daily  polyethylene glycol, 17 g, Oral, Daily  sodium hypochlorite, , Topical, BID  tamsulosin, 0.4 mg, Oral, Daily  torsemide, 20 mg, Oral, Daily      IV Meds:        Results Reviewed:   I have personally reviewed the results from the time of this admission to 11/1/2024 08:32 EDT     Results from last 7 days   Lab Units 11/01/24  0328 10/31/24  0341 10/30/24  0346   SODIUM mmol/L 136 136 140   POTASSIUM mmol/L 3.7 4.0 4.0   CHLORIDE mmol/L 98 97* 102   CO2 mmol/L 26.0 27.0 27.7   BUN mg/dL 35* 30* 21   CREATININE mg/dL 1.85* 1.73* 1.27   CALCIUM mg/dL 8.9 9.4 9.2   GLUCOSE mg/dL 129* 122* 124*     Estimated Creatinine Clearance: 37.6 mL/min (A) (by C-G formula based on SCr of 1.85 mg/dL (H)).  Results from last 7 days   Lab Units 11/01/24  0328 10/31/24  0341 10/30/24  0346   MAGNESIUM mg/dL 2.2 2.1 2.0   PHOSPHORUS mg/dL 3.9 3.8 3.3     Results from last 7 days   Lab Units 11/01/24  0328 10/31/24  0341 10/30/24  0346 10/29/24  0255 10/27/24  0329   URIC ACID mg/dL 10.0* 9.0* 8.2* 8.3* 7.1*     Results from last 7 days   Lab Units 11/01/24  0328 10/31/24  0341 10/29/24  0255 10/27/24  0329 10/26/24  0312   WBC 10*3/mm3 7.59 7.27 5.85 6.12 7.80   HEMOGLOBIN g/dL 11.5* 11.5* 11.2* 10.6* 10.3*   PLATELETS 10*3/mm3 192 201 220 224 271             Assessment / Plan     ASSESSMENT:    Acute kidney injury on chronic kidney disease stage IIIa.  Prerenal azotemia due to volume depletion with hypotension and diuretic use.   Nonoliguric.  Renal function improving creatinine is up to 1.89, the losartan was discontinued, stable, his electrolytes within acceptable range and the creatinine peaked at 4.29.  I am concerned about renovascular disease  Recent urinary retention and hydronephrosis.  Armendariz catheter in place.  Hydronephrosis resolved now  History of diastolic CHF.  Patient stable hemodynamically  Hypertension not well-controlled    PLAN:  Continue the same dose of torsemide  Waiting for the renal artery Doppler  Discontinue the diuretics today  Surveillance labs      I reviewed the chart and other providers notes, reviewed labs.  I discussed the case with the patient and he voiced good understand  Copied text in this note has been reviewed and is accurate as of 11/01/24.         Lionel Cabral MD  11/01/24  08:32 EDT    [facility] \[facility]\""  [phone]               Electronically signed by Lionel Cabral MD at 11/01/24 9209       Damien Scott MD at 10/31/24 3654              [facility]     LOS: 7 days     Subjective:    CC:Altered Mental Status (Sepsis alert)    DIET:  Diet Order   Procedures    Diet: Cardiac; Healthy Heart (2-3 Na+); Fluid Consistency: Thin (IDDSI 0)       Objective:    Vital Signs:  Temp:  [97.9 °F (36.6 °C)-98.6 °F (37 °C)] 97.9 °F (36.6 °C)  Heart Rate:  [75-86] 81  Resp:  [16-18] 18  BP: (117-158)/(69-92) 136/82    SpO2:  [92 %-96 %] 95 %  on   ;   Device (Oxygen Therapy): room air  Body mass index is 29.37 kg/m².    Physical Exam  Constitutional:       General: He is not in acute distress.  Cardiovascular:      Rate and Rhythm: Normal rate and regular rhythm.   Pulmonary:      Effort: Pulmonary effort is normal.      Breath sounds: Normal breath sounds.   Abdominal:      General: There is no distension.      Palpations: Abdomen is soft.      Tenderness: There is no abdominal tenderness.   Skin:     General: Skin is warm and dry.   Neurological:  "     General: No focal deficit present.      Mental Status: He is alert.   Psychiatric:         Mood and Affect: Mood normal.         Behavior: Behavior normal.         Results Review:    Glucose   Date Value Ref Range Status   10/31/2024 122 (H) 65 - 99 mg/dL Final   10/30/2024 124 (H) 65 - 99 mg/dL Final   10/29/2024 144 (H) 65 - 99 mg/dL Final     Results from last 7 days   Lab Units 10/31/24  0341   WBC 10*3/mm3 7.27   HEMOGLOBIN g/dL 11.5*   HEMATOCRIT % 35.9*   PLATELETS 10*3/mm3 201     Results from last 7 days   Lab Units 10/31/24  0341   SODIUM mmol/L 136   POTASSIUM mmol/L 4.0   CHLORIDE mmol/L 97*   CO2 mmol/L 27.0   BUN mg/dL 30*   CREATININE mg/dL 1.73*   CALCIUM mg/dL 9.4   GLUCOSE mg/dL 122*         Results from last 7 days   Lab Units 10/31/24  0341   MAGNESIUM mg/dL 2.1         Cultures:  No results found for: \"BLOODCX\", \"URINECX\", \"WOUNDCX\", \"MRSACX\", \"RESPCX\", \"STOOLCX\"    I have reviewed daily medications and changes in CPOE    Scheduled meds  vitamin C, 500 mg, Oral, Daily  atorvastatin, 10 mg, Oral, Daily  castor oil-balsam peru, 1 Application, Topical, Q12H  heparin (porcine), 5,000 Units, Subcutaneous, Q12H  pantoprazole, 40 mg, Oral, Daily  polyethylene glycol, 17 g, Oral, Daily  sodium hypochlorite, , Topical, BID  tamsulosin, 0.4 mg, Oral, Daily  torsemide, 20 mg, Oral, Daily           PRN meds    acetaminophen **OR** acetaminophen **OR** acetaminophen    senna-docusate sodium **AND** polyethylene glycol **AND** bisacodyl **AND** bisacodyl    Calcium Replacement - Follow Nurse / BPA Driven Protocol    cetirizine    HYDROcodone-acetaminophen    HYDROmorphone    hydrOXYzine    lidocaine    Magnesium Standard Dose Replacement - Follow Nurse / BPA Driven Protocol    melatonin    ondansetron ODT **OR** ondansetron    Phosphorus Replacement - Follow Nurse / BPA Driven Protocol    Potassium Replacement - Follow Nurse / BPA Driven Protocol    sodium chloride        Acute renal failure    Type 2 " diabetes mellitus with diabetic autonomic neuropathy, with long-term current use of insulin    Hypertension    Stage 3a chronic kidney disease    Hyperlipidemia    Prostate cancer    Acidosis        Assessment/Plan:  Acute on chronic kidney failure with hyperkalemia and metabolic acidosis  -Likely prerenal due to hypotension and diuretics  -Resolved with IV fluids  -Creatinine has had a significant jump today from yesterday  -Discontinued losartan last dose 10/30 at 11 AM  -Blood pressures are currently reasonably controlled    Urinary retention hydronephrosis  -Catheter in place  -Continue with Flomax  -Follow-up urology outpatient    1-2 blood cultures positive for coag negative staph likely contamination  -Seen by infectious disease  -No further workup    History of diastolic CHF    Hypertension        Possible discharge 1 to 2 days      Damien Scott MD  10/31/24  17:28 EDT        Electronically signed by Damien Scott MD at 10/31/24 1733       Lionel Cabral MD at 10/31/24 0901              [facility] Progress Note      Patient Name: Arden Carrasquillo  : 1955  MRN: [id number]  Primary Care Physician:  Pollo Ward MD  Date of admission: 10/24/2024    Subjective     Interval History:   Follow-up acute kidney injury    The patient is feeling better, denies any chest pain or shortness of air, no orthopnea or PND, no nausea or vomiting, he has Armendariz catheter.  The patient was restarted on losartan yesterday      Review of Systems:   As noted above    Objective     Vitals:   Temp:  [98.1 °F (36.7 °C)-99.2 °F (37.3 °C)] 98.6 °F (37 °C)  Heart Rate:  [75-90] 83  Resp:  [16-18] 18  BP: (117-158)/(69-92) 151/92    Intake/Output Summary (Last 24 hours) at 10/31/2024 0901  Last data filed at 10/30/2024 1720  Gross per 24 hour   Intake --   Output 800 ml   Net -800 ml       Physical Exam:      General Appearance: Awake, alert, chronically ill, no acute disc  Skin: warm  and dry  HEENT: oral mucosa normal, nonicteric sclera  Neck: No JVD  Lungs: To auscultation, unlabored breathing effort.  Heart: RRR, no rub  Abdomen: soft, nontender, nondistended  : dash with clear urine   Extremities: No edema  Neuro: normal speech and mental status     Scheduled Meds:     vitamin C, 500 mg, Oral, Daily  atorvastatin, 10 mg, Oral, Daily  castor oil-balsam peru, 1 Application, Topical, Q12H  heparin (porcine), 5,000 Units, Subcutaneous, Q12H  pantoprazole, 40 mg, Oral, Daily  polyethylene glycol, 17 g, Oral, Daily  sodium hypochlorite, , Topical, BID  tamsulosin, 0.4 mg, Oral, Daily  torsemide, 20 mg, Oral, Daily      IV Meds:        Results Reviewed:   I have personally reviewed the results from the time of this admission to 10/31/2024 09:01 EDT     Results from last 7 days   Lab Units 10/31/24  0341 10/30/24  0346 10/29/24  0255 10/24/24  2139 10/24/24  1251   SODIUM mmol/L 136 140 139   < > 135*   POTASSIUM mmol/L 4.0 4.0 4.0   < > 5.3*   CHLORIDE mmol/L 97* 102 104   < > 103   CO2 mmol/L 27.0 27.7 28.0   < > 20.5*   BUN mg/dL 30* 21 20   < > 72*   CREATININE mg/dL 1.73* 1.27 1.15   < > 4.29*   CALCIUM mg/dL 9.4 9.2 9.1   < > 8.5*   BILIRUBIN mg/dL  --   --   --   --  0.3   ALK PHOS U/L  --   --   --   --  106   ALT (SGPT) U/L  --   --   --   --  13   AST (SGOT) U/L  --   --   --   --  13   GLUCOSE mg/dL 122* 124* 144*   < > 126*    < > = values in this interval not displayed.     Estimated Creatinine Clearance: 39.5 mL/min (A) (by C-G formula based on SCr of 1.73 mg/dL (H)).  Results from last 7 days   Lab Units 10/31/24  0341 10/30/24  0346 10/29/24  0255   MAGNESIUM mg/dL 2.1 2.0 1.9   PHOSPHORUS mg/dL 3.8 3.3 2.9     Results from last 7 days   Lab Units 10/31/24  0341 10/30/24  0346 10/29/24  0255 10/27/24  0329   URIC ACID mg/dL 9.0* 8.2* 8.3* 7.1*     Results from last 7 days   Lab Units 10/31/24  0341 10/29/24  0255 10/27/24  0329 10/26/24  0312 10/25/24  0300   WBC 10*3/mm3 7.27 5.85  6.12 7.80 9.58   HEMOGLOBIN g/dL 11.5* 11.2* 10.6* 10.3* 11.0*   PLATELETS 10*3/mm3 201 220 224 271 266     Results from last 7 days   Lab Units 10/24/24  1251   INR  1.24*       Assessment / Plan     ASSESSMENT:    Acute kidney injury on chronic kidney disease stage IIIa.  Prerenal azotemia due to volume depletion with hypotension and diuretic use.  Nonoliguric.  Renal function improving creatinine is up to 1.73 after restarting losartan, stable, his electrolytes within acceptable range and the creatinine peaked at 4.29.  I am concerned about renovascular disease  Recent urinary retention and hydronephrosis.  Armendariz catheter in place.  Hydronephrosis resolved now  History of diastolic CHF.  Patient stable hemodynamically  Hypertension not well-controlled    PLAN:  Continue the same dose of torsemide  Discontinue losartan  Monitor for diuretic toxicity  Surveillance labs  I will check renal artery Doppler    I reviewed the chart and other providers notes, reviewed labs.  I discussed the case with the patient and he voiced good understand  Copied text in this note has been reviewed and is accurate as of 10/31/24.         Lionel Cabral MD  10/31/24  09:01 EDT    [facility]  [phone]               Electronically signed by Lionel Cabral MD at 10/31/24 0902       Kemar Vargas MD at 10/30/24 1239              Name: Arden Carrasquillo ADMIT: 10/24/2024   : 1955  PCP: Pollo Ward MD    MRN: [id number] LOS: 6 days   AGE/SEX: 69 y.o. male  ROOM: [facility]     Subjective   Subjective     No events overnight.  No complaints today      Objective   Objective   Vital Signs  Temp:  [97.3 °F (36.3 °C)-98.6 °F (37 °C)] 98.1 °F (36.7 °C)  Heart Rate:  [75-87] 87  Resp:  [18] 18  BP: (130-165)/() 133/76  SpO2:  [93 %-96 %] 95 %  on   ;   Device (Oxygen Therapy): room air  Body mass index is 29.37 kg/m².  Physical Exam  Constitutional:       General: He is not in acute  distress.     Appearance: He is obese. He is not toxic-appearing.   Cardiovascular:      Rate and Rhythm: Normal rate and regular rhythm.      Heart sounds: Normal heart sounds.   Pulmonary:      Effort: Pulmonary effort is normal.      Breath sounds: Normal breath sounds.   Abdominal:      General: Bowel sounds are normal.      Palpations: Abdomen is soft.   Genitourinary:     Comments: dash in place  Musculoskeletal:         General: No tenderness.      Right lower leg: No edema.      Left lower leg: No edema.   Neurological:      Mental Status: He is alert.   Psychiatric:         Mood and Affect: Mood normal.         Behavior: Behavior normal.         Results Review     I reviewed the patient's new clinical results.  Results from last 7 days   Lab Units 10/29/24  0255 10/27/24  0329 10/26/24  0312 10/25/24  0300   WBC 10*3/mm3 5.85 6.12 7.80 9.58   HEMOGLOBIN g/dL 11.2* 10.6* 10.3* 11.0*   PLATELETS 10*3/mm3 220 224 271 266     Results from last 7 days   Lab Units 10/30/24  0346 10/29/24  0255 10/28/24  0236 10/27/24  0329   SODIUM mmol/L 140 139 142 142   POTASSIUM mmol/L 4.0 4.0 4.0 4.8   CHLORIDE mmol/L 102 104 106 113*   CO2 mmol/L 27.7 28.0 26.0 22.4   BUN mg/dL 21 20 22 29*   CREATININE mg/dL 1.27 1.15 1.03 1.19   GLUCOSE mg/dL 124* 144* 121* 144*   Estimated Creatinine Clearance: 53.8 mL/min (by C-G formula based on SCr of 1.27 mg/dL).  Results from last 7 days   Lab Units 10/30/24  0346 10/29/24  0255 10/28/24  0236 10/27/24  0329 10/26/24  0312 10/24/24  1251   ALBUMIN g/dL 3.3* 3.1* 3.1* 2.9*   < > 2.7*   BILIRUBIN mg/dL  --   --   --   --   --  0.3   ALK PHOS U/L  --   --   --   --   --  106   AST (SGOT) U/L  --   --   --   --   --  13   ALT (SGPT) U/L  --   --   --   --   --  13    < > = values in this interval not displayed.     Results from last 7 days   Lab Units 10/30/24  0346 10/29/24  0255 10/28/24  1604 10/28/24  0236 10/27/24  0329 10/24/24  2139 10/24/24  1251   CALCIUM mg/dL 9.2 9.1  --   "9.1 9.3   < > 8.5*   ALBUMIN g/dL 3.3* 3.1*  --  3.1* 2.9*   < > 2.7*   MAGNESIUM mg/dL 2.0 1.9  --   --   --   --  2.5*   PHOSPHORUS mg/dL 3.3 2.9 2.7 1.9* 2.2*   < >  --     < > = values in this interval not displayed.     Results from last 7 days   Lab Units 10/24/24  1755 10/24/24  1321 10/24/24  1251   PROCALCITONIN ng/mL  --   --  0.22   LACTATE mmol/L 1.2 2.1*  --      SARS-CoV-2 PCR   Date Value Ref Range Status   01/16/2021 Not Detected Not Detected Final     Comment:     Nucleic acid specific to SARS-CoV-2 (COVID-19) virus was not detected in  this sample by the TaqPath (TM) COVID-19 Combo Kit.          SARS-CoV-2 (COVID-19) nucleic acid testing performed using [employer] Aptima (R) SARS-CoV-2 Assay or [employer] TaqPath (TM)  COVID-19 Combo Kit as indicated above under Emergency Use Authorization (EUA) from the FDA. Aptima (R) and TaqPath (TM) test performance  characteristics verified by [facility] in accordance with the FDAs Guidance Document (Policy for Diagnostic Tests for Coronavirus Disease-2019  during the Public Health Emergency) issued on March 16, 2020. The laboratory is regulated under CLIA as qualified to perform high-complexity testing  Unless otherwise noted, all testing was performed at [facility], CLIA No. [id number], [place] State Licensee No. [id number]     No results found for: \"HGBA1C\", \"POCGLU\"      US Renal Bilateral  Narrative: ULTRASOUND RENAL BILATERAL     INDICATION: HISTORY of hydronephrosis.     COMPARISON: CT abdomen pelvis October 24, 2024     TECHNIQUE: Real-time sonographic evaluation of the kidneys with  grayscale and color-flow imaging. Representative images were saved for  review.     FINDINGS:     RIGHT KIDNEY: 10.5 cm in length. Simple appearing cyst in the mid kidney  measuring 7 x 4 x 7 mm. No solid-appearing renal mass. Small amount of  pelvicaliectasis. Trace perinephric edema.     LEFT KIDNEY: 11.7 cm in length. Simple appearing cyst in the superior  pole " measuring 2.3 x 2.1 x 2.0 cm. Simple appearing cyst in the mid  kidney measuring 5 x 3 x 5 mm. No solid-appearing renal mass. Small  amount of pelvicaliectasis.     BLADDER: Armendariz in the bladder. Bladder is collapsed.     Impression:    1. Simple appearing renal cysts. No solid mass identified.  2. Small amount of bilateral pelvicaliectasis.  3. Armendariz in the bladder. Bladder is collapsed.              This report was finalized on 10/25/2024 2:33 PM by Dr. Camacho Beal M.D on Workstation: [id number]       Scheduled Medications  vitamin C, 500 mg, Oral, Daily  atorvastatin, 10 mg, Oral, Daily  castor oil-balsam peru, 1 Application, Topical, Q12H  heparin (porcine), 5,000 Units, Subcutaneous, Q12H  losartan, 50 mg, Oral, Q24H  pantoprazole, 40 mg, Oral, Daily  polyethylene glycol, 17 g, Oral, Daily  sodium hypochlorite, , Topical, BID  tamsulosin, 0.4 mg, Oral, Daily  torsemide, 20 mg, Oral, Daily    Infusions   Diet  Diet: Cardiac; Healthy Heart (2-3 Na+); Fluid Consistency: Thin (IDDSI 0)      Assessment/Plan     Active Hospital Problems    Diagnosis  POA    **Acute renal failure [N17.9]  Yes    Acidosis [E87.20]  Unknown    Prostate cancer [C61]  Yes    Hyperlipidemia [E78.5]  Yes    Stage 3a chronic kidney disease [N18.31]  Yes    Hypertension [I10]  Yes    Type 2 diabetes mellitus with diabetic autonomic neuropathy, with long-term current use of insulin [E11.43, Z79.4]  Not Applicable      Resolved Hospital Problems    Diagnosis Date Resolved POA    Hyperkalemia [E87.5] 10/25/2024 Yes       69 y.o. male admitted with Acute renal failure.    NEEL on CKD 3a with hyperkalemia and metabolic acidosis-prerenal due to hypotension and diuretics. Resolved with IVF.  Back on torsemide for lower extremity edema per nephrology, and appears to be tolerating this.  Hypotension/concern for sepsis-resolved with IVF. No infection has been identified this admission   Urinary retention causing hydronephrosis during  hospitalization at [facility]-dash catheter in place. Renal ultrasound this admission doesn't show any hydronephrosis. On tamsulosin. He needs to follow up with urology in 1-2 weeks   1/2 blood cultures positive for coagulase negative staph-likely a contaminate.  Doing well without antibiotics  Hypophosphatemia-improved after replacement   Hypertension-losartan resumed today  Hyperlipidemia-statin  Type 2 diabetes-glucose at goal acutely  GERD-ppi  Chronic decubitus ulcer  Recent hospitalization at [facility] for E coli bacteremia/UTI  Heparin SC for DVT prophylaxis.  Limited code (no CPR, no intubation).  Discussed with patient and nursing staff.  Anticipate discharge to SNU facility once arrangements have been made.      Kemar Vargas MD  [facility]  10/30/24  12:39 EDT             Electronically signed by Kemar Vargas MD at 10/30/24 1241       Lionel Cabral MD at 10/30/24 0949              [facility] Progress Note      Patient Name: Arden Carrasquillo  : 1955  MRN: [id number]  Primary Care Physician:  Pollo Ward MD  Date of admission: 10/24/2024    Subjective     Interval History:   Follow-up acute kidney injury    The patient is feeling better, denies any chest pain or shortness of air, no orthopnea or PND, no nausea or vomiting, he has Dash catheter.      Review of Systems:   As noted above    Objective     Vitals:   Temp:  [97.3 °F (36.3 °C)-98.6 °F (37 °C)] 98.1 °F (36.7 °C)  Heart Rate:  [75-87] 87  Resp:  [18] 18  BP: (130-165)/() 165/102    Intake/Output Summary (Last 24 hours) at 10/30/2024 0926  Last data filed at 10/29/2024 2316  Gross per 24 hour   Intake 300 ml   Output 1750 ml   Net -1450 ml       Physical Exam:      General Appearance: Awake, alert, chronically ill, no acute disc  Skin: warm and dry  HEENT: oral mucosa normal, nonicteric sclera  Neck: No JVD  Lungs: To auscultation, unlabored breathing effort.  Heart: RRR, no  rub  Abdomen: soft, nontender, nondistended  : dash with clear urine   Extremities: No edema  Neuro: normal speech and mental status     Scheduled Meds:     vitamin C, 500 mg, Oral, Daily  atorvastatin, 10 mg, Oral, Daily  castor oil-balsam peru, 1 Application, Topical, Q12H  heparin (porcine), 5,000 Units, Subcutaneous, Q12H  pantoprazole, 40 mg, Oral, Daily  polyethylene glycol, 17 g, Oral, Daily  sodium hypochlorite, , Topical, BID  tamsulosin, 0.4 mg, Oral, Daily  torsemide, 20 mg, Oral, Daily      IV Meds:        Results Reviewed:   I have personally reviewed the results from the time of this admission to 10/30/2024 09:26 EDT     Results from last 7 days   Lab Units 10/30/24  0346 10/29/24  0255 10/28/24  0236 10/24/24  2139 10/24/24  1251   SODIUM mmol/L 140 139 142   < > 135*   POTASSIUM mmol/L 4.0 4.0 4.0   < > 5.3*   CHLORIDE mmol/L 102 104 106   < > 103   CO2 mmol/L 27.7 28.0 26.0   < > 20.5*   BUN mg/dL 21 20 22   < > 72*   CREATININE mg/dL 1.27 1.15 1.03   < > 4.29*   CALCIUM mg/dL 9.2 9.1 9.1   < > 8.5*   BILIRUBIN mg/dL  --   --   --   --  0.3   ALK PHOS U/L  --   --   --   --  106   ALT (SGPT) U/L  --   --   --   --  13   AST (SGOT) U/L  --   --   --   --  13   GLUCOSE mg/dL 124* 144* 121*   < > 126*    < > = values in this interval not displayed.     Estimated Creatinine Clearance: 53.8 mL/min (by C-G formula based on SCr of 1.27 mg/dL).  Results from last 7 days   Lab Units 10/30/24  0346 10/29/24  0255 10/28/24  1604 10/26/24  0312 10/24/24  1251   MAGNESIUM mg/dL 2.0 1.9  --   --  2.5*   PHOSPHORUS mg/dL 3.3 2.9 2.7   < >  --     < > = values in this interval not displayed.     Results from last 7 days   Lab Units 10/30/24  0346 10/29/24  0255 10/27/24  0329   URIC ACID mg/dL 8.2* 8.3* 7.1*     Results from last 7 days   Lab Units 10/29/24  0255 10/27/24  0329 10/26/24  0312 10/25/24  0300 10/24/24  1251   WBC 10*3/mm3 5.85 6.12 7.80 9.58 9.11   HEMOGLOBIN g/dL 11.2* 10.6* 10.3* 11.0* 9.9*    PLATELETS 10*3/mm3 220 224 271 266 283     Results from last 7 days   Lab Units 10/24/24  1251   INR  1.24*       Assessment / Plan     ASSESSMENT:    Acute kidney injury on chronic kidney disease stage IIIa.  Prerenal azotemia due to volume depletion with hypotension and diuretic use.  Nonoliguric.  Renal function improving creatinine is 1.27, stable, his electrolytes within acceptable range and the creatinine peaked at 4.29.  Hyperkalemia.  NEEL has resolved  Recent urinary retention and hydronephrosis.  Armendariz catheter in place.  Hydronephrosis resolved now  History of diastolic CHF.  Patient stable hemodynamically  Hypertension not well-controlled    PLAN:  Continue the same treatment and the current dose of torsemide  Monitor for diuretic toxicity  Surveillance labs  I will restart losartan 50 mg daily    I reviewed the chart and other providers notes, reviewed labs.  I discussed the case with the patient and he voiced good understand  Copied text in this note has been reviewed and is accurate as of 10/30/24.         Lionel Cabral MD  10/30/24  09:26 EDT    [facility]  [phone]               Electronically signed by Lionel Cabral MD at 10/30/24 0929       Kemar Vargas MD at 10/29/24 1547              Name: Arden Carrasquillo ADMIT: 10/24/2024   : 1955  PCP: Pollo Ward MD    MRN: [id number] LOS: 5 days   AGE/SEX: 69 y.o. male  ROOM: [facility]     Subjective   Subjective     No events overnight.  No complaints today      Objective   Objective   Vital Signs  Temp:  [98.1 °F (36.7 °C)-98.3 °F (36.8 °C)] 98.1 °F (36.7 °C)  Heart Rate:  [65-83] 75  Resp:  [16-18] 18  BP: (130-156)/(75-90) 130/75  SpO2:  [93 %-100 %] 93 %  on  Flow (L/min) (Oxygen Therapy):  [2] 2;   Device (Oxygen Therapy): room air  Body mass index is 31.33 kg/m².  Physical Exam  Constitutional:       General: He is not in acute distress.     Appearance: He is obese. He is not toxic-appearing.    Cardiovascular:      Rate and Rhythm: Normal rate and regular rhythm.      Heart sounds: Normal heart sounds.   Pulmonary:      Effort: Pulmonary effort is normal.      Breath sounds: Normal breath sounds.   Abdominal:      General: Bowel sounds are normal.      Palpations: Abdomen is soft.   Genitourinary:     Comments: dash in place  Musculoskeletal:         General: No tenderness.      Right lower leg: No edema.      Left lower leg: No edema.   Neurological:      Mental Status: He is alert.   Psychiatric:         Mood and Affect: Mood normal.         Behavior: Behavior normal.         Results Review     I reviewed the patient's new clinical results.  Results from last 7 days   Lab Units 10/29/24  0255 10/27/24  0329 10/26/24  0312 10/25/24  0300   WBC 10*3/mm3 5.85 6.12 7.80 9.58   HEMOGLOBIN g/dL 11.2* 10.6* 10.3* 11.0*   PLATELETS 10*3/mm3 220 224 271 266     Results from last 7 days   Lab Units 10/29/24  0255 10/28/24  0236 10/27/24  0329 10/26/24  0312   SODIUM mmol/L 139 142 142 143   POTASSIUM mmol/L 4.0 4.0 4.8 4.8   CHLORIDE mmol/L 104 106 113* 112*   CO2 mmol/L 28.0 26.0 22.4 22.0   BUN mg/dL 20 22 29* 43*   CREATININE mg/dL 1.15 1.03 1.19 1.72*   GLUCOSE mg/dL 144* 121* 144* 147*   Estimated Creatinine Clearance: 61.3 mL/min (by C-G formula based on SCr of 1.15 mg/dL).  Results from last 7 days   Lab Units 10/29/24  0255 10/28/24  0236 10/27/24  0329 10/26/24  0312 10/24/24  1251   ALBUMIN g/dL 3.1* 3.1* 2.9* 3.1* 2.7*   BILIRUBIN mg/dL  --   --   --   --  0.3   ALK PHOS U/L  --   --   --   --  106   AST (SGOT) U/L  --   --   --   --  13   ALT (SGPT) U/L  --   --   --   --  13     Results from last 7 days   Lab Units 10/29/24  0255 10/28/24  1604 10/28/24  0236 10/27/24  0329 10/26/24  0312 10/24/24  2139 10/24/24  1251   CALCIUM mg/dL 9.1  --  9.1 9.3 8.7   < > 8.5*   ALBUMIN g/dL 3.1*  --  3.1* 2.9* 3.1*  --  2.7*   MAGNESIUM mg/dL 1.9  --   --   --   --   --  2.5*   PHOSPHORUS mg/dL 2.9 2.7 1.9*  "2.2* 2.6   < >  --     < > = values in this interval not displayed.     Results from last 7 days   Lab Units 10/24/24  1755 10/24/24  1321 10/24/24  1251   PROCALCITONIN ng/mL  --   --  0.22   LACTATE mmol/L 1.2 2.1*  --      SARS-CoV-2 PCR   Date Value Ref Range Status   01/16/2021 Not Detected Not Detected Final     Comment:     Nucleic acid specific to SARS-CoV-2 (COVID-19) virus was not detected in  this sample by the TaqPath (TM) COVID-19 Combo Kit.          SARS-CoV-2 (COVID-19) nucleic acid testing performed using [employer] Aptima (R) SARS-CoV-2 Assay or [employer] TaqPath (TM)  COVID-19 Combo Kit as indicated above under Emergency Use Authorization (EUA) from the FDA. Aptima (R) and TaqPath (TM) test performance  characteristics verified by [facility] in accordance with the FDAs Guidance Document (Policy for Diagnostic Tests for Coronavirus Disease-2019  during the Public Health Emergency) issued on March 16, 2020. The laboratory is regulated under CLIA as qualified to perform high-complexity testing  Unless otherwise noted, all testing was performed at [facility], CLIA No. [id number], [place] State Licensee No. [id number]     No results found for: \"HGBA1C\", \"POCGLU\"      US Renal Bilateral  Narrative: ULTRASOUND RENAL BILATERAL     INDICATION: HISTORY of hydronephrosis.     COMPARISON: CT abdomen pelvis October 24, 2024     TECHNIQUE: Real-time sonographic evaluation of the kidneys with  grayscale and color-flow imaging. Representative images were saved for  review.     FINDINGS:     RIGHT KIDNEY: 10.5 cm in length. Simple appearing cyst in the mid kidney  measuring 7 x 4 x 7 mm. No solid-appearing renal mass. Small amount of  pelvicaliectasis. Trace perinephric edema.     LEFT KIDNEY: 11.7 cm in length. Simple appearing cyst in the superior  pole measuring 2.3 x 2.1 x 2.0 cm. Simple appearing cyst in the mid  kidney measuring 5 x 3 x 5 mm. No solid-appearing renal mass. Small  amount of " pelvicaliectasis.     BLADDER: Dash in the bladder. Bladder is collapsed.     Impression:    1. Simple appearing renal cysts. No solid mass identified.  2. Small amount of bilateral pelvicaliectasis.  3. Dash in the bladder. Bladder is collapsed.              This report was finalized on 10/25/2024 2:33 PM by Dr. Camacho Beal M.D on Workstation: [id number]       Scheduled Medications  vitamin C, 500 mg, Oral, Daily  atorvastatin, 10 mg, Oral, Daily  castor oil-balsam peru, 1 Application, Topical, Q12H  heparin (porcine), 5,000 Units, Subcutaneous, Q12H  pantoprazole, 40 mg, Oral, Daily  polyethylene glycol, 17 g, Oral, Daily  sodium hypochlorite, , Topical, BID  tamsulosin, 0.4 mg, Oral, Daily  torsemide, 20 mg, Oral, Daily    Infusions   Diet  Diet: Cardiac; Healthy Heart (2-3 Na+); Fluid Consistency: Thin (IDDSI 0)      Assessment/Plan     Active Hospital Problems    Diagnosis  POA    **Acute renal failure [N17.9]  Yes    Acidosis [E87.20]  Unknown    Prostate cancer [C61]  Yes    Hyperlipidemia [E78.5]  Yes    Stage 3a chronic kidney disease [N18.31]  Yes    Hypertension [I10]  Yes    Type 2 diabetes mellitus with diabetic autonomic neuropathy, with long-term current use of insulin [E11.43, Z79.4]  Not Applicable      Resolved Hospital Problems    Diagnosis Date Resolved POA    Hyperkalemia [E87.5] 10/25/2024 Yes       69 y.o. male admitted with Acute renal failure.    NEEL on CKD 3a with hyperkalemia and metabolic acidosis-prerenal due to hypotension and diuretics. Resolved with IVF.  Back on torsemide for lower extremity edema per nephrology, and appears to be tolerating this.  Hypotension/concern for sepsis-resolved with IVF. No infection has been identified this admission   Urinary retention causing hydronephrosis during hospitalization at [facility]-dash catheter in place. Renal ultrasound this admission doesn't show any hydronephrosis. On tamsulosin. Has follow up with Dr. Zuniga on tomorrow, so he'll  probably need to reschedule his appointment for 1-2 weeks from now.   1/2 blood cultures positive for coagulase negative staph-likely a contaminate.  Doing well without antibiotics  Hypophosphatemia-improved after replacement   Hypertension-adequate control acutely  Hyperlipidemia-statin  Type 2 diabetes-glucose at goal acutely  GERD-ppi  Chronic decubitus ulcer  Recent hospitalization at [facility] for E coli bacteremia/UTI  Heparin SC for DVT prophylaxis.  Limited code (no CPR, no intubation).  Discussed with patient and nursing staff.  Anticipate discharge to SNU facility once arrangements have been made.      Kemar Vargas MD  [facility]  10/29/24  15:47 EDT             Electronically signed by Kemar Vargas MD at 10/29/24 1547          Physical Therapy Notes (last 72 hours)        Radha Prasad, PT at 10/30/24 1324  Version 1 of 1         Goal Outcome Evaluation:  Plan of Care Reviewed With: patient        Progress: improving  Outcome Evaluation: Pt resting in bed, more alert today, reluctant to participate in therapy but agrees to with encouragement. He req mod-max A Of 2 to sit EOB with vc for sequencing and increased time for processing. He was abl eto slightly move his legs toward EOB. He stood from EOB x 2 attempts w ith max A of 2 - on both attempts, pt able to clear hips off bed but unable to fully straighten knees. Her was unable to take any steps. Sitting balance is fair once positioned. He req max A of 2 to return supine and was dependent to reposition in bed. He dem slight improvement in mobility today and was able to clear his hips off bed but we were unable to get to chair or take any steps. Overall, pt progress is slight at best. Cont PT to address functional deficits and prepare for d/c as appropriate. Pt fatigues quickly and would not tolerate inpt rehab. He is a better candidate for SNF.    Anticipated Discharge Disposition (PT): skilled nursing facility                           Electronically signed by Radha Prasad, PT at 10/30/24 1324       Radha Prasad, PT at 10/30/24 1325  Version 1 of 1         Patient Name: Arden Carrasquillo  : 1955    MRN: [id number]                              Today's Date: 10/30/2024       Admit Date: 10/24/2024    Visit Dx:     ICD-10-CM ICD-9-CM   1. Acute renal failure, unspecified acute renal failure type  N17.9 584.9   2. Hypotension, unspecified hypotension type  I95.9 458.9   3. Altered mental status, unspecified altered mental status type  R41.82 780.97   4. Pressure injury of skin of sacral region, unspecified injury stage  L89.159 707.03     707.20   5. Urinary retention  R33.9 788.20   6. Chronic anemia  D64.9 285.9   7. Severe sepsis  A41.9 038.9    R65.20 995.92     Patient Active Problem List   Diagnosis    Type 2 diabetes mellitus with diabetic autonomic neuropathy, with long-term current use of insulin    Degenerative spondylolisthesis    Hypertension    Generalized anxiety disorder    Stage 3a chronic kidney disease    Hyperlipidemia    Prostate cancer    Anemia in stage 3b chronic kidney disease    Gynecomastia    Carcinoma in situ of colon    Bilateral lower extremity edema    Weakness of both lower extremities    Psychophysiological insomnia    History of colon polyps    Lumbar stenosis with neurogenic claudication    S/P cervical spinal fusion    Cervical cord compression with myelopathy    Aneurysm of descending thoracic aorta without rupture    Tremor    Acute renal failure    Acidosis     Past Medical History:   Diagnosis Date    Diabetes mellitus     Positive colorectal cancer screening using Cologuard test 2020    Severe peripheral polyneuropathy 10/19/2023     Past Surgical History:   Procedure Laterality Date    CYSTOSCOPY TRANSURETHRAL RESECTION OF PROSTATE      UPPER GASTROINTESTINAL ENDOSCOPY        General Information       Row Name 10/30/24 1309          Physical Therapy Time and Intention    Document Type  therapy note (daily note)  -DJ     Mode of Treatment individual therapy;physical therapy  -DJ       Row Name 10/30/24 1309          General Information    Patient Profile Reviewed yes  -DJ       Row Name 10/30/24 1309          Cognition    Orientation Status (Cognition) oriented x 3  more alert today but still slow to respond  -DJ       Row Name 10/30/24 1309          Safety Issues/Impairments Affecting Functional Mobility    Comment, Safety Issues/Impairments (Mobility) gt belt, nonskid socks  -DJ               User Key  (r) = Recorded By, (t) = Taken By, (c) = Cosigned By      Initials Name Provider Type    Radha Medina, IMRA Physical Therapist                   Mobility       Row Name 10/30/24 1309          Bed Mobility    Bed Mobility supine-sit;sit-supine  -DJ     Supine-Sit [place] (Bed Mobility) moderate assist (50% patient effort);maximum assist (25% patient effort);2 person assist;verbal cues  -DJ     Sit-Supine [place] (Bed Mobility) maximum assist (25% patient effort);2 person assist;verbal cues  -DJ     Assistive Device (Bed Mobility) bed rails;head of bed elevated;repositioning sheet;leg   -DJ     Comment, (Bed Mobility) vc for sequencing, increased time for processing  -DJ       Row Name 10/30/24 1309          Transfers    Comment, (Transfers) sit/stand from EOB x 2 attempts  -DJ       Row Name 10/30/24 1309          Bed-Chair Transfer    Bed-Chair [place] (Transfers) unable to assess  -DJ       Row Name 10/30/24 1309          Sit-Stand Transfer    Sit-Stand [place] (Transfers) maximum assist (25% patient effort);2 person assist;verbal cues  -DJ     Assistive Device (Sit-Stand Transfers) walker, front-wheeled  -DJ     Comment, (Sit-Stand Transfer) both attempts pt able to clear hips off bed but unable to fully straighten knees  -DJ       Row Name 10/30/24 1309          Gait/Stairs (Locomotion)    [place] Level (Gait) unable to assess  -DJ     [place] Level (Stairs)  unable to assess  -DJ     Comment, (Gait/Stairs) Pt stood x 2 from EOB but unable to take any steps  -DJ               User Key  (r) = Recorded By, (t) = Taken By, (c) = Cosigned By      Initials Name Provider Type    Radha Medina PT Physical Therapist                   Obj/Interventions       Row Name 10/30/24 1314          Motor Skills    Motor Skills functional endurance  -DJ     Functional Endurance poor  -DJ       Row Name 10/30/24 1314          Balance    Balance Assessment standing static balance;standing dynamic balance  -DJ     Static Standing Balance maximum assist;2-person assist;verbal cues  -DJ     Dynamic Standing Balance maximum assist;2-person assist;verbal cues  -DJ     Position/Device Used, Standing Balance supported;walker, front-wheeled  -DJ     Balance Interventions sitting;standing;sit to stand;supported;weight shifting activity  -DJ     Comment, Balance unsteady  -DJ               User Key  (r) = Recorded By, (t) = Taken By, (c) = Cosigned By      Initials Name Provider Type    Radha Medina PT Physical Therapist                   Goals/Plan    No documentation.                  Clinical Impression       Row Name 10/30/24 1316          Pain    Pre/Posttreatment Pain Comment Pt c/o skin itching around eyes  -DJ       Row Name 10/30/24 1316          Plan of Care Review    Plan of Care Reviewed With patient  -DJ     Progress improving  -DJ     Outcome Evaluation Pt resting in bed, more alert today, reluctant to participate in therapy but agrees to with encouragement. He req mod-max A Of 2 to sit EOB with vc for sequencing and increased time for processing. He was abl eto slightly move his legs toward EOB. He stood from EOB x 2 attempts w ith max A of 2 - on both attempts, pt able to clear hips off bed but unable to fully straighten knees. Her was unable to take any steps. Sitting balance is fair once positioned. He req max A of 2 to return supine and was dependent to reposition in bed. He  dem slight improvement in mobility today and was able to clear his hips off bed but we were unable to get to chair or take any steps. Overall, pt progress is slight at best. Cont PT to address functional deficits and prepare for d/c as appropriate. Pt fatigues quickly and would not tolerate inpt rehab. He is a better candidate for SNF.  -DJ       Row Name 10/30/24 1316          Therapy Assessment/Plan (PT)    Criteria for Skilled Interventions Met (PT) skilled treatment is necessary  -DJ       Row Name 10/30/24 1316          Vital Signs    Pre Patient Position Supine  -DJ     Intra Patient Position Standing  -DJ     Post Patient Position Supine  -DJ       Row Name 10/30/24 1316          Positioning and Restraints    Pre-Treatment Position in bed  -DJ     Post Treatment Position bed  -DJ     In Bed notified nsg;supine;call light within reach;encouraged to call for assist;exit alarm on;waffle boots/both;SCD pump applied;heels elevated  -DJ               User Key  (r) = Recorded By, (t) = Taken By, (c) = Cosigned By      Initials Name Provider Type    Radha Medina, PT Physical Therapist                   Outcome Measures       Row Name 10/30/24 1322          How much help from another person do you currently need...    Turning from your back to your side while in flat bed without using bedrails? 2  -DJ     Moving from lying on back to sitting on the side of a flat bed without bedrails? 2  -DJ     Moving to and from a bed to a chair (including a wheelchair)? 1  -DJ     Standing up from a chair using your arms (e.g., wheelchair, bedside chair)? 2  -DJ     Climbing 3-5 steps with a railing? 1  -DJ     To walk in hospital room? 1  -DJ     AM-PAC 6 Clicks Score (PT) 9  -DJ     Highest Level of Mobility Goal 3 --> Sit at edge of bed  -DJ       Row Name 10/30/24 1322          Functional Assessment    Outcome Measure Options AM-PAC 6 Clicks Basic Mobility (PT)  -DJ               User Key  (r) = Recorded By, (t) = Taken By,  (c) = Cosigned By      Initials Name Provider Type    Radha Medina, PT Physical Therapist                                 Physical Therapy Education       Title: PT OT SLP Therapies (In Progress)       Topic: Physical Therapy (In Progress)       Point: Mobility training (In Progress)       Learning Progress Summary            Patient Acceptance, E, NR by DJ at 10/30/2024 1322    Acceptance, E, VU,NR by DJ at 10/28/2024 1203    Acceptance, E, VU by LB at 10/26/2024 1604    Acceptance, E, VU,NR by DJ at 10/25/2024 1209                      Point: Home exercise program (Done)       Learning Progress Summary            Patient Acceptance, E, VU,NR by DJ at 10/28/2024 1203    Acceptance, E, VU by LB at 10/26/2024 1604                      Point: Body mechanics (In Progress)       Learning Progress Summary            Patient Acceptance, E, NR by DJ at 10/30/2024 1322    Acceptance, E, VU,NR by DJ at 10/28/2024 1203    Acceptance, E, VU by LB at 10/26/2024 1604    Acceptance, E, VU,NR by DJ at 10/25/2024 1209                      Point: Precautions (In Progress)       Learning Progress Summary            Patient Acceptance, E, NR by DJ at 10/30/2024 1322    Acceptance, E, VU,NR by DJ at 10/28/2024 1203    Acceptance, E, VU by LB at 10/26/2024 1604    Acceptance, E, VU,NR by DJ at 10/25/2024 1209                                      User Key       Initials Effective Dates Name Provider Type Discipline    ELISE 10/25/19 -  Radha Prasad, MIRA Physical Therapist PT    ANIL 06/16/21 -  Valeriano Johnston, RN Registered Nurse Nurse                  PT Recommendation and Plan  Planned Therapy Interventions (PT): balance training, bed mobility training, gait training, home exercise program, strengthening, postural re-education, patient/family education, transfer training  Progress: improving  Outcome Evaluation: Pt resting in bed, more alert today, reluctant to participate in therapy but agrees to with encouragement. He req mod-max A Of 2  to sit EOB with vc for sequencing and increased time for processing. He was abl eto slightly move his legs toward EOB. He stood from EOB x 2 attempts w ith max A of 2 - on both attempts, pt able to clear hips off bed but unable to fully straighten knees. Her was unable to take any steps. Sitting balance is fair once positioned. He req max A of 2 to return supine and was dependent to reposition in bed. He dem slight improvement in mobility today and was able to clear his hips off bed but we were unable to get to chair or take any steps. Overall, pt progress is slight at best. Cont PT to address functional deficits and prepare for d/c as appropriate. Pt fatigues quickly and would not tolerate inpt rehab. He is a better candidate for SNF.     Time Calculation:   PT Evaluation Complexity  History, PT Evaluation Complexity: 3 or more personal factors and/or comorbidities  Examination of Body Systems (PT Eval Complexity): total of 4 or more elements  Clinical Presentation (PT Evaluation Complexity): evolving  Clinical Decision Making (PT Evaluation Complexity): moderate complexity  Overall Complexity (PT Evaluation Complexity): moderate complexity     PT Charges       Row Name 10/30/24 1324             Time Calculation    Start Time 1131  -DJ      Stop Time 1151  -DJ      Time Calculation (min) 20 min  -DJ      PT Non-Billable Time (min) 10 min  -DJ      PT Received On 10/30/24  -DJ      PT - Next Appointment 11/01/24  -DJ                User Key  (r) = Recorded By, (t) = Taken By, (c) = Cosigned By      Initials Name Provider Type    Radha Medina PT Physical Therapist                  Therapy Charges for Today       Code Description Service Date Service Provider Modifiers Qty    29393240548 HC PT THERAPEUTIC ACT EA 15 MIN 10/30/2024 Radha Prasad, PT GP 1    42017103324 HC PT THER SUPP EA 15 MIN 10/30/2024 Radha Prasad, PT GP 1            PT G-Codes  Outcome Measure Options: AM-PAC 6 Clicks Basic Mobility (PT)  AM-PAC  6 Clicks Score (PT): 9  PT Discharge Summary  Anticipated Discharge Disposition (PT): skilled nursing facility    Radha Prasad, PT  10/30/2024      Electronically signed by Radha Prasad, PT at 10/30/24 1325       Occupational Therapy Notes (last 72 hours)  Notes from 10/29/24 1326 through 11/01/24 1326   No notes exist for this encounter.       Speech Language Pathology Notes (last 72 hours)  Notes from 10/29/24 1326 through 11/01/24 1326   No notes exist for this encounter.       Skin Assessments (last 2 days)       Date/Time Skin WDL Skin Color/Characteristics Skin Temperature Skin Moisture Skin Elasticity Skin Integrity Sensory Perception Moisture Activity Mobility Nutrition Friction and Shear Richard Score Pressure Reduction Devices Pressure Reduction Techniques    10/30/24 0000 X;color;moisture;integrity redness blanchable;redness nonblanchable -- moist -- bruised (ecchymotic);other (see comments)  -- -- -- -- -- -- -- -- --    Skin Integrity: coccyx wound present at 10/30/24 0000    10/30/24 0845 X;color;moisture;integrity redness blanchable;redness nonblanchable -- moist -- bruised (ecchymotic);other (see comments)  3-->slightly limited 3-->occasionally moist 1-->bedfast 2-->very limited 2-->probably inadequate 1-->problem 12 -- --    Skin Integrity: coccyx wound present at 10/30/24 0845    10/30/24 2010 X;color;moisture;integrity redness blanchable warm moist quick return to original state bruised (ecchymotic);other (see comments) 3-->slightly limited 3-->occasionally moist 1-->bedfast 2-->very limited 3-->adequate 2-->potential problem 14 -- --    10/31/24 0911 X redness blanchable warm moist quick return to original state bruised (ecchymotic);other (see comments)  3-->slightly limited 3-->occasionally moist 1-->bedfast 2-->very limited 3-->adequate 2-->potential problem 14 heel offloading device utilized;positioning supports utilized;pressure-redistributing mattress utilized;specialty bed utilized;other (see  comments)  frequent weight shift encouraged;heels elevated off bed;positioned off wounds;pressure points protected;weight shift assistance provided    Skin Integrity: bottom with PU/red/blanchable, heels red/blanchable at 10/31/24 0911    Pressure Reduction Devices: low air loss mattress at 10/31/24 0911    10/31/24 1410 X redness blanchable warm moist quick return to original state bruised (ecchymotic);other (see comments)  -- -- -- -- -- -- -- -- --    Skin Integrity: bottom with PU/red/blanchable, heels red/blanchable at 10/31/24 1410    10/31/24 2000 X;all redness blanchable warm moist quick return to original state bruised (ecchymotic) 3-->slightly limited 3-->occasionally moist 1-->bedfast 2-->very limited 2-->probably inadequate 1-->problem 12 alternating pressure pump (HARDIK);feet on footrest/footstool;pressure-redistributing mattress utilized;specialty bed utilized frequent weight shift encouraged;heels elevated off bed;weight shift assistance provided    11/01/24 0000 X;all redness blanchable warm moist quick return to original state bruised (ecchymotic) -- -- -- -- -- -- -- -- --    11/01/24 0835 X;all redness blanchable warm moist quick return to original state bruised (ecchymotic) 3-->slightly limited 3-->occasionally moist 1-->bedfast 2-->very limited 2-->probably inadequate 1-->problem 12 -- --          Patient Observations (last 2 days)       None          ADL Documentation (last 2 days)       Date/Time Transferring Toileting Bathing Dressing Eating Communication Swallowing    11/01/24 0835 4 - completely dependent 4 - completely dependent 4 - completely dependent 4 - completely dependent 2 - assistive person 0 - understands/communicates without difficulty 0 - swallows foods/liquids without difficulty    10/31/24 1410 4 - completely dependent 4 - completely dependent 4 - completely dependent 4 - completely dependent 2 - assistive person 0 - understands/communicates without difficulty 0 - swallows  foods/liquids without difficulty    10/31/24 0911 4 - completely dependent 4 - completely dependent 4 - completely dependent 4 - completely dependent 2 - assistive person 0 - understands/communicates without difficulty 0 - swallows foods/liquids without difficulty    10/30/24 2010 4 - completely dependent 4 - completely dependent 4 - completely dependent 4 - completely dependent 2 - assistive person 0 - understands/communicates without difficulty 0 - swallows foods/liquids without difficulty    10/30/24 0845 4 - completely dependent 4 - completely dependent 4 - completely dependent 4 - completely dependent 2 - assistive person 0 - understands/communicates without difficulty 0 - swallows foods/liquids without difficulty

## 2024-11-02 ENCOUNTER — APPOINTMENT (OUTPATIENT)
Dept: CARDIOLOGY | Facility: HOSPITAL | Age: 69
End: 2024-11-02
Payer: MEDICARE

## 2024-11-02 LAB
ALBUMIN SERPL-MCNC: 3.2 G/DL (ref 3.5–5.2)
ANION GAP SERPL CALCULATED.3IONS-SCNC: 10.4 MMOL/L (ref 5–15)
BH CV ECHO MEAS - DIST REN A EDV LEFT: 13.4 CM/S
BH CV ECHO MEAS - DIST REN A PSV LEFT: 48.5 CM/S
BH CV ECHO MEAS - MID REN A EDV LEFT: 17 CM/S
BH CV ECHO MEAS - MID REN A PSV LEFT: 49.4 CM/S
BH CV ECHO MEAS - PROX REN A EDV LEFT: 10.5 CM/S
BH CV ECHO MEAS - PROX REN A PSV LEFT: 30.9 CM/S
BH CV VAS RENAL AORTIC MID PSV: 80 CM/S
BH CV VAS RENAL HILUM LEFT EDV: 15 CM/S
BH CV VAS RENAL HILUM LEFT PSV: 59.9 CM/S
BH CV VAS RENAL HILUM RIGHT EDV: 17.2 CM/S
BH CV VAS RENAL HILUM RIGHT PSV: 60 CM/S
BH CV XLRA MEAS - KID L LEFT: 10 CM
BH CV XLRA MEAS DIST REN A EDV RIGHT: 20.9 CM/S
BH CV XLRA MEAS DIST REN A PSV RIGHT: 64.8 CM/S
BH CV XLRA MEAS KID L RIGHT: 9.5 CM
BH CV XLRA MEAS KID W RIGHT: 5.8 CM
BH CV XLRA MEAS MID REN A EDV RIGHT: 14.9 CM/S
BH CV XLRA MEAS MID REN A PSV RIGHT: 50.8 CM/S
BH CV XLRA MEAS PROX REN A EDV RIGHT: 7.9 CM/S
BH CV XLRA MEAS PROX REN A PSV RIGHT: 34.2 CM/S
BH CV XLRA MEAS RAR LEFT: 0.62
BH CV XLRA MEAS RAR RIGHT: 0.81
BH CV XLRA MEAS RENAL A ORG EDV RIGHT: 6.5 CM/S
BH CV XLRA MEAS RENAL A ORG PSV RIGHT: 35.5 CM/S
BUN SERPL-MCNC: 37 MG/DL (ref 8–23)
BUN/CREAT SERPL: 21.4 (ref 7–25)
CALCIUM SPEC-SCNC: 9.4 MG/DL (ref 8.6–10.5)
CHLORIDE SERPL-SCNC: 99 MMOL/L (ref 98–107)
CO2 SERPL-SCNC: 26.6 MMOL/L (ref 22–29)
CREAT SERPL-MCNC: 1.73 MG/DL (ref 0.76–1.27)
EGFRCR SERPLBLD CKD-EPI 2021: 41.7 ML/MIN/1.73
GLUCOSE SERPL-MCNC: 140 MG/DL (ref 65–99)
LEFT RENAL UPPER PARENCHYMA MAX: 14 CM/S
LEFT RENAL UPPER PARENCHYMA MIN: 3.54 CM/S
LEFT RENAL UPPER PARENCHYMA RI: 0.75
MAGNESIUM SERPL-MCNC: 2.4 MG/DL (ref 1.6–2.4)
PHOSPHATE SERPL-MCNC: 2.9 MG/DL (ref 2.5–4.5)
POTASSIUM SERPL-SCNC: 4.1 MMOL/L (ref 3.5–5.2)
RIGHT RENAL UPPER PARENCHYMA MAX: 21.6 CM/S
RIGHT RENAL UPPER PARENCHYMA MIN: 5.07 CM/S
RIGHT RENAL UPPER PARENCHYMA RI: 0.77
SODIUM SERPL-SCNC: 136 MMOL/L (ref 136–145)
URATE SERPL-MCNC: 9.7 MG/DL (ref 3.4–7)

## 2024-11-02 PROCEDURE — 93975 VASCULAR STUDY: CPT | Performed by: SURGERY

## 2024-11-02 PROCEDURE — 84550 ASSAY OF BLOOD/URIC ACID: CPT | Performed by: INTERNAL MEDICINE

## 2024-11-02 PROCEDURE — 25010000002 HEPARIN (PORCINE) PER 1000 UNITS: Performed by: HOSPITALIST

## 2024-11-02 PROCEDURE — 80069 RENAL FUNCTION PANEL: CPT | Performed by: INTERNAL MEDICINE

## 2024-11-02 PROCEDURE — 83735 ASSAY OF MAGNESIUM: CPT | Performed by: INTERNAL MEDICINE

## 2024-11-02 PROCEDURE — 93975 VASCULAR STUDY: CPT

## 2024-11-02 RX ORDER — AMLODIPINE BESYLATE 10 MG/1
10 TABLET ORAL
Status: DISCONTINUED | OUTPATIENT
Start: 2024-11-02 | End: 2024-11-05

## 2024-11-02 RX ADMIN — HEPARIN SODIUM 5000 UNITS: 5000 INJECTION INTRAVENOUS; SUBCUTANEOUS at 08:38

## 2024-11-02 RX ADMIN — ATORVASTATIN CALCIUM 10 MG: 20 TABLET, FILM COATED ORAL at 08:38

## 2024-11-02 RX ADMIN — OXYCODONE HYDROCHLORIDE AND ACETAMINOPHEN 500 MG: 500 TABLET ORAL at 08:38

## 2024-11-02 RX ADMIN — HEPARIN SODIUM 5000 UNITS: 5000 INJECTION INTRAVENOUS; SUBCUTANEOUS at 20:31

## 2024-11-02 RX ADMIN — HYDROXYZINE HYDROCHLORIDE 25 MG: 25 TABLET ORAL at 17:42

## 2024-11-02 RX ADMIN — HYDROCODONE BITARTRATE AND ACETAMINOPHEN 1 TABLET: 7.5; 325 TABLET ORAL at 08:38

## 2024-11-02 RX ADMIN — CASTOR OIL AND BALSAM, PERU 1 APPLICATION: 788; 87 OINTMENT TOPICAL at 20:31

## 2024-11-02 RX ADMIN — CASTOR OIL AND BALSAM, PERU 1 APPLICATION: 788; 87 OINTMENT TOPICAL at 08:38

## 2024-11-02 RX ADMIN — PANTOPRAZOLE SODIUM 40 MG: 40 TABLET, DELAYED RELEASE ORAL at 08:38

## 2024-11-02 RX ADMIN — HYOSCYAMINE SULFATE: 16 SOLUTION at 08:39

## 2024-11-02 RX ADMIN — POLYETHYLENE GLYCOL 3350 17 G: 17 POWDER, FOR SOLUTION ORAL at 08:38

## 2024-11-02 RX ADMIN — HYDROCODONE BITARTRATE AND ACETAMINOPHEN 1 TABLET: 7.5; 325 TABLET ORAL at 15:21

## 2024-11-02 RX ADMIN — ACETAMINOPHEN 325MG 650 MG: 325 TABLET ORAL at 17:42

## 2024-11-02 RX ADMIN — HYOSCYAMINE SULFATE: 16 SOLUTION at 20:32

## 2024-11-02 RX ADMIN — AMLODIPINE BESYLATE 10 MG: 10 TABLET ORAL at 17:42

## 2024-11-02 RX ADMIN — TAMSULOSIN HYDROCHLORIDE 0.4 MG: 0.4 CAPSULE ORAL at 08:38

## 2024-11-02 NOTE — PROGRESS NOTES
[facility] Progress Note      Patient Name: Arden Carrasquillo  : 1955  MRN: [id number]  Primary Care Physician:  Pollo Ward MD  Date of admission: 10/24/2024    Subjective     Interval History:   F/u NEEL CKD3    Review of Systems:     Renal doppler normal   Feels better in general.  SBP up to 150s    Objective     Vitals:   Temp:  [97.9 °F (36.6 °C)-98.4 °F (36.9 °C)] 98.2 °F (36.8 °C)  Heart Rate:  [66-71] 66  Resp:  [16-18] 18  BP: (149-156)/(86-95) 149/91    Intake/Output Summary (Last 24 hours) at 2024 1656  Last data filed at 2024 0822  Gross per 24 hour   Intake 120 ml   Output 400 ml   Net -280 ml       Physical Exam:    General Appearance: alert, comfortable no distress  Neck: supple, no JVD  Lungs: CTA bilat no rales  Heart: RRR, normal S1 and S2  Abdomen: soft, nontender, nondistended  Extremities: no edema, cyanosis or clubbing       Scheduled Meds:     vitamin C, 500 mg, Oral, Daily  atorvastatin, 10 mg, Oral, Daily  castor oil-balsam peru, 1 Application, Topical, Q12H  heparin (porcine), 5,000 Units, Subcutaneous, Q12H  pantoprazole, 40 mg, Oral, Daily  polyethylene glycol, 17 g, Oral, Daily  sodium hypochlorite, , Topical, BID  tamsulosin, 0.4 mg, Oral, Daily      IV Meds:        Results Reviewed:   I have personally reviewed the results from the time of this admission to 2024 16:56 EDT     Results from last 7 days   Lab Units 24  0327 24  0328 10/31/24  0341   SODIUM mmol/L 136 136 136   POTASSIUM mmol/L 4.1 3.7 4.0   CHLORIDE mmol/L 99 98 97*   CO2 mmol/L 26.6 26.0 27.0   BUN mg/dL 37* 35* 30*   CREATININE mg/dL 1.73* 1.85* 1.73*   CALCIUM mg/dL 9.4 8.9 9.4   GLUCOSE mg/dL 140* 129* 122*     Estimated Creatinine Clearance: 39.7 mL/min (A) (by C-G formula based on SCr of 1.73 mg/dL (H)).  Results from last 7 days   Lab Units 24  0327 24  0328 10/31/24  0341   MAGNESIUM mg/dL 2.4 2.2 2.1   PHOSPHORUS mg/dL 2.9 3.9  3.8     Results from last 7 days   Lab Units 11/02/24  0327 11/01/24  0328 10/31/24  0341 10/30/24  0346 10/29/24  0255 10/27/24  0329   URIC ACID mg/dL 9.7* 10.0* 9.0* 8.2* 8.3* 7.1*     Results from last 7 days   Lab Units 11/01/24  0328 10/31/24  0341 10/29/24  0255 10/27/24  0329   WBC 10*3/mm3 7.59 7.27 5.85 6.12   HEMOGLOBIN g/dL 11.5* 11.5* 11.2* 10.6*   PLATELETS 10*3/mm3 192 201 220 224           Assessment / Plan     ASSESSMENT:  Non olig NEEL, recurrent - peak Cr 4.3 here on 10/24, but also had NEEL at [place] last month, peak Cr 2.1 then.  Current etiol likely prerenal azotemia due to volume depletion with hypotension and diuretic use.  No e/o RAY by doppler but I still would be inclined to hold ARB for now  CKD stage 3A - BL Cr 1.2 to 1.4.    Recent urinary retention and hydronephrosis.  Armendariz catheter in place.  Hydronephrosis resolved now  History of diastolic CHF.  Compensated, off diuretic, may need resumption at lower dose in 24 to 48h (was taking torsemide 40 BID)   Hypertension not well-controlled    PLAN:  Hold ARB still despite absent RAY given recurrent NEEL  Restart norvasc at higher dose 10 mg daily   May end up needing 2nd agent ie beta blocker  Hold torsemide still but anticipate need to resume at lower dose ie 20 mg daily in 24 to 48h      Camacho Esparza MD  11/02/24  16:56 EDT    [facility]  [phone]

## 2024-11-02 NOTE — PROGRESS NOTES
[facility]     LOS: 9 days     Subjective:    CC:Altered Mental Status (Sepsis alert)    DIET:  Diet Order   Procedures    Diet: Cardiac; Healthy Heart (2-3 Na+); Fluid Consistency: Thin (IDDSI 0)   Tolerating oral intake, having pain in the buttock    Objective:    Vital Signs:  Temp:  [97.9 °F (36.6 °C)-98.4 °F (36.9 °C)] 98.4 °F (36.9 °C)  Heart Rate:  [66-79] 66  Resp:  [16-18] 18  BP: (141-156)/(81-95) 150/86    SpO2:  [94 %-98 %] 97 %  on   ;   Device (Oxygen Therapy): room air  Body mass index is 29.62 kg/m².    Physical Exam  Constitutional:       General: He is not in acute distress.  Cardiovascular:      Rate and Rhythm: Normal rate and regular rhythm.   Pulmonary:      Effort: Pulmonary effort is normal.      Breath sounds: Normal breath sounds.   Abdominal:      General: There is no distension.      Palpations: Abdomen is soft.      Tenderness: There is no abdominal tenderness.   Skin:     General: Skin is warm and dry.   Neurological:      General: No focal deficit present.      Mental Status: He is alert.   Psychiatric:         Mood and Affect: Mood normal.         Behavior: Behavior normal.         Results Review:    Glucose   Date Value Ref Range Status   11/02/2024 140 (H) 65 - 99 mg/dL Final   11/01/2024 129 (H) 65 - 99 mg/dL Final   10/31/2024 122 (H) 65 - 99 mg/dL Final     Results from last 7 days   Lab Units 11/01/24  0328   WBC 10*3/mm3 7.59   HEMOGLOBIN g/dL 11.5*   HEMATOCRIT % 36.4*   PLATELETS 10*3/mm3 192     Results from last 7 days   Lab Units 11/02/24  0327   SODIUM mmol/L 136   POTASSIUM mmol/L 4.1   CHLORIDE mmol/L 99   CO2 mmol/L 26.6   BUN mg/dL 37*   CREATININE mg/dL 1.73*   CALCIUM mg/dL 9.4   GLUCOSE mg/dL 140*         Results from last 7 days   Lab Units 11/02/24  0327   MAGNESIUM mg/dL 2.4         Cultures:  No results found for: \"BLOODCX\", \"URINECX\", \"WOUNDCX\", \"MRSACX\", \"RESPCX\", \"STOOLCX\"    I have reviewed daily medications and changes in " CPOE    Scheduled meds  vitamin C, 500 mg, Oral, Daily  atorvastatin, 10 mg, Oral, Daily  castor oil-balsam peru, 1 Application, Topical, Q12H  heparin (porcine), 5,000 Units, Subcutaneous, Q12H  pantoprazole, 40 mg, Oral, Daily  polyethylene glycol, 17 g, Oral, Daily  sodium hypochlorite, , Topical, BID  tamsulosin, 0.4 mg, Oral, Daily           PRN meds    acetaminophen **OR** acetaminophen **OR** acetaminophen    senna-docusate sodium **AND** polyethylene glycol **AND** bisacodyl **AND** bisacodyl    Calcium Replacement - Follow Nurse / BPA Driven Protocol    cetirizine    HYDROcodone-acetaminophen    hydrOXYzine    lidocaine    Magnesium Standard Dose Replacement - Follow Nurse / BPA Driven Protocol    melatonin    ondansetron ODT **OR** ondansetron    Phosphorus Replacement - Follow Nurse / BPA Driven Protocol    Potassium Replacement - Follow Nurse / BPA Driven Protocol    sodium chloride        Acute renal failure    Type 2 diabetes mellitus with diabetic autonomic neuropathy, with long-term current use of insulin    Hypertension    Stage 3a chronic kidney disease    Hyperlipidemia    Prostate cancer    Acidosis        Assessment/Plan:  Acute on chronic kidney failure with hyperkalemia and metabolic acidosis  -Likely prerenal due to hypotension and diuretics  -Resolved with IV fluids  -Creatinine stable today compared to yesterday  -Discontinued losartan   -Blood pressures are currently reasonably controlled  -renal artery u/s is pending    Urinary retention hydronephrosis  -Catheter in place  -Continue with Flomax  -Follow-up urology outpatient    1-2 blood cultures positive for coag negative staph likely contamination  -Seen by infectious disease  -No further workup    History of diastolic CHF  -volume is stable  -recent echo at [place] with EF of 55% 5/31/2024    Hypertension      Possible discharge on monday      Damien Scott MD  11/02/24  09:59 EDT

## 2024-11-02 NOTE — PLAN OF CARE
Alert x 3. VSS. Armendariz in place. Q2 turn. Wound care completed.       Problem: Adult Inpatient Plan of Care  Goal: Absence of Hospital-Acquired Illness or Injury  Intervention: Identify and Manage Fall Risk  Recent Flowsheet Documentation  Taken 11/2/2024 0545 by Leni Ulrich RN  Safety Promotion/Fall Prevention: safety round/check completed  Taken 11/2/2024 0412 by Leni Ulrich RN  Safety Promotion/Fall Prevention: safety round/check completed  Taken 11/2/2024 0001 by Leni Ulrich RN  Safety Promotion/Fall Prevention: safety round/check completed  Taken 11/1/2024 2202 by Leni Ulrich RN  Safety Promotion/Fall Prevention: safety round/check completed  Taken 11/1/2024 1952 by Leni Ulrich RN  Safety Promotion/Fall Prevention: safety round/check completed   Goal Outcome Evaluation:

## 2024-11-03 LAB
ANION GAP SERPL CALCULATED.3IONS-SCNC: 11.3 MMOL/L (ref 5–15)
BUN SERPL-MCNC: 31 MG/DL (ref 8–23)
BUN/CREAT SERPL: 19.4 (ref 7–25)
CALCIUM SPEC-SCNC: 9.7 MG/DL (ref 8.6–10.5)
CHLORIDE SERPL-SCNC: 100 MMOL/L (ref 98–107)
CO2 SERPL-SCNC: 24.7 MMOL/L (ref 22–29)
CREAT SERPL-MCNC: 1.6 MG/DL (ref 0.76–1.27)
EGFRCR SERPLBLD CKD-EPI 2021: 45.8 ML/MIN/1.73
GLUCOSE SERPL-MCNC: 151 MG/DL (ref 65–99)
POTASSIUM SERPL-SCNC: 4.2 MMOL/L (ref 3.5–5.2)
SODIUM SERPL-SCNC: 136 MMOL/L (ref 136–145)

## 2024-11-03 PROCEDURE — 80048 BASIC METABOLIC PNL TOTAL CA: CPT | Performed by: INTERNAL MEDICINE

## 2024-11-03 PROCEDURE — 25010000002 HEPARIN (PORCINE) PER 1000 UNITS: Performed by: HOSPITALIST

## 2024-11-03 RX ORDER — TORSEMIDE 20 MG/1
20 TABLET ORAL DAILY
Status: DISCONTINUED | OUTPATIENT
Start: 2024-11-04 | End: 2024-11-05 | Stop reason: HOSPADM

## 2024-11-03 RX ADMIN — HEPARIN SODIUM 5000 UNITS: 5000 INJECTION INTRAVENOUS; SUBCUTANEOUS at 20:45

## 2024-11-03 RX ADMIN — HYDROXYZINE HYDROCHLORIDE 25 MG: 25 TABLET ORAL at 08:39

## 2024-11-03 RX ADMIN — HYDROXYZINE HYDROCHLORIDE 25 MG: 25 TABLET ORAL at 20:45

## 2024-11-03 RX ADMIN — POLYETHYLENE GLYCOL 3350 17 G: 17 POWDER, FOR SOLUTION ORAL at 08:38

## 2024-11-03 RX ADMIN — OXYCODONE HYDROCHLORIDE AND ACETAMINOPHEN 500 MG: 500 TABLET ORAL at 08:39

## 2024-11-03 RX ADMIN — HEPARIN SODIUM 5000 UNITS: 5000 INJECTION INTRAVENOUS; SUBCUTANEOUS at 08:39

## 2024-11-03 RX ADMIN — HYOSCYAMINE SULFATE: 16 SOLUTION at 20:45

## 2024-11-03 RX ADMIN — AMLODIPINE BESYLATE 10 MG: 10 TABLET ORAL at 08:39

## 2024-11-03 RX ADMIN — CASTOR OIL AND BALSAM, PERU 1 APPLICATION: 788; 87 OINTMENT TOPICAL at 20:45

## 2024-11-03 RX ADMIN — HYDROCODONE BITARTRATE AND ACETAMINOPHEN 1 TABLET: 7.5; 325 TABLET ORAL at 08:39

## 2024-11-03 RX ADMIN — HYOSCYAMINE SULFATE: 16 SOLUTION at 08:40

## 2024-11-03 RX ADMIN — PANTOPRAZOLE SODIUM 40 MG: 40 TABLET, DELAYED RELEASE ORAL at 08:39

## 2024-11-03 RX ADMIN — HYDROCODONE BITARTRATE AND ACETAMINOPHEN 1 TABLET: 7.5; 325 TABLET ORAL at 20:45

## 2024-11-03 RX ADMIN — ATORVASTATIN CALCIUM 10 MG: 20 TABLET, FILM COATED ORAL at 08:39

## 2024-11-03 RX ADMIN — TAMSULOSIN HYDROCHLORIDE 0.4 MG: 0.4 CAPSULE ORAL at 08:39

## 2024-11-03 RX ADMIN — CASTOR OIL AND BALSAM, PERU 1 APPLICATION: 788; 87 OINTMENT TOPICAL at 08:39

## 2024-11-03 NOTE — PLAN OF CARE
Problem: Adult Inpatient Plan of Care  Goal: Plan of Care Review  Outcome: Progressing  Flowsheets (Taken 11/3/2024 0609)  Progress: no change  Outcome Evaluation: VSS on RA, no change in condition noted on assessment. Meds admin as ordered. Safety precautions maintained, plan of care ongoing.  Plan of Care Reviewed With: patient  Goal: Patient-Specific Goal (Individualized)  Outcome: Progressing  Goal: Absence of Hospital-Acquired Illness or Injury  Outcome: Progressing  Intervention: Identify and Manage Fall Risk  Recent Flowsheet Documentation  Taken 11/3/2024 0607 by Shameka Mazariegos RN  Safety Promotion/Fall Prevention: safety round/check completed  Taken 11/3/2024 0408 by Shameka Mazariegos RN  Safety Promotion/Fall Prevention: safety round/check completed  Taken 11/3/2024 0238 by Shameka Mazariegos RN  Safety Promotion/Fall Prevention: safety round/check completed  Taken 11/2/2024 2216 by Shameka Mazariegos RN  Safety Promotion/Fall Prevention: safety round/check completed  Taken 11/2/2024 2031 by Shameka Mazariegos RN  Safety Promotion/Fall Prevention:   activity supervised   assistive device/personal items within reach   clutter free environment maintained   fall prevention program maintained   room organization consistent   safety round/check completed  Intervention: Prevent Skin Injury  Recent Flowsheet Documentation  Taken 11/3/2024 0607 by Shameka Mazariegos RN  Body Position:   turned   right   legs elevated  Taken 11/3/2024 0408 by Shameka Mazariegos RN  Body Position:   turned   left   legs elevated  Taken 11/3/2024 0238 by Shameka Mazariegos RN  Body Position:   turned   right   legs elevated  Taken 11/3/2024 0018 by Shameka Mazariegos RN  Body Position:   supine   weight shifting   legs elevated  Taken 11/2/2024 2216 by Shameka Mazariegos RN  Body Position:   turned   right   legs elevated  Taken 11/2/2024 2031 by Shameka Mazariegos RN  Body Position:   tilted   left   legs elevated  Skin Protection:   incontinence pads utilized   skin  sealant/moisture barrier applied  Intervention: Prevent and Manage VTE (Venous Thromboembolism) Risk  Recent Flowsheet Documentation  Taken 11/3/2024 0018 by Shameka Mazariegos RN  VTE Prevention/Management:   bilateral   SCDs (sequential compression devices) on  Taken 11/2/2024 2031 by Shameka Mazariegos RN  VTE Prevention/Management:   bilateral   SCDs (sequential compression devices) on  Intervention: Prevent Infection  Recent Flowsheet Documentation  Taken 11/3/2024 0018 by Shameka Mazariegos RN  Infection Prevention:   environmental surveillance performed   rest/sleep promoted   single patient room provided  Taken 11/2/2024 2031 by Shameka Mazariegos RN  Infection Prevention:   environmental surveillance performed   rest/sleep promoted   single patient room provided  Goal: Optimal Comfort and Wellbeing  Outcome: Progressing  Intervention: Provide Person-Centered Care  Recent Flowsheet Documentation  Taken 11/3/2024 0018 by Shameka Mazariegos RN  Trust Relationship/Rapport:   care explained   choices provided  Taken 11/2/2024 2031 by Shameka Mazariegos RN  Trust Relationship/Rapport:   care explained   choices provided  Goal: Readiness for Transition of Care  Outcome: Progressing     Problem: Fall Injury Risk  Goal: Absence of Fall and Fall-Related Injury  Outcome: Progressing  Intervention: Identify and Manage Contributors  Recent Flowsheet Documentation  Taken 11/3/2024 0607 by Shameka Mazariegos RN  Medication Review/Management: medications reviewed  Taken 11/3/2024 0408 by Shameka Mazariegos RN  Medication Review/Management: medications reviewed  Taken 11/3/2024 0238 by Shameka Mazariegos RN  Medication Review/Management: medications reviewed  Taken 11/2/2024 2216 by Shameka Mazariegos RN  Medication Review/Management: medications reviewed  Taken 11/2/2024 2031 by Shameka Mazariegos RN  Medication Review/Management: medications reviewed  Intervention: Promote Injury-Free Environment  Recent Flowsheet Documentation  Taken 11/3/2024 0607 by Shameka Mazariegos  RN  Safety Promotion/Fall Prevention: safety round/check completed  Taken 11/3/2024 0408 by Shameka Mazariegos RN  Safety Promotion/Fall Prevention: safety round/check completed  Taken 11/3/2024 0238 by Shameka Mazariegos RN  Safety Promotion/Fall Prevention: safety round/check completed  Taken 11/2/2024 2216 by Shameka Mazariegos RN  Safety Promotion/Fall Prevention: safety round/check completed  Taken 11/2/2024 2031 by Shameka Mazariegos RN  Safety Promotion/Fall Prevention:   activity supervised   assistive device/personal items within reach   clutter free environment maintained   fall prevention program maintained   room organization consistent   safety round/check completed     Problem: Skin Injury Risk Increased  Goal: Skin Health and Integrity  Outcome: Progressing  Intervention: Optimize Skin Protection  Recent Flowsheet Documentation  Taken 11/3/2024 0607 by Shameka Mazariegos RN  Head of Bed (HOB) Positioning: HOB at 20-30 degrees  Taken 11/3/2024 0408 by Shameka Mazariegos RN  Head of Bed (HOB) Positioning: HOB at 20-30 degrees  Taken 11/3/2024 0238 by Shameka Mazariegos RN  Head of Bed (HOB) Positioning: HOB at 20-30 degrees  Taken 11/3/2024 0018 by Shameka Mazariegos RN  Head of Bed (HOB) Positioning: HOB at 20-30 degrees  Taken 11/2/2024 2216 by Shameka Mazariegos RN  Head of Bed (HOB) Positioning: HOB at 20-30 degrees  Taken 11/2/2024 2031 by Shameka Mazariegos RN  Pressure Reduction Techniques:   heels elevated off bed   positioned off wounds   pressure points protected   weight shift assistance provided  Head of Bed (HOB) Positioning: HOB at 20-30 degrees  Pressure Reduction Devices: specialty bed utilized  Skin Protection:   incontinence pads utilized   skin sealant/moisture barrier applied     Problem: Sepsis/Septic Shock  Goal: Optimal Coping  Outcome: Progressing  Goal: Absence of Bleeding  Outcome: Progressing  Goal: Blood Glucose Level Within Target Range  Outcome: Progressing  Goal: Absence of Infection Signs and Symptoms  Outcome:  Progressing  Intervention: Initiate Sepsis Management  Recent Flowsheet Documentation  Taken 11/3/2024 0018 by Shameka Mazariegos, RN  Infection Prevention:   environmental surveillance performed   rest/sleep promoted   single patient room provided  Taken 11/2/2024 2031 by Shameka Mazariegos, RN  Infection Prevention:   environmental surveillance performed   rest/sleep promoted   single patient room provided  Goal: Optimal Nutrition Delivery  Outcome: Progressing   Goal Outcome Evaluation:  Plan of Care Reviewed With: patient        Progress: no change  Outcome Evaluation: VSS on RA, no change in condition noted on assessment. Meds admin as ordered. Safety precautions maintained, plan of care ongoing.

## 2024-11-03 NOTE — PROGRESS NOTES
[facility]     LOS: 10 days     Subjective:    CC:Altered Mental Status (Sepsis alert)    DIET:  Diet Order   Procedures    Diet: Cardiac; Healthy Heart (2-3 Na+); Fluid Consistency: Thin (IDDSI 0)     Patient with no new complaints, patient, patient wakes up talks to me without any difficulty but is fairly quick to fall back asleep    Objective:    Vital Signs:  Temp:  [98.2 °F (36.8 °C)-99.9 °F (37.7 °C)] 99.9 °F (37.7 °C)  Heart Rate:  [67-82] 67  Resp:  [18] 18  BP: (124-149)/(79-91) 148/79    SpO2:  [96 %-98 %] 98 %  on   ;   Device (Oxygen Therapy): room air  Body mass index is 29.12 kg/m².    Physical Exam  Constitutional:       General: He is not in acute distress.     Comments: Patient will wake up and talk to me and is alert at that time but will go back to sleep   Cardiovascular:      Rate and Rhythm: Normal rate and regular rhythm.   Pulmonary:      Effort: Pulmonary effort is normal.      Breath sounds: Normal breath sounds.   Abdominal:      General: There is no distension.      Palpations: Abdomen is soft.      Tenderness: There is no abdominal tenderness.   Skin:     General: Skin is warm and dry.   Neurological:      General: No focal deficit present.         Results Review:    Glucose   Date Value Ref Range Status   11/03/2024 151 (H) 65 - 99 mg/dL Final   11/02/2024 140 (H) 65 - 99 mg/dL Final   11/01/2024 129 (H) 65 - 99 mg/dL Final     Results from last 7 days   Lab Units 11/01/24  0328   WBC 10*3/mm3 7.59   HEMOGLOBIN g/dL 11.5*   HEMATOCRIT % 36.4*   PLATELETS 10*3/mm3 192     Results from last 7 days   Lab Units 11/03/24  0459   SODIUM mmol/L 136   POTASSIUM mmol/L 4.2   CHLORIDE mmol/L 100   CO2 mmol/L 24.7   BUN mg/dL 31*   CREATININE mg/dL 1.60*   CALCIUM mg/dL 9.7   GLUCOSE mg/dL 151*         Results from last 7 days   Lab Units 11/02/24  0327   MAGNESIUM mg/dL 2.4         Cultures:  No results found for: \"BLOODCX\", \"URINECX\", \"WOUNDCX\", \"MRSACX\", \"RESPCX\", " "\"STOOLCX\"    I have reviewed daily medications and changes in CPOE    Scheduled meds  amLODIPine, 10 mg, Oral, Q24H  vitamin C, 500 mg, Oral, Daily  atorvastatin, 10 mg, Oral, Daily  castor oil-balsam peru, 1 Application, Topical, Q12H  heparin (porcine), 5,000 Units, Subcutaneous, Q12H  pantoprazole, 40 mg, Oral, Daily  polyethylene glycol, 17 g, Oral, Daily  sodium hypochlorite, , Topical, BID  tamsulosin, 0.4 mg, Oral, Daily           PRN meds    acetaminophen **OR** acetaminophen **OR** acetaminophen    senna-docusate sodium **AND** polyethylene glycol **AND** bisacodyl **AND** bisacodyl    Calcium Replacement - Follow Nurse / BPA Driven Protocol    cetirizine    HYDROcodone-acetaminophen    hydrOXYzine    lidocaine    Magnesium Standard Dose Replacement - Follow Nurse / BPA Driven Protocol    melatonin    ondansetron ODT **OR** ondansetron    Phosphorus Replacement - Follow Nurse / BPA Driven Protocol    Potassium Replacement - Follow Nurse / BPA Driven Protocol    sodium chloride        Acute renal failure    Type 2 diabetes mellitus with diabetic autonomic neuropathy, with long-term current use of insulin    Hypertension    Stage 3a chronic kidney disease    Hyperlipidemia    Prostate cancer    Acidosis        Assessment/Plan:  Acute on chronic kidney failure with hyperkalemia and metabolic acidosis  -Likely prerenal due to hypotension and diuretics  -Creatinine stable today compared to yesterday  -losartan was reintroduced and patient had a bump in creatinine  -Blood pressures are currently reasonably controlled  -renal artery u/s shows normal right and left renal artery  -Hold on restarting torsemide    Urinary retention hydronephrosis  -Catheter in place  -Continue with Flomax  -Follow-up urology outpatient    1-2 blood cultures positive for coag negative staph likely contamination  -Seen by infectious disease  -No further workup    History of diastolic CHF  -volume is stable  -recent echo at [place] with " EF of 55% 5/31/2024    Hypertension  -Restarting Norvasc    Possible discharge 1 to 2 days      Damien Scott MD  11/03/24  09:47 EST

## 2024-11-03 NOTE — PROGRESS NOTES
[facility] Progress Note      Patient Name: Arden Carrasquillo  : 1955  MRN: [id number]  Primary Care Physician:  Pollo Ward MD  Date of admission: 10/24/2024    Subjective     Interval History:   F/u NEEL     Review of Systems:   Sleeping comfortably     Objective     Vitals:   Temp:  [98.4 °F (36.9 °C)-99.9 °F (37.7 °C)] 98.9 °F (37.2 °C)  Heart Rate:  [67-82] 67  Resp:  [18] 18  BP: (120-149)/(79-89) 120/80    Intake/Output Summary (Last 24 hours) at 11/3/2024 1807  Last data filed at 11/3/2024 0822  Gross per 24 hour   Intake 240 ml   Output 1050 ml   Net -810 ml       Physical Exam:    General Appearance: AAM resting comfortably on RA  Neck: supple, no JVD  Lungs: CTA bilat no rales  Heart: RRR, normal S1 and S2  Abdomen: soft, nontender, nondistended  Extremities: no edema, cyanosis or clubbing       Scheduled Meds:     amLODIPine, 10 mg, Oral, Q24H  vitamin C, 500 mg, Oral, Daily  atorvastatin, 10 mg, Oral, Daily  castor oil-balsam peru, 1 Application, Topical, Q12H  heparin (porcine), 5,000 Units, Subcutaneous, Q12H  pantoprazole, 40 mg, Oral, Daily  polyethylene glycol, 17 g, Oral, Daily  sodium hypochlorite, , Topical, BID  tamsulosin, 0.4 mg, Oral, Daily      IV Meds:        Results Reviewed:   I have personally reviewed the results from the time of this admission to 11/3/2024 18:07 EST     Results from last 7 days   Lab Units 24  0459 24  0327 24  0328   SODIUM mmol/L 136 136 136   POTASSIUM mmol/L 4.2 4.1 3.7   CHLORIDE mmol/L 100 99 98   CO2 mmol/L 24.7 26.6 26.0   BUN mg/dL 31* 37* 35*   CREATININE mg/dL 1.60* 1.73* 1.85*   CALCIUM mg/dL 9.7 9.4 8.9   GLUCOSE mg/dL 151* 140* 129*     Estimated Creatinine Clearance: 42.5 mL/min (A) (by C-G formula based on SCr of 1.6 mg/dL (H)).  Results from last 7 days   Lab Units 24  0327 24  0328 10/31/24  0341   MAGNESIUM mg/dL 2.4 2.2 2.1   PHOSPHORUS mg/dL 2.9 3.9 3.8     Results from last 7  days   Lab Units 11/02/24 0327 11/01/24  0328 10/31/24  0341 10/30/24  0346 10/29/24  0255   URIC ACID mg/dL 9.7* 10.0* 9.0* 8.2* 8.3*     Results from last 7 days   Lab Units 11/01/24  0328 10/31/24  0341 10/29/24  0255   WBC 10*3/mm3 7.59 7.27 5.85   HEMOGLOBIN g/dL 11.5* 11.5* 11.2*   PLATELETS 10*3/mm3 192 201 220           Assessment / Plan     ASSESSMENT:  Non olig NEEL, recurrent - peak Cr 4.3 here on 10/24, but also had NEEL at [place] last month, peak Cr 2.1 then.  Current etiol likely prerenal azotemia due to volume depletion with hypotension and diuretic use.  No e/o RAY by doppler but I still would be inclined to hold ARB for now  CKD stage 3A - BL Cr 1.2 to 1.4.    Recent urinary retention and hydronephrosis.  Armendariz catheter in place.  Hydronephrosis resolved now  History of diastolic CHF.  Compensated, off diuretic, plan resumption tomorrow at lower dose (was taking torsemide 40 BID)   Hypertension - controlled, resumed norvasc at lower dose.  Holding ARB as above.  SBP 120s to 140s    PLAN:  Continue to hold ARB  Continue norvasc 10 mg  Resume torsemide at lower dose 20mg daily tomorrow  No objection to discharge tomorrow         Camacho Esparza MD  11/03/24  18:07 [facility]    [facility]  [phone]

## 2024-11-04 LAB
ANION GAP SERPL CALCULATED.3IONS-SCNC: 8.6 MMOL/L (ref 5–15)
BASOPHILS # BLD AUTO: 0.02 10*3/MM3 (ref 0–0.2)
BASOPHILS NFR BLD AUTO: 0.2 % (ref 0–1.5)
BUN SERPL-MCNC: 30 MG/DL (ref 8–23)
BUN/CREAT SERPL: 18.5 (ref 7–25)
CALCIUM SPEC-SCNC: 9.1 MG/DL (ref 8.6–10.5)
CHLORIDE SERPL-SCNC: 100 MMOL/L (ref 98–107)
CO2 SERPL-SCNC: 26.4 MMOL/L (ref 22–29)
CREAT SERPL-MCNC: 1.62 MG/DL (ref 0.76–1.27)
DEPRECATED RDW RBC AUTO: 42.2 FL (ref 37–54)
EGFRCR SERPLBLD CKD-EPI 2021: 45.1 ML/MIN/1.73
EOSINOPHIL # BLD AUTO: 0.24 10*3/MM3 (ref 0–0.4)
EOSINOPHIL NFR BLD AUTO: 3 % (ref 0.3–6.2)
ERYTHROCYTE [DISTWIDTH] IN BLOOD BY AUTOMATED COUNT: 13.8 % (ref 12.3–15.4)
GLUCOSE BLDC GLUCOMTR-MCNC: 201 MG/DL (ref 70–130)
GLUCOSE SERPL-MCNC: 226 MG/DL (ref 65–99)
HCT VFR BLD AUTO: 34.5 % (ref 37.5–51)
HGB BLD-MCNC: 11.3 G/DL (ref 13–17.7)
IMM GRANULOCYTES # BLD AUTO: 0.06 10*3/MM3 (ref 0–0.05)
IMM GRANULOCYTES NFR BLD AUTO: 0.7 % (ref 0–0.5)
LYMPHOCYTES # BLD AUTO: 1.43 10*3/MM3 (ref 0.7–3.1)
LYMPHOCYTES NFR BLD AUTO: 17.7 % (ref 19.6–45.3)
MCH RBC QN AUTO: 27.7 PG (ref 26.6–33)
MCHC RBC AUTO-ENTMCNC: 32.8 G/DL (ref 31.5–35.7)
MCV RBC AUTO: 84.6 FL (ref 79–97)
MONOCYTES # BLD AUTO: 0.74 10*3/MM3 (ref 0.1–0.9)
MONOCYTES NFR BLD AUTO: 9.1 % (ref 5–12)
NEUTROPHILS NFR BLD AUTO: 5.6 10*3/MM3 (ref 1.7–7)
NEUTROPHILS NFR BLD AUTO: 69.3 % (ref 42.7–76)
NRBC BLD AUTO-RTO: 0 /100 WBC (ref 0–0.2)
PLATELET # BLD AUTO: 161 10*3/MM3 (ref 140–450)
PMV BLD AUTO: 10.3 FL (ref 6–12)
POTASSIUM SERPL-SCNC: 4.1 MMOL/L (ref 3.5–5.2)
RBC # BLD AUTO: 4.08 10*6/MM3 (ref 4.14–5.8)
SODIUM SERPL-SCNC: 135 MMOL/L (ref 136–145)
WBC NRBC COR # BLD AUTO: 8.09 10*3/MM3 (ref 3.4–10.8)

## 2024-11-04 PROCEDURE — 80048 BASIC METABOLIC PNL TOTAL CA: CPT | Performed by: INTERNAL MEDICINE

## 2024-11-04 PROCEDURE — 97530 THERAPEUTIC ACTIVITIES: CPT

## 2024-11-04 PROCEDURE — 85025 COMPLETE CBC W/AUTO DIFF WBC: CPT | Performed by: INTERNAL MEDICINE

## 2024-11-04 PROCEDURE — 25010000002 HEPARIN (PORCINE) PER 1000 UNITS: Performed by: HOSPITALIST

## 2024-11-04 PROCEDURE — 82948 REAGENT STRIP/BLOOD GLUCOSE: CPT

## 2024-11-04 RX ADMIN — TAMSULOSIN HYDROCHLORIDE 0.4 MG: 0.4 CAPSULE ORAL at 09:24

## 2024-11-04 RX ADMIN — HYDROXYZINE HYDROCHLORIDE 25 MG: 25 TABLET ORAL at 09:24

## 2024-11-04 RX ADMIN — HEPARIN SODIUM 5000 UNITS: 5000 INJECTION INTRAVENOUS; SUBCUTANEOUS at 20:52

## 2024-11-04 RX ADMIN — POLYETHYLENE GLYCOL 3350 17 G: 17 POWDER, FOR SOLUTION ORAL at 09:24

## 2024-11-04 RX ADMIN — AMLODIPINE BESYLATE 10 MG: 10 TABLET ORAL at 09:24

## 2024-11-04 RX ADMIN — PANTOPRAZOLE SODIUM 40 MG: 40 TABLET, DELAYED RELEASE ORAL at 09:24

## 2024-11-04 RX ADMIN — HYDROXYZINE HYDROCHLORIDE 25 MG: 25 TABLET ORAL at 16:10

## 2024-11-04 RX ADMIN — HYDROCODONE BITARTRATE AND ACETAMINOPHEN 1 TABLET: 7.5; 325 TABLET ORAL at 05:29

## 2024-11-04 RX ADMIN — HEPARIN SODIUM 5000 UNITS: 5000 INJECTION INTRAVENOUS; SUBCUTANEOUS at 09:24

## 2024-11-04 RX ADMIN — CASTOR OIL AND BALSAM, PERU 1 APPLICATION: 788; 87 OINTMENT TOPICAL at 09:25

## 2024-11-04 RX ADMIN — HYOSCYAMINE SULFATE: 16 SOLUTION at 20:52

## 2024-11-04 RX ADMIN — ATORVASTATIN CALCIUM 10 MG: 20 TABLET, FILM COATED ORAL at 09:24

## 2024-11-04 RX ADMIN — OXYCODONE HYDROCHLORIDE AND ACETAMINOPHEN 500 MG: 500 TABLET ORAL at 09:24

## 2024-11-04 RX ADMIN — CASTOR OIL AND BALSAM, PERU 1 APPLICATION: 788; 87 OINTMENT TOPICAL at 20:52

## 2024-11-04 RX ADMIN — HYDROCODONE BITARTRATE AND ACETAMINOPHEN 1 TABLET: 7.5; 325 TABLET ORAL at 16:10

## 2024-11-04 RX ADMIN — HYOSCYAMINE SULFATE: 16 SOLUTION at 09:25

## 2024-11-04 RX ADMIN — TORSEMIDE 20 MG: 20 TABLET ORAL at 09:24

## 2024-11-04 NOTE — CASE MANAGEMENT/SOCIAL WORK
"Continued Stay Note  [facility]     Patient Name: Arden Carrasquillo  MRN: [id number]  Today's Date: 11/4/2024    Admit Date: 10/24/2024    Plan: Home with family support via [facility] at 9:30am.   Discharge Plan       Row Name 11/04/24 1529       Plan    Plan Home with family support via [facility] at 9:30am.    Patient/Family in Agreement with Plan yes    Plan Comments CCP received call from Breonna, to discuss DC. CCP explained insurance denied pre-cert and stated pt has used all skilled days. CCP explained DC plan is home with family support and hired caregiver's vs LTC Medicaid pending vs LTC private pay. Breonna stated she \"would not put her dad in a nursing home\". Breonna stated plans for pt to return home with assistance from family. Breonna stated she has a hospital bed, wheelchair and walker at home. [facility] transport arranged for 9:30am; MD, RN and Breonna notified of transport time. Ap CADE/CCP      Row Name 11/04/24 1332       Plan    Plan TBD    Patient/Family in Agreement with Plan yes    Plan Comments Per Varun CHRISTIAN/JOHANA note pt has used all of his skilled bed days (over 100) and pt denied for SNF. [facility] has made 3 attempts, leaving 2 VM's, to reach Breonna, pts daughter, to discuss DC. [facility] Manager notified. Ap CADE/CCP                   Discharge Codes    No documentation.                 Expected Discharge Date and Time       Expected Discharge Date Expected Discharge Time    Nov 5, 2024               Tina Pérez RN    "

## 2024-11-04 NOTE — CASE MANAGEMENT/SOCIAL WORK
Continued Stay Note  [facility]     Patient Name: Arden Carrasquillo  MRN: [id number]  Today's Date: 11/4/2024    Admit Date: 10/24/2024    Plan: TBD   Discharge Plan       Row Name 11/04/24 1332       Plan    Plan TBD    Patient/Family in Agreement with Plan yes    Plan Comments Per Varun CHRISTIAN/CCP note pt has used all of his skilled bed days (over 100) and pt denied for SNF. CCP has made 3 attempts, leaving 2 VM's, to reach Breonna, pts daughter, to discuss DC. CCP Manager notified. Ap CADE/CCP                   Discharge Codes    No documentation.                 Expected Discharge Date and Time       Expected Discharge Date Expected Discharge Time    Nov 5, 2024               Tina Pérez RN

## 2024-11-04 NOTE — PROGRESS NOTES
[facility] Progress Note      Patient Name: Arden Carrasquillo  : 1955  MRN: [id number]  Primary Care Physician:  Pollo Ward MD  Date of admission: 10/24/2024    Subjective     Interval History:   F/u NEEL   The patient is feeling the same has Armendariz catheter anchored in, no chest pain or shortness of air, no orthopnea or PND.  Review of Systems:   As noted above    Objective     Vitals:   Temp:  [98.2 °F (36.8 °C)-99 °F (37.2 °C)] 99 °F (37.2 °C)  Heart Rate:  [77-85] 77  Resp:  [16-18] 18  BP: ()/(74-99) 147/99    Intake/Output Summary (Last 24 hours) at 2024 1006  Last data filed at 2024 0500  Gross per 24 hour   Intake 240 ml   Output 625 ml   Net -385 ml       Physical Exam:    General Appearance: Awake, alert, no acute distress, chronically  Neck: supple, no JVD  Lungs: CTA, unlabored breathing effort  Heart: RRR, no rub  Abdomen: soft, nontender, nondistended  Extremities: no edema, cyanosis or clubbing  : Armendariz catheter anchored in place       Scheduled Meds:     amLODIPine, 10 mg, Oral, Q24H  vitamin C, 500 mg, Oral, Daily  atorvastatin, 10 mg, Oral, Daily  castor oil-balsam peru, 1 Application, Topical, Q12H  heparin (porcine), 5,000 Units, Subcutaneous, Q12H  pantoprazole, 40 mg, Oral, Daily  polyethylene glycol, 17 g, Oral, Daily  sodium hypochlorite, , Topical, BID  tamsulosin, 0.4 mg, Oral, Daily  torsemide, 20 mg, Oral, Daily      IV Meds:        Results Reviewed:   I have personally reviewed the results from the time of this admission to 2024 10:06 EST     Results from last 7 days   Lab Units 24  0307 24  0459 24  0327   SODIUM mmol/L 135* 136 136   POTASSIUM mmol/L 4.1 4.2 4.1   CHLORIDE mmol/L 100 100 99   CO2 mmol/L 26.4 24.7 26.6   BUN mg/dL 30* 31* 37*   CREATININE mg/dL 1.62* 1.60* 1.73*   CALCIUM mg/dL 9.1 9.7 9.4   GLUCOSE mg/dL 226* 151* 140*     Estimated Creatinine Clearance: 42.2 mL/min (A) (by C-G formula  based on SCr of 1.62 mg/dL (H)).  Results from last 7 days   Lab Units 11/02/24  0327 11/01/24  0328 10/31/24  0341   MAGNESIUM mg/dL 2.4 2.2 2.1   PHOSPHORUS mg/dL 2.9 3.9 3.8     Results from last 7 days   Lab Units 11/02/24  0327 11/01/24  0328 10/31/24  0341 10/30/24  0346 10/29/24  0255   URIC ACID mg/dL 9.7* 10.0* 9.0* 8.2* 8.3*     Results from last 7 days   Lab Units 11/04/24  0307 11/01/24  0328 10/31/24  0341 10/29/24  0255   WBC 10*3/mm3 8.09 7.59 7.27 5.85   HEMOGLOBIN g/dL 11.3* 11.5* 11.5* 11.2*   PLATELETS 10*3/mm3 161 192 201 220           Assessment / Plan     ASSESSMENT:  Non olig NEEL, recurrent - peak Cr 4.3 here on 10/24, but also had NEEL at [place] last month, peak Cr 2.1 then.  Current etiol likely prerenal azotemia due to volume depletion with hypotension and diuretic use.  No e/o RAY by doppler but I still would be inclined to hold ARB for now creatinine today down to 1.62  CKD stage 3A - BL Cr 1.2 to 1.4.    Recent urinary retention and hydronephrosis.  Armendariz catheter in place.  Hydronephrosis resolved now  History of diastolic CHF.  Compensated, off diuretic, plan resumption tomorrow at lower dose (was taking torsemide 40 BID)   Hypertension -reasonably controlled    PLAN:  Continue to hold off ARB  Continue the same treatment including amlodipine torsemide  3.  The patient could be discharged from the renal standpoint and will plan follow-up in the office.      I reviewed the chart and other providers notes, I reviewed labs  I discussed the case with the patient's nurse at the bedside  Copied text in this note has been reviewed and is accurate as of 11/04/24.           Lionel Cabral MD  11/04/24  10:06 [facility]    [facility]  [phone]

## 2024-11-04 NOTE — PLAN OF CARE
Goal Outcome Evaluation:  Plan of Care Reviewed With: patient           Outcome Evaluation: Pt demonstrates some increased strength as he was able to perform bed mobility and transfers with less assistance this date. Pt with some improved sitting balance as he was able to sit EOB with SBA. PT continues to have impaired standing balance as he was able weight shift to take side steps to the HOB. PT will continue to follow to address strength, mobility, and gait.    Anticipated Discharge Disposition (PT): skilled nursing facility

## 2024-11-04 NOTE — PLAN OF CARE
Problem: Adult Inpatient Plan of Care  Goal: Plan of Care Review  Outcome: Progressing  Goal: Patient-Specific Goal (Individualized)  Outcome: Progressing  Goal: Absence of Hospital-Acquired Illness or Injury  Outcome: Progressing  Intervention: Identify and Manage Fall Risk  Recent Flowsheet Documentation  Taken 11/4/2024 1425 by Kota Brambila RN  Safety Promotion/Fall Prevention:   safety round/check completed   room organization consistent  Taken 11/4/2024 0822 by Kota Brambila RN  Safety Promotion/Fall Prevention:   safety round/check completed   room organization consistent  Intervention: Prevent Skin Injury  Recent Flowsheet Documentation  Taken 11/4/2024 1425 by Kota Brambila RN  Body Position: turned  Skin Protection:   transparent dressing maintained   incontinence pads utilized  Taken 11/4/2024 0822 by Kota Brambila RN  Body Position:   supine   turned  Skin Protection: transparent dressing maintained  Intervention: Prevent and Manage VTE (Venous Thromboembolism) Risk  Recent Flowsheet Documentation  Taken 11/4/2024 0822 by Kota Brambila RN  VTE Prevention/Management:   bilateral   SCDs (sequential compression devices) on  Intervention: Prevent Infection  Recent Flowsheet Documentation  Taken 11/4/2024 1425 by Kota Brambila RN  Infection Prevention:   single patient room provided   rest/sleep promoted   hand hygiene promoted  Taken 11/4/2024 0822 by Kota Brambila RN  Infection Prevention:   single patient room provided   rest/sleep promoted   hand hygiene promoted  Goal: Optimal Comfort and Wellbeing  Outcome: Progressing  Intervention: Provide Person-Centered Care  Recent Flowsheet Documentation  Taken 11/4/2024 1425 by Kota Brambila RN  Trust Relationship/Rapport:   thoughts/feelings acknowledged   reassurance provided   questions encouraged   questions answered   empathic listening  provided   emotional support provided   choices provided   care explained  Taken 11/4/2024 0822 by Kota Brambila RN  Trust Relationship/Rapport:   thoughts/feelings acknowledged   reassurance provided   questions encouraged   questions answered   empathic listening provided   emotional support provided   choices provided   care explained  Goal: Readiness for Transition of Care  Outcome: Progressing     Problem: Fall Injury Risk  Goal: Absence of Fall and Fall-Related Injury  Outcome: Progressing  Intervention: Identify and Manage Contributors  Recent Flowsheet Documentation  Taken 11/4/2024 1425 by Kota Brambila RN  Medication Review/Management: medications reviewed  Self-Care Promotion: independence encouraged  Taken 11/4/2024 0822 by Kota Brambila RN  Medication Review/Management: medications reviewed  Self-Care Promotion: independence encouraged  Intervention: Promote Injury-Free Environment  Recent Flowsheet Documentation  Taken 11/4/2024 1425 by Kota Brambila RN  Safety Promotion/Fall Prevention:   safety round/check completed   room organization consistent  Taken 11/4/2024 0822 by Kota Brambila RN  Safety Promotion/Fall Prevention:   safety round/check completed   room organization consistent     Problem: Skin Injury Risk Increased  Goal: Skin Health and Integrity  Outcome: Progressing  Intervention: Optimize Skin Protection  Recent Flowsheet Documentation  Taken 11/4/2024 1425 by Kota Bramibla RN  Activity Management: activity encouraged  Head of Bed (HOB) Positioning: HOB at 30-45 degrees  Skin Protection:   transparent dressing maintained   incontinence pads utilized  Taken 11/4/2024 0822 by Kota Brambila RN  Activity Management: activity encouraged  Head of Bed (HOB) Positioning: HOB at 30-45 degrees  Skin Protection: transparent dressing maintained     Problem: Sepsis/Septic Shock  Goal: Optimal  Coping  Outcome: Progressing  Intervention: Support Patient and Family Response  Recent Flowsheet Documentation  Taken 11/4/2024 1425 by Kota Brambila RN  Supportive Measures: active listening utilized  Family/Support System Care:   support provided   self-care encouraged  Taken 11/4/2024 0822 by Kota Brambila RN  Supportive Measures: active listening utilized  Family/Support System Care:   support provided   self-care encouraged  Goal: Absence of Bleeding  Outcome: Progressing  Goal: Blood Glucose Level Within Target Range  Outcome: Progressing  Goal: Absence of Infection Signs and Symptoms  Outcome: Progressing  Intervention: Initiate Sepsis Management  Recent Flowsheet Documentation  Taken 11/4/2024 1425 by Kota Brambila RN  Infection Management: aseptic technique maintained  Infection Prevention:   single patient room provided   rest/sleep promoted   hand hygiene promoted  Taken 11/4/2024 0822 by Kota Brambila RN  Infection Management: aseptic technique maintained  Infection Prevention:   single patient room provided   rest/sleep promoted   hand hygiene promoted  Intervention: Promote Recovery  Recent Flowsheet Documentation  Taken 11/4/2024 1425 by Kota Brambila RN  Activity Management: activity encouraged  Airway/Ventilation Management: oxygen therapy provided  Sleep/Rest Enhancement: awakenings minimized  Taken 11/4/2024 0822 by Kota Brambila RN  Activity Management: activity encouraged  Airway/Ventilation Management: oxygen therapy provided  Sleep/Rest Enhancement: awakenings minimized  Goal: Optimal Nutrition Delivery  Outcome: Progressing   Goal Outcome Evaluation:

## 2024-11-04 NOTE — PROGRESS NOTES
Name: Arden Carrasquillo ADMIT: 10/24/2024   : 1955  PCP: Pollo Ward MD    MRN: [id number] LOS: 11 days   AGE/SEX: 69 y.o. male  ROOM: [facility]/     Subjective   Subjective   Feeling fine today. No complaints. Eager to go home.       Objective   Objective   Vital Signs  Temp:  [98.1 °F (36.7 °C)-99 °F (37.2 °C)] 98.1 °F (36.7 °C)  Heart Rate:  [77-85] 83  Resp:  [16-18] 18  BP: ()/(65-99) 106/65  SpO2:  [95 %-97 %] 97 %  on   ;   Device (Oxygen Therapy): room air  Body mass index is 29.48 kg/m².  Physical Exam  Vitals and nursing note reviewed. Exam conducted with a chaperone present (Cousin).   Constitutional:       General: He is not in acute distress.     Appearance: He is ill-appearing (chronically). He is not toxic-appearing or diaphoretic.   HENT:      Head: Normocephalic.      Nose: Nose normal.      Mouth/Throat:      Mouth: Mucous membranes are moist.      Pharynx: Oropharynx is clear.   Eyes:      General: No scleral icterus.        Right eye: No discharge.         Left eye: No discharge.      Conjunctiva/sclera: Conjunctivae normal.   Cardiovascular:      Rate and Rhythm: Normal rate and regular rhythm.      Pulses: Normal pulses.   Pulmonary:      Effort: Pulmonary effort is normal. No respiratory distress.      Breath sounds: Normal breath sounds. No wheezing or rales.      Comments: Anteriorly   Abdominal:      General: Bowel sounds are normal. There is no distension.      Palpations: Abdomen is soft.      Tenderness: There is no abdominal tenderness.   Genitourinary:     Comments: Yellow urine in dash bag  Musculoskeletal:         General: No swelling.      Cervical back: Neck supple.      Comments: SCDs in place   Skin:     General: Skin is warm and dry.      Capillary Refill: Capillary refill takes less than 2 seconds.      Coloration: Skin is not jaundiced.   Neurological:      Mental Status: He is alert. Mental status is at baseline.      Cranial Nerves: No cranial nerve deficit.  "     Coordination: Coordination normal.   Psychiatric:         Mood and Affect: Mood normal.         Behavior: Behavior normal.       Results Review     I reviewed the patient's new clinical results.  Results from last 7 days   Lab Units 11/04/24  0307 11/01/24  0328 10/31/24  0341 10/29/24  0255   WBC 10*3/mm3 8.09 7.59 7.27 5.85   HEMOGLOBIN g/dL 11.3* 11.5* 11.5* 11.2*   PLATELETS 10*3/mm3 161 192 201 220     Results from last 7 days   Lab Units 11/04/24 0307 11/03/24 0459 11/02/24 0327 11/01/24 0328   SODIUM mmol/L 135* 136 136 136   POTASSIUM mmol/L 4.1 4.2 4.1 3.7   CHLORIDE mmol/L 100 100 99 98   CO2 mmol/L 26.4 24.7 26.6 26.0   BUN mg/dL 30* 31* 37* 35*   CREATININE mg/dL 1.62* 1.60* 1.73* 1.85*   GLUCOSE mg/dL 226* 151* 140* 129*   EGFR mL/min/1.73 45.1* 45.8* 41.7* 38.5*     Results from last 7 days   Lab Units 11/02/24  0327 11/01/24  0328 10/31/24  0341 10/30/24  0346   ALBUMIN g/dL 3.2* 3.4* 3.2* 3.3*     Results from last 7 days   Lab Units 11/04/24  0307 11/03/24  0459 11/02/24  0327 11/01/24  0328 10/31/24  0341 10/30/24  0346   CALCIUM mg/dL 9.1 9.7 9.4 8.9 9.4 9.2   ALBUMIN g/dL  --   --  3.2* 3.4* 3.2* 3.3*   MAGNESIUM mg/dL  --   --  2.4 2.2 2.1 2.0   PHOSPHORUS mg/dL  --   --  2.9 3.9 3.8 3.3       No results found for: \"HGBA1C\", \"POCGLU\"    No radiology results for the last day    I have personally reviewed all medications:  Scheduled Medications  amLODIPine, 10 mg, Oral, Q24H  vitamin C, 500 mg, Oral, Daily  atorvastatin, 10 mg, Oral, Daily  castor oil-balsam peru, 1 Application, Topical, Q12H  heparin (porcine), 5,000 Units, Subcutaneous, Q12H  pantoprazole, 40 mg, Oral, Daily  polyethylene glycol, 17 g, Oral, Daily  sodium hypochlorite, , Topical, BID  tamsulosin, 0.4 mg, Oral, Daily  torsemide, 20 mg, Oral, Daily    Infusions   Diet  Diet: Cardiac; Healthy Heart (2-3 Na+); Fluid Consistency: Thin (IDDSI 0)    I have personally reviewed:  [x]  Laboratory   [x]  Microbiology   []  " Radiology   [x]  EKG/Telemetry  [x]  Cardiology/Vascular   []  Pathology    [x]  Records       Assessment/Plan     Active Hospital Problems    Diagnosis  POA    **Acute renal failure [N17.9]  Yes    Acidosis [E87.20]  Unknown    Prostate cancer [C61]  Yes    Hyperlipidemia [E78.5]  Yes    Stage 3a chronic kidney disease [N18.31]  Yes    Hypertension [I10]  Yes    Type 2 diabetes mellitus with diabetic autonomic neuropathy, with long-term current use of insulin [E11.43, Z79.4]  Not Applicable      Resolved Hospital Problems    Diagnosis Date Resolved POA    Hyperkalemia [E87.5] 10/25/2024 Yes       68yo gentleman just admitted to [facility] facility for prolonged stay with sepsis, who presented to ER with NEEL, hypotension, and metabolic encephalopathy.    NEEL/CKD3a  Hyperkalemia  Metabolic acidosis  -Likely prerenal due to hypotension and diuretics  -Cr stable today compared to yesterday  -losartan was reintroduced by Renal and patient had a bump in Cr again--losartan dc'd, Renal recommends continuing to hold for now  -Blood pressures are currently reasonably controlled  -renal artery u/s shows normal right and left renal artery  -torsemide restarted at lower dose this AM by Renal  -okay to dc home per Renal with close f/u in office     Urinary retention  Armendariz in place since [facility] admission  Hydronephrosis  -Armendariz catheter to remain in place per  recs  -Continue Flomax  -Follow-up with Dr. Zuniga 1-2 weeks after dc     1 of 2 blood cultures positive for coag negative staph  -likely contamination  -Seen by ID  -No further workup     History of diastolic CHF  -volume is stable  -recent Echo at [place] with EF of 55% 5/31/2024     Hypertension  -Restarted Norvasc  -BPs improved      Heparin SC for DVT prophylaxis.  DNR.  Discussed with patient, nursing staff, CCP, and care team on multidisciplinary rounds. D/w cousin at bedside.  Anticipate discharge home with family tomorrow. PT recommending SNF but pt has no more  covered days--dtr prefers to take him home after d/w CCP.  Expected Discharge Date: 11/5/2024; Expected Discharge Time:       David Whyte MD  [facility]  11/04/24  15:09 EST

## 2024-11-04 NOTE — THERAPY TREATMENT NOTE
Patient Name: Arden Carrasquillo  : 1955    MRN: [id number]                              Today's Date: 2024       Admit Date: 10/24/2024    Visit Dx:     ICD-10-CM ICD-9-CM   1. Acute renal failure, unspecified acute renal failure type  N17.9 584.9   2. Hypotension, unspecified hypotension type  I95.9 458.9   3. Altered mental status, unspecified altered mental status type  R41.82 780.97   4. Pressure injury of skin of sacral region, unspecified injury stage  L89.159 707.03     707.20   5. Urinary retention  R33.9 788.20   6. Chronic anemia  D64.9 285.9   7. Severe sepsis  A41.9 038.9    R65.20 995.92     Patient Active Problem List   Diagnosis    Type 2 diabetes mellitus with diabetic autonomic neuropathy, with long-term current use of insulin    Degenerative spondylolisthesis    Hypertension    Generalized anxiety disorder    Stage 3a chronic kidney disease    Hyperlipidemia    Prostate cancer    Anemia in stage 3b chronic kidney disease    Gynecomastia    Carcinoma in situ of colon    Bilateral lower extremity edema    Weakness of both lower extremities    Psychophysiological insomnia    History of colon polyps    Lumbar stenosis with neurogenic claudication    S/P cervical spinal fusion    Cervical cord compression with myelopathy    Aneurysm of descending thoracic aorta without rupture    Tremor    Acute renal failure    Acidosis     Past Medical History:   Diagnosis Date    Diabetes mellitus     Positive colorectal cancer screening using Cologuard test 2020    Severe peripheral polyneuropathy 10/19/2023     Past Surgical History:   Procedure Laterality Date    CYSTOSCOPY TRANSURETHRAL RESECTION OF PROSTATE      UPPER GASTROINTESTINAL ENDOSCOPY        General Information       Row Name 24 1119          Physical Therapy Time and Intention    Document Type therapy note (daily note)  -CH     Mode of Treatment individual therapy;physical therapy  -CH       Row Name 24 1119           General Information    Existing Precautions/Restrictions fall;oxygen therapy device and L/min  -       Row Name 11/04/24 1119          Cognition    Orientation Status (Cognition) oriented x 3  -       Row Name 11/04/24 1119          Safety Issues/Impairments Affecting Functional Mobility    Impairments Affecting Function (Mobility) balance;cognition;endurance/activity tolerance;strength;pain  -               User Key  (r) = Recorded By, (t) = Taken By, (c) = Cosigned By      Initials Name Provider Type    Nadine Aguilar, PT Physical Therapist                   Mobility       Row Name 11/04/24 1120          Bed Mobility    Bed Mobility supine-sit;sit-supine  -     Supine-Sit [place] (Bed Mobility) verbal cues;nonverbal cues (demo/gesture);moderate assist (50% patient effort);2 person assist  -     Sit-Supine [place] (Bed Mobility) verbal cues;nonverbal cues (demo/gesture);moderate assist (50% patient effort);2 person assist  -     Assistive Device (Bed Mobility) head of bed elevated;bed rails  -       Row Name 11/04/24 1120          Sit-Stand Transfer    Sit-Stand [place] (Transfers) verbal cues;nonverbal cues (demo/gesture);moderate assist (50% patient effort);2 person assist  -     Assistive Device (Sit-Stand Transfers) walker, front-wheeled  -     Comment, (Sit-Stand Transfer) Pt performed sit to stand x2, pt able to acheive almost fully erect posture on both attempts, stood for about 30 seconds each  -       Row Name 11/04/24 1120          Gait/Stairs (Locomotion)    Comment, (Gait/Stairs) On second stand, pt was instructed to take sidesteps to HOB, pt unable to weight shift to take steps and had to sit back down  -               User Key  (r) = Recorded By, (t) = Taken By, (c) = Cosigned By      Initials Name Provider Type    Nadine Aguilar PT Physical Therapist                   Obj/Interventions    No documentation.                  Goals/Plan       Row Name  11/04/24 1126          Bed Mobility Goal 1 (PT)    Activity/Assistive Device (Bed Mobility Goal 1, PT) sit to supine;supine to sit  -     [place] Level/Cues Needed (Bed Mobility Goal 1, PT) minimum assist (75% or more patient effort);verbal cues required  -CH     Time Frame (Bed Mobility Goal 1, PT) 1 week  -CH     Progress/Outcomes (Bed Mobility Goal 1, PT) goal ongoing  -       Row Name 11/04/24 1126          Transfer Goal 1 (PT)    Activity/Assistive Device (Transfer Goal 1, PT) sit-to-stand/stand-to-sit;walker, rolling  -CH     [place] Level/Cues Needed (Transfer Goal 1, PT) minimum assist (75% or more patient effort);verbal cues required  -CH     Time Frame (Transfer Goal 1, PT) 1 week  -CH     Progress/Outcome (Transfer Goal 1, PT) goal ongoing  -       Row Name 11/04/24 1126          Gait Training Goal 1 (PT)    Activity/Assistive Device (Gait Training Goal 1, PT) gait (walking locomotion);assistive device use;improve balance and speed;increase endurance/gait distance;increase energy conservation;walker, rolling  -     [place] Level (Gait Training Goal 1, PT) minimum assist (75% or more patient effort)  -CH     Distance (Gait Training Goal 1, PT) 15  -CH     Time Frame (Gait Training Goal 1, PT) 1 week  -     Progress/Outcome (Gait Training Goal 1, PT) goal ongoing  [facility]       Row Name 11/04/24 1126          Therapy Assessment/Plan (PT)    Planned Therapy Interventions (PT) balance training;bed mobility training;gait training;home exercise program;patient/family education;strengthening;transfer training  -               User Key  (r) = Recorded By, (t) = Taken By, (c) = Cosigned By      Initials Name Provider Type    CH Nadine Richmond, PT Physical Therapist                   Clinical Impression       Row Name 11/04/24 1124          Pain    Pretreatment Pain Rating 10/10  -CH     Posttreatment Pain Rating 10/10  -CH     Pain Location back  -     Pain Management Interventions  exercise or physical activity utilized;premedicated for activity  -     Response to Pain Interventions activity participation with increased pain  -       Row Name 11/04/24 1124          Plan of Care Review    Plan of Care Reviewed With patient  -     Outcome Evaluation Pt demonstrates some increased strength as he was able to perform bed mobility and transfers with less assistance this date. Pt with some improved sitting balance as he was able to sit EOB with SBA. PT continues to have impaired standing balance as he was able weight shift to take side steps to the HOB. PT will continue to follow to address strength, mobility, and gait.  -       Row Name 11/04/24 1124          Therapy Assessment/Plan (PT)    Therapy Frequency (PT) 5 times/wk  -       Row Name 11/04/24 1124          Positioning and Restraints    Pre-Treatment Position in bed  -     Post Treatment Position bed  -CH     In Bed side lying left;call light within reach;encouraged to call for assist;exit alarm on  -               User Key  (r) = Recorded By, (t) = Taken By, (c) = Cosigned By      Initials Name Provider Type     Nadine Richmond, PT Physical Therapist                   Outcome Measures       Row Name 11/04/24 1127          How much help from another person do you currently need...    Turning from your back to your side while in flat bed without using bedrails? 2  -CH     Moving from lying on back to sitting on the side of a flat bed without bedrails? 2  -CH     Moving to and from a bed to a chair (including a wheelchair)? 1  -CH     Standing up from a chair using your arms (e.g., wheelchair, bedside chair)? 2  -CH     Climbing 3-5 steps with a railing? 1  -CH     To walk in hospital room? 1  -CH     AM-PAC 6 Clicks Score (PT) 9  -CH     Highest Level of Mobility Goal 3 --> Sit at edge of bed  -       Row Name 11/04/24 1127          Functional Assessment    Outcome Measure Options AM-PAC 6 Clicks Basic Mobility (PT)  -                User Key  (r) = Recorded By, (t) = Taken By, (c) = Cosigned By      Initials Name Provider Type     Nadine Richmond, PT Physical Therapist                                 Physical Therapy Education       Title: PT OT SLP Therapies (Done)       Topic: Physical Therapy (Done)       Point: Mobility training (Done)       Learning Progress Summary            Patient Acceptance, E,TB,D, VU,NR by  at 11/4/2024 1128    Acceptance, E, NR by DJ at 10/30/2024 1322    Acceptance, E, VU,NR by DJ at 10/28/2024 1203    Acceptance, E, VU by LB at 10/26/2024 1604    Acceptance, E, VU,NR by DJ at 10/25/2024 1209                      Point: Home exercise program (Done)       Learning Progress Summary            Patient Acceptance, E, VU,NR by DJ at 10/28/2024 1203    Acceptance, E, VU by LB at 10/26/2024 1604                      Point: Body mechanics (Done)       Learning Progress Summary            Patient Acceptance, E,TB,D, VU,NR by  at 11/4/2024 1128    Acceptance, E, NR by DJ at 10/30/2024 1322    Acceptance, E, VU,NR by DJ at 10/28/2024 1203    Acceptance, E, VU by LB at 10/26/2024 1604    Acceptance, E, VU,NR by DJ at 10/25/2024 1209                      Point: Precautions (Done)       Learning Progress Summary            Patient Acceptance, E,TB,D, VU,NR by  at 11/4/2024 1128    Acceptance, E, NR by DJ at 10/30/2024 1322    Acceptance, E, VU,NR by DJ at 10/28/2024 1203    Acceptance, E, VU by LB at 10/26/2024 1604    Acceptance, E, VU,NR by DJ at 10/25/2024 1209                                      User Key       Initials Effective Dates Name Provider Type Discipline     06/16/21 -  Nadine Richmond, PT Physical Therapist PT    DJ 10/25/19 -  Radha Prasad, PT Physical Therapist PT    LB 06/16/21 -  Valeriano Johnston, RN Registered Nurse Nurse                  PT Recommendation and Plan  Planned Therapy Interventions (PT): balance training, bed mobility training, gait training, home exercise program,  patient/family education, strengthening, transfer training  Outcome Evaluation: Pt demonstrates some increased strength as he was able to perform bed mobility and transfers with less assistance this date. Pt with some improved sitting balance as he was able to sit EOB with SBA. PT continues to have impaired standing balance as he was able weight shift to take side steps to the HOB. PT will continue to follow to address strength, mobility, and gait.     Time Calculation:         PT Charges       Row Name 11/04/24 1129             Time Calculation    Start Time 0952  -      Stop Time 1011  -CH      Time Calculation (min) 19 min  -CH      PT Received On 11/04/24  -      PT - Next Appointment 11/05/24  -      PT Goal Re-Cert Due Date 11/11/24  -         Time Calculation- PT    Total Timed Code Minutes- PT 19 minute(s)  -         Timed Charges    55515 - PT Therapeutic Activity Minutes 19  -CH         Total Minutes    Timed Charges Total Minutes 19  -CH       Total Minutes 19  -CH                User Key  (r) = Recorded By, (t) = Taken By, (c) = Cosigned By      Initials Name Provider Type     Nadine Richmond PT Physical Therapist                  Therapy Charges for Today       Code Description Service Date Service Provider Modifiers Qty    [id number]  PT THERAPEUTIC ACT EA 15 MIN 11/4/2024 Nadine Richmond PT GP 1            PT G-Codes  Outcome Measure Options: AM-PAC 6 Clicks Basic Mobility (PT)  AM-PAC 6 Clicks Score (PT): 9  PT Discharge Summary  Anticipated Discharge Disposition (PT): skilled nursing facility    Nadine Richmond, MIRA  11/4/2024

## 2024-11-05 ENCOUNTER — TELEPHONE (OUTPATIENT)
Dept: INTERNAL MEDICINE | Facility: CLINIC | Age: 69
End: 2024-11-05
Payer: MEDICARE

## 2024-11-05 ENCOUNTER — READMISSION MANAGEMENT (OUTPATIENT)
Dept: CALL CENTER | Facility: HOSPITAL | Age: 69
End: 2024-11-05
Payer: MEDICARE

## 2024-11-05 VITALS
WEIGHT: 175.27 LBS | OXYGEN SATURATION: 97 % | TEMPERATURE: 98.2 F | HEART RATE: 91 BPM | BODY MASS INDEX: 28.17 KG/M2 | SYSTOLIC BLOOD PRESSURE: 144 MMHG | RESPIRATION RATE: 18 BRPM | HEIGHT: 66 IN | DIASTOLIC BLOOD PRESSURE: 94 MMHG

## 2024-11-05 LAB
ALBUMIN SERPL-MCNC: 3.3 G/DL (ref 3.5–5.2)
ANION GAP SERPL CALCULATED.3IONS-SCNC: 10 MMOL/L (ref 5–15)
BUN SERPL-MCNC: 31 MG/DL (ref 8–23)
BUN/CREAT SERPL: 16.8 (ref 7–25)
CALCIUM SPEC-SCNC: 8.8 MG/DL (ref 8.6–10.5)
CHLORIDE SERPL-SCNC: 100 MMOL/L (ref 98–107)
CO2 SERPL-SCNC: 26 MMOL/L (ref 22–29)
CREAT SERPL-MCNC: 1.84 MG/DL (ref 0.76–1.27)
DEPRECATED RDW RBC AUTO: 42.7 FL (ref 37–54)
EGFRCR SERPLBLD CKD-EPI 2021: 38.7 ML/MIN/1.73
ERYTHROCYTE [DISTWIDTH] IN BLOOD BY AUTOMATED COUNT: 13.9 % (ref 12.3–15.4)
GLUCOSE SERPL-MCNC: 142 MG/DL (ref 65–99)
HCT VFR BLD AUTO: 34.3 % (ref 37.5–51)
HGB BLD-MCNC: 11.2 G/DL (ref 13–17.7)
MAGNESIUM SERPL-MCNC: 2.4 MG/DL (ref 1.6–2.4)
MCH RBC QN AUTO: 27.5 PG (ref 26.6–33)
MCHC RBC AUTO-ENTMCNC: 32.7 G/DL (ref 31.5–35.7)
MCV RBC AUTO: 84.3 FL (ref 79–97)
PHOSPHATE SERPL-MCNC: 3 MG/DL (ref 2.5–4.5)
PLATELET # BLD AUTO: 150 10*3/MM3 (ref 140–450)
PMV BLD AUTO: 10.3 FL (ref 6–12)
POTASSIUM SERPL-SCNC: 4.1 MMOL/L (ref 3.5–5.2)
RBC # BLD AUTO: 4.07 10*6/MM3 (ref 4.14–5.8)
SODIUM SERPL-SCNC: 136 MMOL/L (ref 136–145)
URATE SERPL-MCNC: 10.7 MG/DL (ref 3.4–7)
WBC NRBC COR # BLD AUTO: 7.21 10*3/MM3 (ref 3.4–10.8)

## 2024-11-05 PROCEDURE — 25010000002 HEPARIN (PORCINE) PER 1000 UNITS: Performed by: HOSPITALIST

## 2024-11-05 PROCEDURE — 85027 COMPLETE CBC AUTOMATED: CPT | Performed by: HOSPITALIST

## 2024-11-05 PROCEDURE — 80069 RENAL FUNCTION PANEL: CPT | Performed by: INTERNAL MEDICINE

## 2024-11-05 PROCEDURE — 83735 ASSAY OF MAGNESIUM: CPT | Performed by: INTERNAL MEDICINE

## 2024-11-05 PROCEDURE — 84550 ASSAY OF BLOOD/URIC ACID: CPT | Performed by: INTERNAL MEDICINE

## 2024-11-05 RX ORDER — AMLODIPINE BESYLATE 5 MG/1
5 TABLET ORAL EVERY 12 HOURS
Qty: 60 TABLET | Refills: 0 | Status: SHIPPED | OUTPATIENT
Start: 2024-11-05

## 2024-11-05 RX ORDER — AMLODIPINE BESYLATE 5 MG/1
5 TABLET ORAL EVERY 12 HOURS
Status: DISCONTINUED | OUTPATIENT
Start: 2024-11-05 | End: 2024-11-05 | Stop reason: HOSPADM

## 2024-11-05 RX ORDER — TORSEMIDE 20 MG/1
20 TABLET ORAL DAILY
Qty: 30 TABLET | Refills: 0 | Status: SHIPPED | OUTPATIENT
Start: 2024-11-06

## 2024-11-05 RX ADMIN — HYOSCYAMINE SULFATE: 16 SOLUTION at 08:23

## 2024-11-05 RX ADMIN — HYDROXYZINE HYDROCHLORIDE 25 MG: 25 TABLET ORAL at 08:31

## 2024-11-05 RX ADMIN — AMLODIPINE BESYLATE 5 MG: 10 TABLET ORAL at 08:22

## 2024-11-05 RX ADMIN — CASTOR OIL AND BALSAM, PERU 1 APPLICATION: 788; 87 OINTMENT TOPICAL at 08:23

## 2024-11-05 RX ADMIN — PANTOPRAZOLE SODIUM 40 MG: 40 TABLET, DELAYED RELEASE ORAL at 08:22

## 2024-11-05 RX ADMIN — POLYETHYLENE GLYCOL 3350 17 G: 17 POWDER, FOR SOLUTION ORAL at 08:22

## 2024-11-05 RX ADMIN — HYDROXYZINE HYDROCHLORIDE 25 MG: 25 TABLET ORAL at 00:22

## 2024-11-05 RX ADMIN — HEPARIN SODIUM 5000 UNITS: 5000 INJECTION INTRAVENOUS; SUBCUTANEOUS at 08:22

## 2024-11-05 RX ADMIN — HYDROCODONE BITARTRATE AND ACETAMINOPHEN 1 TABLET: 7.5; 325 TABLET ORAL at 06:18

## 2024-11-05 RX ADMIN — TORSEMIDE 20 MG: 20 TABLET ORAL at 08:21

## 2024-11-05 RX ADMIN — HYDROCODONE BITARTRATE AND ACETAMINOPHEN 1 TABLET: 7.5; 325 TABLET ORAL at 00:22

## 2024-11-05 RX ADMIN — ATORVASTATIN CALCIUM 10 MG: 20 TABLET, FILM COATED ORAL at 08:22

## 2024-11-05 RX ADMIN — TAMSULOSIN HYDROCHLORIDE 0.4 MG: 0.4 CAPSULE ORAL at 08:22

## 2024-11-05 RX ADMIN — OXYCODONE HYDROCHLORIDE AND ACETAMINOPHEN 500 MG: 500 TABLET ORAL at 08:21

## 2024-11-05 NOTE — DISCHARGE SUMMARY
Patient Name: Arden Carrasquillo  : 1955  MRN: [id number]    Date of Admission: 10/24/2024  Date of Discharge:  2024  Primary Care Physician: Pollo Ward MD      Chief Complaint:   Altered Mental Status (Sepsis alert)      Discharge Diagnoses     Active Hospital Problems    Diagnosis  POA    **Acute renal failure [N17.9]  Yes    Acidosis [E87.20]  Yes    Prostate cancer [C61]  Yes    Hyperlipidemia [E78.5]  Yes    Stage 3a chronic kidney disease [N18.31]  Yes    Hypertension [I10]  Yes    Type 2 diabetes mellitus with diabetic autonomic neuropathy, with long-term current use of insulin [E11.43, Z79.4]  Not Applicable      Resolved Hospital Problems    Diagnosis Date Resolved POA    Hyperkalemia [E87.5] 10/25/2024 Yes        Hospital Course     Pleasant 70yo gentleman just admitted to [facility] for prolonged stay with sepsis, who presented to ER with NEEL, hypotension, and metabolic encephalopathy. Please see below for details of admission by problem:      NEEL/CKD3a  Hyperkalemia  Metabolic acidosis  -Likely prerenal due to hypotension and diuretics  -Cr sl higher today compared to yesterday  -losartan was reintroduced by Renal and patient had a bump in Cr again--losartan dc'd, Renal recommends continuing to hold for now  -Blood pressures are currently reasonably controlled but seem to get low midday (see below)  -renal artery u/s shows normal right and left renal artery  -torsemide restarted at lower dose yesterday AM by Renal  -okay to dc home per Renal with close f/u in office (d/w Dr. Cabral today)     Urinary retention  Armendariz in place since [facility] admission  Hydronephrosis  -Armendariz catheter to remain in place at dc per  recs  -Continue Flomax  -Follow-up with Dr. Zuniga 1-2 weeks after dc     1 of 2 blood cultures positive for coag negative staph  -likely contamination  -Seen by ID  -No further workup     History of diastolic CHF  -volume is stable  -recent Echo at [facility] with EF of 55%  5/31/2024     Hypertension  -Restarted Norvasc at 10mg daily as losartan was dc'd  -BPs improved but seems to be a little higher later evening and early AM and a bit low midday--have changed to BID dosing to see if that will even things out  -F/u with PCP at next available appt  -F/u with Renal in a week        Heparin SC for DVT prophylaxis while here.  DNR confirmed.  Discussed with patient, nursing staff, CCP, and care team on multidisciplinary rounds. D/w Dr. Cabral.  Discharge home with family today. PT recommended SNF but pt has no more covered days--dtr prefers to take him home with HH/PT after d/w CCP.    Day of Discharge     Subjective:  Feeling fine again today. No complaints. Eager to go home.     Physical Exam:  Temp:  [97.9 °F (36.6 °C)-98.6 °F (37 °C)] 98.2 °F (36.8 °C)  Heart Rate:  [74-91] 91  Resp:  [18] 18  BP: ()/(65-94) 144/94  Body mass index is 28.3 kg/m².  Physical Exam  Vitals and nursing note reviewed. Exam conducted with a chaperone present (RN x 2).   Constitutional:       General: He is not in acute distress.     Appearance: He is ill-appearing (chronically). He is not toxic-appearing or diaphoretic.    Cardiovascular:      Rate and Rhythm: Normal rate and regular rhythm.      Pulses: Normal pulses.   Pulmonary:      Effort: Pulmonary effort is normal. No respiratory distress.      Breath sounds: Normal breath sounds. No wheezing or rales.      Comments: Anteriorly   Abdominal:      General: Bowel sounds are normal. There is no distension.      Palpations: Abdomen is soft.      Tenderness: There is no abdominal tenderness.   Genitourinary:     Comments: Yellow urine in dash bag  Musculoskeletal:         General: No swelling.      Cervical back: Neck supple.      Comments: SCDs in place   Skin:     General: Skin is warm and dry.      Capillary Refill: Capillary refill takes less than 2 seconds.      Coloration: Skin is not jaundiced.   Neurological:      Mental Status: He is alert.  Mental status is at baseline.      Cranial Nerves: No cranial nerve deficit.      Coordination: Coordination normal.   Psychiatric:         Mood and Affect: Mood normal.         Behavior: Behavior normal.         Consultants     Consult Orders (all) (From admission, onward)       Start     Ordered    10/25/24 1032  Inpatient Urology Consult  Once        Specialty:  Urology  Provider:  David Singletary Jr., MD    10/25/24 1032    10/24/24 2000  Inpatient Nephrology Consult  Once        Specialty:  Nephrology  Provider:  Jordan Jordan MD    10/24/24 1959    10/24/24 1959  Inpatient Infectious Diseases Consult  Once        Specialty:  Infectious Diseases  Provider:  Ester Hurst MD    10/24/24 1959    10/24/24 1508  Pulmonology (on-call MD unless specified)  Once        Specialty:  Pulmonary Disease  Provider:  (Not yet assigned)    10/24/24 1510                  Procedures     * Surgery not found *    Imaging Results (All)       Procedure Component Value Units Date/Time    US Renal Bilateral [[id number]] Collected: 10/25/24 1427     Updated: 10/25/24 1436    Narrative:      ULTRASOUND RENAL BILATERAL     INDICATION: HISTORY of hydronephrosis.     COMPARISON: CT abdomen pelvis October 24, 2024     TECHNIQUE: Real-time sonographic evaluation of the kidneys with  grayscale and color-flow imaging. Representative images were saved for  review.     FINDINGS:     RIGHT KIDNEY: 10.5 cm in length. Simple appearing cyst in the mid kidney  measuring 7 x 4 x 7 mm. No solid-appearing renal mass. Small amount of  pelvicaliectasis. Trace perinephric edema.     LEFT KIDNEY: 11.7 cm in length. Simple appearing cyst in the superior  pole measuring 2.3 x 2.1 x 2.0 cm. Simple appearing cyst in the mid  kidney measuring 5 x 3 x 5 mm. No solid-appearing renal mass. Small  amount of pelvicaliectasis.     BLADDER: Armendariz in the bladder. Bladder is collapsed.       Impression:         1. Simple appearing renal cysts. No solid  mass identified.  2. Small amount of bilateral pelvicaliectasis.  3. Armendariz in the bladder. Bladder is collapsed.              This report was finalized on 10/25/2024 2:33 PM by Dr. Camacho Beal M.D on Workstation: [id number]       CT Abdomen Pelvis Without Contrast [[id number]] Collected: 10/24/24 1435     Updated: 10/24/24 1451    Narrative:      CT ABDOMEN PELVIS WO CONTRAST-     INDICATION: Sepsis     COMPARISON: None     TECHNIQUE:  Routine CT abdomen/pelvis without IV contrast. Coronal and sagittal  reformats. Radiation dose reduction techniques were utilized, including  automated exposure control and exposure modulation based on body size.     FINDINGS:      Lung bases: Trace left pleural effusion. Subsegmental atelectasis at the  right lung base. Partial atelectasis in the basilar left lower lobe.  Coronary artery atherosclerotic calcification. Mild cardiomegaly.  Gynecomastia.     ABDOMEN: Normal liver, gallbladder and spleen. Mild to moderate  pancreatic atrophy. No pancreatic ductal dilatation or mass seen. No  adrenal nodules. Fluid attenuating cyst seen in the left mid kidney.  Subcentimeter hyperattenuating lesion in the left mid kidney, series 2,  axial mage 58, too small to characterize, typically a small hemorrhagic  or proteinaceous cyst. No urolithiasis or hydronephrosis. Motion  obscures the right kidney with a mildly prominent right renal collecting  system.     Pelvis: Prostate is normal in size. Armendariz in the bladder. Gas in the  nondependent bladder lumen, suspect iatrogenic. Bladder is collapsed.  Phleboliths in the pelvis.     Bowel: No obstruction. Normal appendix.     Body wall: Large fat-containing supraumbilical ventral hernia. Small  fat-containing umbilical hernia. Small fat-containing inguinal hernias.     Soft tissue defect seen posterior to the inferior sacrum and coccyx,  containing packing material, consistent with a sacral decubitus  ulceration, extends to the underlying  bone. No fluid collection.     Retroperitoneum: No lymphadenopathy.     Vasculature: No abdominal aortic aneurysm. Mild aortoiliac  atherosclerotic calcification.     Osseous structures: See above. Decreased bone mineralization. Lumbar  facet degenerative arthropathy with grade 1 anterolisthesis of L4 on L5.     Artifacts: Motion       Impression:         1. Sacral decubitus ulceration, extends to the underlying sacrum.  2. Large amount of partial atelectasis in the basilar left lower lobe.  3. Mild cardiomegaly     This report was finalized on 10/24/2024 2:48 PM by Dr. Camacho Beal M.D on Workstation: [id number]       CT Head Without Contrast [[id number]] Collected: 10/24/24 1430     Updated: 10/24/24 1442    Narrative:      CT HEAD WO CONTRAST-     HISTORY:  AMS; N17.9-Acute kidney failure, unspecified;  I95.9-Hypotension, unspecified; R41.82-Altered mental status,  unspecified     COMPARISON: None     FINDINGS: The brain and ventricles are symmetrical. Moderate vascular  calcification is noted. Areas of decreased attenuation involving the  white matter of cerebral hemispheres are noted bilaterally consistent  with mild to moderate small vessel ischemic disease. No focal area of  decreased attenuation to suggest acute infarction is identified. Further  evaluation could be performed with a MRI examination of the brain as  indicated.                 Radiation dose reduction techniques were utilized, including automated  exposure modulation based on body size.     This report was finalized on 10/24/2024 2:39 PM by Dr. Damien Mathis M.D on Workstation: [id number]       XR Chest 1 View [[id number]] Collected: 10/24/24 1405     Updated: 10/24/24 1411    Narrative:      XR CHEST 1 VW-     HISTORY: Male who is 69 years-old, weakness     TECHNIQUE: frontal view of the chest     COMPARISON: None      FINDINGS: The heart size is borderline. Aorta appears ectatic. Pulmonary  vasculature is unremarkable.  No focal  "pulmonary consolidation, pleural  effusion, or pneumothorax. No acute osseous process.       Impression:      No focal pulmonary consolidation. Borderline heart size.  Ectatic appearing aorta. Follow-up as clinically indicated.           This report was finalized on 10/24/2024 2:08 PM by Dr. Ruddy De La Cruz M.D on Workstation: [id number]             Results for orders placed during the hospital encounter of 10/24/24    Duplex Renal Artery - Bilateral Complete CAR    Interpretation Summary    Normal right renal artery.    Normal left renal artery.      Pertinent Labs     Results from last 7 days   Lab Units 11/05/24  0258 11/04/24  0307 11/01/24  0328 10/31/24  0341   WBC 10*3/mm3 7.21 8.09 7.59 7.27   HEMOGLOBIN g/dL 11.2* 11.3* 11.5* 11.5*   PLATELETS 10*3/mm3 150 161 192 201     Results from last 7 days   Lab Units 11/05/24 0258 11/04/24 0307 11/03/24 0459 11/02/24 0327   SODIUM mmol/L 136 135* 136 136   POTASSIUM mmol/L 4.1 4.1 4.2 4.1   CHLORIDE mmol/L 100 100 100 99   CO2 mmol/L 26.0 26.4 24.7 26.6   BUN mg/dL 31* 30* 31* 37*   CREATININE mg/dL 1.84* 1.62* 1.60* 1.73*   GLUCOSE mg/dL 142* 226* 151* 140*   EGFR mL/min/1.73 38.7* 45.1* 45.8* 41.7*     Results from last 7 days   Lab Units 11/05/24  0258 11/02/24  0327 11/01/24  0328 10/31/24  0341   ALBUMIN g/dL 3.3* 3.2* 3.4* 3.2*     Results from last 7 days   Lab Units 11/05/24  0258 11/04/24  0307 11/03/24  0459 11/02/24  0327 11/01/24  0328 10/31/24  0341   CALCIUM mg/dL 8.8 9.1 9.7 9.4 8.9 9.4   ALBUMIN g/dL 3.3*  --   --  3.2* 3.4* 3.2*   MAGNESIUM mg/dL 2.4  --   --  2.4 2.2 2.1   PHOSPHORUS mg/dL 3.0  --   --  2.9 3.9 3.8         Results from last 7 days   Lab Units 11/05/24  0258   URIC ACID mg/dL 10.7*         Invalid input(s): \"LDLCALC\"          Test Results Pending at Discharge     Pending Results       None              Discharge Details        Discharge Medications        Changes to Medications        Instructions Start Date   amLODIPine 5 MG " tablet  Commonly known as: NORVASC  What changed:   medication strength  when to take this   5 mg, Oral, Every 12 Hours      atorvastatin 10 MG tablet  Commonly known as: LIPITOR  What changed:   how much to take  how to take this  when to take this  additional instructions   Take 1 tab PO QD for cholesterol and heart health      promethazine 12.5 MG tablet  Commonly known as: PHENERGAN  What changed:   how much to take  how to take this  when to take this  reasons to take this  additional instructions   TAKE 1/2 TO 1 TABLET BY MOUTH EVERY 6 HOURS AS NEEDED FOR NAUSEA      sodium hypochlorite 0.25 % topical solution  Commonly known as: DAKIN'S  What changed: how to take this   Use as directed twice daily      torsemide 20 MG tablet  Commonly known as: DEMADEX  What changed:   medication strength  how much to take  when to take this   20 mg, Oral, Daily   Start Date: November 6, 2024     zolpidem 5 MG tablet  Commonly known as: AMBIEN  What changed: See the new instructions.   TAKE 1/2 TO 1 TABLET BY MOUTH ONCE NIGHTLY             Continue These Medications        Instructions Start Date   docusate sodium 100 MG capsule  Commonly known as: COLACE   100 mg, 2 Times Daily      ferrous sulfate 325 (65 FE) MG tablet   325 mg, Oral, Daily With Breakfast      fexofenadine 180 MG tablet  Commonly known as: ALLEGRA   180 mg, Daily      HYDROcodone-acetaminophen 7.5-325 MG per tablet  Commonly known as: NORCO   1 tablet, Oral, Every 6 Hours PRN      lidocaine 4 % cream  Commonly known as: LMX   1 Application, As Needed      Melatonin 10 MG tablet   1 tablet, Nightly PRN      pantoprazole 40 MG EC tablet  Commonly known as: PROTONIX   40 mg, Oral, Daily      polyethylene glycol 17 g packet  Commonly known as: MIRALAX   17 g, Daily      tamsulosin 0.4 MG capsule 24 hr capsule  Commonly known as: FLOMAX   0.4 mg, Oral, Daily      vitamin C 500 MG tablet  Commonly known as: ASCORBIC ACID   500 mg, Oral, Daily             Stop  These Medications      cyclobenzaprine 5 MG tablet  Commonly known as: FLEXERIL     losartan 100 MG tablet  Commonly known as: COZAAR              Allergies   Allergen Reactions    Vitamin D Analogs Rash       Discharge Disposition:  Home-Health Care Svc      Discharge Diet:  Diet Order   Procedures    Diet: Cardiac; Healthy Heart (2-3 Na+); Fluid Consistency: Thin (IDDSI 0)       Discharge Activity:   As tolerated    CODE STATUS:    Code Status and Medical Interventions: No CPR (Do Not Attempt to Resuscitate); Limited Support; No intubation (DNI)   Ordered at: 10/24/24 1908     Medical Intervention Limits:    No intubation (DNI)     Code Status (Patient has no pulse and is not breathing):    No CPR (Do Not Attempt to Resuscitate)     Medical Interventions (Patient has pulse or is breathing):    Limited Support       No future appointments.  Additional Instructions for the Follow-ups that You Need to Schedule       Ambulatory Referral to Home Health   As directed      Face to Face Visit Date: 11/5/2024   Follow-up provider for Plan of Care?: I treated the patient in an acute care facility and will not continue treatment after discharge.   Follow-up provider: LUCY WARD [[id number]]   Reason/Clinical Findings: NEEL/CKD3a, hyperkalemia, urinary retention, dash, diastolic CHF, HTN   Describe mobility limitations that make leaving home difficult: requires the assistance of another to leave the home   Nursing/Therapeutic Services Requested: Skilled Nursing Physical Therapy   Skilled nursing orders: Cardiopulmonary assessments Medication education Pain management Monthly catheter care   PT orders: Therapeutic exercise Strengthening   Frequency: 1 Week 1        Discharge Follow-up with PCP   As directed       Currently Documented PCP:    Lucy Ward MD    PCP Phone Number:    [phone]     Follow Up Details: Dr. Ward (PCP) at next available appt        Discharge Follow-up with Specified Provider: Dr. Zuniga ([facility]); 1 Week   As directed      To: Dr. Zuniga ([facility])   Follow Up: 1 Week        Discharge Follow-up with Specified Provider: Dr. Cabral (Renal); 1 Week   As directed      To: Dr. Cabral (Renal)   Follow Up: 1 Week        Discharge Follow-up with Specified Provider: Dr. Rod (Pain Mgmt) at next available appt   As directed      To: Dr. Rod (Pain Mgmt) at next available appt               Contact information for follow-up providers       Lucy Aparicio MD .    Specialties: Family Medicine, Emergency Medicine, Urgent Care  Why: Dr. Aparicio (PCP) at next available appt  Contact information:  [address]   [place] [address] [phone]                       Contact information for after-discharge care       Destination       [facility] .    Service: Skilled Nursing  Contact information:  [address]   [place] [address] [phone]                                   Additional Instructions for the Follow-ups that You Need to Schedule       Ambulatory Referral to Home Health   As directed      Face to Face Visit Date: 11/5/2024   Follow-up provider for Plan of Care?: I treated the patient in an acute care facility and will not continue treatment after discharge.   Follow-up provider: LUCY APARICIO [[id number]]   Reason/Clinical Findings: NEEL/CKD3a, hyperkalemia, urinary retention, dash, diastolic CHF, HTN   Describe mobility limitations that make leaving home difficult: requires the assistance of another to leave the home   Nursing/Therapeutic Services Requested: Skilled Nursing Physical Therapy   Skilled nursing orders: Cardiopulmonary assessments Medication education Pain management Monthly catheter care   PT orders: Therapeutic exercise Strengthening   Frequency: 1 Week 1        Discharge Follow-up with PCP   As directed       Currently Documented PCP:    Lucy Aparicio MD    PCP Phone Number:    [phone]     Follow Up Details: Dr. Aparicio (PCP) at next  available appt        Discharge Follow-up with Specified Provider: Dr. Zuniga ([facility]); 1 Week   As directed      To: Dr. Zuniga ([facility])   Follow Up: 1 Week        Discharge Follow-up with Specified Provider: Dr. Cabral (Renal); 1 Week   As directed      To: Dr. Cabral (Renal)   Follow Up: 1 Week        Discharge Follow-up with Specified Provider: Dr. Rod (Pain Mgmt) at next available appt   As directed      To: Dr. Rod (Pain Mgmt) at next available appt            Time Spent on Discharge:  Greater than 30 minutes      David Whyte MD  [facility]  11/05/24  08:39 EST

## 2024-11-05 NOTE — CASE MANAGEMENT/SOCIAL WORK
Case Management Discharge Note      Final Note: Home with family, [facility] at 9:30am. No additional CCP needs.         Selected Continued Care - Discharged on 11/5/2024 Admission date: 10/24/2024 - Discharge disposition: Home-Health Care [facility]      Destination Coordination complete.      Service Provider Services Address Phone Fax Patient Preferred    [facility] [address] , [place] [address] [phone] --              Durable Medical Equipment    No services have been selected for the patient.                Dialysis/Infusion    No services have been selected for the patient.                Home Medical Care       Service Provider Services Address Phone Fax Patient Preferred    [facility], Home Medical , Home Rehabilitation, Home Nursing, Home Living Aide Services [address], [place] [address] [phone] --              Therapy    No services have been selected for the patient.                Community Resources    No services have been selected for the patient.                Community & [facility]    No services have been selected for the patient.                    Selected Continued Care - Episodes Includes continued care and service providers with selected services from the active episodes listed below      Ambulatory Social Work Case Management Episode start date: 10/21/2024 (Paused)   There are no active outsourced providers for this episode.                      Final Discharge Disposition Code: 06 - home with home health care

## 2024-11-05 NOTE — PLAN OF CARE
Goal Outcome Evaluation:  Plan of Care Reviewed With: patient        Progress: no change  Outcome Evaluation: vss. pain medication given prn. dash care completed. wound care completed. q2 turns. plan to dc today home at 930

## 2024-11-05 NOTE — OUTREACH NOTE
Prep Survey      Flowsheet Row Responses   [facility] patient discharged from? [facility]   Is LACE score < 7 ? No   Eligibility [facility]   Date of Admission 10/24/24   Date of Discharge 11/05/24   Discharge Disposition Home or Self Care   Discharge diagnosis Acute renal failure   Does the patient have one of the following disease processes/diagnoses(primary or secondary)? Other   Does the patient have Home health ordered? No  [Family to hire caregivers, refused SNF placement per CM note]   Is there a DME ordered? No   Medication alerts for this patient see avs   Prep survey completed? Yes            Sivan GORDON - Registered Nurse

## 2024-11-05 NOTE — CASE MANAGEMENT/SOCIAL WORK
Continued Stay Note  [facility]     Patient Name: Arden Carrasquillo  MRN: [id number]  Today's Date: 11/5/2024    Admit Date: 10/24/2024    Plan: Home with family, [facility] via [facility] at 9:30am.   Discharge Plan       Row Name 11/05/24 1224       Plan    Plan Home with family, [facility] via [facility] at 9:30am.    Patient/Family in Agreement with Plan yes    Plan Comments MD notified CCP orders for HH at [facility]. CCP s/w [facility], unable to accept. [facility] notified of referral and able to accept. DC plan is home with [facility], family support via [facility] at 9:30am. Ap CADE/CCP    Final Discharge Disposition Code 06 - home with home health care    Final Note Home with family, [facility] at 9:30am. No additional CCP needs.                   Discharge Codes    No documentation.                 Expected Discharge Date and Time       Expected Discharge Date Expected Discharge Time    Nov 5, 2024               Tina Pérez RN

## 2024-11-05 NOTE — TELEPHONE ENCOUNTER
Caller: PAULINE VERNON/ [facility]    Relationship:     Best call back number: [phone]     What orders are you requesting (i.e. lab or imaging): VERBAL ORDERS FOR SKILLED NURSING EVALUATION    In what timeframe would the patient need to come in: ASAP    Where will you receive your lab/imaging services: [facility]    Additional notes: OKAY TO LEAVE MESSAGE WITH ORDERS

## 2024-11-05 NOTE — PROGRESS NOTES
[facility] Progress Note      Patient Name: Arden Carrasquillo  : 1955  MRN: [id number]  Primary Care Physician:  Pollo Ward MD  Date of admission: 10/24/2024    Subjective     Interval History:   F/u NEEL   The patient is feeling the same has Armendariz catheter anchored in, no chest pain or shortness of air, no orthopnea or PND.  Review of Systems:   As noted above    Objective     Vitals:   Temp:  [97.9 °F (36.6 °C)-98.6 °F (37 °C)] 98.2 °F (36.8 °C)  Heart Rate:  [74-91] 91  Resp:  [18] 18  BP: ()/(65-94) 144/94    Intake/Output Summary (Last 24 hours) at 2024 0908  Last data filed at 2024 0430  Gross per 24 hour   Intake 240 ml   Output 1075 ml   Net -835 ml       Physical Exam:    General Appearance: Awake, alert, no acute distress, chronically  Neck: supple, no JVD  Lungs: CTA, unlabored breathing effort  Heart: RRR, no rub  Abdomen: soft, nontender, nondistended  Extremities: no edema, cyanosis or clubbing  : Armendariz catheter anchored in place       Scheduled Meds:     amLODIPine, 5 mg, Oral, Q12H  vitamin C, 500 mg, Oral, Daily  atorvastatin, 10 mg, Oral, Daily  castor oil-balsam peru, 1 Application, Topical, Q12H  heparin (porcine), 5,000 Units, Subcutaneous, Q12H  pantoprazole, 40 mg, Oral, Daily  polyethylene glycol, 17 g, Oral, Daily  sodium hypochlorite, , Topical, BID  tamsulosin, 0.4 mg, Oral, Daily  torsemide, 20 mg, Oral, Daily      IV Meds:        Results Reviewed:   I have personally reviewed the results from the time of this admission to 2024 09:08 EST     Results from last 7 days   Lab Units 24  0258 24  0307 24  0459   SODIUM mmol/L 136 135* 136   POTASSIUM mmol/L 4.1 4.1 4.2   CHLORIDE mmol/L 100 100 100   CO2 mmol/L 26.0 26.4 24.7   BUN mg/dL 31* 30* 31*   CREATININE mg/dL 1.84* 1.62* 1.60*   CALCIUM mg/dL 8.8 9.1 9.7   GLUCOSE mg/dL 142* 226* 151*     Estimated Creatinine Clearance: 36.5 mL/min (A) (by C-G formula  based on SCr of 1.84 mg/dL (H)).  Results from last 7 days   Lab Units 11/05/24  0258 11/02/24  0327 11/01/24  0328   MAGNESIUM mg/dL 2.4 2.4 2.2   PHOSPHORUS mg/dL 3.0 2.9 3.9     Results from last 7 days   Lab Units 11/05/24  0258 11/02/24  0327 11/01/24  0328 10/31/24  0341 10/30/24  0346   URIC ACID mg/dL 10.7* 9.7* 10.0* 9.0* 8.2*     Results from last 7 days   Lab Units 11/05/24  0258 11/04/24  0307 11/01/24  0328 10/31/24  0341   WBC 10*3/mm3 7.21 8.09 7.59 7.27   HEMOGLOBIN g/dL 11.2* 11.3* 11.5* 11.5*   PLATELETS 10*3/mm3 150 161 192 201           Assessment / Plan     ASSESSMENT:  Non olig NEEL, recurrent - peak Cr 4.3 here on 10/24, but also had NEEL at [place] last month, peak Cr 2.1 then.  Current etiol likely prerenal azotemia due to volume depletion with hypotension and diuretic use.  No e/o RAY by doppler but I still would be inclined to hold ARB for now creatinine today slightly increased up to 1.83 appears to be euvolemic  CKD stage 3A - BL Cr 1.2 to 1.4.    Recent urinary retention and hydronephrosis.  Armendariz catheter in place.  Hydronephrosis resolved now  History of diastolic CHF.  Compensated, off diuretic, plan resumption tomorrow at lower dose (was taking torsemide 40 BID)   Hypertension -reasonably controlled    PLAN:  Continue to hold off ARB  Continue the same treatment including amlodipine torsemide  3.  The patient could be discharged from the renal standpoint and will plan follow-up in the office.      I reviewed the chart and other providers notes, I reviewed labs  I discussed the case with the patient's nurse at the bedside and I discussed the case with Dr. Whyte  Copied text in this note has been reviewed and is accurate as of 11/05/24.           Lionel Cabral MD  11/05/24  09:08 [facility]    [facility]  [phone]

## 2024-11-06 ENCOUNTER — TRANSITIONAL CARE MANAGEMENT TELEPHONE ENCOUNTER (OUTPATIENT)
Dept: CALL CENTER | Facility: HOSPITAL | Age: 69
End: 2024-11-06
Payer: MEDICARE

## 2024-11-06 NOTE — OUTREACH NOTE
Call Center TCM Note      Flowsheet Row Responses   [facility] patient discharged from? [place]   Does the patient have one of the following disease processes/diagnoses(primary or secondary)? Other   TCM attempt successful? No   Unsuccessful attempts Attempt 1            Shona Hodge MA    11/6/2024, 09:17 EST

## 2024-11-06 NOTE — OUTREACH NOTE
Call Center TCM Note      Flowsheet Row Responses   [facility] patient discharged from? [place]   Does the patient have one of the following disease processes/diagnoses(primary or secondary)? Other   TCM attempt successful? No   Unsuccessful attempts Attempt 2   Call Status Voice mail issues  [MAILBOX FULL]            Shona Hodge MA    11/6/2024, 15:39 EST

## 2024-11-07 ENCOUNTER — TRANSITIONAL CARE MANAGEMENT TELEPHONE ENCOUNTER (OUTPATIENT)
Dept: CALL CENTER | Facility: HOSPITAL | Age: 69
End: 2024-11-07
Payer: MEDICARE

## 2024-11-07 NOTE — OUTREACH NOTE
Call Center TCM Note      Flowsheet Row Responses   [facility] patient discharged from? [place]   Does the patient have one of the following disease processes/diagnoses(primary or secondary)? Other   TCM attempt successful? Yes   Call start time 1336   Call end time 1346   Discharge diagnosis Acute renal failure   Person spoke with today (if not patient) and relationship Daughter- Breonna Pendleton reviewed with patient/caregiver? Yes   Is the patient having any side effects they believe may be caused by any medication additions or changes? No   Does the patient have all medications ordered at discharge? Yes   Is the patient taking all medications as directed (includes completed medication regime)? Yes   Comments HOSP DC FU appt 11/14/24 215   Does the patient have an appointment with their PCP within 7-14 days of discharge? Yes   Has home health visited the patient within 72 hours of discharge? Call prior to 72 hours   Home health comments Daughter declined [facility] when they called. She wanted [facility] , however in CM notes [facility] un able to accept.   Psychosocial issues? No   Did the patient receive a copy of their discharge instructions? Yes   Nursing interventions Reviewed instructions with patient   What is the patient's perception of their health status since discharge? Improving   Is the patient/caregiver able to teach back signs and symptoms related to disease process for when to call PCP? Yes   Is the patient/caregiver able to teach back signs and symptoms related to disease process for when to call 911? Yes   Is the patient/caregiver able to teach back the hierarchy of who to call/visit for symptoms/problems? PCP, Specialist, Home health nurse, Urgent Care, ED, 911 Yes   TCM call completed? Yes   Wrap up additional comments Daughter reports she declined HH [facility]  as she wants [facility], She will discuss with PCP office.   Call end time 1346            Georgette Smith RN    11/7/2024, 13:48  EST

## 2024-11-11 ENCOUNTER — PATIENT OUTREACH (OUTPATIENT)
Age: 69
End: 2024-11-11
Payer: MEDICARE

## 2024-11-11 NOTE — OUTREACH NOTE
MSW received referral from nurse call center for assistance with community resources. MSW outreach to patient's daughter Breonna by phone and left voicemail and call back number. MSW will continue to attempt outreach to patient.    Ilene PONCE -   Ambulatory Case Management    11/11/2024, 10:19 EST

## 2024-11-13 ENCOUNTER — TELEPHONE (OUTPATIENT)
Dept: INTERNAL MEDICINE | Facility: CLINIC | Age: 69
End: 2024-11-13
Payer: MEDICARE

## 2024-11-13 ENCOUNTER — PATIENT OUTREACH (OUTPATIENT)
Age: 69
End: 2024-11-13
Payer: MEDICARE

## 2024-11-13 DIAGNOSIS — M43.10 DEGENERATIVE SPONDYLOLISTHESIS: ICD-10-CM

## 2024-11-13 DIAGNOSIS — M48.062 LUMBAR STENOSIS WITH NEUROGENIC CLAUDICATION: ICD-10-CM

## 2024-11-13 RX ORDER — HYDROCODONE BITARTRATE AND ACETAMINOPHEN 7.5; 325 MG/1; MG/1
1 TABLET ORAL EVERY 6 HOURS PRN
Qty: 120 TABLET | Refills: 0 | Status: SHIPPED | OUTPATIENT
Start: 2024-11-13

## 2024-11-13 NOTE — TELEPHONE ENCOUNTER
Caller: Arden Carrasquillo    Relationship: Self    Best call back number: [phone]    What orders are you requesting (i.e. lab or imaging): OCCUPATIONAL, PHYSICAL, AND NURSING    In what timeframe would the patient need to come in: ASAP     Where will you receive your lab/imaging services: IN HIS HOME     Additional notes: HE CALLED TO SEE IF THESE ORDERS HAD BEEN SENT TO [facility] YET. PLEASE ADVISE.

## 2024-11-13 NOTE — TELEPHONE ENCOUNTER
Patient's daughter called to see if there is any way that Dr. Ward can refill his hydrocodine. She said that he is bedridden and in a lot of pain. She also said that his pain management doctor will not refill the medicine because he's not been in for an appointment in a long time and she is unable to bring him in.

## 2024-11-13 NOTE — OUTREACH NOTE
Social Work Assessment  Questions/Answers      Flowsheet Row Most Recent Value   Referral Source physician, outpatient staff, outpatient clinic   Reason for Consult community resources, transportation   Preferred Language English   Advance Care Planning Reviewed present on chart, no concerns identified   People in Home sibling(s)   Current Living Arrangements apartment   Potentially Unsafe Housing Conditions none   In the past 12 months has the electric, gas, oil, or water company threatened to shut off services in your home? No   Primary Care Provided by self, child(sven)   Provides Primary Care For no one, unable/limited ability to care for self   Employment Status retired   Source of Income unable to assess   Application for Public Assistance pending public assistance pending number   Meal Preparation assistive person   Housekeeping assistive person   Laundry assistive person   Shopping assistive person   If for any reason you need help with day-to-day activities such as bathing, preparing meals, shopping, managing finances, etc., do you get the help you need? I get all the help I need   Major Change/Loss/Stressor loss of independence   Sources of Support community support, adult child(sven)   Do you want help finding or keeping work or a job? I do not need or want help   Do you want help with school or training? For example, starting or completing job training or getting a high school diploma, GED or equivalent No   Transportation Anticipated family or friend will provide, other (see comments)  [[facility], [facility]]          SDOH updated and reviewed with the patient during this program:  --     Disabilities: At Risk (11/13/2024)    Disabilities     Concentrating, Remembering, or Making Decisions Difficulty: yes     Doing Errands Independently Difficulty: no      --     Employment: Not At Risk (11/13/2024)    Employment     Do you want help finding or keeping work or a job?: I do not need or want help       Financial Resource Strain: Low Risk  (11/13/2024)    Overall Financial Resource Strain (CARDIA)     Difficulty of Paying Living Expenses: Not very hard      --     Food Insecurity: No Food Insecurity (11/13/2024)    Hunger Vital Sign     Worried About Running Out of Food in the Last Year: Never true     Ran Out of Food in the Last Year: Never true      --     Health Literacy: Not At Risk (11/13/2024)    Education     Help with school or training?: No     Preferred Language: English      --     Housing Stability: Low Risk  (11/13/2024)    Housing Stability Vital Sign     Unable to Pay for Housing in the Last Year: No     Number of Times Moved in the Last Year: 1     Homeless in the Last Year: No      --     Transportation Needs: Unmet Transportation Needs (11/13/2024)    PRAPARE - Transportation     Lack of Transportation (Medical): Yes     Lack of Transportation (Non-Medical): Yes      --     Utilities: Not At Risk (11/13/2024)    [facility] Utilities     Threatened with loss of utilities: No      Continuing Care   [facility]    [address] [id number]    Phone: [phone]    Request Status: Accepted    Services: Transportation    Resource for: Transportation Needs   [facility]    [address] [address]    Phone: [phone]    Request Status: Accepted    Services: Transportation    Resource for: Transportation Needs     Patient Outreach    MSW outreach to patient's daughter Breonna regarding community resource needs as requested per nurse call center. Patient's daughter states that patient is currently working on setting up home health services with [facility]. Patient's daughter states that patient is bed ridden and currently weak. Patient's daughter hopeful that home health therapies will allow patient to return to previous functioning. Patient's daughter states at this time the only mode of transportation available is through ambulance  transportation which requires a $250 copayment. MSW unaware of community resources to help with the cost of ambulance transport. MSW and patient's daughter discussed if patient able to utilize wheelchair, he could potentially use [employer] for transportation or [employer] [employer]. MSW explained [employer] and how to contact to set up transportation services. MSW and patient's daughter discussed [facility] [employer] services for transportation and she states that she previously filled out application. MSW provided patient's daughter with [facility] [employer] eligibility line to see if patient's application still active or if she needs to complete additional application. Patient's daughter denied additional needs at this time and states she will call back to MSW if additional assistance is needed.     Ilene PONCE -   Ambulatory Case Management    11/13/2024, 15:06 EST

## 2024-11-21 ENCOUNTER — TELEPHONE (OUTPATIENT)
Dept: INTERNAL MEDICINE | Facility: CLINIC | Age: 69
End: 2024-11-21
Payer: MEDICARE

## 2024-11-21 NOTE — TELEPHONE ENCOUNTER
Caller: DIVINA HEIN    Relationship: Home Health    Best call back number: [phone]     What was the call regarding: DIVINA IS CALLING TO GET VERBAL ORDERS FOR PHYSICAL THERAPY OCCUPATIONAL THERAPY AND A

## 2024-11-26 ENCOUNTER — TELEPHONE (OUTPATIENT)
Dept: INTERNAL MEDICINE | Facility: CLINIC | Age: 69
End: 2024-11-26
Payer: MEDICARE

## 2024-11-26 NOTE — TELEPHONE ENCOUNTER
Caller: KRISTIAN RUIZ    Relationship:     Best call back number: [phone]       Who are you requesting to speak with (clinical staff, provider,  specific staff member): PCP OR CLINICAL       What was the call regarding: VERBAL ORDERS NEEDED FOR PHYSICAL THERAPY 1 TIME A WEEK FOR 8 WEEKS.  PATIENT IS BED BOUND AND DEPENDENT FOR CARE.  HIS SISTER WAS THERE BUT SHE WAS ON A ROLATOR HERSELF.  JUST WANTED TO LET DR. APARICIO.        HAS SECURED VOICEMAIL IF NEEDED TO LEAVE ONE.

## 2024-11-27 ENCOUNTER — TELEPHONE (OUTPATIENT)
Dept: INTERNAL MEDICINE | Facility: CLINIC | Age: 69
End: 2024-11-27
Payer: MEDICARE

## 2024-11-27 NOTE — TELEPHONE ENCOUNTER
Caller: TAO    Relationship: [facility]    Best call back number: [phone]     What orders are you requesting (i.e. lab or imaging): VERBAL ORDERS    Additional notes:     OCCUPATIONAL THERAPY 2 X A WEEK FOR 4 WEEKS AND THEN 1 TIME A WEEK FOR 4 WEEKS    PLEASE ADVISE

## 2024-12-02 ENCOUNTER — TELEPHONE (OUTPATIENT)
Dept: INTERNAL MEDICINE | Facility: CLINIC | Age: 69
End: 2024-12-02

## 2024-12-02 DIAGNOSIS — L89.153 DECUBITUS ULCER OF COCCYGEAL REGION, STAGE 3: Primary | ICD-10-CM

## 2024-12-02 NOTE — TELEPHONE ENCOUNTER
Caller: CIELO    Relationship:     Best call back number:          What was the call regarding: [facility] AN AIR MATTRESS FOR THE PATIENT DUE TO STAGE 3 COCCYX AREA PATIENT IS BED BOUND     CIELO  SAID WHATEVER HIS INSURANCE WOULD PAY FOR

## 2024-12-04 ENCOUNTER — TELEPHONE (OUTPATIENT)
Dept: INTERNAL MEDICINE | Facility: CLINIC | Age: 69
End: 2024-12-04

## 2024-12-04 ENCOUNTER — TELEPHONE (OUTPATIENT)
Dept: INTERNAL MEDICINE | Facility: CLINIC | Age: 69
End: 2024-12-04
Payer: MEDICARE

## 2024-12-04 DIAGNOSIS — G47.00 INSOMNIA, UNSPECIFIED TYPE: ICD-10-CM

## 2024-12-04 DIAGNOSIS — R11.0 NAUSEA: ICD-10-CM

## 2024-12-04 NOTE — TELEPHONE ENCOUNTER
Caller: MICHELLE GUY    Relationship to patient: Emergency Contact      Best call back number: [phone]     Provider: DR. APARICIO    PA needed: NEEDS A PA FOR AMBULANCE SERVICE TO TRANSPORT TO WOUND CARE PROVIDER - DR. AYLIN KAPLAN APPOINTMENT ON 12/10/24 AT 9:15 AM    HAS NEW INSURANCE :- DID DO RTE    [facility]    ID# [id number]    [facility] # [phone]

## 2024-12-04 NOTE — TELEPHONE ENCOUNTER
Caller: KAREN    Relationship:     Best call back number:      What was the call regarding: KAREN IS CALLING HOME HEALTH HAS TO DISCHARGE AND READMIT DUE TO PATIENT INSURANCE CHANGE     PATIENT IS NOW GOING TO AN [facility] POLICY WITH  [id number]  THAT IS A CRITICAL  CARE PLAN FROM [facility]     THEY WILL RESTART CARE 12/9/24

## 2024-12-06 NOTE — TELEPHONE ENCOUNTER
Called and spoke to daughter. Advised she will need to contact a transport company to start the process before we can see what's needed to complete an authorization if one is needed.

## 2024-12-09 RX ORDER — ZOLPIDEM TARTRATE 5 MG/1
2.5-5 TABLET ORAL
Qty: 30 TABLET | Refills: 2 | Status: SHIPPED | OUTPATIENT
Start: 2024-12-09

## 2024-12-09 RX ORDER — PROMETHAZINE HYDROCHLORIDE 12.5 MG/1
TABLET ORAL
Qty: 30 TABLET | Refills: 2 | Status: SHIPPED | OUTPATIENT
Start: 2024-12-09

## 2024-12-09 NOTE — TELEPHONE ENCOUNTER
Rx Refill Note  Requested Prescriptions     Pending Prescriptions Disp Refills    zolpidem (AMBIEN) 5 MG tablet [Pharmacy Med Name: ZOLPIDEM TARTRATE 5 MG TABLET] 30 tablet      Sig: TAKE 1/2 TO 1 TABLET BY MOUTH AT BEDTIME    promethazine (PHENERGAN) 12.5 MG tablet [Pharmacy Med Name: PROMETHAZINE 12.5 MG TABLET] 15 tablet 1     Sig: TAKE 1/2 TO 1 TABLET BY MOUTH EVERY 6 HOURS AS NEEDED FOR NAUSEA      Last office visit with prescribing clinician: 10/3/2024   Last telemedicine visit with prescribing clinician: Visit date not found   Next office visit with prescribing clinician: Visit date not found    11/8/24

## 2024-12-13 ENCOUNTER — TELEPHONE (OUTPATIENT)
Dept: INTERNAL MEDICINE | Facility: CLINIC | Age: 69
End: 2024-12-13
Payer: MEDICARE

## 2024-12-13 NOTE — TELEPHONE ENCOUNTER
Caller: KHALIF TOWNSEND    Relationship:     Best call back number: [phone]         Who are you requesting to speak with (clinical staff, provider,  specific staff member):         What was the call regarding: CALLING AND REQUESTING A VERBAL ORDER FOR PT NEXT WEEK 2 TIMES. THEN AFTER THAT 1 TIME A WEEK FOR 3 WEEKS.   THEN RE-ACCESS.     PLEASE CALL TO GIVE A VERBAL OKAY

## 2024-12-13 NOTE — TELEPHONE ENCOUNTER
Caller: OMAR CABALLERO    Relationship: Other    Best call back number: [phone]     What orders are you requesting (i.e. lab or imaging): PSYCH NURSING AND SOCIAL WORK SERVICES 1 VISIT     In what timeframe would the patient need to come in: ASAP

## 2024-12-17 ENCOUNTER — TELEPHONE (OUTPATIENT)
Dept: INTERNAL MEDICINE | Facility: CLINIC | Age: 69
End: 2024-12-17

## 2024-12-17 NOTE — TELEPHONE ENCOUNTER
Caller: AYDEN    Relationship to patient: Home Health    Best call back number: [phone]    Patient is needing: SHE NO LONGER NEEDS THE ORDERS THAT WERE REQUESTED ON 12/13 FOR PSYCH, NURSING AND SOCIAL WORK AT THIS TIME

## 2024-12-18 ENCOUNTER — TELEPHONE (OUTPATIENT)
Dept: INTERNAL MEDICINE | Facility: CLINIC | Age: 69
End: 2024-12-18

## 2024-12-18 NOTE — TELEPHONE ENCOUNTER
Caller: FRANCISCO= FEDERICO    Relationship: Other    Best call back number: [phone]     Who are you requesting to speak with (clinical staff, provider,  specific staff member): DR. APARICIO       What was the call regarding: SECOND TIME HAS TRIED TO VISIT PATIENT FOR WOUND CARE AND NO ONE ANSWER THE DOOR- IS GOING TO HAVE TO CALL ADULT PROTECTIVE SERVICE BECAUSE PATIENT IS BED BOUND AND IS LEFT ALONE ALL THE TIME PROBABLY BE DISCHARGED FROM THEM

## 2024-12-27 ENCOUNTER — TELEPHONE (OUTPATIENT)
Dept: INTERNAL MEDICINE | Facility: CLINIC | Age: 69
End: 2024-12-27
Payer: MEDICARE

## 2024-12-27 NOTE — TELEPHONE ENCOUNTER
Ruddy from [facility] called about the patient today. He is the patient's PT and wanted to let us know that [facility] is going to be discharging he patient. They said that he is strictly bed bound and his caregivers are leaving him alone for long periods of time. He suggested and said that the patient needs and would benefit from long term care for rehab. If Dr. Ward needs to call Ruddy his number is [phone] and the office number is [phone] .

## 2025-01-02 ENCOUNTER — TELEPHONE (OUTPATIENT)
Dept: INTERNAL MEDICINE | Facility: CLINIC | Age: 70
End: 2025-01-02
Payer: MEDICARE

## 2025-01-02 DIAGNOSIS — R29.898 WEAKNESS OF BOTH LOWER EXTREMITIES: ICD-10-CM

## 2025-01-02 DIAGNOSIS — Z79.4 TYPE 2 DIABETES MELLITUS WITH DIABETIC AUTONOMIC NEUROPATHY, WITH LONG-TERM CURRENT USE OF INSULIN: ICD-10-CM

## 2025-01-02 DIAGNOSIS — E11.43 TYPE 2 DIABETES MELLITUS WITH DIABETIC AUTONOMIC NEUROPATHY, WITH LONG-TERM CURRENT USE OF INSULIN: ICD-10-CM

## 2025-01-02 DIAGNOSIS — M48.062 LUMBAR STENOSIS WITH NEUROGENIC CLAUDICATION: ICD-10-CM

## 2025-01-02 DIAGNOSIS — L89.153 DECUBITUS ULCER OF COCCYGEAL REGION, STAGE 3: Primary | ICD-10-CM

## 2025-01-08 ENCOUNTER — TELEPHONE (OUTPATIENT)
Dept: INTERNAL MEDICINE | Facility: CLINIC | Age: 70
End: 2025-01-08
Payer: MEDICARE

## 2025-01-08 NOTE — TELEPHONE ENCOUNTER
Pt calling to ask if you will send him or admit him to [facility].

## 2025-01-15 ENCOUNTER — READMISSION MANAGEMENT (OUTPATIENT)
Dept: CALL CENTER | Facility: HOSPITAL | Age: 70
End: 2025-01-15
Payer: MEDICARE

## 2025-01-15 NOTE — OUTREACH NOTE
Prep Survey      Flowsheet Row Responses   Religious facility patient discharged from? Non-BH   Is LACE score < 7 ? Non-BH Discharge   Eligibility Not Eligible   What are the reasons patient is not eligible? [facility]  [Skilled Nursing Facility]   Does the patient have one of the following disease processes/diagnoses(primary or secondary)? Other   Prep survey completed? Yes            Greer KUNZ - Registered Nurse

## 2025-02-04 NOTE — TELEPHONE ENCOUNTER
The patient called and asked for a referral for home health. It was very hard to hear and understand him.   
No-Patient/Caregiver offered and refused free interpretation services.

## 2025-02-24 ENCOUNTER — TELEPHONE (OUTPATIENT)
Dept: INTERNAL MEDICINE | Facility: CLINIC | Age: 70
End: 2025-02-24

## 2025-02-24 ENCOUNTER — TELEPHONE (OUTPATIENT)
Dept: INTERNAL MEDICINE | Facility: CLINIC | Age: 70
End: 2025-02-24
Payer: MEDICARE

## 2025-02-24 NOTE — TELEPHONE ENCOUNTER
"Caller: Arden Carrasquillo    Relationship: Self    Best call back number: [phone]     What is the medical concern/diagnosis: RECENTLY HOSPITALIZED - MOSTLY WITH HIS LEGS. HE IS HOME AND HE DOESN'T HAVE ANYBODY TO TAKE CARE OF HIM.    What specialty or service is being requested: REHAB    What is the provider, practice or medical service name: [facility]    What is the office location: \"[facility]\"    Any additional details: PLEASE CALL TO DISCUSS AS NEEDED    "

## 2025-02-24 NOTE — TELEPHONE ENCOUNTER
Nelly from [facility] called and said that they are not going to be able to Arden on as a patient. The patient is completely bed bound and requires 2 plus people to lift him. This is not something that can be done.

## 2025-02-25 NOTE — TELEPHONE ENCOUNTER
If home health, and his caregivers cannot take care of him, he needs to be directed back to the hospital for admission and needs to be placed in an assisted living facility

## 2025-03-10 ENCOUNTER — READMISSION MANAGEMENT (OUTPATIENT)
Dept: CALL CENTER | Facility: HOSPITAL | Age: 70
End: 2025-03-10
Payer: MEDICARE

## 2025-03-11 NOTE — OUTREACH NOTE
Prep Survey      Flowsheet Row Responses   [place] facility patient discharged from? Non-BH   Is LACE score < 7 ? Non-BH Discharge   Eligibility Not Eligible   What are the reasons patient is not eligible? [facility]   Does the patient have one of the following disease processes/diagnoses(primary or secondary)? Other   Prep survey completed? Yes            Rosa Maria Liriano Registered Nurse